# Patient Record
Sex: FEMALE | Race: BLACK OR AFRICAN AMERICAN | Employment: OTHER | URBAN - METROPOLITAN AREA
[De-identification: names, ages, dates, MRNs, and addresses within clinical notes are randomized per-mention and may not be internally consistent; named-entity substitution may affect disease eponyms.]

---

## 2017-01-04 ENCOUNTER — DOCUMENTATION ONLY (OUTPATIENT)
Dept: INTERNAL MEDICINE CLINIC | Age: 37
End: 2017-01-04

## 2017-01-04 NOTE — PROGRESS NOTES
NN chart review reveals pt is no longer being cared for by this practice. All active episodes will be closed.

## 2017-03-21 ENCOUNTER — HOSPITAL ENCOUNTER (EMERGENCY)
Age: 37
Discharge: HOME | End: 2017-03-21
Attending: INTERNAL MEDICINE
Payer: MEDICARE

## 2017-03-21 VITALS
DIASTOLIC BLOOD PRESSURE: 90 MMHG | SYSTOLIC BLOOD PRESSURE: 147 MMHG | OXYGEN SATURATION: 100 % | HEART RATE: 83 BPM | WEIGHT: 244.8 LBS | RESPIRATION RATE: 20 BRPM | HEIGHT: 64 IN | BODY MASS INDEX: 41.79 KG/M2 | TEMPERATURE: 98.4 F

## 2017-03-21 DIAGNOSIS — K21.00 GASTROESOPHAGEAL REFLUX DISEASE WITH ESOPHAGITIS: Primary | ICD-10-CM

## 2017-03-21 LAB — TROPONIN I BLD-MCNC: <0.04 NG/ML (ref 0–0.08)

## 2017-03-21 PROCEDURE — 74011000250 HC RX REV CODE- 250: Performed by: INTERNAL MEDICINE

## 2017-03-21 PROCEDURE — 99284 EMERGENCY DEPT VISIT MOD MDM: CPT

## 2017-03-21 PROCEDURE — 74011250637 HC RX REV CODE- 250/637: Performed by: INTERNAL MEDICINE

## 2017-03-21 PROCEDURE — 93005 ELECTROCARDIOGRAM TRACING: CPT

## 2017-03-21 PROCEDURE — 84484 ASSAY OF TROPONIN QUANT: CPT

## 2017-03-21 RX ORDER — RANITIDINE HCL 75 MG
75 TABLET ORAL
Qty: 10 TAB | Refills: 0 | Status: SHIPPED | OUTPATIENT
Start: 2017-03-21 | End: 2017-03-31

## 2017-03-21 RX ADMIN — PHENOBARBITAL ELIXIR 50 ML: 16.2; .1037; .0065; .0194 ELIXIR ORAL at 02:06

## 2017-03-21 NOTE — ED PROVIDER NOTES
HPI Comments: Claudetta Mathieu is a 42 y.o. female with history significant for HTN, DM, and asthma presenting ambulatory to [facility] ED with c/o sudden onset of abdominal pain. Per pt, she has been experiencing a moderate 5/10 burning sensation to her epigastric abdomen which radiates upwards diffusely into her chest. Pt reports history of GERD and believes it is acting up. Pt states her symptoms have been present for the past three days but were exacerbated with ingestion of a \"hot dog from -\" today. Pt notes she has attempted to take Omeprazole and Mylanta with little relief. Pt specifically denies any nausea, vomiting, fevers, chills, urinary complications, chest pain, or SOB. PCP: Vangie Atkins MD    PMHx: depression, hyperlipidemia   PSHx: tubal ligation  Social Hx: + EtOH; + Smoker; - Illicit Drugs    There are no other changes, complaints or physical findings at this time. The history is provided by the patient.       Past Medical History:   Diagnosis Date    Abnormal Pap smear     \"years ago\"     Asthma     bronchitis    Depression with anxiety 2010    Diabetes ([facility] [address]     Diabetes mellitus     type II diabetes mellitus since 2004    Essential hypertension     chronic hypertension    Gastrointestinal disorder     GERD    GERD (gastroesophageal reflux disease)     HTN (hypertension) 2010    Hypertension     Ill-defined condition     High Cholesterol    Other and unspecified hyperlipidemia 2010    Postpartum depression     hospitalized after last delivery    Psychiatric disorder     Depression, Bipolar, & Anxiety    Psychiatric problem     anxiety    Psychiatric problem     \"mood disorder\"    Rhinitis 2012     Past Surgical History:   Procedure Laterality Date     DELIVERY ONLY      cesearean section 08    HX  SECTION      X 3    HX GYN      , tubal ligation     Family History:   Problem Relation Age of Onset    Diabetes Mother    Akil Hernan Hypertension Mother     Stroke Mother     Heart Disease Mother     Hypertension Father     Stroke Father     Diabetes Father     Kidney Disease Father     Hypertension Maternal Grandmother     Diabetes Maternal Grandmother     Hypertension Maternal Grandfather     Diabetes Maternal Grandfather     Hypertension Paternal Grandmother     Diabetes Paternal [de-identified]     Hypertension Paternal Grandfather     Diabetes Paternal Grandfather     Hypertension Sister     Diabetes Sister     Diabetes Sister     Hypertension Sister     Diabetes Sister     Hypertension Sister      Social History     Social History    Marital status: SINGLE     Spouse name: N/A    Number of children: N/A    Years of education: N/A     Occupational History    Not on file. Social History Main Topics    Smoking status: Current Every Day Smoker     Packs/day: 0.50     Last attempt to quit: 10/16/2015    Smokeless tobacco: Never Used    Alcohol use No      Comment: Occasionally    Drug use: No    Sexual activity: Yes     Partners: Male     Birth control/ protection: Surgical     Other Topics Concern    Not on file     Social History Narrative    ** Merged History Encounter **          ALLERGIES: Latex; Latex; Other food; Flagyl [metronidazole]; Lisinopril-hydrochlorothiazide; Clindamycin; Ciprofloxacin; Citrate; Cymbalta [duloxetine]; Flexeril [cyclobenzaprine]; Pcn [penicillins]; Sulfa (sulfonamide antibiotics); Tomato; and Tomato    Review of Systems   Constitutional: Negative for chills, diaphoresis and fever. HENT: Negative for rhinorrhea and sore throat. Eyes: Negative for visual disturbance. Respiratory: Negative for cough and shortness of breath. Cardiovascular: Negative for chest pain. Gastrointestinal: Positive for abdominal pain. Negative for constipation, diarrhea, nausea and vomiting. Genitourinary: Negative for dysuria, frequency, hematuria and urgency.    Musculoskeletal: Negative for back pain.   Skin: Negative for wound. Neurological: Negative for syncope, numbness and headaches. Vitals:    03/21/17 0144   BP: 147/90   Pulse: 83   Resp: 20   Temp: 98.4 °F (36.9 °C)   SpO2: 100%   Weight: 111 kg (244 lb 12.8 oz)   Height: 5' 4\" (1.626 m)          Physical Exam   Constitutional: She is oriented to person, place, and time. She appears well-developed and well-nourished. No distress. HENT:   Head: Normocephalic and atraumatic. Eyes: EOM are normal. Pupils are equal, round, and reactive to light. Neck: Normal range of motion. Cardiovascular: Normal rate, regular rhythm and normal heart sounds. Exam reveals no gallop and no friction rub. No murmur heard. Pulmonary/Chest: Effort normal and breath sounds normal. No respiratory distress. She has no wheezes. She has no rales. She exhibits no tenderness. Abdominal: Soft. Bowel sounds are normal. She exhibits no distension and no mass. There is no tenderness. There is no rebound and no guarding. Musculoskeletal: Normal range of motion. She exhibits no edema, tenderness or deformity. Neurological: She is alert and oriented to person, place, and time. No cranial nerve deficit. Skin: Skin is warm. No rash noted. She is not diaphoretic. No erythema. Psychiatric: Her behavior is normal.   Nursing note and vitals reviewed. MDM  Number of Diagnoses or Management Options  Diagnosis management comments: DDx: reflux, esophagitis, MI       Amount and/or Complexity of Data Reviewed  Clinical lab tests: ordered and reviewed  Tests in the medicine section of CPT®: reviewed and ordered  Review and summarize past medical records: yes  Independent visualization of images, tracings, or specimens: yes    Patient Progress  Patient progress: stable    ED Course     Procedures     EKG interpretation: (Preliminary) 0200  Rhythm: normal sinus rhythm; and regular . Rate (approx.): 77;  Axis: normal; ND interval: normal; QRS interval: normal ; ST/T wave: non-specific T wave flattening; This note is prepared by Quiana San acting as Scribe for Tyra Damon MD    LABORATORY TESTS:  Recent Results (from the past 12 hour(s))   EKG, 12 LEAD, INITIAL    Collection Time: 03/21/17  2:02 AM   Result Value Ref Range    Ventricular Rate 77 BPM    Atrial Rate 77 BPM    P-R Interval 176 ms    QRS Duration 80 ms    Q-T Interval 386 ms    QTC Calculation (Bezet) 436 ms    Calculated P Axis 38 degrees    Calculated R Axis 24 degrees    Calculated T Axis 25 degrees    Diagnosis       Normal sinus rhythm  Nonspecific T wave abnormality  Abnormal ECG  When compared with ECG of 03-NOV-2016 23:31,  Nonspecific T wave abnormality now evident in Lateral leads     POC TROPONIN-I    Collection Time: 03/21/17  2:12 AM   Result Value Ref Range    Troponin-I (POC) <0.04 0.00 - 0.08 ng/mL     MEDICATIONS GIVEN:  Medications   mylanta/donnatal/viscous lidocaine (GI COCKTAIL) (50 mL Oral Given 3/21/17 0206)     IMPRESSION:  1. Gastroesophageal reflux disease with esophagitis      PLAN:  1. Discharge Medication List as of 3/21/2017  2:37 AM      START taking these medications    Details   raNITIdine (ZANTAC 75) 75 mg tablet Take 1 Tab by mouth nightly for 10 doses. , Print, Disp-10 Tab, R-0         CONTINUE these medications which have NOT CHANGED    Details   ondansetron hcl (ZOFRAN, AS HYDROCHLORIDE,) 4 mg tablet Take 1 Tab by mouth every eight (8) hours as needed for Nausea., Normal, Disp-10 Tab, R-0      metoclopramide HCl (REGLAN) 5 mg tablet Take 5 mg by mouth Before breakfast, lunch, and dinner., Historical Med      omeprazole (PRILOSEC) 20 mg capsule Historical Med      gabapentin (NEURONTIN) 100 mg capsule Take 100 mg by mouth three (3) times daily. , Historical Med           2.    Follow-up Information     Follow up With Details Comments [address], [place] In 2 days  [address]  [phone]          Return to ED if worse     DISCHARGE NOTE:    2:44 AM  The patient is ready for discharge. The patient signs, symptoms, diagnosis, and discharge instructions have been discussed and the patient has conveyed their understanding. The patient is to follow-up as reccommended or returned to the ER should their symptoms worsen. Plan has been discussed and patient is in agreement. This note is prepared by Erin Perea acting as Scribe for Rebekah Dillon MD.    Rebekah Dillon MD: This scribe's documentation has been prepared under my direction and personally reviewed by me in its entirety. I confirm that the note above accurately reflects all work, treatment procedures and medical decision makings performed by me.

## 2017-03-21 NOTE — DISCHARGE INSTRUCTIONS

## 2017-03-21 NOTE — ED NOTES
Patient (s)  given copy of dc instructions and 1 script(s). Patient(s)  verbalized understanding of instructions and script (s). Patient given a current medication reconciliation form and verbalized understanding of their medications. Patient (s) verbalized understanding of the importance of discussing medications with  his or her physician or clinic when they follow up. Patient alert and oriented and in no acute distress. Pt verbalizes pain scale of 0 out of 10. Patient discharged home ambulatory.

## 2017-03-23 LAB
ATRIAL RATE: 77 BPM
CALCULATED P AXIS, ECG09: 38 DEGREES
CALCULATED R AXIS, ECG10: 24 DEGREES
CALCULATED T AXIS, ECG11: 25 DEGREES
DIAGNOSIS, 93000: NORMAL
P-R INTERVAL, ECG05: 176 MS
Q-T INTERVAL, ECG07: 386 MS
QRS DURATION, ECG06: 80 MS
QTC CALCULATION (BEZET), ECG08: 436 MS
VENTRICULAR RATE, ECG03: 77 BPM

## 2017-06-09 ENCOUNTER — HOSPITAL ENCOUNTER (EMERGENCY)
Age: 37
Discharge: HOME | End: 2017-06-09
Attending: EMERGENCY MEDICINE
Payer: MEDICARE

## 2017-06-09 VITALS
WEIGHT: 245 LBS | TEMPERATURE: 98.7 F | RESPIRATION RATE: 18 BRPM | OXYGEN SATURATION: 99 % | BODY MASS INDEX: 41.83 KG/M2 | SYSTOLIC BLOOD PRESSURE: 166 MMHG | DIASTOLIC BLOOD PRESSURE: 85 MMHG | HEIGHT: 64 IN | HEART RATE: 87 BPM

## 2017-06-09 DIAGNOSIS — S40.862A INSECT BITE OF ARM, LEFT, INITIAL ENCOUNTER: Primary | ICD-10-CM
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
## 2017-06-09 PROCEDURE — 74011250637 HC RX REV CODE- 250/637: Performed by: PHYSICIAN ASSISTANT

## 2017-06-09 PROCEDURE — 99283 EMERGENCY DEPT VISIT LOW MDM: CPT

## 2017-06-09 RX ORDER — DIPHENHYDRAMINE HCL 25 MG
25 CAPSULE ORAL
Status: COMPLETED | OUTPATIENT
Start: 2017-06-09 | End: 2017-06-09

## 2017-06-09 RX ORDER — IBUPROFEN 400 MG/1
800 TABLET ORAL
Status: COMPLETED | OUTPATIENT
Start: 2017-06-09 | End: 2017-06-09

## 2017-06-09 RX ORDER — DIPHENHYDRAMINE HCL 50 MG
50 CAPSULE ORAL
Qty: 15 CAP | Refills: 0 | Status: SHIPPED | OUTPATIENT
Start: 2017-06-09 | End: 2017-06-19

## 2017-06-09 RX ORDER — IBUPROFEN 800 MG/1
800 TABLET ORAL
Qty: 20 TAB | Refills: 0 | Status: SHIPPED | OUTPATIENT
Start: 2017-06-09 | End: 2018-01-17

## 2017-06-09 RX ORDER — DOXYCYCLINE 100 MG/1
100 CAPSULE ORAL 2 TIMES DAILY
Qty: 14 CAP | Refills: 0 | Status: SHIPPED | OUTPATIENT
Start: 2017-06-09 | End: 2017-06-16

## 2017-06-09 RX ADMIN — IBUPROFEN 800 MG: 400 TABLET, FILM COATED ORAL at 09:39

## 2017-06-09 RX ADMIN — DIPHENHYDRAMINE HYDROCHLORIDE 25 MG: 25 CAPSULE ORAL at 09:39

## 2017-06-09 NOTE — DISCHARGE INSTRUCTIONS
Insect Stings and Bites: Care Instructions  Your Care Instructions  Stings and bites from bees, wasps, ants, and other insects often cause pain, swelling, redness, and itching. In some people, especially children, the redness and swelling may be worse. It may extend several inches beyond the affected area. But in most cases, stings and bites don't cause reactions all over the body. If you have had a reaction to an insect sting or bite, you are at risk for a reaction if you get stung or bitten again. Follow-up care is a key part of your treatment and safety. Be sure to make and go to all appointments, and call your doctor if you are having problems. It's also a good idea to know your test results and keep a list of the medicines you take. How can you care for yourself at home? · Do not scratch or rub the skin where the sting or bite occurred. · Put a cold pack or ice cube on the area. Put a thin cloth between the ice and your skin. For some people, a paste of baking soda mixed with a little water helps relieve pain and decrease the reaction. · Take an over-the-counter antihistamine, such as diphenhydramine (Benadryl) or loratadine (Claritin), to relieve swelling, redness, and itching. Calamine lotion or hydrocortisone cream may also help. Do not give antihistamines to your child unless you have checked with the doctor first.  · Be safe with medicines. If your doctor prescribed medicine for your allergy, take it exactly as prescribed. Call your doctor if you think you are having a problem with your medicine. You will get more details on the specific medicines your doctor prescribes. · Your doctor may prescribe a shot of epinephrine to carry with you in case you have a severe reaction. Learn how and when to give yourself the shot, and keep it with you at all times. Make sure it has not . · Go to the emergency room anytime you have a severe reaction.  Go even if you have given yourself epinephrine and are feeling better. Symptoms can come back. When should you call for help? Call 911 anytime you think you may need emergency care. For example, call if:  · You have symptoms of a severe allergic reaction. These may include:  ¨ Sudden raised, red areas (hives) all over your body. ¨ Swelling of the throat, mouth, lips, or tongue. ¨ Trouble breathing. ¨ Passing out (losing consciousness). Or you may feel very lightheaded or suddenly feel weak, confused, or restless. Call your doctor now or seek immediate medical care if:  · You have symptoms of an allergic reaction not right at the sting or bite, such as:  ¨ A rash or small area of hives (raised, red areas on the skin). ¨ Itching. ¨ Swelling. ¨ Belly pain, nausea, or vomiting. · You have a lot of swelling around the site (such as your entire arm or leg is swollen). · You have signs of infection, such as:  ¨ Increased pain, swelling, redness, or warmth around the sting. ¨ Red streaks leading from the area. ¨ Pus draining from the sting. ¨ A fever. Watch closely for changes in your health, and be sure to contact your doctor if:  · You do not get better as expected. Where can you learn more? Go to http://rosalino-jordan.info/. Enter P390 in the search box to learn more about \"Insect Stings and Bites: Care Instructions. \"  Current as of: July 28, 2016  Content Version: 11.2  © 1060-1171 [employer]. Care instructions adapted under license by [employer] (which disclaims liability or warranty for this information). If you have questions about a medical condition or this instruction, always ask your healthcare professional. [place] [address] any warranty or liability for your use of this information.

## 2017-06-09 NOTE — ED TRIAGE NOTES
Bite trevor with redness/swelling/pain noted on left upper arm area since yesterday, \"i woke up with it,\" denies recent injury/trauma

## 2017-06-09 NOTE — ED PROVIDER NOTES
Patient is a 42 y.o. female presenting with arm swelling. The history is provided by the patient. Arm swelling    This is a new problem. The current episode started yesterday (pt states she thinks she was bit by something and she started scratching the area but woke up this morning and it was swollen, painful, and \"feels like it has fever in it\"). The problem occurs constantly. The problem has been gradually worsening. The pain is present in the left arm. The quality of the pain is described as aching and constant. The pain is at a severity of 9/10. The pain is severe. Pertinent negatives include full range of motion. The symptoms are aggravated by palpation and movement. She has tried nothing for the symptoms. There has been no history of extremity trauma.         Past Medical History:   Diagnosis Date    Abnormal Pap smear     \"years ago\"     Asthma     bronchitis    Depression with anxiety 2010    Diabetes ([facility] [address]     Diabetes mellitus     type II diabetes mellitus since     Essential hypertension     chronic hypertension    Gastrointestinal disorder     GERD    GERD (gastroesophageal reflux disease)     HTN (hypertension) 2010    Hypertension     Ill-defined condition     High Cholesterol    Other and unspecified hyperlipidemia 2010    Postpartum depression     hospitalized after last delivery    Psychiatric disorder     Depression, Bipolar, & Anxiety    Psychiatric problem     anxiety    Psychiatric problem     \"mood disorder\"    Rhinitis 2012       Past Surgical History:   Procedure Laterality Date     DELIVERY ONLY      cesearean section 08    HX  SECTION      X 3    HX GYN      , tubal ligation         Family History:   Problem Relation Age of Onset    Diabetes Mother     Hypertension Mother     Stroke Mother     Heart Disease Mother     Hypertension Father     Stroke Father     Diabetes Father     Kidney Disease Father    Andrew Jacobmiryam Hypertension Maternal Grandmother     Diabetes Maternal Grandmother     Hypertension Maternal Grandfather     Diabetes Maternal Grandfather     Hypertension Paternal Grandmother     Diabetes Paternal [de-identified]     Hypertension Paternal Grandfather     Diabetes Paternal Grandfather     Hypertension Sister     Diabetes Sister     Diabetes Sister     Hypertension Sister     Diabetes Sister     Hypertension Sister        Social History     Social History    Marital status: SINGLE     Spouse name: N/A    Number of children: N/A    Years of education: N/A     Occupational History    Not on file. Social History Main Topics    Smoking status: Current Every Day Smoker     Packs/day: 0.50     Last attempt to quit: 10/16/2015    Smokeless tobacco: Never Used    Alcohol use No      Comment: Occasionally    Drug use: No    Sexual activity: Yes     Partners: Female     Other Topics Concern    Not on file     Social History Narrative    ** Merged History Encounter **              ALLERGIES: Latex; Latex; Other food; Flagyl [metronidazole]; Lisinopril-hydrochlorothiazide; Clindamycin; Ciprofloxacin; Citrate; Cymbalta [duloxetine]; Flexeril [cyclobenzaprine]; Pcn [penicillins]; Sulfa (sulfonamide antibiotics); Tomato; and Tomato    Review of Systems   Constitutional: Negative for fever. Musculoskeletal: Positive for myalgias. Skin: Positive for color change and rash. Neurological: Negative for speech difficulty and weakness. All other systems reviewed and are negative. Vitals:    06/09/17 0904 06/09/17 0909   BP: (!) 177/114 166/85   Pulse: 87    Resp: 18    Temp: 98.7 °F (37.1 °C)    SpO2: 100% 99%   Weight: 111.1 kg (245 lb)    Height: 5' 4\" (1.626 m)             Physical Exam   Constitutional: She is oriented to person, place, and time. She appears well-developed and well-nourished. No distress. HENT:   Head: Normocephalic and atraumatic.    Eyes: Conjunctivae are normal. Cardiovascular: Normal rate, regular rhythm and normal heart sounds. Pulmonary/Chest: Effort normal and breath sounds normal. No respiratory distress. She has no wheezes. She has no rales. Musculoskeletal:        Left upper arm: She exhibits tenderness and swelling (and erythema to the L. posterior upper arm, with induration, no fluctuance, minimal warmth to touch ). Arms:  Neurological: She is alert and oriented to person, place, and time. Skin: Skin is warm and dry. Psychiatric: She has a normal mood and affect. Her behavior is normal. Judgment and thought content normal.   Nursing note and vitals reviewed. MDM  Number of Diagnoses or Management Options  Insect bite of arm, left, initial encounter:   Diagnosis management comments: DDX: cellulitis, insect bite, allergic reaction    Progress note:  Discussed covering pt for abx.   Pt states she can only take doxy because she is allergic to a lot of stuff and that's the \"only thing that works for [facility]"    ED Course       Procedures

## 2017-06-09 NOTE — ED NOTES
Emergency Department Nursing Plan of Care       The Nursing Plan of Care is developed from the Nursing assessment and Emergency Department Attending provider initial evaluation. The plan of care may be reviewed in the ED Provider note.     The Plan of Care was developed with the following considerations:   Patient / Family readiness to learn indicated by:verbalized understanding  Persons(s) to be included in education: patient  Barriers to Learning/Limitations:No    Signed     Hiram Ramires RN    6/9/2017   9:20 AM

## 2017-07-29 ENCOUNTER — HOSPITAL ENCOUNTER (EMERGENCY)
Age: 37
Discharge: HOME | End: 2017-07-29
Attending: INTERNAL MEDICINE
Payer: MEDICARE

## 2017-07-29 VITALS
WEIGHT: 244 LBS | RESPIRATION RATE: 18 BRPM | SYSTOLIC BLOOD PRESSURE: 174 MMHG | HEART RATE: 85 BPM | BODY MASS INDEX: 43.23 KG/M2 | TEMPERATURE: 97.8 F | HEIGHT: 63 IN | DIASTOLIC BLOOD PRESSURE: 102 MMHG | OXYGEN SATURATION: 97 %

## 2017-07-29 DIAGNOSIS — M79.2 NEUROPATHIC PAIN OF LOWER EXTREMITY, LEFT: Primary | ICD-10-CM

## 2017-07-29 PROCEDURE — 99282 EMERGENCY DEPT VISIT SF MDM: CPT

## 2017-07-29 PROCEDURE — 74011250636 HC RX REV CODE- 250/636: Performed by: INTERNAL MEDICINE

## 2017-07-29 PROCEDURE — 96372 THER/PROPH/DIAG INJ SC/IM: CPT

## 2017-07-29 RX ORDER — KETOROLAC TROMETHAMINE 30 MG/ML
30 INJECTION, SOLUTION INTRAMUSCULAR; INTRAVENOUS
Status: COMPLETED | OUTPATIENT
Start: 2017-07-29 | End: 2017-07-29

## 2017-07-29 RX ORDER — TRAMADOL HYDROCHLORIDE 50 MG/1
50 TABLET ORAL
Qty: 8 TAB | Refills: 0 | Status: SHIPPED | OUTPATIENT
Start: 2017-07-29 | End: 2018-01-17

## 2017-07-29 RX ADMIN — KETOROLAC TROMETHAMINE 30 MG: 30 INJECTION, SOLUTION INTRAMUSCULAR at 03:33

## 2017-07-29 NOTE — ED TRIAGE NOTES
Pt arrived to ED ambulatory with c/o leg pain and hip pain X 2 weeks. Pt denies any injury or trauma to the area. Pt has tried OTC medications w/o relief. Pt is alert and orientated X 4; skin is intact; lungs are clear; pt breaths well on room air; Pt is in no acute distress. Will continue to monitor.

## 2017-07-29 NOTE — ED PROVIDER NOTES
HPI Comments: Margaret Dockery is a 42 y.o. female with significant PMHx of depression, anxiety, hypertension, and diabetes, presents ambulatory to the ED with c/o intermittent episodes of throbbing left hip pain x 2 weeks. She notes the pain radiates down her left leg and left foot numbness. She reports she takes 900 mg Neurontin a day for neuropathy of her feet. Pt denies injury/fall, chest pain, SOB, nausea, vomiting, any exacerbating/modifying factors. PCP: Surya Everett MD  Social Hx: + tobacco use (0.5 ppd), + EtOH use (occasionally), - Illicit drug use    There are no other complaints, changes or physical findings at this time. Written by Beth Mayo ED [employer], as dictated by Mukesh Castellanos MD.      The history is provided by the patient. No  was used.         Past Medical History:   Diagnosis Date    Abnormal Pap smear     \"years ago\"     Asthma     bronchitis    Depression with anxiety 2010    Diabetes ([facility] [address]     Diabetes mellitus     type II diabetes mellitus since     Essential hypertension     chronic hypertension    Gastrointestinal disorder     GERD    GERD (gastroesophageal reflux disease)     HTN (hypertension) 2010    Hypertension     Ill-defined condition     High Cholesterol    Other and unspecified hyperlipidemia 2010    Postpartum depression     hospitalized after last delivery    Psychiatric disorder     Depression, Bipolar, & Anxiety    Psychiatric problem     anxiety    Psychiatric problem     \"mood disorder\"    Rhinitis 2012       Past Surgical History:   Procedure Laterality Date     DELIVERY ONLY      cesearean section 08    HX  SECTION      X 3    HX GYN      , tubal ligation         Family History:   Problem Relation Age of Onset    Diabetes Mother     Hypertension Mother     Stroke Mother     Heart Disease Mother     Hypertension Father     Stroke Father     Diabetes Father    [facility] Kidney Disease Father     Hypertension Maternal Grandmother     Diabetes Maternal Grandmother     Hypertension Maternal Grandfather     Diabetes Maternal Grandfather     Hypertension Paternal Grandmother     Diabetes Paternal [de-identified]     Hypertension Paternal Grandfather     Diabetes Paternal Grandfather     Hypertension Sister     Diabetes Sister     Diabetes Sister     Hypertension Sister     Diabetes Sister     Hypertension Sister        Social History     Social History    Marital status: SINGLE     Spouse name: N/A    Number of children: N/A    Years of education: N/A     Occupational History    Not on file. Social History Main Topics    Smoking status: Current Every Day Smoker     Packs/day: 0.50     Last attempt to quit: 10/16/2015    Smokeless tobacco: Never Used    Alcohol use No      Comment: Occasionally    Drug use: No    Sexual activity: Yes     Partners: Female     Other Topics Concern    Not on file     Social History Narrative    ** Merged History Encounter **              ALLERGIES: Latex; Latex; Other food; Flagyl [metronidazole]; Lisinopril-hydrochlorothiazide; Clindamycin; Ciprofloxacin; Citrate; Cymbalta [duloxetine]; Flexeril [cyclobenzaprine]; Pcn [penicillins]; Sulfa (sulfonamide antibiotics); Tomato; and Tomato    Review of Systems   Constitutional: Negative. Negative for activity change, appetite change, chills, fatigue and fever. HENT: Negative for congestion, ear pain, rhinorrhea, sneezing, sore throat and voice change. Eyes: Negative. Negative for pain. Respiratory: Negative. Negative for cough and shortness of breath. Cardiovascular: Negative. Negative for chest pain. Gastrointestinal: Negative. Negative for abdominal pain, blood in stool, diarrhea, nausea and vomiting. Musculoskeletal: Positive for arthralgias and myalgias (left leg). Negative for back pain (left hip) and neck stiffness. Skin: Negative.   Negative for color change and rash.   Neurological: Negative. Negative for dizziness, syncope, numbness and headaches. Hematological: Negative for adenopathy. Vitals:    07/29/17 0257   BP: (!) 174/102   Pulse: 85   Resp: 18   Temp: 97.8 °F (36.6 °C)   SpO2: 97%   Weight: 110.7 kg (244 lb)   Height: 5' 3\" (1.6 m)            Physical Exam   Constitutional: She is oriented to person, place, and time. She appears well-developed and well-nourished. No distress. hypertension   HENT:   Head: Normocephalic and atraumatic. Mouth/Throat: Oropharynx is clear and moist. No oropharyngeal exudate. Eyes: Conjunctivae and EOM are normal. Pupils are equal, round, and reactive to light. Right eye exhibits no discharge. Left eye exhibits no discharge. Neck: Normal range of motion. Neck supple. Cardiovascular: Normal rate, regular rhythm and intact distal pulses. Exam reveals no gallop and no friction rub. No murmur heard. Pulmonary/Chest: Effort normal and breath sounds normal. No respiratory distress. She has no wheezes. She has no rales. She exhibits no tenderness. Abdominal: Soft. Bowel sounds are normal. She exhibits no distension and no mass. There is no tenderness. There is no rebound and no guarding. Musculoskeletal: Normal range of motion. She exhibits no edema. Lymphadenopathy:     She has no cervical adenopathy. Neurological: She is alert and oriented to person, place, and time. No cranial nerve deficit. Coordination normal.   Skin: Skin is warm and dry. No rash noted. No erythema. Psychiatric: She has a normal mood and affect. Vitals reviewed. MDM  Number of Diagnoses or Management Options  Diagnosis management comments:     DDx: diabetic neuropathy, DJD, fibromyalgia, myositis      Patient Progress  Patient progress: stable    ED Course       Procedures      MEDICATIONS GIVEN:  Medications   ketorolac (TORADOL) injection 30 mg (30 mg IntraMUSCular Given 7/29/17 8967)       IMPRESSION:  1.  Neuropathic pain of lower extremity, left        PLAN:  1. Discharge Medication List as of 7/29/2017  3:02 AM      START taking these medications    Details   traMADol (ULTRAM) 50 mg tablet Take 1 Tab by mouth every eight (8) hours as needed for Pain for up to 8 doses. Max Daily Amount: 150 mg., Print, Disp-8 Tab, R-0         CONTINUE these medications which have NOT CHANGED    Details   metoclopramide HCl (REGLAN) 5 mg tablet Take 5 mg by mouth Before breakfast, lunch, and dinner., Historical Med      gabapentin (NEURONTIN) 100 mg capsule Take 100 mg by mouth three (3) times daily. , Historical Med      ibuprofen (MOTRIN) 800 mg tablet Take 1 Tab by mouth every eight (8) hours as needed for Pain., Normal, Disp-20 Tab, R-0      ondansetron hcl (ZOFRAN, AS HYDROCHLORIDE,) 4 mg tablet Take 1 Tab by mouth every eight (8) hours as needed for Nausea., Normal, Disp-10 Tab, R-0      omeprazole (PRILOSEC) 20 mg capsule Historical Med           2. Follow-up Information     Follow up With Details Comments [address], [place] In 2 days  [address] [facility] [address] [phone]          Return to ED if worse     DISCHARGE NOTE  3:42 AM  The patient has been re-evaluated and is ready for discharge. Reviewed available results with patient. Counseled pt on diagnosis and care plan. Pt has expressed understanding, and all questions have been answered. Pt agrees with plan and agrees to F/U as recommended, or return to the ED if their sxs worsen. Discharge instructions have been provided and explained to the pt, along with reasons to return to the ED. Written by Nicole Wagner, ED Scribe, as dictated by Alexandr Fong MD.      This note is prepared by Nicole Wagner, acting as Scribe for Alexandr Fong MD.    Alexandr Fong MD\ : The scribe's documentation has been prepared under my direction and personally reviewed by me in its entirety.  I confirm that the note above accurately reflects all work, treatment, procedures, and medical decision making performed by me.

## 2017-07-29 NOTE — ED NOTES
Emergency Department Nursing Plan of Care       The Nursing Plan of Care is developed from the Nursing assessment and Emergency Department Attending provider initial evaluation. The plan of care may be reviewed in the ED Provider note.     The Plan of Care was developed with the following considerations:   Patient / Family readiness to learn indicated by:verbalized understanding  Persons(s) to be included in education: patient  Barriers to Learning/Limitations:No    Signed     Marina Combs RN    7/29/2017   3:43 AM

## 2017-11-14 ENCOUNTER — HOSPITAL ENCOUNTER (EMERGENCY)
Age: 37
Discharge: HOME | End: 2017-11-14
Attending: INTERNAL MEDICINE
Payer: MEDICARE

## 2017-11-14 VITALS
TEMPERATURE: 98.8 F | DIASTOLIC BLOOD PRESSURE: 99 MMHG | OXYGEN SATURATION: 95 % | SYSTOLIC BLOOD PRESSURE: 173 MMHG | HEART RATE: 86 BPM | RESPIRATION RATE: 20 BRPM

## 2017-11-14 DIAGNOSIS — J02.9 ACUTE PHARYNGITIS, UNSPECIFIED ETIOLOGY: Primary | ICD-10-CM

## 2017-11-14 LAB — DEPRECATED S PYO AG THROAT QL EIA: NEGATIVE

## 2017-11-14 PROCEDURE — 99283 EMERGENCY DEPT VISIT LOW MDM: CPT

## 2017-11-14 PROCEDURE — 87880 STREP A ASSAY W/OPTIC: CPT | Performed by: PHYSICIAN ASSISTANT

## 2017-11-14 PROCEDURE — 74011250637 HC RX REV CODE- 250/637: Performed by: PHYSICIAN ASSISTANT

## 2017-11-14 PROCEDURE — 87070 CULTURE OTHR SPECIMN AEROBIC: CPT | Performed by: INTERNAL MEDICINE

## 2017-11-14 PROCEDURE — 87147 CULTURE TYPE IMMUNOLOGIC: CPT | Performed by: INTERNAL MEDICINE

## 2017-11-14 RX ORDER — LOSARTAN POTASSIUM 25 MG/1
50 TABLET ORAL DAILY
COMMUNITY
End: 2022-03-15

## 2017-11-14 RX ORDER — GUAIFENESIN 100 MG/5ML
200 SOLUTION ORAL
Qty: 118 ML | Refills: 0 | Status: SHIPPED | OUTPATIENT
Start: 2017-11-14 | End: 2018-01-17

## 2017-11-14 RX ORDER — ACETAMINOPHEN 325 MG/1
650 TABLET ORAL
Qty: 20 TAB | Refills: 0 | Status: SHIPPED | OUTPATIENT
Start: 2017-11-14 | End: 2018-01-17

## 2017-11-14 RX ORDER — FLUTICASONE PROPIONATE 50 MCG
2 SPRAY, SUSPENSION (ML) NASAL DAILY
Qty: 1 BOTTLE | Refills: 0 | Status: SHIPPED | OUTPATIENT
Start: 2017-11-14 | End: 2018-01-17

## 2017-11-14 RX ORDER — ONDANSETRON 4 MG/1
4 TABLET, ORALLY DISINTEGRATING ORAL
Status: COMPLETED | OUTPATIENT
Start: 2017-11-14 | End: 2017-11-14

## 2017-11-14 RX ORDER — AZITHROMYCIN 500 MG/1
500 TABLET, FILM COATED ORAL SEE ADMIN INSTRUCTIONS
Qty: 10 TAB | Refills: 0 | Status: SHIPPED | OUTPATIENT
Start: 2017-11-14 | End: 2017-11-19

## 2017-11-14 RX ORDER — LORATADINE 10 MG/1
10 TABLET ORAL DAILY
Qty: 20 TAB | Refills: 0 | Status: SHIPPED | OUTPATIENT
Start: 2017-11-14 | End: 2018-03-29

## 2017-11-14 RX ADMIN — ONDANSETRON 4 MG: 4 TABLET, ORALLY DISINTEGRATING ORAL at 21:27

## 2017-11-15 NOTE — ED PROVIDER NOTES
Patient is a 42 y.o. female presenting with sore throat. The history is provided by the patient. Sore Throat    This is a new problem. The current episode started more than 2 days ago (x 4 days). The problem has not changed since onset. There has been no fever. Associated symptoms include congestion, headaches and cough. Pertinent negatives include no diarrhea, no vomiting, no drooling, no ear discharge, no ear pain, no plugged ear sensation, no shortness of breath, no stridor, no swollen glands, no trouble swallowing and no stiff neck. She has tried nothing for the symptoms.         Past Medical History:   Diagnosis Date    Abnormal Pap smear     \"years ago\"     Asthma     bronchitis    Depression with anxiety 2010    Diabetes ([facility] [address]     Diabetes mellitus     type II diabetes mellitus since     Essential hypertension     chronic hypertension    Gastrointestinal disorder     GERD    GERD (gastroesophageal reflux disease)     HTN (hypertension) 2010    Hypertension     Ill-defined condition     High Cholesterol    Other and unspecified hyperlipidemia 2010    Postpartum depression     hospitalized after last delivery    Psychiatric disorder     Depression, Bipolar, & Anxiety    Psychiatric problem     anxiety    Psychiatric problem     \"mood disorder\"    Rhinitis 2012       Past Surgical History:   Procedure Laterality Date     DELIVERY ONLY      cesearean section 08    HX  SECTION      X 3    HX GYN      , tubal ligation         Family History:   Problem Relation Age of Onset    Diabetes Mother     Hypertension Mother     Stroke Mother     Heart Disease Mother     Hypertension Father     Stroke Father     Diabetes Father     Kidney Disease Father     Hypertension Maternal Grandmother     Diabetes Maternal Grandmother     Hypertension Maternal Grandfather     Diabetes Maternal Grandfather     Hypertension Paternal Grandmother    [facility] Diabetes Paternal Grandmother     Hypertension Paternal Grandfather     Diabetes Paternal Grandfather     Hypertension Sister     Diabetes Sister     Diabetes Sister     Hypertension Sister     Diabetes Sister     Hypertension Sister        Social History     Social History    Marital status: SINGLE     Spouse name: N/A    Number of children: N/A    Years of education: N/A     Occupational History    Not on file. Social History Main Topics    Smoking status: Current Every Day Smoker     Packs/day: 0.50     Last attempt to quit: 10/16/2015    Smokeless tobacco: Never Used    Alcohol use No      Comment: Occasionally    Drug use: No    Sexual activity: Yes     Partners: Female     Other Topics Concern    Not on file     Social History Narrative    ** Merged History Encounter **              ALLERGIES: Latex; Latex; Other food; Flagyl [metronidazole]; Lisinopril-hydrochlorothiazide; Clindamycin; Ciprofloxacin; Citrate; Cottonseed oil; Cymbalta [duloxetine]; Flexeril [cyclobenzaprine]; Pcn [penicillins]; Sulfa (sulfonamide antibiotics); Tomato; and Tomato    Review of Systems   Constitutional: Negative for activity change, chills, fatigue and fever. HENT: Positive for congestion, rhinorrhea and sore throat. Negative for drooling, ear discharge, ear pain, facial swelling, postnasal drip, sinus pressure, sneezing and trouble swallowing. Eyes: Negative for photophobia, pain, discharge and visual disturbance. Respiratory: Positive for cough. Negative for chest tightness, shortness of breath and stridor. Cardiovascular: Negative for chest pain. Gastrointestinal: Positive for nausea. Negative for abdominal pain, constipation, diarrhea and vomiting. Genitourinary: Negative. Musculoskeletal: Negative. Negative for myalgias and neck pain. Skin: Negative. Negative for rash. Neurological: Positive for headaches.  Negative for dizziness, syncope, facial asymmetry, weakness and light-headedness. Psychiatric/Behavioral: Negative. Vitals:    11/14/17 2108   BP: (!) 173/99   Pulse: 86   Resp: 20   Temp: 98.8 °F (37.1 °C)   SpO2: 95%            Physical Exam   Constitutional: She is oriented to person, place, and time. She appears well-developed and well-nourished. No distress. Obese. HENT:   Head: Normocephalic and atraumatic. Right Ear: Hearing, tympanic membrane, external ear and ear canal normal.   Left Ear: Hearing, tympanic membrane, external ear and ear canal normal.   Nose: Mucosal edema and rhinorrhea present. Right sinus exhibits no maxillary sinus tenderness and no frontal sinus tenderness. Left sinus exhibits no maxillary sinus tenderness and no frontal sinus tenderness. Mouth/Throat: Uvula is midline and mucous membranes are normal. No oral lesions. No trismus in the jaw. No uvula swelling. Posterior oropharyngeal erythema present. No oropharyngeal exudate, posterior oropharyngeal edema or tonsillar abscesses. Eyes: Conjunctivae and EOM are normal. Pupils are equal, round, and reactive to light. Right eye exhibits no discharge. Left eye exhibits no discharge. No scleral icterus. Neck: Normal range of motion. Neck supple. Cardiovascular: Normal rate, regular rhythm, normal heart sounds and intact distal pulses. Exam reveals no gallop and no friction rub. No murmur heard. Pulmonary/Chest: Effort normal and breath sounds normal. No accessory muscle usage. No respiratory distress. She has no decreased breath sounds. She has no wheezes. She has no rhonchi. She has no rales. She exhibits no tenderness. Abdominal: Soft. Bowel sounds are normal. She exhibits no distension and no mass. There is no tenderness. There is no rebound and no guarding. Musculoskeletal: Normal range of motion. Lymphadenopathy:     She has no cervical adenopathy. Neurological: She is alert and oriented to person, place, and time. No cranial nerve deficit.    Skin: Skin is warm and dry. No rash noted. She is not diaphoretic. Psychiatric: She has a normal mood and affect. Her behavior is normal. Judgment and thought content normal.   Nursing note and vitals reviewed. MDM  Number of Diagnoses or Management Options  Acute pharyngitis, unspecified etiology:   Diagnosis management comments: DDx: strep, viral pharyngitis, uri, allergic rhinitis, bronchitis, pneumonia unlikely    LABORATORY TESTS:  Recent Results (from the past 12 hour(s))  -STREP AG SCREEN, GROUP A  Collection Time: 11/14/17  9:19 PM       Result                                            Value                         Ref Range                       Group A Strep Ag ID                               NEGATIVE                      NEG                          IMAGING RESULTS:  No orders to display    MEDICATIONS GIVEN:  Medications  ondansetron (ZOFRAN ODT) tablet 4 mg (4 mg Oral Given 11/14/17 2127)    IMPRESSION:  Acute pharyngitis, unspecified etiology  (primary encounter diagnosis)    PLAN:  1. Discharge Medication List as of 11/14/2017  9:38 PM    START taking these medications    azithromycin (ZITHROMAX) 500 mg tab  Take 1 Tab by mouth See Admin Instructions for 5 days. , Normal, Disp-10 Tab, R-0    acetaminophen (TYLENOL) 325 mg tablet  Take 2 Tabs by mouth every four (4) hours as needed for Pain., Normal, Disp-20 Tab, R-0    guaiFENesin (ROBITUSSIN) 100 mg/5 mL liquid  Take 10 mL by mouth three (3) times daily as needed for Cough., Normal, Disp-118 mL, R-0    fluticasone (FLONASE) 50 mcg/actuation nasal spray  2 Sprays by Both Nostrils route daily. , Normal, Disp-1 Bottle, R-0    loratadine (CLARITIN) 10 mg tablet  Take 1 Tab by mouth daily. , Normal, Disp-20 Tab, R-0      CONTINUE these medications which have NOT CHANGED    losartan (COZAAR) 25 mg tablet  Take 25 mg by mouth daily. , Historical Med    IRON,CARB/VIT C/VIT B12/FOLIC (IRON 628 PLUS PO)  Take  by mouth., Historical Med    metoclopramide HCl (REGLAN) 5 mg tablet  Take 5 mg by mouth Before breakfast, lunch, and dinner., Historical Med    omeprazole (PRILOSEC) 20 mg capsule  Historical Med    gabapentin (NEURONTIN) 100 mg capsule  Take 400 mg by mouth three (3) times daily. , Historical Med    traMADol (ULTRAM) 50 mg tablet  Take 1 Tab by mouth every eight (8) hours as needed for Pain for up to 8 doses. Max Daily Amount: 150 mg., Print, Disp-8 Tab, R-0    ibuprofen (MOTRIN) 800 mg tablet  Take 1 Tab by mouth every eight (8) hours as needed for Pain., Normal, Disp-20 Tab, R-0    ondansetron hcl (ZOFRAN, AS HYDROCHLORIDE,) 4 mg tablet  Take 1 Tab by mouth every eight (8) hours as needed for Nausea., Normal, Disp-10 Tab, R-0        2. Follow-up Information     Follow up With Details Comments [address], [place] Schedule an appointment as soon as possible for a   visit in 1 week As needed, If symptoms worsen [address]        Return to ED if worse                  Amount and/or Complexity of Data Reviewed  Clinical lab tests: ordered and reviewed  Tests in the medicine section of CPT®: ordered and reviewed    Patient Progress  Patient progress: stable    ED Course       Procedures    9:46 PM  I have discussed with patient their diagnosis, treatment, and follow up plan. The patient agrees to follow up as outlined in discharge paperwork and also to return to the ED with any worsening.  Jude Carmona PA-C

## 2017-11-15 NOTE — DISCHARGE INSTRUCTIONS
Sore Throat: Care Instructions  Your Care Instructions    Infection by bacteria or a virus causes most sore throats. Cigarette smoke, dry air, air pollution, allergies, and yelling can also cause a sore throat. Sore throats can be painful and annoying. Fortunately, most sore throats go away on their own. If you have a bacterial infection, your doctor may prescribe antibiotics. Follow-up care is a key part of your treatment and safety. Be sure to make and go to all appointments, and call your doctor if you are having problems. It's also a good idea to know your test results and keep a list of the medicines you take. How can you care for yourself at home? · If your doctor prescribed antibiotics, take them as directed. Do not stop taking them just because you feel better. You need to take the full course of antibiotics. · Gargle with warm salt water once an hour to help reduce swelling and relieve discomfort. Use 1 teaspoon of salt mixed in 1 cup of warm water. · Take an over-the-counter pain medicine, such as acetaminophen (Tylenol), ibuprofen (Advil, Motrin), or naproxen (Aleve). Read and follow all instructions on the label. · Be careful when taking over-the-counter cold or flu medicines and Tylenol at the same time. Many of these medicines have acetaminophen, which is Tylenol. Read the labels to make sure that you are not taking more than the recommended dose. Too much acetaminophen (Tylenol) can be harmful. · Drink plenty of fluids. Fluids may help soothe an irritated throat. Hot fluids, such as tea or soup, may help decrease throat pain. · Use over-the-counter throat lozenges to soothe pain. Regular cough drops or hard candy may also help. These should not be given to young children because of the risk of choking. · Do not smoke or allow others to smoke around you. If you need help quitting, talk to your doctor about stop-smoking programs and medicines.  These can increase your chances of quitting for good. · Use a vaporizer or humidifier to add moisture to your bedroom. Follow the directions for cleaning the machine. When should you call for help? Call your doctor now or seek immediate medical care if:  ? · You have new or worse trouble swallowing. ? · Your sore throat gets much worse on one side. ? Watch closely for changes in your health, and be sure to contact your doctor if you do not get better as expected. Where can you learn more? Go to http://rosalino-jordan.info/. Enter [phone] in the search box to learn more about \"Sore Throat: Care Instructions. \"  Current as of: May 12, 2017  Content Version: 11.4  © 7388-2410 [employer]. Care instructions adapted under license by [employer] (which disclaims liability or warranty for this information). If you have questions about a medical condition or this instruction, always ask your healthcare professional. [address] any warranty or liability for your use of this information.

## 2017-11-15 NOTE — ED NOTES
Patient  given copy of dc instructions and 5 script(s). Patient  verbalized understanding of instructions and script (s). Patient given a current medication reconciliation form and verbalized understanding of their medications. Patient verbalized understanding of the importance of discussing medications with  his or her physician or clinic they will be following up with. Patient alert and oriented and in no acute distress. Patient discharged home ambulatory.

## 2017-11-15 NOTE — ED NOTES
Pt arrived to ED with c/o sore throat X 4 days. Pt states daughter is sick as well. Pt denies any other symptoms. Pt is alert and orientated X 4; skin is intact; lungs are clear; pt breaths well on room air; Pt is in no acute distress. Will continue to monitor. See nursing assessment. Safety precautions in place; call light within reach. Emergency Department Nursing Plan of Care       The Nursing Plan of Care is developed from the Nursing assessment and Emergency Department Attending provider initial evaluation. The plan of care may be reviewed in the ED Provider note.     The Plan of Care was developed with the following considerations:   Patient / Family readiness to learn indicated by:verbalized understanding  Persons(s) to be included in education: patient  Barriers to Learning/Limitations:No    [address], RN    11/14/2017   9:16 PM

## 2017-11-17 LAB
BACTERIA SPEC CULT: ABNORMAL
BACTERIA SPEC CULT: ABNORMAL
SERVICE CMNT-IMP: ABNORMAL

## 2018-01-17 ENCOUNTER — APPOINTMENT (OUTPATIENT)
Dept: GENERAL RADIOLOGY | Age: 38
End: 2018-01-17
Attending: EMERGENCY MEDICINE
Payer: MEDICARE

## 2018-01-17 ENCOUNTER — HOSPITAL ENCOUNTER (EMERGENCY)
Age: 38
Discharge: HOME | End: 2018-01-17
Attending: EMERGENCY MEDICINE
Payer: MEDICARE

## 2018-01-17 VITALS
RESPIRATION RATE: 18 BRPM | HEART RATE: 87 BPM | BODY MASS INDEX: 43.94 KG/M2 | OXYGEN SATURATION: 100 % | SYSTOLIC BLOOD PRESSURE: 156 MMHG | HEIGHT: 63 IN | WEIGHT: 248 LBS | DIASTOLIC BLOOD PRESSURE: 97 MMHG | TEMPERATURE: 98 F

## 2018-01-17 DIAGNOSIS — J06.9 ACUTE UPPER RESPIRATORY INFECTION: Primary | ICD-10-CM

## 2018-01-17 DIAGNOSIS — M94.0 COSTOCHONDRITIS: ICD-10-CM

## 2018-01-17 LAB — HCG UR QL: NEGATIVE

## 2018-01-17 PROCEDURE — 74011000250 HC RX REV CODE- 250: Performed by: EMERGENCY MEDICINE

## 2018-01-17 PROCEDURE — 81025 URINE PREGNANCY TEST: CPT

## 2018-01-17 PROCEDURE — 93005 ELECTROCARDIOGRAM TRACING: CPT

## 2018-01-17 PROCEDURE — 74011250637 HC RX REV CODE- 250/637: Performed by: EMERGENCY MEDICINE

## 2018-01-17 PROCEDURE — 71046 X-RAY EXAM CHEST 2 VIEWS: CPT

## 2018-01-17 PROCEDURE — 99284 EMERGENCY DEPT VISIT MOD MDM: CPT

## 2018-01-17 RX ORDER — FLUTICASONE PROPIONATE 50 MCG
2 SPRAY, SUSPENSION (ML) NASAL DAILY
Qty: 1 BOTTLE | Refills: 0 | Status: SHIPPED | OUTPATIENT
Start: 2018-01-17 | End: 2018-03-29

## 2018-01-17 RX ORDER — CODEINE PHOSPHATE AND GUAIFENESIN 10; 100 MG/5ML; MG/5ML
5 SOLUTION ORAL
Qty: 120 ML | Refills: 0 | Status: SHIPPED | OUTPATIENT
Start: 2018-01-17 | End: 2018-03-29

## 2018-01-17 RX ADMIN — PHENOBARBITAL ELIXIR 50 ML: 16.2; .1037; .0065; .0194 ELIXIR ORAL at 11:42

## 2018-01-17 NOTE — ED PROVIDER NOTES
EMERGENCY DEPARTMENT HISTORY AND PHYSICAL EXAM      Date: 1/17/2018  Patient Name: Manisha Dixon    History of Presenting Illness     Chief Complaint   Patient presents with    Nasal Congestion     headache since yesterday    Chest Pain     intermittent sharp pains in chest since yesterday     History Provided By: Patient    HPI: Manisha Dixon, 42 y.o. female with PMHx significant for HTN, DM, and asthma, presents ambulatory to the ED with cc of gradually worsening diarrhea x 5 days, chest pain x one day, and sudden onset of congestion and cough this morning. Per pt, she has been having intermittent diarrhea for the past several days with no formed BM movements. Pt reports she was evaluated by her PCP for the diarrhea x several days and was advised to stop her Reglan and informed that her Metformin can also cause diarrhea. Aside from that, pt additionally informs of chest pain for the past several days with a moderate 8/10 intensity and an associated sharp, pressuring sensation to the left sided chest since yesterday with gradual worsening. Pt informs increased chest pain with deep inspiration. Pt notes associated chest tightness as well. Pt lastly informs of constant congestion upon waking up this morning alongside a minimal associated cough. Pt reports her cough is predominantly dry with mild, intermittent productivity and yellow-green mucus. Pt reports no other modifying factors. Pt reports recent sick contact. Pt specifically denies any fevers, chills, nausea, vomiting, abdominal pain, palpitations, wheezing, or SOB. PMHx: depression, anxiety   PSHx: tubal ligation   Social Hx: + EtOH; + Smoker; - Illicit Drugs    PCP: Swati Boykin MD    There are no other complaints, changes, or physical findings at this time. Current Outpatient Prescriptions   Medication Sig Dispense Refill    METFORMIN HCL (METFORMIN PO) Take  by mouth.       OTHER Indications: unknown medication for depression that begins with an \"L\"      fluticasone (FLONASE) 50 mcg/actuation nasal spray 2 Sprays by Both Nostrils route daily. 1 Bottle 0    guaiFENesin-codeine (ROBITUSSIN AC) 100-10 mg/5 mL solution Take 5 mL by mouth three (3) times daily as needed for Cough. Max Daily Amount: 15 mL. 120 mL 0    losartan (COZAAR) 25 mg tablet Take 25 mg by mouth daily.  IRON,CARB/VIT C/VIT B12/FOLIC (IRON 034 PLUS PO) Take  by mouth.  metoclopramide HCl (REGLAN) 5 mg tablet Take 5 mg by mouth Before breakfast, lunch, and dinner.  omeprazole (PRILOSEC) 20 mg capsule       gabapentin (NEURONTIN) 100 mg capsule Take 400 mg by mouth three (3) times daily.  loratadine (CLARITIN) 10 mg tablet Take 1 Tab by mouth daily.  21 Tab 0     Past History     Past Medical History:  Past Medical History:   Diagnosis Date    Abnormal Pap smear     \"years ago\"     Asthma     bronchitis    Depression with anxiety 2010    Diabetes ([facility] [address]     Diabetes mellitus     type II diabetes mellitus since     Essential hypertension     chronic hypertension    Gastrointestinal disorder     GERD    GERD (gastroesophageal reflux disease)     HTN (hypertension) 2010    Hypertension     Ill-defined condition     High Cholesterol    Other and unspecified hyperlipidemia 2010    Postpartum depression     hospitalized after last delivery    Psychiatric disorder     Depression, Bipolar, & Anxiety    Psychiatric problem     anxiety    Psychiatric problem     \"mood disorder\"    Rhinitis 2012     Past Surgical History:  Past Surgical History:   Procedure Laterality Date     DELIVERY ONLY      cesearean section 08    HX  SECTION      X 3    HX GYN      , tubal ligation     Family History:  Family History   Problem Relation Age of Onset    Diabetes Mother     Hypertension Mother     Stroke Mother     Heart Disease Mother     Hypertension Father     Stroke Father     Diabetes Father     Kidney Disease Father     Hypertension Maternal Grandmother     Diabetes Maternal Grandmother     Hypertension Maternal Grandfather     Diabetes Maternal Grandfather     Hypertension Paternal Grandmother     Diabetes Paternal Grandmother     Hypertension Paternal Grandfather     Diabetes Paternal Grandfather     Hypertension Sister     Diabetes Sister     Diabetes Sister     Hypertension Sister     Diabetes Sister     Hypertension Sister      Social History:  Social History   Substance Use Topics    Smoking status: Current Every Day Smoker     Packs/day: 0.50     Last attempt to quit: 10/16/2015    Smokeless tobacco: Never Used    Alcohol use No      Comment: Occasionally     Allergies: Allergies   Allergen Reactions    Latex Rash    Latex Hives    Other Food Hives     Citrus Fruits    Flagyl [Metronidazole] Hives and Itching    Lisinopril-Hydrochlorothiazide Angioedema    Clindamycin Rash and Itching    Ciprofloxacin Rash and Itching    Citrate Hives    Cottonseed Oil Rash    Cymbalta [Duloxetine] Hives and Itching    Flexeril [Cyclobenzaprine] Angioedema    Pcn [Penicillins] Hives    Sulfa (Sulfonamide Antibiotics) Shortness of Breath    Tomato Hives    Tomato Hives     Review of Systems   Review of Systems   Constitutional: Negative for chills and fever. HENT: Positive for congestion. Negative for rhinorrhea, sneezing and sore throat. Eyes: Negative for redness and visual disturbance. Respiratory: Positive for cough and chest tightness. Negative for shortness of breath. Cardiovascular: Positive for chest pain. Negative for palpitations and leg swelling. Gastrointestinal: Positive for diarrhea. Negative for abdominal pain, nausea and vomiting. Genitourinary: Negative for difficulty urinating and frequency. Musculoskeletal: Negative for back pain, myalgias and neck stiffness. Skin: Negative for rash. Neurological: Negative for dizziness, syncope, weakness and headaches. Hematological: Negative for adenopathy. All other systems reviewed and are negative. Physical Exam   Physical Exam   Constitutional: She is oriented to person, place, and time. She appears well-developed and well-nourished. Overweight    HENT:   Head: Normocephalic and atraumatic. Mouth/Throat: Oropharynx is clear and moist.   Eyes: Conjunctivae and EOM are normal.   Neck: Normal range of motion and full passive range of motion without pain. Neck supple. Cardiovascular: Normal rate, regular rhythm, S1 normal, S2 normal, normal heart sounds, intact distal pulses and normal pulses. No murmur heard. Pulmonary/Chest: Effort normal and breath sounds normal. No respiratory distress. She has no wheezes. She exhibits no tenderness. Bowel sounds on auscultation of chest    Abdominal: Soft. Normal appearance and bowel sounds are normal. She exhibits no distension. There is no tenderness. There is no rebound. Musculoskeletal: Normal range of motion. Neurological: She is alert and oriented to person, place, and time. She has normal strength. Skin: Skin is warm, dry and intact. No rash noted. Psychiatric: She has a normal mood and affect. Her speech is normal and behavior is normal. Judgment and thought content normal.   Nursing note and vitals reviewed.     Diagnostic Study Results     Labs -     Recent Results (from the past 12 hour(s))   EKG, 12 LEAD, INITIAL    Collection Time: 01/17/18 11:10 AM   Result Value Ref Range    Ventricular Rate 88 BPM    Atrial Rate 88 BPM    P-R Interval 158 ms    QRS Duration 78 ms    Q-T Interval 378 ms    QTC Calculation (Bezet) 457 ms    Calculated P Axis 45 degrees    Calculated R Axis 11 degrees    Calculated T Axis 7 degrees    Diagnosis       Normal sinus rhythm  Possible Left atrial enlargement  Left ventricular hypertrophy  Abnormal ECG  When compared with ECG of 21-MAR-2017 02:02,  No significant change was found     HCG URINE, QL. - POC    Collection Time: 01/17/18 11:31 AM   Result Value Ref Range    Pregnancy test,urine (POC) NEGATIVE  NEG       Radiologic Studies -   XR CHEST PA LAT   Final Result        CT Results  (Last 48 hours)    None        CXR Results  (Last 48 hours)               01/17/18 1201  XR CHEST PA LAT Final result    Impression:  IMPRESSION: No acute cardiopulmonary disease. Narrative: Indication:  Chest Pain, Cough, pneumonia? Exam: PA and lateral views of the chest.       Direct comparison is made to prior CXR dated 11/2016. Findings: Cardiomediastinal silhouette is within normal limits. Lungs are clear   bilaterally. Pleural spaces are normal. Osseous structures are intact. Medical Decision Making   I am the first provider for this patient. I reviewed the vital signs, available nursing notes, past medical history, past surgical history, family history and social history. Vital Signs-Reviewed the patient's vital signs. Patient Vitals for the past 12 hrs:   Temp Pulse Resp BP SpO2   01/17/18 1057 98 °F (36.7 °C) 87 18 (!) 156/97 100 %     Pulse Oximetry Analysis - 100% on RA    Cardiac Monitor:   Rate: 87 bpm  Rhythm: Normal Sinus Rhythm      EKG interpretation: (Preliminary) 1115  Rhythm: normal sinus rhythm; and regular . Rate (approx.): 88; Axis: normal; SD interval: normal; QRS interval: normal ; ST/T wave: normal; Other findings: left ventricular hypertrophy. Written by Merly Marina ED [employer], as dictated by Matthew Preston MD.    Records Reviewed: Nursing Notes and Old Medical Records    Provider Notes (Medical Decision Making):   DDx: GERD, viral illness, PNA, atypical chest pain     ED Course:   Initial assessment performed. The patients presenting problems have been discussed, and they are in agreement with the care plan formulated and outlined with them. I have encouraged them to ask questions as they arise throughout their visit.     TOBACCO COUNSELING:  Upon evaluation, pt expressed that they are a current tobacco user. Pt has been counseled on the dangers of smoking and was encouraged to quit as soon as possible in order to decrease further risks to their health. Pt has conveyed their understanding of the risks involved should they continue to use tobacco products. Disposition:  DISCHARGE NOTE:    12:11 PM  The patient is ready for discharge. The patient signs, symptoms, diagnosis, and discharge instructions have been discussed and the patient has conveyed their understanding. The patient is to follow-up as reccommended or returned to the ER should their symptoms worsen. Plan has been discussed and patient is in agreement. PLAN:  1. Current Discharge Medication List      START taking these medications    Details   fluticasone (FLONASE) 50 mcg/actuation nasal spray 2 Sprays by Both Nostrils route daily. Qty: 1 Bottle, Refills: 0    Associated Diagnoses: Acute upper respiratory infection      guaiFENesin-codeine (ROBITUSSIN AC) 100-10 mg/5 mL solution Take 5 mL by mouth three (3) times daily as needed for Cough. Max Daily Amount: 15 mL. Qty: 120 mL, Refills: 0    Associated Diagnoses: Acute upper respiratory infection           2. Follow-up Information     Follow up With Details Comments [address], [place] Schedule an appointment as soon as possible for a visit  [address]      [facility] EM[facility]  As needed, If symptoms worsen [address] [facility]  [phone]        Return to ED if worse     Diagnosis     Clinical Impression:   1. Acute upper respiratory infection    2. Costochondritis      Attestations: This note is prepared by Merly Marina, acting as Scribe for Sonny Damon MD.    Sonny Damon MD: The scribe's documentation has been prepared under my direction and personally reviewed by me in its entirety.  I confirm that the note above accurately reflects all work, treatment, procedures, and medical decision making performed by me.

## 2018-01-17 NOTE — ED NOTES
Assumed pt care for task only. Patient discharged to home at this time with self. Patient provided with written instructions and 1 prescriptions. All questions answered.

## 2018-01-17 NOTE — DISCHARGE INSTRUCTIONS
Upper Respiratory Infection (Cold): Care Instructions  Your Care Instructions    An upper respiratory infection, or URI, is an infection of the nose, sinuses, or throat. URIs are spread by coughs, sneezes, and direct contact. The common cold is the most frequent kind of URI. The flu and sinus infections are other kinds of URIs. Almost all URIs are caused by viruses. Antibiotics won't cure them. But you can treat most infections with home care. This may include drinking lots of fluids and taking over-the-counter pain medicine. You will probably feel better in 4 to 10 days. The doctor has checked you carefully, but problems can develop later. If you notice any problems or new symptoms, get medical treatment right away. Follow-up care is a key part of your treatment and safety. Be sure to make and go to all appointments, and call your doctor if you are having problems. It's also a good idea to know your test results and keep a list of the medicines you take. How can you care for yourself at home? · To prevent dehydration, drink plenty of fluids, enough so that your urine is light yellow or clear like water. Choose water and other caffeine-free clear liquids until you feel better. If you have kidney, heart, or liver disease and have to limit fluids, talk with your doctor before you increase the amount of fluids you drink. · Take an over-the-counter pain medicine, such as acetaminophen (Tylenol), ibuprofen (Advil, Motrin), or naproxen (Aleve). Read and follow all instructions on the label. · Before you use cough and cold medicines, check the label. These medicines may not be safe for young children or for people with certain health problems. · Be careful when taking over-the-counter cold or flu medicines and Tylenol at the same time. Many of these medicines have acetaminophen, which is Tylenol. Read the labels to make sure that you are not taking more than the recommended dose.  Too much acetaminophen (Tylenol) can be harmful. · Get plenty of rest.  · Do not smoke or allow others to smoke around you. If you need help quitting, talk to your doctor about stop-smoking programs and medicines. These can increase your chances of quitting for good. When should you call for help? Call 911 anytime you think you may need emergency care. For example, call if:  ? · You have severe trouble breathing. ?Call your doctor now or seek immediate medical care if:  ? · You seem to be getting much sicker. ? · You have new or worse trouble breathing. ? · You have a new or higher fever. ? · You have a new rash. ? Watch closely for changes in your health, and be sure to contact your doctor if:  ? · You have a new symptom, such as a sore throat, an earache, or sinus pain. ? · You cough more deeply or more often, especially if you notice more mucus or a change in the color of your mucus. ? · You do not get better as expected. Where can you learn more? Go to http://rosalino-jordan.info/. Enter V576 in the search box to learn more about \"Upper Respiratory Infection (Cold): Care Instructions. \"  Current as of: May 12, 2017  Content Version: 11.4  © 1941-4385 [employer]. Care instructions adapted under license by [employer] (which disclaims liability or warranty for this information). If you have questions about a medical condition or this instruction, always ask your healthcare professional. [place] [address] any warranty or liability for your use of this information. Viral Respiratory Infection: Care Instructions  Your Care Instructions    Viruses are very small organisms. They grow in number after they enter your body. There are many types that cause different illnesses, such as colds and the mumps. The symptoms of a viral respiratory infection often start quickly. They include a fever, sore throat, and runny nose. You may also just not feel well.  Or you may not want to eat much. Most viral respiratory infections are not serious. They usually get better with time and self-care. Antibiotics are not used to treat a viral infection. That's because antibiotics will not help cure a viral illness. In some cases, antiviral medicine can help your body fight a serious viral infection. Follow-up care is a key part of your treatment and safety. Be sure to make and go to all appointments, and call your doctor if you are having problems. It's also a good idea to know your test results and keep a list of the medicines you take. How can you care for yourself at home? · Rest as much as possible until you feel better. · Be safe with medicines. Take your medicine exactly as prescribed. Call your doctor if you think you are having a problem with your medicine. You will get more details on the specific medicine your doctor prescribes. · Take an over-the-counter pain medicine, such as acetaminophen (Tylenol), ibuprofen (Advil, Motrin), or naproxen (Aleve), as needed for pain and fever. Read and follow all instructions on the label. Do not give aspirin to anyone younger than 20. It has been linked to Reye syndrome, a serious illness. · Drink plenty of fluids, enough so that your urine is light yellow or clear like water. Hot fluids, such as tea or soup, may help relieve congestion in your nose and throat. If you have kidney, heart, or liver disease and have to limit fluids, talk with your doctor before you increase the amount of fluids you drink. · Try to clear mucus from your lungs by breathing deeply and coughing. · Gargle with warm salt water once an hour. This can help reduce swelling and throat pain. Use 1 teaspoon of salt mixed in 1 cup of warm water. · Do not smoke or allow others to smoke around you. If you need help quitting, talk to your doctor about stop-smoking programs and medicines. These can increase your chances of quitting for good.   To avoid spreading the virus  · Cough or sneeze into a tissue. Then throw the tissue away. · If you don't have a tissue, use your hand to cover your cough or sneeze. Then clean your hand. You can also cough into your sleeve. · Wash your hands often. Use soap and warm water. Wash for 15 to 20 seconds each time. · If you don't have soap and water near you, you can clean your hands with alcohol wipes or gel. When should you call for help? Call your doctor now or seek immediate medical care if:  ? · You have a new or higher fever. ? · Your fever lasts more than 48 hours. ? · You have trouble breathing. ? · You have a fever with a stiff neck or a severe headache. ? · You are sensitive to light. ? · You feel very sleepy or confused. ? Watch closely for changes in your health, and be sure to contact your doctor if:  ? · You do not get better as expected. Where can you learn more? Go to http://rosalino-jordan.info/. Enter N970 in the search box to learn more about \"Viral Respiratory Infection: Care Instructions. \"  Current as of: May 12, 2017  Content Version: 11.4  © 8695-4439 [employer]. Care instructions adapted under license by [employer] (which disclaims liability or warranty for this information). If you have questions about a medical condition or this instruction, always ask your healthcare professional. [place] [address] any warranty or liability for your use of this information.

## 2018-01-17 NOTE — ED TRIAGE NOTES
Pt reports intermittent sharp pains in right side of chest, onset yesterday, denies any injury, reports occasional cough with yellow/green mucus; also reports nasal congestion and headache since yesterday      Emergency Department Nursing Plan of Care       The Nursing Plan of Care is developed from the Nursing assessment and Emergency Department Attending provider initial evaluation. The plan of care may be reviewed in the ED Provider note.     The Plan of Care was developed with the following considerations:   Patient / Family readiness to learn indicated by:verbalized understanding  Persons(s) to be included in education: patient  Barriers to Learning/Limitations:No    Signed     Debra De Souza RN    1/17/2018   11:01 AM

## 2018-01-19 LAB
ATRIAL RATE: 88 BPM
CALCULATED P AXIS, ECG09: 45 DEGREES
CALCULATED R AXIS, ECG10: 11 DEGREES
CALCULATED T AXIS, ECG11: 7 DEGREES
DIAGNOSIS, 93000: NORMAL
P-R INTERVAL, ECG05: 158 MS
Q-T INTERVAL, ECG07: 378 MS
QRS DURATION, ECG06: 78 MS
QTC CALCULATION (BEZET), ECG08: 457 MS
VENTRICULAR RATE, ECG03: 88 BPM

## 2018-03-21 ENCOUNTER — HOSPITAL ENCOUNTER (EMERGENCY)
Age: 38
Discharge: HOME | End: 2018-03-21
Attending: EMERGENCY MEDICINE
Payer: MEDICARE

## 2018-03-21 VITALS
TEMPERATURE: 97.8 F | SYSTOLIC BLOOD PRESSURE: 151 MMHG | OXYGEN SATURATION: 99 % | HEIGHT: 63 IN | BODY MASS INDEX: 44.3 KG/M2 | WEIGHT: 250 LBS | RESPIRATION RATE: 19 BRPM | HEART RATE: 92 BPM | DIASTOLIC BLOOD PRESSURE: 97 MMHG

## 2018-03-21 DIAGNOSIS — S05.01XA ABRASION OF RIGHT CORNEA, INITIAL ENCOUNTER: Primary | ICD-10-CM

## 2018-03-21 PROCEDURE — 99282 EMERGENCY DEPT VISIT SF MDM: CPT

## 2018-03-21 RX ORDER — ERYTHROMYCIN 5 MG/G
OINTMENT OPHTHALMIC
Qty: 1 TUBE | Refills: 0 | Status: SHIPPED | OUTPATIENT
Start: 2018-03-21 | End: 2018-03-28

## 2018-03-21 NOTE — ED PROVIDER NOTES
EMERGENCY DEPARTMENT HISTORY AND PHYSICAL EXAM      Date: 3/21/2018  Patient Name: Michelle Goemz    History of Presenting Illness     Chief Complaint   Patient presents with    Eye Pain     right eye pain x 2 days, +irritation, denies itching, \"feels like something is in eye,\" denies recent injury. History Provided By: Patient    HPI: Michelle Gomez, 34 y.o. female with PMHx significant for HTN and DM, presents ambulatory to the ED with cc of constant right eye pain with concern for foreign body after walking outdoors while windy 2 days ago. Pt reports attempting to wash out the foreign body without success. She notes some intermittent blurred vision. Pt endorses wearing glasses and denies wearing contacts. She reports rubbing the eye, and notes increased watering of the right eye. Pt denies any other new symptoms at this time. PCP: Omar Davies MD    There are no other complaints, changes, or physical findings at this time. Current Facility-Administered Medications   Medication Dose Route Frequency Provider Last Rate Last Dose    fluorescein (FLU-SURINDER) 0.6 mg ophthalmic strip 1 Strip  1 Strip Both Eyes NOW Meghan Sifuentes MD         Current Outpatient Prescriptions   Medication Sig Dispense Refill    erythromycin (ILOTYCIN) ophthalmic ointment Apply 1 strip four times a day for next 4 days 1 Tube 0    METFORMIN HCL (METFORMIN PO) Take  by mouth.  OTHER Indications: unknown medication for depression that begins with an \"L\"      fluticasone (FLONASE) 50 mcg/actuation nasal spray 2 Sprays by Both Nostrils route daily. 1 Bottle 0    guaiFENesin-codeine (ROBITUSSIN AC) 100-10 mg/5 mL solution Take 5 mL by mouth three (3) times daily as needed for Cough. Max Daily Amount: 15 mL. 120 mL 0    losartan (COZAAR) 25 mg tablet Take 25 mg by mouth daily.  loratadine (CLARITIN) 10 mg tablet Take 1 Tab by mouth daily. 20 Tab 0    IRON,CARB/VIT C/VIT B12/FOLIC (IRON 720 PLUS PO) Take  by mouth.  metoclopramide HCl (REGLAN) 5 mg tablet Take 5 mg by mouth Before breakfast, lunch, and dinner.  omeprazole (PRILOSEC) 20 mg capsule       gabapentin (NEURONTIN) 100 mg capsule Take 400 mg by mouth three (3) times daily.          Past History     Past Medical History:  Past Medical History:   Diagnosis Date    Abnormal Pap smear     \"years ago\"     Asthma     bronchitis    Depression with anxiety 2010    Diabetes ([facility] [address]     Diabetes mellitus     type II diabetes mellitus since     Essential hypertension     chronic hypertension    Gastrointestinal disorder     GERD    GERD (gastroesophageal reflux disease)     HTN (hypertension) 2010    Hypertension     Ill-defined condition     High Cholesterol    Other and unspecified hyperlipidemia 2010    Postpartum depression     hospitalized after last delivery    Psychiatric disorder     Depression, Bipolar, & Anxiety    Psychiatric problem     anxiety    Psychiatric problem     \"mood disorder\"    Rhinitis 2012       Past Surgical History:  Past Surgical History:   Procedure Laterality Date     DELIVERY ONLY      cesearean section 08    HX  SECTION      X 3    HX GYN      , tubal ligation       Family History:  Family History   Problem Relation Age of Onset    Diabetes Mother     Hypertension Mother     Stroke Mother     Heart Disease Mother     Hypertension Father     Stroke Father     Diabetes Father     Kidney Disease Father     Hypertension Maternal Grandmother     Diabetes Maternal Grandmother     Hypertension Maternal Grandfather     Diabetes Maternal Grandfather     Hypertension Paternal Grandmother     Diabetes Paternal Grandmother     Hypertension Paternal Grandfather     Diabetes Paternal Grandfather     Hypertension Sister     Diabetes Sister     Diabetes Sister     Hypertension Sister     Diabetes Sister     Hypertension Sister        Social History:  Social History   Substance Use Topics    Smoking status: Current Every Day Smoker     Packs/day: 0.50     Last attempt to quit: 10/16/2015    Smokeless tobacco: Never Used    Alcohol use No      Comment: Occasionally       Allergies: Allergies   Allergen Reactions    Latex Rash    Latex Hives    Other Food Hives     Citrus Fruits    Flagyl [Metronidazole] Hives and Itching    Lisinopril-Hydrochlorothiazide Angioedema    Clindamycin Rash and Itching    Ciprofloxacin Rash and Itching    Citrate Hives    Cottonseed Oil Rash    Cymbalta [Duloxetine] Hives and Itching    Flexeril [Cyclobenzaprine] Angioedema    Pcn [Penicillins] Hives    Sulfa (Sulfonamide Antibiotics) Shortness of Breath    Tomato Hives    Tomato Hives         Review of Systems   Review of Systems   Constitutional: Negative. Negative for chills, diaphoresis and fever. HENT: Negative for congestion, sore throat and trouble swallowing. Eyes: Positive for pain. Negative for photophobia and redness. Respiratory: Positive for stridor. Negative for cough, chest tightness, shortness of breath and wheezing. Cardiovascular: Negative. Negative for chest pain and palpitations. Gastrointestinal: Negative. Negative for abdominal pain, blood in stool, diarrhea, nausea and vomiting. Genitourinary: Negative for difficulty urinating, dysuria and frequency. Musculoskeletal: Negative. Negative for arthralgias, myalgias, neck pain and neck stiffness. Skin: Negative. Neurological: Negative. Negative for dizziness, tremors, seizures, syncope, speech difficulty, light-headedness and headaches. Psychiatric/Behavioral: Negative. Negative for confusion. The patient is not nervous/anxious. All other systems reviewed and are negative. Physical Exam   Physical Exam   Constitutional: She is oriented to person, place, and time. She appears well-developed and well-nourished. HENT:   Head: Normocephalic and atraumatic.    Eyes: Conjunctivae and EOM are normal.   No obvious foreign body  Inverted right eyelid, no foreign body found  Normal peripheral vision  EOMI without difficulty   Neck: Normal range of motion. Neck supple. Cardiovascular: Normal rate and regular rhythm. Pulmonary/Chest: Effort normal and breath sounds normal. No respiratory distress. Abdominal: Soft. She exhibits no distension. There is no tenderness. Musculoskeletal: Normal range of motion. Neurological: She is alert and oriented to person, place, and time. Skin: Skin is warm and dry. Psychiatric: She has a normal mood and affect. Nursing note and vitals reviewed. Medical Decision Making   I am the first provider for this patient. I reviewed the vital signs, available nursing notes, past medical history, past surgical history, family history and social history. Vital Signs-Reviewed the patient's vital signs. Patient Vitals for the past 12 hrs:   Temp Pulse Resp BP SpO2   03/21/18 1252 97.8 °F (36.6 °C) 92 19 (!) 151/97 99 %       Records Reviewed: Nursing Notes and Old Medical Records    Provider Notes (Medical Decision Making):   Pt presents to ED with eye pain. Differential: corenal vs scleral abrasion, foreign body, conjunctivitis. No red flags for acute glaucoma or globe injury, retinal artery or vein occlusion. Will numb eye first and then do slit lamp to evaluate eye. ED Course:   Initial assessment performed. The patients presenting problems have been discussed, and they are in agreement with the care plan formulated and outlined with them. I have encouraged them to ask questions as they arise throughout their visit. 1:39 PM  Pyxis in the ER is down and currently being worked on. Patient does not want to wait for [facility] exam. Will empirically treat for corneal abrasion given story. Disposition:  DISCHARGE NOTE:  1:40 PM  The patient has been re-evaluated and is ready for discharge.  Reviewed available results with patient. Counseled patient on diagnosis and care plan. Patient has expressed understanding, and all questions have been answered. Patient agrees with plan and agrees to follow up as recommended, or return to the ED if their symptoms worsen. Discharge instructions have been provided and explained to the patient, along with reasons to return to the ED. PLAN:  1. Current Discharge Medication List      START taking these medications    Details   erythromycin (ILOTYCIN) ophthalmic ointment Apply 1 strip four times a day for next 4 days  Qty: 1 Tube, Refills: 0           2. Follow-up Information     Follow up With Details Comments [address], [place]  As needed [address] [facility]  [phone]          Return to ED if worse     Diagnosis     Clinical Impression:   1. Abrasion of right cornea, initial encounter        Attestations:    ATTESTATION:  This note is prepared by Karen De Leon, acting as Scribe for Liv Rome MD.     Liv Rome MD: The scribe's documentation has been prepared under my direction and personally reviewed by me in its entirety. I confirm that the note above accurately reflects all work, treatment, procedures, and medical decision making performed by me.

## 2018-03-21 NOTE — DISCHARGE INSTRUCTIONS
Corneal Scratches: Care Instructions  Your Care Instructions    The cornea is the clear surface that covers the front of the eye. When a speck of dirt, a wood chip, an insect, or another object flies into your eye, it can cause a painful scratch on the cornea. Wearing contact lenses too long or rubbing your eyes can also scratch the cornea. Small scratches usually heal in a day or two. Deeper scratches may take longer. If you have had a foreign object removed from your eye or you have a corneal scratch, you will need to watch for infection and vision problems while your eye heals. Follow-up care is a key part of your treatment and safety. Be sure to make and go to all appointments, and call your doctor if you are having problems. It's also a good idea to know your test results and keep a list of the medicines you take. How can you care for yourself at home? · The doctor probably used a medicine during your exam to numb your eye. When it wears off in 30 to 60 minutes, your eye pain may come back. Take pain medicines exactly as directed. ¨ If the doctor gave you a prescription medicine for pain, take it as prescribed. ¨ If you are not taking a prescription pain medicine, ask your doctor if you can take an over-the-counter medicine. ¨ Do not take two or more pain medicines at the same time unless the doctor told you to. Many pain medicines have acetaminophen, which is Tylenol. Too much acetaminophen (Tylenol) can be harmful. · Do not rub your injured eye. Rubbing can make it worse. · Use the prescribed eyedrops or ointment as directed. Be sure the dropper or bottle tip is clean. To put in eyedrops or ointment:  ¨ Tilt your head back, and pull your lower eyelid down with one finger. ¨ Drop or squirt the medicine inside the lower lid. ¨ Close your eye for 30 to 60 seconds to let the drops or ointment move around. ¨ Do not touch the ointment or dropper tip to your eyelashes or any other surface.   · Do not use your contact lens in your hurt eye until your doctor says you can. Also, do not wear eye makeup until your eye has healed. · Do not drive if you have blurred vision. · Bright light may hurt. Sunglasses can help. · To prevent eye injuries in the future, wear safety glasses or goggles when you work with machines or tools, mow the lawn, or ride a bike or motorcycle. When should you call for help? Call your doctor now or seek immediate medical care if:  ? · You have signs of an eye infection, such as:  ¨ Pus or thick discharge coming from the eye. ¨ Redness or swelling around the eye. ¨ A fever. ? · You have new or worse eye pain. ? · You have vision changes. ? · It feels like there is something in your eye. ? · Light hurts your eye. ? Watch closely for changes in your health, and be sure to contact your doctor if:  ? · You do not get better as expected. Where can you learn more? Go to http://rosalino-jordan.info/. Enter C576 in the search box to learn more about \"Corneal Scratches: Care Instructions. \"  Current as of: March 3, 2017  Content Version: 11.4  © 5089-7452 Healthwise[employer]. Care instructions adapted under license by [employer] (which disclaims liability or warranty for this information). If you have questions about a medical condition or this instruction, always ask your healthcare professional. [place] [address] any warranty or liability for your use of this information.

## 2018-03-21 NOTE — ED NOTES
Patient here with c/o right eye pain x2 days. Patient states \"I took my glasses off when it was windy and it felt like something flew into my eye. I've washed my eye out and used ClearEyes but nothing has helped. \"  Patient reports only clear watery drainage. Patient reports being able to see from right eye but states \"its a little blurry\". Patient denies fevers. Denies redness in eye. Emergency Department Nursing Plan of Care       The Nursing Plan of Care is developed from the Nursing assessment and Emergency Department Attending provider initial evaluation. The plan of care may be reviewed in the ED Provider note.     The Plan of Care was developed with the following considerations:   Patient / Family readiness to learn indicated by:verbalized understanding  Persons(s) to be included in education: patient  Barriers to Learning/Limitations:No    Signed     Jas Armenta RN    3/21/2018   2:03 PM

## 2018-03-29 ENCOUNTER — HOSPITAL ENCOUNTER (EMERGENCY)
Age: 38
Discharge: HOME | End: 2018-03-30
Attending: EMERGENCY MEDICINE
Payer: MEDICARE

## 2018-03-29 VITALS
OXYGEN SATURATION: 100 % | HEART RATE: 83 BPM | SYSTOLIC BLOOD PRESSURE: 152 MMHG | DIASTOLIC BLOOD PRESSURE: 90 MMHG | RESPIRATION RATE: 18 BRPM | WEIGHT: 243.5 LBS | BODY MASS INDEX: 43.14 KG/M2 | HEIGHT: 63 IN | TEMPERATURE: 98.2 F

## 2018-03-29 DIAGNOSIS — H92.01 EAR PAIN, RIGHT: Primary | ICD-10-CM

## 2018-03-29 PROCEDURE — 99282 EMERGENCY DEPT VISIT SF MDM: CPT

## 2018-03-30 RX ORDER — DOXYCYCLINE HYCLATE 100 MG
100 TABLET ORAL 2 TIMES DAILY
Qty: 20 TAB | Refills: 0 | Status: SHIPPED | OUTPATIENT
Start: 2018-03-30 | End: 2018-04-09

## 2018-03-30 NOTE — DISCHARGE INSTRUCTIONS
Earache: Care Instructions  Your Care Instructions    Even though infection is a common cause of ear pain, not all ear pain means an infection. If you have ear pain and don't have an infection, it could be because of a jaw problem, such as temporomandibular joint (TMJ) pain. Or it could be because of a neck problem. When ear discomfort or pain is mild or comes and goes without other symptoms, home treatment may be all you need. Follow-up care is a key part of your treatment and safety. Be sure to make and go to all appointments, and call your doctor if you are having problems. It's also a good idea to know your test results and keep a list of the medicines you take. How can you care for yourself at home? · Apply heat on the ear to ease pain. To apply heat, put a warm water bottle, a heating pad set on low, or a warm cloth on your ear. Do not go to sleep with a heating pad on your skin. · Take an over-the-counter pain medicine, such as acetaminophen (Tylenol), ibuprofen (Advil, Motrin), or naproxen (Aleve). Be safe with medicines. Read and follow all instructions on the label. · Do not take two or more pain medicines at the same time unless the doctor told you to. Many pain medicines have acetaminophen, which is Tylenol. Too much acetaminophen (Tylenol) can be harmful. · Never insert anything, such as a cotton swab or a valeriano pin, into the ear. When should you call for help? Call your doctor now or seek immediate medical care if:  ? · You have new or worse symptoms of infection, such as:  ¨ Increased pain, swelling, warmth, or redness. ¨ Red streaks leading from the area. ¨ Pus draining from the area. ¨ A fever. ? Watch closely for changes in your health, and be sure to contact your doctor if:  ? · You have new or worse discharge coming from the ear. ? · You do not get better as expected. Where can you learn more? Go to http://rosalino-jordan.info/.   Enter A403 in the search box to learn more about \"Earache: Care Instructions. \"  Current as of: May 12, 2017  Content Version: 11.4  © 8844-2263 Healthwise, [employer]. Care instructions adapted under license by [employer] (which disclaims liability or warranty for this information). If you have questions about a medical condition or this instruction, always ask your healthcare professional. [address] any warranty or liability for your use of this information.

## 2018-03-30 NOTE — ED NOTES
Pt presents ambulatory to ED complaining of bilateral ear pain for the past day. Pt has not tried any medication for her pain. Pt offered a hot pack for comfort, she declined. Pt is alert and oriented x 4, RR even and unlabored, skin is warm and dry. Assesment completed and pt updated on plan of care. Emergency Department Nursing Plan of Care       The Nursing Plan of Care is developed from the Nursing assessment and Emergency Department Attending provider initial evaluation. The plan of care may be reviewed in the ED Provider note.     The Plan of Care was developed with the following considerations:   Patient / Family readiness to learn indicated by:verbalized understanding  Persons(s) to be included in education: patient  Barriers to Learning/Limitations:No    Signed     Jodee Sanchez RN    3/29/2018   10:59 PM

## 2018-03-30 NOTE — ED NOTES
Discharge instructions were given to the patient by Yoshi Monzon RN. The patient left the Emergency Department ambulatory, alert and oriented and in no acute distress with 1 prescription. The patient was encouraged to call or return to the ED for worsening issues or problems and was encouraged to schedule a follow up appointment for continuing care. The patient verbalized understanding of discharge instructions and prescriptions, all questions were answered. The patient has no further concerns at this time.

## 2018-03-30 NOTE — ED PROVIDER NOTES
EMERGENCY DEPARTMENT HISTORY AND PHYSICAL EXAM      Date: 3/29/2018  Patient Name: Brandon Medina    History of Presenting Illness     Chief Complaint   Patient presents with    Ear Pain     x 1day bilateral ear pain rt hurting more than left       History Provided By: Patient    HPI: Brandon Medina, 34 y.o. female with PMHx significant for HTN, depression, anxiety, HLD, rhinitis, asthma, bipolar disorder, DM, GERD who presents ambulatory to the ED with cc of gradually worsening bilateral ear pain that onset yesterday. Pt reports associated productive cough with brown to yellow sputum and intermittent nasal congestion. She reports taking tylenol with little relief of her symptoms. Pt reports a hx of ear infections but states that her last ear infection occurred years ago. She denies any sore throat, nausea, vomiting, abdominal pain, facial swelling, ear drainage, HA, fevers, or chills. + tobacco use (0.5 ppd), - EtOH use, - Illicit drug use    PCP: Gabriella Montanez MD    There are no other complaints, changes, or physical findings at this time. Current Outpatient Prescriptions   Medication Sig Dispense Refill    doxycycline (VIBRA-TABS) 100 mg tablet Take 1 Tab by mouth two (2) times a day for 10 days. 20 Tab 0    doxycycline (VIBRA-TABS) 100 mg tablet Take 1 Tab by mouth two (2) times a day for 10 days. 20 Tab 0    METFORMIN HCL (METFORMIN PO) Take  by mouth.  OTHER Indications: unknown medication for depression that begins with an \"L\"      losartan (COZAAR) 25 mg tablet Take 25 mg by mouth daily.  IRON,CARB/VIT C/VIT B12/FOLIC (IRON 688 PLUS PO) Take  by mouth.  metoclopramide HCl (REGLAN) 5 mg tablet Take 5 mg by mouth Before breakfast, lunch, and dinner.  omeprazole (PRILOSEC) 20 mg capsule       gabapentin (NEURONTIN) 100 mg capsule Take 400 mg by mouth three (3) times daily.          Past History     Past Medical History:  Past Medical History:   Diagnosis Date    Abnormal Pap smear     \"years ago\"     Asthma     bronchitis    Depression with anxiety 2010    Diabetes mellitus     type II diabetes mellitus since     GERD (gastroesophageal reflux disease)     HTN (hypertension) 2010    Ill-defined condition     High Cholesterol    Other and unspecified hyperlipidemia 2010    Postpartum depression     hospitalized after last delivery    Psychiatric disorder     Bipolar    Psychiatric problem     \"mood disorder\"    Rhinitis 2012       Past Surgical History:  Past Surgical History:   Procedure Laterality Date     DELIVERY ONLY      cesearean section 08    HX  SECTION      X 3    HX GYN      , tubal ligation       Family History:  Family History   Problem Relation Age of Onset    Diabetes Mother     Hypertension Mother     Stroke Mother     Heart Disease Mother     Hypertension Father     Stroke Father     Diabetes Father     Kidney Disease Father     Hypertension Maternal Grandmother     Diabetes Maternal Grandmother     Hypertension Maternal Grandfather     Diabetes Maternal Grandfather     Hypertension Paternal Grandmother     Diabetes Paternal Grandmother     Hypertension Paternal Grandfather     Diabetes Paternal Grandfather     Hypertension Sister     Diabetes Sister     Diabetes Sister     Hypertension Sister     Diabetes Sister     Hypertension Sister        Social History:  Social History   Substance Use Topics    Smoking status: Current Every Day Smoker     Packs/day: 0.50     Last attempt to quit: 10/16/2015    Smokeless tobacco: Never Used    Alcohol use No      Comment: Occasionally       Allergies:   Allergies   Allergen Reactions    Latex Rash    Latex Hives    Other Food Hives     Citrus Fruits    Flagyl [Metronidazole] Hives and Itching    Lisinopril-Hydrochlorothiazide Angioedema    Clindamycin Rash and Itching    Ciprofloxacin Rash and Itching    Citrate Hives    Cottonseed Oil Rash    Cymbalta [Duloxetine] Hives and Itching    Flexeril [Cyclobenzaprine] Angioedema    Pcn [Penicillins] Hives    Sulfa (Sulfonamide Antibiotics) Shortness of Breath    Tomato Hives    Tomato Hives         Review of Systems   Review of Systems   Constitutional: Negative for appetite change, chills, diaphoresis, fatigue and fever. HENT: Positive for ear pain (bilateral). Negative for ear discharge, facial swelling, sore throat and trouble swallowing. Respiratory: Negative for cough and shortness of breath. Cardiovascular: Negative for chest pain. Gastrointestinal: Negative for abdominal pain, diarrhea, nausea and vomiting. Genitourinary: Negative for dysuria and frequency. Musculoskeletal: Negative for arthralgias, back pain and myalgias. Skin: Negative for color change and rash. Neurological: Negative for weakness, numbness and headaches. Hematological: Does not bruise/bleed easily. All other systems reviewed and are negative. Physical Exam   Physical Exam   Constitutional: She is oriented to person, place, and time. She appears well-developed and well-nourished. No distress. HENT:   Head: Normocephalic and atraumatic. Right Ear: Tympanic membrane is bulging. Tympanic membrane is not erythematous. Left Ear: Tympanic membrane normal.   Mouth/Throat: Oropharynx is clear and moist. No oropharyngeal exudate or posterior oropharyngeal erythema. Neck: Normal range of motion and full passive range of motion without pain. Neck supple. Cardiovascular: Normal rate, regular rhythm, normal heart sounds, intact distal pulses and normal pulses. Exam reveals no gallop and no friction rub. No murmur heard. Pulmonary/Chest: Effort normal and breath sounds normal. No accessory muscle usage. No respiratory distress. She has no decreased breath sounds. She has no wheezes. She has no rhonchi. She has no rales. Abdominal: Soft. Bowel sounds are normal. She exhibits no distension. There is no tenderness. There is no rebound, no guarding and no CVA tenderness. Musculoskeletal: Normal range of motion. She exhibits no edema or tenderness. Thoracic back: She exhibits no tenderness and no bony tenderness. Lumbar back: She exhibits no tenderness and no bony tenderness. Lymphadenopathy:     She has no cervical adenopathy. Neurological: She is alert and oriented to person, place, and time. She has normal strength. She is not disoriented. No cranial nerve deficit or sensory deficit. No focal deficits; 5/5 muscle strength in all extremities   Skin: Skin is warm. No lesion and no rash noted. Rash is not nodular. She is not diaphoretic. Nursing note and vitals reviewed. Medical Decision Making   I am the first provider for this patient. I reviewed the vital signs, available nursing notes, past medical history, past surgical history, family history and social history. Vital Signs-Reviewed the patient's vital signs. Patient Vitals for the past 12 hrs:   Temp Pulse Resp BP SpO2   03/29/18 2238 98.2 °F (36.8 °C) 83 18 152/90 100 %     Records Reviewed: Nursing Notes and Old Medical Records    Provider Notes (Medical Decision Making):     Pissoble AOM, viral illness, pharyngitis. Unlikely PNA. ED Course:   Initial assessment performed. The patients presenting problems have been discussed, and they are in agreement with the care plan formulated and outlined with them. I have encouraged them to ask questions as they arise throughout their visit. Disposition:    DISCHARGE NOTE  12:18 AM  The patient has been re-evaluated and is ready for discharge. Reviewed available results with patient. Counseled patient on diagnosis and care plan. Patient has expressed understanding, and all questions have been answered. Patient agrees with plan and agrees to follow up as recommended, or return to the ED if their symptoms worsen.  Discharge instructions have been provided and explained to the patient, along with reasons to return to the ED. PLAN:  1. Discharge Medication List as of 3/30/2018 12:25 AM        2. Follow-up Information     Follow up With Details Comments [address], [place]   [address] [phone]          Return to ED if worse     Diagnosis     Clinical Impression:   1. Ear pain, right        Attestations: This note is prepared by Tamara Bermudez, acting as Scribe for Riley Choe MD.    Riley Choe MD: The scribe's documentation has been prepared under my direction and personally reviewed by me in its entirety. I confirm that the note above accurately reflects all work, treatment, procedures, and medical decision making performed by me.

## 2018-04-23 ENCOUNTER — HOSPITAL ENCOUNTER (INPATIENT)
Age: 38
LOS: 2 days | Discharge: HOME | DRG: 881 | End: 2018-04-25
Attending: EMERGENCY MEDICINE
Payer: MEDICARE

## 2018-04-23 DIAGNOSIS — F41.8 ANXIETY ASSOCIATED WITH DEPRESSION: ICD-10-CM

## 2018-04-23 DIAGNOSIS — F19.10 SUBSTANCE ABUSE (HCC): ICD-10-CM

## 2018-04-23 DIAGNOSIS — E11.9 TYPE 2 DIABETES MELLITUS WITHOUT COMPLICATION, UNSPECIFIED WHETHER LONG TERM INSULIN USE (HCC): ICD-10-CM

## 2018-04-23 DIAGNOSIS — F32.89 OTHER DEPRESSION: Primary | ICD-10-CM

## 2018-04-23 DIAGNOSIS — I10 HYPERTENSION, UNSPECIFIED TYPE: ICD-10-CM

## 2018-04-23 PROBLEM — F32.A DEPRESSION: Status: ACTIVE | Noted: 2018-04-23

## 2018-04-23 LAB
ALBUMIN SERPL-MCNC: 3.8 G/DL (ref 3.5–5)
ALBUMIN/GLOB SERPL: 1.1 {RATIO} (ref 1.1–2.2)
ALP SERPL-CCNC: 87 U/L (ref 45–117)
ALT SERPL-CCNC: 29 U/L (ref 12–78)
AMPHET UR QL SCN: NEGATIVE
ANION GAP SERPL CALC-SCNC: 9 MMOL/L (ref 5–15)
APPEARANCE UR: ABNORMAL
AST SERPL-CCNC: 20 U/L (ref 15–37)
BACTERIA URNS QL MICRO: ABNORMAL /HPF
BARBITURATES UR QL SCN: NEGATIVE
BASOPHILS # BLD: 0.1 K/UL (ref 0–0.1)
BASOPHILS NFR BLD: 1 % (ref 0–1)
BENZODIAZ UR QL: NEGATIVE
BILIRUB SERPL-MCNC: 0.2 MG/DL (ref 0.2–1)
BILIRUB UR QL: NEGATIVE
BUN SERPL-MCNC: 9 MG/DL (ref 6–20)
BUN/CREAT SERPL: 8 (ref 12–20)
CALCIUM SERPL-MCNC: 8.6 MG/DL (ref 8.5–10.1)
CANNABINOIDS UR QL SCN: POSITIVE
CHLORIDE SERPL-SCNC: 103 MMOL/L (ref 97–108)
CO2 SERPL-SCNC: 26 MMOL/L (ref 21–32)
COCAINE UR QL SCN: NEGATIVE
COLOR UR: ABNORMAL
CREAT SERPL-MCNC: 1.07 MG/DL (ref 0.55–1.02)
DIFFERENTIAL METHOD BLD: ABNORMAL
DRUG SCRN COMMENT,DRGCM: ABNORMAL
EOSINOPHIL # BLD: 0.1 K/UL (ref 0–0.4)
EOSINOPHIL NFR BLD: 1 % (ref 0–7)
EPITH CASTS URNS QL MICRO: ABNORMAL /LPF
ERYTHROCYTE [DISTWIDTH] IN BLOOD BY AUTOMATED COUNT: 14.7 % (ref 11.5–14.5)
ETHANOL SERPL-MCNC: <10 MG/DL
GLOBULIN SER CALC-MCNC: 3.4 G/DL (ref 2–4)
GLUCOSE BLD STRIP.AUTO-MCNC: 152 MG/DL (ref 65–100)
GLUCOSE SERPL-MCNC: 157 MG/DL (ref 65–100)
GLUCOSE UR STRIP.AUTO-MCNC: NEGATIVE MG/DL
HCG UR QL: NEGATIVE
HCT VFR BLD AUTO: 34.4 % (ref 35–47)
HGB BLD-MCNC: 11.3 G/DL (ref 11.5–16)
HGB UR QL STRIP: ABNORMAL
IMM GRANULOCYTES # BLD: 0 K/UL (ref 0–0.04)
IMM GRANULOCYTES NFR BLD AUTO: 0 % (ref 0–0.5)
KETONES UR QL STRIP.AUTO: NEGATIVE MG/DL
LEUKOCYTE ESTERASE UR QL STRIP.AUTO: ABNORMAL
LYMPHOCYTES # BLD: 2.1 K/UL (ref 0.8–3.5)
LYMPHOCYTES NFR BLD: 26 % (ref 12–49)
MCH RBC QN AUTO: 26.4 PG (ref 26–34)
MCHC RBC AUTO-ENTMCNC: 32.8 G/DL (ref 30–36.5)
MCV RBC AUTO: 80.4 FL (ref 80–99)
METHADONE UR QL: NEGATIVE
MONOCYTES # BLD: 0.6 K/UL (ref 0–1)
MONOCYTES NFR BLD: 7 % (ref 5–13)
NEUTS SEG # BLD: 5.1 K/UL (ref 1.8–8)
NEUTS SEG NFR BLD: 65 % (ref 32–75)
NITRITE UR QL STRIP.AUTO: NEGATIVE
NRBC # BLD: 0 K/UL (ref 0–0.01)
NRBC BLD-RTO: 0 PER 100 WBC
OPIATES UR QL: NEGATIVE
PCP UR QL: NEGATIVE
PH UR STRIP: 5.5 [PH] (ref 5–8)
PLATELET # BLD AUTO: 245 K/UL (ref 150–400)
PMV BLD AUTO: 10.9 FL (ref 8.9–12.9)
POTASSIUM SERPL-SCNC: 3.6 MMOL/L (ref 3.5–5.1)
PROT SERPL-MCNC: 7.2 G/DL (ref 6.4–8.2)
PROT UR STRIP-MCNC: ABNORMAL MG/DL
RBC # BLD AUTO: 4.28 M/UL (ref 3.8–5.2)
RBC #/AREA URNS HPF: ABNORMAL /HPF (ref 0–5)
SERVICE CMNT-IMP: ABNORMAL
SODIUM SERPL-SCNC: 138 MMOL/L (ref 136–145)
SP GR UR REFRACTOMETRY: 1.02 (ref 1–1.03)
UROBILINOGEN UR QL STRIP.AUTO: 0.2 EU/DL (ref 0.2–1)
WBC # BLD AUTO: 7.8 K/UL (ref 3.6–11)
WBC URNS QL MICRO: ABNORMAL /HPF (ref 0–4)

## 2018-04-23 PROCEDURE — 81001 URINALYSIS AUTO W/SCOPE: CPT

## 2018-04-23 PROCEDURE — 90791 PSYCH DIAGNOSTIC EVALUATION: CPT

## 2018-04-23 PROCEDURE — 80307 DRUG TEST PRSMV CHEM ANLYZR: CPT

## 2018-04-23 PROCEDURE — 81025 URINE PREGNANCY TEST: CPT

## 2018-04-23 PROCEDURE — 80053 COMPREHEN METABOLIC PANEL: CPT

## 2018-04-23 PROCEDURE — 85025 COMPLETE CBC W/AUTO DIFF WBC: CPT

## 2018-04-23 PROCEDURE — 74011250637 HC RX REV CODE- 250/637

## 2018-04-23 PROCEDURE — 82962 GLUCOSE BLOOD TEST: CPT

## 2018-04-23 PROCEDURE — 65220000003 HC RM SEMIPRIVATE PSYCH

## 2018-04-23 PROCEDURE — 36415 COLL VENOUS BLD VENIPUNCTURE: CPT

## 2018-04-23 PROCEDURE — 99285 EMERGENCY DEPT VISIT HI MDM: CPT

## 2018-04-23 PROCEDURE — 74011250637 HC RX REV CODE- 250/637: Performed by: EMERGENCY MEDICINE

## 2018-04-23 RX ORDER — BENZTROPINE MESYLATE 2 MG/1
2 TABLET ORAL
Status: DISCONTINUED | OUTPATIENT
Start: 2018-04-23 | End: 2018-04-25 | Stop reason: HOSPADM

## 2018-04-23 RX ORDER — LORAZEPAM 1 MG/1
1 TABLET ORAL
Status: DISCONTINUED | OUTPATIENT
Start: 2018-04-23 | End: 2018-04-24

## 2018-04-23 RX ORDER — LORAZEPAM 2 MG/ML
2 INJECTION INTRAMUSCULAR
Status: DISCONTINUED | OUTPATIENT
Start: 2018-04-23 | End: 2018-04-25 | Stop reason: HOSPADM

## 2018-04-23 RX ORDER — HALOPERIDOL 1 MG/1
2 TABLET ORAL
Status: COMPLETED | OUTPATIENT
Start: 2018-04-23 | End: 2018-04-23

## 2018-04-23 RX ORDER — OLANZAPINE 5 MG/1
5 TABLET ORAL
Status: DISCONTINUED | OUTPATIENT
Start: 2018-04-23 | End: 2018-04-25 | Stop reason: HOSPADM

## 2018-04-23 RX ORDER — ACETAMINOPHEN 325 MG/1
650 TABLET ORAL
Status: DISCONTINUED | OUTPATIENT
Start: 2018-04-23 | End: 2018-04-25 | Stop reason: HOSPADM

## 2018-04-23 RX ORDER — ZOLPIDEM TARTRATE 10 MG/1
10 TABLET ORAL
Status: DISCONTINUED | OUTPATIENT
Start: 2018-04-23 | End: 2018-04-25 | Stop reason: HOSPADM

## 2018-04-23 RX ORDER — FAMOTIDINE 20 MG/1
20 TABLET, FILM COATED ORAL 2 TIMES DAILY
Status: DISCONTINUED | OUTPATIENT
Start: 2018-04-23 | End: 2018-04-25 | Stop reason: HOSPADM

## 2018-04-23 RX ORDER — IBUPROFEN 200 MG
1 TABLET ORAL
Status: DISCONTINUED | OUTPATIENT
Start: 2018-04-23 | End: 2018-04-25 | Stop reason: HOSPADM

## 2018-04-23 RX ORDER — ADHESIVE BANDAGE
30 BANDAGE TOPICAL DAILY PRN
Status: DISCONTINUED | OUTPATIENT
Start: 2018-04-23 | End: 2018-04-25 | Stop reason: HOSPADM

## 2018-04-23 RX ORDER — BENZTROPINE MESYLATE 1 MG/ML
2 INJECTION INTRAMUSCULAR; INTRAVENOUS
Status: DISCONTINUED | OUTPATIENT
Start: 2018-04-23 | End: 2018-04-25 | Stop reason: HOSPADM

## 2018-04-23 RX ORDER — IBUPROFEN 400 MG/1
400 TABLET ORAL
Status: DISCONTINUED | OUTPATIENT
Start: 2018-04-23 | End: 2018-04-25 | Stop reason: HOSPADM

## 2018-04-23 RX ORDER — LORAZEPAM 1 MG/1
1 TABLET ORAL
Status: COMPLETED | OUTPATIENT
Start: 2018-04-23 | End: 2018-04-23

## 2018-04-23 RX ADMIN — LORAZEPAM 1 MG: 1 TABLET ORAL at 16:32

## 2018-04-23 RX ADMIN — GABAPENTIN 400 MG: 300 CAPSULE ORAL at 21:36

## 2018-04-23 RX ADMIN — FAMOTIDINE 20 MG: 20 TABLET, FILM COATED ORAL at 19:30

## 2018-04-23 RX ADMIN — ACETAMINOPHEN 650 MG: 325 TABLET, FILM COATED ORAL at 22:00

## 2018-04-23 RX ADMIN — HALOPERIDOL 2 MG: 1 TABLET ORAL at 18:57

## 2018-04-23 NOTE — ED NOTES
Per other RN patient found to have call bell wrapped around neck. When asked what she was doing patient states, \"I was trying to strangle myself. \" Patient will now have a sitter with her at bedside. She is breathing at a regular rate, depth, and pattern. VS GALOL.

## 2018-04-23 NOTE — BSMART NOTE
Comprehensive Assessment Form Part 1      Section I - Disposition    Axis I - Bipolar Disorder    Axis II - Deferred  Axis III -   Past Medical History:   Diagnosis Date    Abnormal Pap smear     \"years ago\"     Asthma     bronchitis    Depression with anxiety 9/22/2010    Diabetes mellitus     type II diabetes mellitus since 2004    GERD (gastroesophageal reflux disease)     HTN (hypertension) 9/22/2010    Ill-defined condition     High Cholesterol    Other and unspecified hyperlipidemia 9/22/2010    Postpartum depression     hospitalized after last delivery    Psychiatric disorder     Bipolar    Psychiatric problem     \"mood disorder\"    Rhinitis 6/8/2012       Axis IV - Medication issues, limited support  [place] V - 28      The Medical Doctor to Psychiatrist conference was not completed. The Medical Doctor is in agreement with Psychiatrist disposition because of (reason) Patient requests admission. The plan is admit to [facility]. The on-call Psychiatrist consulted was Dr. Melly Torres. The admitting Psychiatrist will be Dr. Noah Alberto. The admitting Diagnosis is Bipolar . The Payor source is [facility] Medicare. Section II - Integrated Summary  Summary:  Patient drove herself to ER due to need for mental health evaluation. Patient reported history of Depression, Anxiety, and Bipolar Disorder diagnoses. She reported one prior psychiatric admission at [facility]. Patient indicated she has seen two different psychiatric providers, a doctor and an NP, and she was taken off of her medications because it was interacting with her Reglan. Patient indicated she has been off medications for a couple months and she has been decompensating. Patient reported no specific stressors but stated \"everyday life. \"  Patient is alert and oriented. She is cooperative. Patient reported poor sleep, poor appetite, and SI. She stated she thought about wrecking her car on the way over and in the ER put cords around her neck.   Patient reported nightmares and sometimes seeing stars. Patient denied HI. Patient does not feel safe to discharge at this time. She indicated \"My state of mind isn't right. \"    The patienthas demonstrated mental capacity to provide informed consent. The information is given by the patient. The Chief Complaint is suicidal.  The Precipitant Factors are non specific. Previous Hospitalizations: Yes  Current Psychiatrist and/or  is NA. Lethality Assessment:    The potential for suicide noted by the following: defined plan and ideation . The potential for homicide is not noted. The patient has not been a perpetrator of sexual or physical abuse. There are not pending charges. The patient is felt to be at risk for self harm or harm to others. The attending nurse was advised that security has not been notified. Section III - Psychosocial  The patient's overall mood and attitude is depressed. Feelings of helplessness and hopelessness are observed by verbal statements. Generalized anxiety is not observed. Panic is not observed. Phobias are not observed. Obsessive compulsive tendencies are not observed. Section IV - Mental Status Exam  The patient's appearance shows no evidence of impairment. The patient's behavior shows no evidence of impairment. The patient is oriented to time, place, person and situation. The patient's speech shows no evidence of impairment. The patient's mood is depressed. The range of affect is constricted. The patient's thought content demonstrates no evidence of impairment. The thought process shows no evidence of impairment. The patient's perception shows no evidence of impairment. The patient's memory shows no evidence of impairment. The patient's appetite is decreased and shows signs of weight loss. The patient's sleep has evidence of insomnia. The patient shows no insight. The patient's judgement is psychologically impaired.                   Section V - Substance Abuse  The patient is not using substances. Patient denied substance use but is positive for THC. Section VI - Living Arrangements  The patient is single. The patient lives with a child/children. The patient has one child age 10 yo. The patient does plan to return home upon discharge. The patient does not have legal issues pending. The patient's source of income comes from social security. Sabianism and cultural practices have not been voiced at this time. The patient's greatest support comes from child's father and this person will be involved with the treatment. The patient has not been in an event described as horrible or outside the realm of ordinary life experience either currently or in the past.  The patient has not been a victim of sexual/physical abuse. Section VII - Other Areas of Clinical Concern  The highest grade achieved is NA with the overall quality of school experience being described as NA. The patient is currently unemployed and speaks [place] as a primary language. The patient has no communication impairments affecting communication. The patient's preference for learning can be described as: can read and write adequately.   The patient's hearing is normal.  The patient's vision is normal.      Sebastian Moreno, LPC

## 2018-04-23 NOTE — ED NOTES
Patient presents ambulatory to the ED complaining of depression and suicidal ideations x 2 days. She is anxious and tearful during assessment and relays she has been under a lot of stress lately. She denies having an active plan, denies HI, denies hallucinations or delusions. States she has been off her depression medications x 2 months but is unable to verbalize the names of those medications. Hx of psych admissions in the past at [facility]. Denies alcohol or drug use.

## 2018-04-23 NOTE — IP AVS SNAPSHOT
[facility] 
 
 
 [address] Ivane Lui Reese Patient: Faith Mendosa MRN: [id number] GTX:[id number] About your hospitalization You were admitted on:  April 23, 2018 You last received care in the:  [address] [facility] [address] [address] You were discharged on:  April 25, 2018 Why you were hospitalized Your primary diagnosis was:  Not on File Your diagnoses also included:  Depression Follow-up Information Follow up With Details Comments Contact Info Madelin  Please follow up with Aniya Calhoun LCSW on May 8th at [place] [address], [address], [address] Phone: [phone] Fax: [phone] Kendra Ramos MD   [address]  [address] 
[phone] Madelin  Please follow up with MAYLIN Cain on Tues. 5/1/18 at [facility] [address], [address] ph: [phone] Discharge Orders None A check trevor indicates which time of day the medication should be taken. My Medications START taking these medications Instructions Each Dose to Equal  
 Morning Noon Evening Bedtime FLUoxetine 10 mg capsule Commonly known as:  PROzac Start taking on:  4/26/2018 Your last dose was: Your next dose is: Take 1 Cap by mouth daily for 14 days. Indications: Generalized Anxiety Disorder, Post Traumatic Stress Disorder 10 mg CONTINUE taking these medications Instructions Each Dose to Equal  
 Morning Noon Evening Bedtime  
 ferrous sulfate 325 mg (65 mg iron) tablet Your last dose was: Your next dose is: Take 325 mg by mouth Daily (before lunch). Indications: Iron Deficiency Anemia 325 mg  
    
   
   
   
  
 gabapentin 400 mg capsule Commonly known as:  NEURONTIN Your last dose was: Your next dose is: Take 400 mg by mouth three (3) times daily. Indications: NEUROPATHIC PAIN  
 400 mg  
    
   
   
   
  
 losartan 25 mg tablet Commonly known as:  COZAAR Your last dose was: Your next dose is: Take 25 mg by mouth daily. Indications: hypertension 25 mg  
    
   
   
   
  
 metFORMIN 1,000 mg tablet Commonly known as:  GLUCOPHAGE Your last dose was: Your next dose is: Take 1,000 mg by mouth two (2) times daily (with meals). Indications: type 2 diabetes mellitus 1000 mg  
    
   
   
   
  
 omeprazole 20 mg capsule Commonly known as:  PRILOSEC Your last dose was: Your next dose is: Take 20 mg by mouth daily. Indications: Heartburn  
 20 mg REGLAN 5 mg tablet Generic drug:  metoclopramide HCl Your last dose was: Your next dose is: Take 5 mg by mouth Before breakfast, lunch, dinner and at bedtime. 5 mg Where to Get Your Medications Information on where to get these meds will be given to you by the nurse or doctor. ! Ask your nurse or doctor about these medications FLUoxetine 10 mg capsule Discharge Instructions DISCHARGE SUMMARY from Nurse PATIENT INSTRUCTIONS What to do at Home: 
Recommended activity: Activity as tolerated *  Please give a list of your current medications to your Primary Care Provider. *  Please update this list whenever your medications are discontinued, doses are 
    changed, or new medications (including over-the-counter products) are added. *  Please carry medication information at all times in case of emergency situations. These are general instructions for a healthy lifestyle: No smoking/ No tobacco products/ Avoid exposure to second hand smoke Surgeon General's Warning:  Quitting smoking now greatly reduces serious risk to your health. Obesity, smoking, and sedentary lifestyle greatly increases your risk for illness A healthy diet, regular physical exercise & weight monitoring are important for maintaining a healthy lifestyle The discharge information has been reviewed with the patient. The patient verbalized understanding. Discharge medications reviewed with the patient and appropriate educational materials and side effects teaching were provided. ___________________________________________________________________________________________________________________________________Please return for increased weakness, thoughts Albert Vazquez If I feel I am at risk of hurting myself or others, I will call the crisis office and speak with a crisis worker who will assist me during my crisis. Albert Vazquez [facility]  [phone] [address] [place] [address] [phone] [place] [address]  [phone] [facility] [facility]- [phone] Com[facility]- [phone] 2 [facility]  [phone] [facility]3 [facility]  [phone] Learning About Anxiety Disorders What are anxiety disorders? Anxiety disorders are a type of medical problem. They cause severe anxiety. When you feel anxious, you feel that something bad is about to happen. This feeling interferes with your life. These disorders include: · Generalized anxiety disorder. You feel worried and stressed about many everyday events and activities. This goes on for several months and disrupts your life on most days. · Panic disorder. You have repeated panic attacks. A panic attack is a sudden, intense fear or anxiety. It may make you feel short of breath. Your heart may pound. · Social anxiety disorder. You feel very anxious about what you will say or do in front of people. For example, you may be scared to talk or eat in public. This problem affects your daily life. · Phobias.  You are very scared of a specific object, situation, or activity. For example, you may fear spiders, high places, or small spaces. What are the symptoms? Generalized anxiety disorder Symptoms may include: · Feeling worried and stressed about many things almost every day. · Feeling tired or irritable. You may have a hard time concentrating. · Having headaches or muscle aches. · Having a hard time getting to sleep or staying asleep. Panic disorder You may have repeated panic attacks when there is no reason for feeling afraid. You may change your daily activities because you worry that you will have another attack. Symptoms may include: 
· Intense fear, terror, or anxiety. · Trouble breathing or very fast breathing. · Chest pain or tightness. · A heartbeat that races or is not regular. Social anxiety disorder Symptoms may include: · Fear about a social situation, such as eating in front of others or speaking in public. You may worry a lot. Or you may be afraid that something bad will happen. · Anxiety that can cause you to blush, sweat, and feel shaky. · A heartbeat that is faster than normal. 
· A hard time focusing. Phobias Symptoms may include: · More fear than most people of being around an object, being in a situation, or doing an activity. You might also be stressed about the chance of being around the thing you fear. · Worry about losing control, panicking, fainting, or having physical symptoms like a faster heartbeat when you are around the situation or object. How are these disorders treated? Anxiety disorders can be treated with medicines or counseling. A combination of both may be used. Medicines may include: · Antidepressants. These may help your symptoms by keeping chemicals in your brain in balance. · Benzodiazepines. These may give you short-term relief of your symptoms. Some people use cognitive-behavioral therapy. A therapist helps you learn to change stressful or bad thoughts into helpful thoughts. Lead a healthy lifestyle A healthy lifestyle may help you feel better. · Get at least 30 minutes of exercise on most days of the week. Walking is a good choice. · Eat a healthy diet. Include fruits, vegetables, lean proteins, and whole grains in your diet each day. · Try to go to bed at the same time every night. Try for 8 hours of sleep a night. · Find ways to manage stress. Try relaxation exercises. · Avoid alcohol and illegal drugs. Follow-up care is a key part of your treatment and safety. Be sure to make and go to all appointments, and call your doctor if you are having problems. It's also a good idea to know your test results and keep a list of the medicines you take. Where can you learn more? Go to http://rosalino-jordan.info/. Enter I339 in the search box to learn more about \"Learning About Anxiety Disorders. \" Current as of: May 12, 2017 Content Version: 11.4 © 7640-6114 [employer]. Care instructions adapted under license by [employer] (which disclaims liability or warranty for this information). If you have questions about a medical condition or this instruction, always ask your healthcare professional. [place] [address] any warranty or liability for your use of this information. Learning About Depression Screening What is depression screening? Depression screening is a way to see if you have depression symptoms. It may be done by a doctor or counselor. This screening is often part of a routine checkup. That's because your mental health is just as important as your physical health. Depression is a medical illness that affects how you feel, think, and act. You may: 
· Have less energy. · Lose interest in your daily activities. · Feel sad and grouchy for a long time. Depression is very common. It affects men and women of all ages. Many things can trigger depression.  Some people become depressed after they have a stroke or find out they have a major illness like cancer or heart disease. The death of a loved one, a breakup, or changes in the natural brain chemicals may lead to depression. It can run in families. Most experts believe that a combination of family history (a person's genes) and stressful life events can cause depression. What happens during screening? Your doctor may ask about your feelings, any changes in eating habits, your energy level, and your interest in your daily tasks. He or she may ask other questions, such as how well you are sleeping and if you can focus on the things you do. This may be an informal talk between the two of you. Or your doctor may ask you to fill out a quick form and then talk about your answers. Some diseases or changes in your body can cause symptoms that look like depression. So your doctor may do blood tests to help rule out other problems, such as hormone changes, a low thyroid level, or anemia. What happens after screening? If you have signs of depression, your doctor will talk to you about your options. Doctors usually treat depression with medicines or counseling. Often, combining the two works best. Many people don't get help because they think that they'll get over the depression on their own. But people with depression may not get better unless they get treatment. Many people feel embarrassed or ashamed about having depression. But it isn't a sign of personal weakness. It's not a character flaw. A person who is depressed is not \"crazy. \" Depression is caused by changes in the brain. A serious symptom of depression is thinking about death or suicide. If you or someone you care about talks about this or about feeling hopeless, get help right away. It's important to know that depression can be treated. The first step toward feeling better is often just seeing that the problem exists. Where can you learn more? Go to http://rosalino-jordan.info/. Enter T185 in the search box to learn more about \"Learning About Depression Screening. \" Current as of: May 12, 2017 Content Version: 11.4 © 2428-8330 [employer]. Care instructions adapted under license by [employer] (which disclaims liability or warranty for this information). If you have questions about a medical condition or this instruction, always ask your healthcare professional. [place] [address] any warranty or liability for your use of this information. of death or dying or any other concerns. Please see your doctor in 2-3 days for a recheck PlayRaven Announcement We are excited to announce that we are making your provider's discharge notes available to you in PlayRaven. You will see these notes when they are completed and signed by the physician that discharged you from your recent hospital stay. If you have any questions or concerns about any information you see in PlayRaven, please call the Health Information Department where you were seen or reach out to your Primary Care Provider for more information about your plan of care. Introducing [facility]! Rachelle Carbone introduces PlayRaven patient portal. Now you can access parts of your medical record, email your doctor's office, and request medication refills online. 1. In your internet browser, go to [email or web address] 2. Click on the First Time User? Click Here link in the Sign In box. You will see the New Member Sign Up page. 3. Enter your PlayRaven Access Code exactly as it appears below. You will not need to use this code after youve completed the sign-up process. If you do not sign up before the expiration date, you must request a new code. · PlayRaven Access Code: [email or web address] Expires: 6/19/2018  1:39 PM 
 
4.  Enter the last four digits of your Social Security Number (xxxx) and Date of Birth (mm/dd/yyyy) as indicated and click Submit. You will be taken to the next sign-up page. 5. Create a Appurit ID. This will be your [employer] login ID and cannot be changed, so think of one that is secure and easy to remember. 6. Create a Appurit password. You can change your password at any time. 7. Enter your Password Reset Question and Answer. This can be used at a later time if you forget your password. 8. Enter your e-mail address. You will receive e-mail notification when new information is available in [address] 9. Click Sign Up. You can now view and download portions of your medical record. 10. Click the Download Summary menu link to download a portable copy of your medical information. If you have questions, please visit the Frequently Asked Questions section of the [employer] [place] website. Remember, [employer] [place] is NOT to be used for urgent needs. For medical emergencies, dial 911. Now available from your iPhone and Android! Introducing Lex Solis As a [facility] [place] patient, I wanted to make you aware of our electronic visit tool called Lex Torrefin. [place] 24/7 allows you to connect within minutes with a medical provider 24 hours a day, seven days a week via a mobile device or tablet or logging into a secure website from your computer. You can access Lex Solis from anywhere in the [place]. A virtual visit might be right for you when you have a simple condition and feel like you just dont want to get out of bed, or cant get away from work for an appointment, when your regular Kridris [place] provider is not available (evenings, weekends or holidays), or when youre out of town and need minor care. Electronic visits cost only $49 and if the Santh[facility] [place] 24/7 provider determines a prescription is needed to treat your condition, one can be electronically transmitted to a nearby pharmacy*. Please take a moment to enroll today if you have not already done so. The enrollment process is free and takes just a few minutes. To enroll, please download the [employer] Play-i) 24/7 lelia to your tablet or phone, or visit [email or web address] to enroll on your computer. And, as an [address] [place] [address] patient with a [employer] account, the results of your visits will be scanned into your electronic medical record and your primary care provider will be able to view the scanned results. We urge you to continue to see your regular [facility] (Former[employer] Play-i) provider for your ongoing medical care. And while your primary care provider may not be the one available when you seek a Just around Us virtual visit, the peace of mind you get from getting a real diagnosis real time can be priceless. For more information on Just around Us, view our Frequently Asked Questions (FAQs) at [email or web address]. Sincerely, 
 
Dianah Sicard, MD 
Chief Medical Officer [facility] Teena Chance *:  certain medications cannot be prescribed via Just around Us Providers Seen During Your Hospitalization Provider Specialty Primary office phone Sylvia Sumner MD Emergency Medicine [phone] Paola Noyola MD Emergency Medicine [phone] Dawood Mcdermott MD Emergency Medicine [phone] Clemente Sosa MD Psychiatry [phone] Danielle San MD Psychiatry [phone] Your Primary Care Physician (PCP) Primary Care Physician Office Phone Office Fax Francisco Javier Quezada [phone] You are allergic to the following Allergen Reactions Latex Rash Latex Hives Other Food Hives Citrus Fruits Flagyl (Metronidazole) Hives Itching Lisinopril-Hydrochlorothiazide Angioedema Clindamycin Rash Itching Ciprofloxacin Rash Itching Citrate Hives Cottonseed Oil Rash Cymbalta (Duloxetine) Hives Itching Flexeril (Cyclobenzaprine) Angioedema Lemon Hives Pcn (Penicillins) Hives Sulfa (Sulfonamide Antibiotics) Shortness of Breath Tomato Hives Tomato Hives Recent Documentation Height Weight Breastfeeding? BMI OB Status Smoking Status 1.6 m 110.7 kg No 43.23 kg/m2 Having regular periods Current Every Day Smoker Emergency Contacts Name Discharge Info Relation Home Work Mobile [place] Mir N/A  AT THIS TIME [6] Sister [23] [phone] Patient Belongings The following personal items are in your possession at time of discharge: 
  Dental Appliances: None  Visual Aid: None      Home Medications: None   Jewelry: None  Clothing: Pants, Shirt, Socks, Footwear    Other Valuables: None Please provide this summary of care documentation to your next provider. Signatures-by signing, you are acknowledging that this After Visit Summary has been reviewed with you and you have received a copy. Patient Signature:  ____________________________________________________________ Date:  ____________________________________________________________  
  
Randall Hoffmann Provider Signature:  ____________________________________________________________ Date:  ____________________________________________________________

## 2018-04-23 NOTE — ED NOTES
Verbal shift change report given to MELANIA Guzmán (oncoming nurse) by Tommie Severance (offgoing nurse). Report included the following information SBAR, ED Summary, Procedure Summary, MAR and Recent Results.

## 2018-04-23 NOTE — ED NOTES
Report called to MONY MALLOY. Carola Malcolm RN receiving. SBAR report given. Pt to be transported to [facility] Room 312 Bed 2 by Security.

## 2018-04-23 NOTE — ED PROVIDER NOTES
HPI Comments: Pt with hx of depression, anxiety and possible bipolar d/o- states that she saw a NP/psychiatrist and was taken off her medications for depression about 2 months ago and was not placed on anything to replace her medication. Since then her depression has steadily been getting worse-she has had thoughts of self harm, but no plan      She has no medical complaints, has been taking her HTN and DM meds- no chest pain or SOB- has had some nasal congestion and was tx of ear infection several weeks ago    Patient is a 34 y.o. female presenting with mental health disorder. The history is provided by the patient. Mental Health Problem    This is a recurrent problem. Associated symptoms include self-injury. Pertinent negatives include no confusion, no somnolence, no unresponsiveness, no weakness, no delusions, no hallucinations, no violence and no numbness.         Past Medical History:   Diagnosis Date    Abnormal Pap smear     \"years ago\"     Asthma     bronchitis    Depression with anxiety 2010    Diabetes mellitus     type II diabetes mellitus since     GERD (gastroesophageal reflux disease)     HTN (hypertension) 2010    Ill-defined condition     High Cholesterol    Other and unspecified hyperlipidemia 2010    Postpartum depression     hospitalized after last delivery    Psychiatric disorder     Bipolar    Psychiatric problem     \"mood disorder\"    Rhinitis 2012       Past Surgical History:   Procedure Laterality Date     DELIVERY ONLY      cesearean section 08    HX  SECTION      X 3    HX GYN      , tubal ligation         Family History:   Problem Relation Age of Onset    Diabetes Mother     Hypertension Mother     Stroke Mother     Heart Disease Mother     Hypertension Father     Stroke Father     Diabetes Father     Kidney Disease Father     Hypertension Maternal Grandmother     Diabetes Maternal Grandmother     Hypertension Maternal Grandfather     Diabetes Maternal Grandfather     Hypertension Paternal Grandmother     Diabetes Paternal [de-identified]     Hypertension Paternal Grandfather     Diabetes Paternal Grandfather     Hypertension Sister     Diabetes Sister     Diabetes Sister     Hypertension Sister     Diabetes Sister     Hypertension Sister        Social History     Social History    Marital status: SINGLE     Spouse name: N/A    Number of children: N/A    Years of education: N/A     Occupational History    Not on file. Social History Main Topics    Smoking status: Current Every Day Smoker     Packs/day: 0.50     Last attempt to quit: 10/16/2015    Smokeless tobacco: Never Used    Alcohol use No      Comment: Occasionally    Drug use: No    Sexual activity: Yes     Partners: Female     Other Topics Concern    Not on file     Social History Narrative    ** Merged History Encounter **              ALLERGIES: Latex; Latex; Other food; Flagyl [metronidazole]; Lisinopril-hydrochlorothiazide; Clindamycin; Ciprofloxacin; Citrate; Cottonseed oil; Cymbalta [duloxetine]; Flexeril [cyclobenzaprine]; Pcn [penicillins]; Sulfa (sulfonamide antibiotics); Tomato; and Tomato    Review of Systems   Constitutional:        Pt denies other acute medical complaints or concerns. HENT: Positive for ear pain, postnasal drip and sinus pain. Negative for ear discharge. Respiratory: Negative for cough, chest tightness and shortness of breath. Neurological: Negative for weakness and numbness. Psychiatric/Behavioral: Positive for self-injury. Negative for confusion and hallucinations. Vitals:    04/23/18 1556 04/23/18 1643   BP: (!) 216/123 155/81   Pulse: (!) 102 85   Resp: 22    Temp: 98 °F (36.7 °C)    SpO2: 99%    Weight: 110.7 kg (244 lb 0.8 oz)    Height: 5' 3\" (1.6 m)             Physical Exam   Constitutional: She is oriented to person, place, and time.  She appears well-developed and well-nourished. HENT:   Head: Normocephalic and atraumatic. Right Ear: No tenderness. A middle ear effusion is present. Left Ear: No tenderness. A middle ear effusion is present. Nose: Mucosal edema and rhinorrhea present. Mouth/Throat: Uvula is midline. Eyes: Conjunctivae and EOM are normal.   Neck: Neck supple. No tracheal deviation present. No thyromegaly present. Cardiovascular: Normal rate, regular rhythm and normal heart sounds. Pulmonary/Chest: Effort normal and breath sounds normal. No respiratory distress. Abdominal: She exhibits no distension. Musculoskeletal: She exhibits no edema. Lymphadenopathy:     She has no cervical adenopathy. Neurological: She is alert and oriented to person, place, and time. Skin: Skin is warm and dry. Psychiatric: Judgment normal. Her mood appears anxious. Her affect is labile. Vitals reviewed. [facility]      ED Course   Comment By Time   Pt with asymptomatic HTN- anxiety likely contributing Sofía Hutson MD 04/23 1617   Pt with no acute medical concerns.  Asymptomatic HTN- resolved without intervention-pending further psych eval and tx Sofía Hutson MD 04/23 1716       Procedures

## 2018-04-23 NOTE — ED TRIAGE NOTES
Pt c/o SI x several days, no plan. Pt states she has been off of medication x several months. Pt denies AH/VH/HI.

## 2018-04-23 NOTE — ED NOTES
Emergency Department Nursing Plan of Care       The Nursing Plan of Care is developed from the Nursing assessment and Emergency Department Attending provider initial evaluation. The plan of care may be reviewed in the ED Provider note.     The Plan of Care was developed with the following considerations:   Patient / Family readiness to learn indicated by:verbalized understanding  Persons(s) to be included in education: patient  Barriers to Learning/Limitations:No    Signed     Penelope Zamora    4/23/2018   4:05 PM

## 2018-04-23 NOTE — IP AVS SNAPSHOT
[facility] 
 
 
 [address] Patient: Lucita Benavides MRN: [id number] OPJ:[id number] A check trevor indicates which time of day the medication should be taken. My Medications START taking these medications Instructions Each Dose to Equal  
 Morning Noon Evening Bedtime FLUoxetine 10 mg capsule Commonly known as:  PROzac Start taking on:  4/26/2018 Your last dose was: Your next dose is: Take 1 Cap by mouth daily for 14 days. Indications: Generalized Anxiety Disorder, Post Traumatic Stress Disorder 10 mg CONTINUE taking these medications Instructions Each Dose to Equal  
 Morning Noon Evening Bedtime  
 ferrous sulfate 325 mg (65 mg iron) tablet Your last dose was: Your next dose is: Take 325 mg by mouth Daily (before lunch). Indications: Iron Deficiency Anemia 325 mg  
    
   
   
   
  
 gabapentin 400 mg capsule Commonly known as:  NEURONTIN Your last dose was: Your next dose is: Take 400 mg by mouth three (3) times daily. Indications: NEUROPATHIC PAIN  
 400 mg  
    
   
   
   
  
 losartan 25 mg tablet Commonly known as:  COZAAR Your last dose was: Your next dose is: Take 25 mg by mouth daily. Indications: hypertension 25 mg  
    
   
   
   
  
 metFORMIN 1,000 mg tablet Commonly known as:  GLUCOPHAGE Your last dose was: Your next dose is: Take 1,000 mg by mouth two (2) times daily (with meals). Indications: type 2 diabetes mellitus 1000 mg  
    
   
   
   
  
 omeprazole 20 mg capsule Commonly known as:  PRILOSEC Your last dose was: Your next dose is: Take 20 mg by mouth daily. Indications: Heartburn  
 20 mg REGLAN 5 mg tablet Generic drug:  metoclopramide HCl Your last dose was: Your next dose is: Take 5 mg by mouth Before breakfast, lunch, dinner and at bedtime. 5 mg Where to Get Your Medications Information on where to get these meds will be given to you by the nurse or doctor. ! Ask your nurse or doctor about these medications FLUoxetine 10 mg capsule

## 2018-04-23 NOTE — BSMART NOTE
Received call requesting consult. At another facility with another patient. Will call when on the way or able to telepsych.     Joe Villasenor [facility]

## 2018-04-24 LAB
CHOLEST SERPL-MCNC: 253 MG/DL
GLUCOSE BLD STRIP.AUTO-MCNC: 152 MG/DL (ref 65–100)
GLUCOSE P FAST SERPL-MCNC: 127 MG/DL (ref 65–100)
HDLC SERPL-MCNC: 49 MG/DL
HDLC SERPL: 5.2 {RATIO} (ref 0–5)
LDLC SERPL CALC-MCNC: 174 MG/DL (ref 0–100)
LIPID PROFILE,FLP: ABNORMAL
SERVICE CMNT-IMP: ABNORMAL
TRIGL SERPL-MCNC: 150 MG/DL (ref ?–150)
TSH SERPL DL<=0.05 MIU/L-ACNC: 1.32 UIU/ML (ref 0.36–3.74)
VLDLC SERPL CALC-MCNC: 30 MG/DL

## 2018-04-24 PROCEDURE — 84443 ASSAY THYROID STIM HORMONE: CPT

## 2018-04-24 PROCEDURE — 82947 ASSAY GLUCOSE BLOOD QUANT: CPT

## 2018-04-24 PROCEDURE — 65220000003 HC RM SEMIPRIVATE PSYCH

## 2018-04-24 PROCEDURE — 74011250637 HC RX REV CODE- 250/637: Performed by: EMERGENCY MEDICINE

## 2018-04-24 PROCEDURE — 74011250637 HC RX REV CODE- 250/637

## 2018-04-24 PROCEDURE — 74011250637 HC RX REV CODE- 250/637: Performed by: PSYCHIATRY & NEUROLOGY

## 2018-04-24 PROCEDURE — 82962 GLUCOSE BLOOD TEST: CPT

## 2018-04-24 PROCEDURE — 80061 LIPID PANEL: CPT

## 2018-04-24 PROCEDURE — 36415 COLL VENOUS BLD VENIPUNCTURE: CPT

## 2018-04-24 PROCEDURE — 74011250637 HC RX REV CODE- 250/637: Performed by: FAMILY MEDICINE

## 2018-04-24 RX ORDER — METFORMIN HYDROCHLORIDE 1000 MG/1
1000 TABLET ORAL 2 TIMES DAILY WITH MEALS
COMMUNITY

## 2018-04-24 RX ORDER — LOSARTAN POTASSIUM 25 MG/1
25 TABLET ORAL DAILY
Status: DISCONTINUED | OUTPATIENT
Start: 2018-04-24 | End: 2018-04-25 | Stop reason: HOSPADM

## 2018-04-24 RX ORDER — LOPERAMIDE HYDROCHLORIDE 2 MG/1
2 CAPSULE ORAL
Status: DISCONTINUED | OUTPATIENT
Start: 2018-04-24 | End: 2018-04-25 | Stop reason: HOSPADM

## 2018-04-24 RX ORDER — LANOLIN ALCOHOL/MO/W.PET/CERES
325 CREAM (GRAM) TOPICAL
COMMUNITY
End: 2019-07-25

## 2018-04-24 RX ORDER — GABAPENTIN 100 MG/1
100 CAPSULE ORAL 3 TIMES DAILY
COMMUNITY
End: 2021-09-17

## 2018-04-24 RX ORDER — METOCLOPRAMIDE 10 MG/1
5 TABLET ORAL
Status: DISCONTINUED | OUTPATIENT
Start: 2018-04-24 | End: 2018-04-24 | Stop reason: DRUGHIGH

## 2018-04-24 RX ORDER — FLUOXETINE 10 MG/1
10 CAPSULE ORAL DAILY
Status: DISCONTINUED | OUTPATIENT
Start: 2018-04-24 | End: 2018-04-25 | Stop reason: HOSPADM

## 2018-04-24 RX ORDER — LANOLIN ALCOHOL/MO/W.PET/CERES
325 CREAM (GRAM) TOPICAL
Status: DISCONTINUED | OUTPATIENT
Start: 2018-04-24 | End: 2018-04-25 | Stop reason: HOSPADM

## 2018-04-24 RX ORDER — HYDROXYZINE PAMOATE 25 MG/1
50 CAPSULE ORAL
Status: DISCONTINUED | OUTPATIENT
Start: 2018-04-24 | End: 2018-04-25 | Stop reason: HOSPADM

## 2018-04-24 RX ORDER — METOCLOPRAMIDE 10 MG/1
5 TABLET ORAL
Status: DISCONTINUED | OUTPATIENT
Start: 2018-04-24 | End: 2018-04-25 | Stop reason: HOSPADM

## 2018-04-24 RX ORDER — METFORMIN HYDROCHLORIDE 500 MG/1
1000 TABLET ORAL 2 TIMES DAILY WITH MEALS
Status: DISCONTINUED | OUTPATIENT
Start: 2018-04-24 | End: 2018-04-25 | Stop reason: HOSPADM

## 2018-04-24 RX ADMIN — LOPERAMIDE HYDROCHLORIDE 2 MG: 2 CAPSULE ORAL at 21:15

## 2018-04-24 RX ADMIN — FERROUS SULFATE TAB 325 MG (65 MG ELEMENTAL FE) 325 MG: 325 (65 FE) TAB at 12:02

## 2018-04-24 RX ADMIN — FAMOTIDINE 20 MG: 20 TABLET, FILM COATED ORAL at 08:18

## 2018-04-24 RX ADMIN — FLUOXETINE 10 MG: 10 CAPSULE ORAL at 10:15

## 2018-04-24 RX ADMIN — METOCLOPRAMIDE 5 MG: 10 TABLET ORAL at 12:03

## 2018-04-24 RX ADMIN — ZOLPIDEM TARTRATE 10 MG: 10 TABLET ORAL at 21:16

## 2018-04-24 RX ADMIN — GABAPENTIN 400 MG: 300 CAPSULE ORAL at 08:43

## 2018-04-24 RX ADMIN — METFORMIN HYDROCHLORIDE 1000 MG: 500 TABLET, FILM COATED ORAL at 12:03

## 2018-04-24 RX ADMIN — GABAPENTIN 400 MG: 300 CAPSULE ORAL at 17:02

## 2018-04-24 RX ADMIN — HYDROXYZINE PAMOATE 50 MG: 25 CAPSULE ORAL at 10:14

## 2018-04-24 RX ADMIN — METOCLOPRAMIDE 5 MG: 10 TABLET ORAL at 17:02

## 2018-04-24 RX ADMIN — LOSARTAN POTASSIUM 25 MG: 25 TABLET ORAL at 12:02

## 2018-04-24 RX ADMIN — FAMOTIDINE 20 MG: 20 TABLET, FILM COATED ORAL at 17:02

## 2018-04-24 NOTE — BH NOTES
Pt observed awake in his room on her bed talking to staff. She has been noted at times laughing and talking without distress. Pt interacting well with staff. She continues on 1:1 monitor for safety

## 2018-04-24 NOTE — BH NOTES
Patient states she wrapped cord around neck in ER because she wanted attention. Patient states she was setting in ER for 5 hours. Patient contracts for safety on there unit. One to one discontinued per MD. Staff will continue to monitor patient closely and assess needs.

## 2018-04-24 NOTE — PROGRESS NOTES
Spoke with Leanne Quach from [facility]. I explained the concerns about the pt being voluntary and actively suicidal, to include trying to strangle herself in the ED today with a call bell cord. There is concern from Jag Hurley that the pt needs to be ECO and then TDO, do to active suicidal ideations and attempts while in the ED. Leanne Quach advised me that the pt is \"Capable of making sound decisions,\" and stated that \"Just because the pt tried to wrap a cord around her neck, it isn't a reason for her to be TDO'd.\"  I advised the [facility] about this status of the pt, and they will be accepting the pt at this time, in the current voluntary status.

## 2018-04-24 NOTE — BH NOTES
Pt in bed resting quietly . She has not voiced any suicidal ideas and have not made any attempt to harm herself. Continued on 1:1 monitor

## 2018-04-24 NOTE — BH NOTES
ADMISSION NOTE:    Pt is admitted voluntarily for the professional services of Dr. Charley Granados. Per report, pt has been suicidal x2 days w/ plan. However, during the nurse to nurse report, Carola Feliciano was informed that pt wrapped the call bell cord around her neck in the the ED. Writer called nursing supervisor with concerns. Per report, pt has been off meds for at least 2 months. Pt UDS is positive for THC and BAL <10. Pt U/A is remarkable for bacteria. Pt skin assessment is unremarkable, done by Lyndon Hoffman and Reji Johnson RN. Pt denies being suicidal at this time, but does not contract for safety. Per MD, pt placed on one to one continuously. Orders received from Dr. Trae Cortez. Pt oriented to the unit. Will monitor on a 1:1 basis continuously.

## 2018-04-24 NOTE — BH NOTES
Pt refused metformin stating \"I don't need it\". Pt also will only take gabapentin by opening the capsule. She states its hard to swallow pills but swallows all other medication.

## 2018-04-24 NOTE — H&P
INITIAL PSYCHIATRIC EVALUATION            IDENTIFICATION:    Patient Name  Isis Figueroa   Date of Birth 1980   [facility] [id number]   Medical Record Number  [id number]      Age  34 y.o. PCP Matt Vicente MD   Admit date:  4/23/2018    Room Number  200/65  @ [facility]   Date of Service  4/24/2018            HISTORY         REASON FOR HOSPITALIZATION:  CC: \". Pt admitted under a voluntary basis for depression with suicidal ideations . HISTORY OF PRESENT ILLNESS:    The patient, Isis Figueroa, is a 34 y.o. BLACK OR  female with a past psychiatric history significant for depression and suicidal ideation , who presents at this time with complaints of (and/or evidence of) the following emotional symptoms: depression and suicidal thoughts/threats. Additional symptomatology include anxiety, depression worse and feeling suicidal.  The above symptoms have been present for months . These symptoms are of moderate  severity. These symptoms are constant  in nature. The patient's condition has been precipitated by financial issues and psychosocial stressors (problems with family ). Patient's condition made worse by continued illicit drug use and alcohol use as well as treatment noncompliance. UDS: positive ; BAL=0. ALLERGIES:   Allergies   Allergen Reactions    Latex Rash    Latex Hives    Other Food Hives     Citrus Fruits    Flagyl [Metronidazole] Hives and Itching    Lisinopril-Hydrochlorothiazide Angioedema    Clindamycin Rash and Itching    Ciprofloxacin Rash and Itching    Citrate Hives    Cottonseed Oil Rash    Cymbalta [Duloxetine] Hives and Itching    Flexeril [Cyclobenzaprine] Angioedema    Lemon Hives    Pcn [Penicillins] Hives    Sulfa (Sulfonamide Antibiotics) Shortness of Breath    Tomato Hives    Tomato Hives      MEDICATIONS PRIOR TO ADMISSION:   Prescriptions Prior to Admission   Medication Sig    METFORMIN HCL (METFORMIN PO) Take  by mouth.  losartan (COZAAR) 25 mg tablet Take 25 mg by mouth daily.  IRON,CARB/VIT C/VIT B12/FOLIC (IRON 105 PLUS PO) Take  by mouth.  metoclopramide HCl (REGLAN) 5 mg tablet Take 5 mg by mouth Before breakfast, lunch, and dinner.  omeprazole (PRILOSEC) 20 mg capsule     gabapentin (NEURONTIN) 100 mg capsule Take 400 mg by mouth three (3) times daily.  OTHER Indications: unknown medication for depression that begins with an \"L\"      PAST MEDICAL HISTORY:   Past Medical History:   Diagnosis Date    Abnormal Pap smear     \"years ago\"     Asthma     bronchitis    Depression with anxiety 2010    Diabetes mellitus     type II diabetes mellitus since     GERD (gastroesophageal reflux disease)     HTN (hypertension) 2010    Ill-defined condition     High Cholesterol    Other and unspecified hyperlipidemia 2010    Postpartum depression     hospitalized after last delivery    Psychiatric disorder     Bipolar    Psychiatric problem     \"mood disorder\"    Rhinitis 2012     Past Surgical History:   Procedure Laterality Date     DELIVERY ONLY      cesearean section 08    HX  SECTION      X 3    HX GYN      , tubal ligation      SOCIAL HISTORY: single    Social History     Social History    Marital status: SINGLE     Spouse name: N/A    Number of children: N/A    Years of education: N/A     Occupational History    Not on file. Social History Main Topics    Smoking status: Current Every Day Smoker     Packs/day: 0.50     Last attempt to quit: 10/16/2015    Smokeless tobacco: Never Used    Alcohol use No      Comment: Occasionally    Drug use: No    Sexual activity: Yes     Partners: Female     Other Topics Concern    Not on file     Social History Narrative    ** Merged History Encounter **           FAMILY HISTORY: History reviewed. No pertinent family history.    Family History   Problem Relation Age of Onset    Diabetes Mother    [address] [facility] Hypertension Mother     Stroke Mother     Heart Disease Mother     Hypertension Father     Stroke Father     Diabetes Father     Kidney Disease Father     Hypertension Maternal Grandmother     Diabetes Maternal Grandmother     Hypertension Maternal Grandfather     Diabetes Maternal Grandfather     Hypertension Paternal Grandmother     Diabetes Paternal [de-identified]     Hypertension Paternal Grandfather     Diabetes Paternal Grandfather     Hypertension Sister     Diabetes Sister     Diabetes Sister     Hypertension Sister     Diabetes Sister     Hypertension Sister        REVIEW OF SYSTEMS:   History obtained from the patient  Pertinent items are noted in the History of Present Illness. All other Systems reviewed and are considered negative. MENTAL STATUS EXAM & VITALS     MENTAL STATUS EXAM (MSE):    MSE FINDINGS ARE WITHIN NORMAL LIMITS (WNL) UNLESS OTHERWISE STATED BELOW. ( ALL OF THE BELOW CATEGORIES OF THE MSE HAVE BEEN REVIEWED (reviewed 4/24/2018) AND UPDATED AS DEEMED APPROPRIATE )  General Presentation age appropriate, cooperative   Orientation oriented to time, place and person   Vital Signs  See below (reviewed 4/24/2018); Vital Signs (BP, Pulse, & Temp) are within normal limits if not listed below.    Gait and Station Stable/steady, no ataxia   Musculoskeletal System No extrapyramidal symptoms (EPS); no abnormal muscular movements or Tardive Dyskinesia (TD); muscle strength and tone are within normal limits   Language No aphasia or dysarthria   Speech:  normal pitch and normal volume   Thought Processes logical; normal rate of thoughts; fair abstract reasoning/computation   Thought Associations goal directed   Thought Content free of delusions   Suicidal Ideations no plan    Homicidal Ideations no plan    Mood:  anxious    Affect:  anxious   Memory recent  good   Memory remote:  good   Concentration/Attention:  good   Fund of Knowledge average   Insight:  fair   Reliability fair   Judgment:  fair          VITALS:     Patient Vitals for the past 24 hrs:   Temp Pulse Resp BP SpO2   04/23/18 1937 - 88 18 163/79 100 %   04/23/18 1821 98.7 °F (37.1 °C) - 18 (!) 176/106 100 %   04/23/18 1643 - 85 - 155/81 -   04/23/18 1556 98 °F (36.7 °C) (!) 102 22 (!) 216/123 99 %     Wt Readings from Last 3 Encounters:   04/23/18 110.7 kg (244 lb 0.8 oz)   03/29/18 110.5 kg (243 lb 8 oz)   03/21/18 113.4 kg (250 lb)     Temp Readings from Last 3 Encounters:   04/23/18 98.7 °F (37.1 °C)   03/29/18 98.2 °F (36.8 °C)   03/21/18 97.8 °F (36.6 °C)     BP Readings from Last 3 Encounters:   04/23/18 163/79   03/29/18 152/90   03/21/18 (!) 151/97     Pulse Readings from Last 3 Encounters:   04/23/18 88   03/29/18 83   03/21/18 92            DATA     LABORATORY DATA:  Labs Reviewed   CBC WITH AUTOMATED DIFF - Abnormal; Notable for the following:        Result Value    HGB 11.3 (*)     HCT 34.4 (*)     RDW 14.7 (*)     All other components within normal limits   METABOLIC PANEL, COMPREHENSIVE - Abnormal; Notable for the following:     Glucose 157 (*)     Creatinine 1.07 (*)     BUN/Creatinine ratio 8 (*)     GFR est non-AA 57 (*)     All other components within normal limits   URINALYSIS W/ RFLX MICROSCOPIC - Abnormal; Notable for the following:     Appearance CLOUDY (*)     Protein TRACE (*)     Blood LARGE (*)     Leukocyte Esterase TRACE (*)     All other components within normal limits   DRUG SCREEN, URINE - Abnormal; Notable for the following:     THC (TH-CANNABINOL) POSITIVE (*)     All other components within normal limits   URINE MICROSCOPIC ONLY - Abnormal; Notable for the following:     Epithelial cells MODERATE (*)     Bacteria 1+ (*)     All other components within normal limits   LIPID PANEL - Abnormal; Notable for the following:     Cholesterol, total 253 (*)     Triglyceride 150 (*)     LDL, calculated 174 (*)     CHOL/HDL Ratio 5.2 (*)     All other components within normal limits   GLUCOSE, FASTING - Abnormal; Notable for the following:     Glucose 127 (*)     All other components within normal limits   GLUCOSE, POC - Abnormal; Notable for the following:     Glucose (POC) 152 (*)     All other components within normal limits   GLUCOSE, POC - Abnormal; Notable for the following:     Glucose (POC) 152 (*)     All other components within normal limits   ETHYL ALCOHOL   HCG URINE, QL   TSH 3RD GENERATION     Admission on 04/23/2018   Component Date Value Ref Range Status    WBC 04/23/2018 7.8  3.6 - 11.0 K/uL Final    RBC 04/23/2018 4.28  3.80 - 5.20 M/uL Final    HGB 04/23/2018 11.3* 11.5 - 16.0 g/dL Final    HCT 04/23/2018 34.4* 35.0 - 47.0 % Final    MCV 04/23/2018 80.4  80.0 - 99.0 FL Final    MCH 04/23/2018 26.4  26.0 - 34.0 PG Final    MCHC 04/23/2018 32.8  30.0 - 36.5 g/dL Final    RDW 04/23/2018 14.7* 11.5 - 14.5 % Final    PLATELET 85/32/1415 959  150 - 400 K/uL Final    MPV 04/23/2018 10.9  8.9 - 12.9 FL Final    NRBC 04/23/2018 0.0  0  WBC Final    ABSOLUTE NRBC 04/23/2018 0.00  0.00 - 0.01 K/uL Final    NEUTROPHILS 04/23/2018 65  32 - 75 % Final    LYMPHOCYTES 04/23/2018 26  12 - 49 % Final    MONOCYTES 04/23/2018 7  5 - 13 % Final    EOSINOPHILS 04/23/2018 1  0 - 7 % Final    BASOPHILS 04/23/2018 1  0 - 1 % Final    IMMATURE GRANULOCYTES 04/23/2018 0  0.0 - 0.5 % Final    ABS. NEUTROPHILS 04/23/2018 5.1  1.8 - 8.0 K/UL Final    ABS. LYMPHOCYTES 04/23/2018 2.1  0.8 - 3.5 K/UL Final    ABS. MONOCYTES 04/23/2018 0.6  0.0 - 1.0 K/UL Final    ABS. EOSINOPHILS 04/23/2018 0.1  0.0 - 0.4 K/UL Final    ABS. BASOPHILS 04/23/2018 0.1  0.0 - 0.1 K/UL Final    ABS. IMM.  GRANS. 04/23/2018 0.0  0.00 - 0.04 K/UL Final    DF 04/23/2018 AUTOMATED    Final    Sodium 04/23/2018 138  136 - 145 mmol/L Final    Potassium 04/23/2018 3.6  3.5 - 5.1 mmol/L Final    Chloride 04/23/2018 103  97 - 108 mmol/L Final    CO2 04/23/2018 26  21 - 32 mmol/L Final    Anion gap 04/23/2018 9  5 - 15 mmol/L Final    Glucose 04/23/2018 157* 65 - 100 mg/dL Final    BUN 04/23/2018 9  6 - 20 MG/DL Final    Creatinine 04/23/2018 1.07* 0.55 - 1.02 MG/DL Final    BUN/Creatinine ratio 04/23/2018 8* 12 - 20   Final    GFR est AA 04/23/2018 >60  >60 ml/min/1.73m2 Final    GFR est non-AA 04/23/2018 57* >60 ml/min/1.73m2 Final    Calcium 04/23/2018 8.6  8.5 - 10.1 MG/DL Final    Bilirubin, total 04/23/2018 0.2  0.2 - 1.0 MG/DL Final    ALT (SGPT) 04/23/2018 29  12 - 78 U/L Final    AST (SGOT) 04/23/2018 20  15 - 37 U/L Final    Alk.  phosphatase 04/23/2018 87  45 - 117 U/L Final    Protein, total 04/23/2018 7.2  6.4 - 8.2 g/dL Final    Albumin 04/23/2018 3.8  3.5 - 5.0 g/dL Final    Globulin 04/23/2018 3.4  2.0 - 4.0 g/dL Final    A-G Ratio 04/23/2018 1.1  1.1 - 2.2   Final    Color 04/23/2018 YELLOW/STRAW    Final    Appearance 04/23/2018 CLOUDY* CLEAR   Final    Specific gravity 04/23/2018 1.025  1.003 - 1.030   Final    pH (UA) 04/23/2018 5.5  5.0 - 8.0   Final    Protein 04/23/2018 TRACE* NEG mg/dL Final    Glucose 04/23/2018 NEGATIVE   NEG mg/dL Final    Ketone 04/23/2018 NEGATIVE   NEG mg/dL Final    Bilirubin 04/23/2018 NEGATIVE   NEG   Final    Blood 04/23/2018 LARGE* NEG   Final    Urobilinogen 04/23/2018 0.2  0.2 - 1.0 EU/dL Final    Nitrites 04/23/2018 NEGATIVE   NEG   Final    Leukocyte Esterase 04/23/2018 TRACE* NEG   Final    ALCOHOL(ETHYL),SERUM 04/23/2018 <10  <10 MG/DL Final    AMPHETAMINES 04/23/2018 NEGATIVE   NEG   Final    BARBITURATES 04/23/2018 NEGATIVE   NEG   Final    BENZODIAZEPINES 04/23/2018 NEGATIVE   NEG   Final    COCAINE 04/23/2018 NEGATIVE   NEG   Final    METHADONE 04/23/2018 NEGATIVE   NEG   Final    OPIATES 04/23/2018 NEGATIVE   NEG   Final    PCP(PHENCYCLIDINE) 04/23/2018 NEGATIVE   NEG   Final    THC (TH-CANNABINOL) 04/23/2018 POSITIVE* NEG   Final    Drug screen comment 04/23/2018 (NOTE)   Final    HCG urine, QL 04/23/2018 NEGATIVE   NEG   Final    Glucose (POC) 04/23/2018 152* 65 - 100 mg/dL Final    Performed by 04/23/2018 Shirin Lizzeth Sandy Burtonmin) Cris Flores   Final    WBC 04/23/2018 0-4  0 - 4 /hpf Final    RBC 04/23/2018 0-5  0 - 5 /hpf Final    Epithelial cells 04/23/2018 MODERATE* FEW /lpf Final    Bacteria 04/23/2018 1+* NEG /hpf Final    TSH 04/24/2018 1.32  0.36 - 3.74 uIU/mL Final    LIPID PROFILE 04/24/2018        Final    Cholesterol, total 04/24/2018 253* <200 MG/DL Final    Triglyceride 04/24/2018 150* <150 MG/DL Final    HDL Cholesterol 04/24/2018 49  MG/DL Final    LDL, calculated 04/24/2018 174* 0 - 100 MG/DL Final    VLDL, calculated 04/24/2018 30  MG/DL Final    CHOL/HDL Ratio 04/24/2018 5.2* 0 - 5.0   Final    Glucose 04/24/2018 127* 65 - 100 MG/DL Final    Glucose (POC) 04/24/2018 152* 65 - 100 mg/dL Final    Performed by 04/24/2018 Sandrita Granados   Final        RADIOLOGY REPORTS:    Results from Hospital Encounter encounter on 01/17/18   XR CHEST PA LAT   Narrative Indication:  Chest Pain, Cough, pneumonia? Exam: PA and lateral views of the chest.    Direct comparison is made to prior CXR dated 11/2016. Findings: Cardiomediastinal silhouette is within normal limits. Lungs are clear  bilaterally. Pleural spaces are normal. Osseous structures are intact. Impression IMPRESSION: No acute cardiopulmonary disease. No results found.            MEDICATIONS       ALL MEDICATIONS  Current Facility-Administered Medications   Medication Dose Route Frequency    gabapentin (NEURONTIN) capsule 400 mg  400 mg Oral TID    famotidine (PEPCID) tablet 20 mg  20 mg Oral BID    LORazepam (ATIVAN) tablet 1 mg  1 mg Oral Q6H PRN    ziprasidone (GEODON) 20 mg in sterile water (preservative free) 1 mL injection  20 mg IntraMUSCular BID PRN    OLANZapine (ZyPREXA) tablet 5 mg  5 mg Oral Q6H PRN    benztropine (COGENTIN) tablet 2 mg  2 mg Oral BID PRN    benztropine (COGENTIN) injection 2 mg  2 mg IntraMUSCular BID PRN    LORazepam (ATIVAN) injection 2 mg  2 mg IntraMUSCular Q4H PRN    LORazepam (ATIVAN) tablet 1 mg  1 mg Oral Q4H PRN    zolpidem (AMBIEN) tablet 10 mg  10 mg Oral QHS PRN    acetaminophen (TYLENOL) tablet 650 mg  650 mg Oral Q4H PRN    ibuprofen (MOTRIN) tablet 400 mg  400 mg Oral Q8H PRN    magnesium hydroxide (MILK OF MAGNESIA) 400 mg/5 mL oral suspension 30 mL  30 mL Oral DAILY PRN    nicotine (NICODERM CQ) 21 mg/24 hr patch 1 Patch  1 Patch TransDERmal DAILY PRN      SCHEDULED MEDICATIONS  Current Facility-Administered Medications   Medication Dose Route Frequency    gabapentin (NEURONTIN) capsule 400 mg  400 mg Oral TID    famotidine (PEPCID) tablet 20 mg  20 mg Oral BID                ASSESSMENT & PLAN        The patient, Natalio Armando, is a 34 y.o.  female who presents at this time for treatment of the following diagnoses:  Patient Active Hospital Problem List:   Depression (4/23/2018)    Assessment: depression and sadness     Plan: restart her medications and will need therapy           I will continue to monitor blood levels (Depakote, Tegretol, lithium, clozapine---a drug with a narrow therapeutic index= NTI) and associated labs for drug therapy implemented that require intense monitoring for toxicity as deemed appropriate based on current medication side effects and pharmacodynamically determined drug 1/2 lives. A coordinated, multidisplinary treatment team (includes the nurse, unit pharmcist,  and writer) round was conducted for this initial evaluation with the patient present. The following regarding medications was addressed during rounds with patient:   the risks and benefits of the proposed medication. The patient was given the opportunity to ask questions. Informed consent given to the use of the above medications.      I will continue to adjust psychiatric and non-psychiatric medications (see above \"medication\" section and orders section for details) as deemed appropriate & based upon diagnoses and response to treatment. I have reviewed admission (and previous/old) labs and medical tests in the EHR and or transferring hospital documents. I will continue to order blood tests/labs and diagnostic tests as deemed appropriate and review results as they become available (see orders for details). I have reviewed old psychiatric and medical records available in the EHR. I Will order additional psychiatric records from other institutions to further elucidate the nature of patient's psychopathology and review once available. I will gather additional collateral information from friends, family and o/p treatment team to further elucidate the nature of patient's psychopathology and baselline level of psychiatric functioning.       ESTIMATED LENGTH OF STAY:    3       STRENGTHS:  Exercising self-direction/Resourceful, Access to housing/residential stability and Interpersonal/supportive relationships (family, friends, peers)                                        SIGNED:    Gino Muro MD  4/24/2018
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
## 2018-04-24 NOTE — ED NOTES
Call placed to Araceli Infante. [facility] [place], [place]) in regards to patient trying to hang herself. There is some concern from floor staff about patient being admitted voluntary. Informed by [facility] that she will reach out to bed board. Awaiting call back from [facility] for update on patient.

## 2018-04-24 NOTE — CONSULTS
Medical Consult for [facility] Patient    Consult H&P   dictated, see patient chart    Impression:    Leana Barnard a 34 y.o. female with past medical history of mental health disease, hypertension, dm,  multiple drug allergies, and anemia presents with behavioral health problems of severe depression admitted for further psychiatric evaluation and treatment. Plan:   1. Psychiatry to manage mental health issues  2. Continuing home medications. 3. Medically stable at this time, will follow up as needed. 4. No VTE prophylaxis indicated or necessary at this time.    5. For diarrhea will start immodium    Thank you  Natalia Billings MD  4/24/2018, 7:50 PM

## 2018-04-24 NOTE — BH NOTES
PT observed on initial rounds. I asked how she was doing she said \"not good\" I asked her what was going on? She stated\" I don't want to be here I want to be at home. ... I thought I could watch the news and they said I had to go to bed at 10 o'clock. It's stuffy here and I can't wait to get out of here in the morning\" I asked her if she felt safe here and whether she would do anything to harm herself here on the unit. She stated \" no I don't feel safe here but I will not do anything to harm myself. Philip Orlando \" Pt remain !:1 with staff for safety

## 2018-04-24 NOTE — PROGRESS NOTES
[facility] Medication Reconciliation     Recommendations/Findings:   1) Added: ferrous sulfate   2) Changed: gabapentin, metoclopramide  3) Removed: multivitamin     Total Time Spent: 20 minutes     Information obtained from: RxQuery, patient interview     Patient allergies: Allergies as of 04/23/2018 - Review Complete 04/23/2018   Allergen Reaction Noted    Latex Rash 03/10/2010    Latex Hives 04/03/2011    Other food Hives 10/02/2014    Flagyl [metronidazole] Hives and Itching 10/03/2014    Lisinopril-hydrochlorothiazide Angioedema 03/21/2015    Clindamycin Rash and Itching 08/20/2015    Ciprofloxacin Rash and Itching 08/20/2015    Citrate Hives 04/03/2011    Cottonseed oil Rash 11/14/2017    Cymbalta [duloxetine] Hives and Itching 02/17/2011    Flexeril [cyclobenzaprine] Angioedema 03/09/2012    Lemon Hives 04/23/2018    Pcn [penicillins] Hives 10/02/2014    Sulfa (sulfonamide antibiotics) Shortness of Breath 04/17/2014    Tomato Hives 03/10/2010    Tomato Hives 10/17/2011         Prior to Admission Medications   Prescriptions Last Dose Informant Patient Reported? Taking?   ferrous sulfate 325 mg (65 mg iron) tablet   Yes Yes   Sig: Take 325 mg by mouth Daily (before lunch). Indications: Iron Deficiency Anemia   gabapentin (NEURONTIN) 400 mg capsule   Yes Yes   Sig: Take 400 mg by mouth three (3) times daily. Indications: NEUROPATHIC PAIN   losartan (COZAAR) 25 mg tablet 4/23/2018 at Unknown time  Yes Yes   Sig: Take 25 mg by mouth daily. Indications: hypertension   metFORMIN (GLUCOPHAGE) 1,000 mg tablet   Yes Yes   Sig: Take 1,000 mg by mouth two (2) times daily (with meals). Indications: type 2 diabetes mellitus   metoclopramide HCl (REGLAN) 5 mg tablet 4/23/2018 at Unknown time  Yes Yes   Sig: Take 5 mg by mouth Before breakfast, lunch, dinner and at bedtime. omeprazole (PRILOSEC) 20 mg capsule 4/23/2018 at Unknown time  Yes Yes   Sig: Take 20 mg by mouth daily.  Indications: Heartburn      Facility-Administered Medications: None       Thank you,  Samantha James, PHARMD, BCPS

## 2018-04-24 NOTE — BH NOTES
Patient complained of anxiety. m patient states ativan 1 mg was not helping. Order for vistaril 50mg q6h prn started. Patient was also order scheduled Prozac. Nurse will given Prozac and vistaril together. Will continue to monitor patient and assess .

## 2018-04-25 VITALS
RESPIRATION RATE: 20 BRPM | WEIGHT: 244.05 LBS | DIASTOLIC BLOOD PRESSURE: 79 MMHG | SYSTOLIC BLOOD PRESSURE: 143 MMHG | OXYGEN SATURATION: 100 % | TEMPERATURE: 98.1 F | HEIGHT: 63 IN | HEART RATE: 89 BPM | BODY MASS INDEX: 43.24 KG/M2

## 2018-04-25 PROCEDURE — 74011250637 HC RX REV CODE- 250/637: Performed by: PSYCHIATRY & NEUROLOGY

## 2018-04-25 PROCEDURE — 74011250637 HC RX REV CODE- 250/637: Performed by: EMERGENCY MEDICINE

## 2018-04-25 RX ORDER — FLUOXETINE 10 MG/1
10 CAPSULE ORAL DAILY
Qty: 14 CAP | Refills: 0 | Status: SHIPPED | OUTPATIENT
Start: 2018-04-26 | End: 2018-05-10

## 2018-04-25 RX ADMIN — LOSARTAN POTASSIUM 25 MG: 25 TABLET ORAL at 08:59

## 2018-04-25 RX ADMIN — FLUOXETINE 10 MG: 10 CAPSULE ORAL at 08:58

## 2018-04-25 RX ADMIN — METFORMIN HYDROCHLORIDE 1000 MG: 500 TABLET, FILM COATED ORAL at 08:54

## 2018-04-25 RX ADMIN — METOCLOPRAMIDE 5 MG: 10 TABLET ORAL at 08:56

## 2018-04-25 RX ADMIN — GABAPENTIN 400 MG: 300 CAPSULE ORAL at 08:58

## 2018-04-25 RX ADMIN — FAMOTIDINE 20 MG: 20 TABLET, FILM COATED ORAL at 08:58

## 2018-04-25 NOTE — BH NOTES
GROUP THERAPY PROGRESS NOTE    Kayley Hou is participating in [place].      Group time: 30 minutes    Personal goal for participation: daily orientation    Goal orientation: personal    Group therapy participation: active    Therapeutic interventions reviewed and discussed: yes    Impression of participation: Attend    Kristi Hogue  4/25/2018

## 2018-04-25 NOTE — BH NOTES
GROUP THERAPY PROGRESS NOTE    Car Pickard is participating in [place].      Group time: 30 minutes    Personal goal for participation: daily orientation    Goal orientation: personal    Group therapy participation: active    Therapeutic interventions reviewed and discussed: yes    Impression of participation: Attend    Brittney Ann  4/25/2018

## 2018-04-25 NOTE — DISCHARGE SUMMARY
PSYCHIATRIC DISCHARGE SUMMARY         IDENTIFICATION:    Patient Name  Whitney Hook   Date of Birth 1980   [facility] [id number]   Medical Record Number  [id number]      Age  34 y.o. PCP Jewels Mcgraw MD   Admit date:  4/23/2018    Discharge date: 4/25/2018   Room Number  46/80  @ [facility]   Date of Service  4/25/2018            TYPE OF DISCHARGE: REGULAR               CONDITION AT DISCHARGE: improved       PROVISIONAL & DISCHARGE DIAGNOSES:    Problem List  Date Reviewed: 12/29/2014          Codes Class    Depression ICD-10-CM: F32.9  ICD-9-CM: 122         Preexisting diabetes complicating pregnancy, antepartum ICD-10-CM: O24.319  ICD-9-CM: 648.03, 250.00         Back pain with radiation ICD-10-CM: M54.9  ICD-9-CM: 724.5         Allergic rhinitis ICD-10-CM: J30.9  ICD-9-CM: 477.9         Hypercholesteremia ICD-10-CM: E78.00  ICD-9-CM: 272.0         Anxiety attack ICD-10-CM: F41.0  ICD-9-CM: 300.01         GERD (gastroesophageal reflux disease) ICD-10-CM: K21.9  ICD-9-CM: 530.81         Depression with anxiety ICD-10-CM: F41.8  ICD-9-CM: 300.4         Diabetes ([address] ICD-10-CM: E11.9  ICD-9-CM: 250.00         HTN (hypertension) ICD-10-CM: I10  ICD-9-CM: 401.9               Active Hospital Problems    Depression        DISCHARGE DIAGNOSIS:   Axis I:  SEE ABOVE  Axis II: SEE ABOVE  Axis III: SEE ABOVE  Axis IV:  lack of structure  Axis V:  45 on admission, 65 on discharge 65 (baseline)       CC & HISTORY OF PRESENT ILLNESS:  CC: \". Pt admitted under a voluntary basis for depression with suicidal ideations . HISTORY OF PRESENT ILLNESS:    The patient, Whitney Hook, is a 34 y.o. BLACK [place]  female with a past psychiatric history significant for depression and suicidal ideation , who presents at this time with complaints of (and/or evidence of) the following emotional symptoms: depression and suicidal thoughts/threats.   Additional symptomatology include anxiety, depression worse and feeling suicidal.  The above symptoms have been present for months . These symptoms are of moderate  severity. These symptoms are constant  in nature. The patient's condition has been precipitated by financial issues and psychosocial stressors (problems with family ). Patient's condition made worse by continued illicit drug use and alcohol use as well as treatment noncompliance. UDS: positive ; BAL=0.   4/25/18 she is doing better and mood is better and no SI or HI and no psychotic features and no psychotic features and no anger issues        SOCIAL HISTORY:    Social History     Social History    Marital status: SINGLE     Spouse name: N/A    Number of children: N/A    Years of education: N/A     Occupational History    Not on file.      Social History Main Topics    Smoking status: Current Every Day Smoker     Packs/day: 0.50     Last attempt to quit: 10/16/2015    Smokeless tobacco: Never Used    Alcohol use No      Comment: Occasionally    Drug use: No    Sexual activity: Yes     Partners: Female     Other Topics Concern    Not on file     Social History Narrative    ** Merged History Encounter **           FAMILY HISTORY:   Family History   Problem Relation Age of Onset    Diabetes Mother     Hypertension Mother     Stroke Mother     Heart Disease Mother     Hypertension Father     Stroke Father     Diabetes Father     Kidney Disease Father     Hypertension Maternal Grandmother     Diabetes Maternal Grandmother     Hypertension Maternal Grandfather     Diabetes Maternal Grandfather     Hypertension Paternal Grandmother     Diabetes Paternal Grandmother     Hypertension Paternal Grandfather     Diabetes Paternal Grandfather     Hypertension Sister     Diabetes Sister     Diabetes Sister     Hypertension Sister     Diabetes Sister     Hypertension Sister              HOSPITALIZATION COURSE:    Bridget Kellogg was admitted to the inpatient psychiatric unit [facility] for acute psychiatric stabilization in regards to symptomatology as described in the HPI above. The differential diagnosis at time of admission included: depression and anxiety   While on the unit Louis Corral was involved in individual, group, occupational and milieu therapy. Psychiatric medications were adjusted during this hospitalization including Prozac . Louis Corral demonstrated a slow, but progressive improvement in overall condition. Much of patient's depression appeared to be related to situational stressors, effects of drugs of abuse, and psychological factors. Please see individual progress notes for more specific details regarding patient's hospitalization course. At time of discharge, Louis Corral is without significant problems of depression psychosis  luz maria. Patient free of suicidal and homicidal ideations (appears to be at very low risk of suicide or homicide) and reports many positive predictive factors in terms of not attempting suicide or homicide. Overall presentation at time of discharge is most consistent with the diagnosis of adjustment disorder with depressed mood. Patient with request for discharge today. There are no grounds to seek a TDO. Patient has maximized benefit to be derived from acute inpatient psychiatric treatment. All members of the treatment team concur with each other in regards to plans for discharge today per patient's request.  Patient and family are aware and in agreement with discharge and discharge plan.     Per my last note:          LABS AND IMAGAING:    Labs Reviewed   CBC WITH AUTOMATED DIFF - Abnormal; Notable for the following:        Result Value    HGB 11.3 (*)     HCT 34.4 (*)     RDW 14.7 (*)     All other components within normal limits   METABOLIC PANEL, COMPREHENSIVE - Abnormal; Notable for the following:     Glucose 157 (*)     Creatinine 1.07 (*)     BUN/Creatinine ratio 8 (*)     GFR est non-AA 57 (*)     All other components within normal limits   URINALYSIS W/ RFLX MICROSCOPIC - Abnormal; Notable for the following:     Appearance CLOUDY (*)     Protein TRACE (*)     Blood LARGE (*)     Leukocyte Esterase TRACE (*)     All other components within normal limits   DRUG SCREEN, URINE - Abnormal; Notable for the following:     THC (TH-CANNABINOL) POSITIVE (*)     All other components within normal limits   URINE MICROSCOPIC ONLY - Abnormal; Notable for the following:     Epithelial cells MODERATE (*)     Bacteria 1+ (*)     All other components within normal limits   LIPID PANEL - Abnormal; Notable for the following:     Cholesterol, total 253 (*)     Triglyceride 150 (*)     LDL, calculated 174 (*)     CHOL/HDL Ratio 5.2 (*)     All other components within normal limits   GLUCOSE, FASTING - Abnormal; Notable for the following:     Glucose 127 (*)     All other components within normal limits   GLUCOSE, POC - Abnormal; Notable for the following:     Glucose (POC) 152 (*)     All other components within normal limits   GLUCOSE, POC - Abnormal; Notable for the following:     Glucose (POC) 152 (*)     All other components within normal limits   ETHYL ALCOHOL   HCG URINE, QL   TSH 3RD GENERATION     No results found for: DS35, PHEN, PHENO, PHENT, DILF, DS39, PHENY, PTN, VALF2, VALAC, VALP, VALPR, DS6, CRBAM, CRBAMP, CARB2, XCRBAM  Admission on 04/23/2018   Component Date Value Ref Range Status    WBC 04/23/2018 7.8  3.6 - 11.0 K/uL Final    RBC 04/23/2018 4.28  3.80 - 5.20 M/uL Final    HGB 04/23/2018 11.3* 11.5 - 16.0 g/dL Final    HCT 04/23/2018 34.4* 35.0 - 47.0 % Final    MCV 04/23/2018 80.4  80.0 - 99.0 FL Final    MCH 04/23/2018 26.4  26.0 - 34.0 PG Final    MCHC 04/23/2018 32.8  30.0 - 36.5 g/dL Final    RDW 04/23/2018 14.7* 11.5 - 14.5 % Final    PLATELET 17/20/5662 603  150 - 400 K/uL Final    MPV 04/23/2018 10.9  8.9 - 12.9 FL Final    NRBC 04/23/2018 0.0  0  WBC Final    ABSOLUTE NRBC 04/23/2018 0.00  0.00 - 0.01 K/uL Final    NEUTROPHILS 04/23/2018 65  32 - 75 % Final    LYMPHOCYTES 04/23/2018 26  12 - 49 % Final    MONOCYTES 04/23/2018 7  5 - 13 % Final    EOSINOPHILS 04/23/2018 1  0 - 7 % Final    BASOPHILS 04/23/2018 1  0 - 1 % Final    IMMATURE GRANULOCYTES 04/23/2018 0  0.0 - 0.5 % Final    ABS. NEUTROPHILS 04/23/2018 5.1  1.8 - 8.0 K/UL Final    ABS. LYMPHOCYTES 04/23/2018 2.1  0.8 - 3.5 K/UL Final    ABS. MONOCYTES 04/23/2018 0.6  0.0 - 1.0 K/UL Final    ABS. EOSINOPHILS 04/23/2018 0.1  0.0 - 0.4 K/UL Final    ABS. BASOPHILS 04/23/2018 0.1  0.0 - 0.1 K/UL Final    ABS. IMM. GRANS. 04/23/2018 0.0  0.00 - 0.04 K/UL Final    DF 04/23/2018 AUTOMATED    Final    Sodium 04/23/2018 138  136 - 145 mmol/L Final    Potassium 04/23/2018 3.6  3.5 - 5.1 mmol/L Final    Chloride 04/23/2018 103  97 - 108 mmol/L Final    CO2 04/23/2018 26  21 - 32 mmol/L Final    Anion gap 04/23/2018 9  5 - 15 mmol/L Final    Glucose 04/23/2018 157* 65 - 100 mg/dL Final    BUN 04/23/2018 9  6 - 20 MG/DL Final    Creatinine 04/23/2018 1.07* 0.55 - 1.02 MG/DL Final    BUN/Creatinine ratio 04/23/2018 8* 12 - 20   Final    GFR est AA 04/23/2018 >60  >60 ml/min/1.73m2 Final    GFR est non-AA 04/23/2018 57* >60 ml/min/1.73m2 Final    Calcium 04/23/2018 8.6  8.5 - 10.1 MG/DL Final    Bilirubin, total 04/23/2018 0.2  0.2 - 1.0 MG/DL Final    ALT (SGPT) 04/23/2018 29  12 - 78 U/L Final    AST (SGOT) 04/23/2018 20  15 - 37 U/L Final    Alk.  phosphatase 04/23/2018 87  45 - 117 U/L Final    Protein, total 04/23/2018 7.2  6.4 - 8.2 g/dL Final    Albumin 04/23/2018 3.8  3.5 - 5.0 g/dL Final    Globulin 04/23/2018 3.4  2.0 - 4.0 g/dL Final    A-G Ratio 04/23/2018 1.1  1.1 - 2.2   Final    Color 04/23/2018 YELLOW/STRAW    Final    Appearance 04/23/2018 CLOUDY* CLEAR   Final    Specific gravity 04/23/2018 1.025  1.003 - 1.030   Final    pH (UA) 04/23/2018 5.5  5.0 - 8.0   Final    Protein 04/23/2018 TRACE* NEG mg/dL Final    Glucose 04/23/2018 NEGATIVE   NEG mg/dL Final    Ketone 04/23/2018 NEGATIVE   NEG mg/dL Final    Bilirubin 04/23/2018 NEGATIVE   NEG   Final    Blood 04/23/2018 LARGE* NEG   Final    Urobilinogen 04/23/2018 0.2  0.2 - 1.0 EU/dL Final    Nitrites 04/23/2018 NEGATIVE   NEG   Final    Leukocyte Esterase 04/23/2018 TRACE* NEG   Final    ALCOHOL(ETHYL),SERUM 04/23/2018 <10  <10 MG/DL Final    AMPHETAMINES 04/23/2018 NEGATIVE   NEG   Final    BARBITURATES 04/23/2018 NEGATIVE   NEG   Final    BENZODIAZEPINES 04/23/2018 NEGATIVE   NEG   Final    COCAINE 04/23/2018 NEGATIVE   NEG   Final    METHADONE 04/23/2018 NEGATIVE   NEG   Final    OPIATES 04/23/2018 NEGATIVE   NEG   Final    PCP(PHENCYCLIDINE) 04/23/2018 NEGATIVE   NEG   Final    THC (TH-CANNABINOL) 04/23/2018 POSITIVE* NEG   Final    Drug screen comment 04/23/2018 (NOTE)   Final    HCG urine, QL 04/23/2018 NEGATIVE   NEG   Final    Glucose (POC) 04/23/2018 152* 65 - 100 mg/dL Final    Performed by 04/23/2018 Lamar Moore   Final    WBC 04/23/2018 0-4  0 - 4 /hpf Final    RBC 04/23/2018 0-5  0 - 5 /hpf Final    Epithelial cells 04/23/2018 MODERATE* FEW /lpf Final    Bacteria 04/23/2018 1+* NEG /hpf Final    TSH 04/24/2018 1.32  0.36 - 3.74 uIU/mL Final    LIPID PROFILE 04/24/2018        Final    Cholesterol, total 04/24/2018 253* <200 MG/DL Final    Triglyceride 04/24/2018 150* <150 MG/DL Final    HDL Cholesterol 04/24/2018 49  MG/DL Final    LDL, calculated 04/24/2018 174* 0 - 100 MG/DL Final    VLDL, calculated 04/24/2018 30  MG/DL Final    CHOL/HDL Ratio 04/24/2018 5.2* 0 - 5.0   Final    Glucose 04/24/2018 127* 65 - 100 MG/DL Final    Glucose (POC) 04/24/2018 152* 65 - 100 mg/dL Final    Performed by 04/24/2018 Chucky Rodas   Final     No results found. DISPOSITION:    Home. Patient to f/u with psychiatric, and psychotherapy appointments. Patient is to f/u with internist as directed. FOLLOW-UP CARE:    Activity as tolerated  Regular Diet  Wound Care: none needed. Follow-up Information     Follow up With Details Comments Contact Info    [facility]  Please follow up with Je Cerda LCSW on May 8th at 1pm. Please folllow up with Ama Lr on   [facility] [address],   [place], [address] [place] [address]  Phone: [phone]  Fax: [phone]    Paulette Archibald MD   [address] [phone]                   PROGNOSIS:   Good ---- based on nature of patient's pathology/ies and treatment compliance issues. Prognosis is greatly dependent upon patient's ability to remain to follow up with psychiatric/psychotherapy appointments as well as to comply with psychiatric medications as prescribed. DISCHARGE MEDICATIONS:     Informed consent given for the use of following psychotropic medications:  Current Discharge Medication List      START taking these medications    Details   FLUoxetine (PROZAC) 10 mg capsule Take 1 Cap by mouth daily for 14 days. Indications: Generalized Anxiety Disorder, Post Traumatic Stress Disorder  Qty: 14 Cap, Refills: 0         CONTINUE these medications which have NOT CHANGED    Details   metFORMIN (GLUCOPHAGE) 1,000 mg tablet Take 1,000 mg by mouth two (2) times daily (with meals). Indications: type 2 diabetes mellitus      gabapentin (NEURONTIN) 400 mg capsule Take 400 mg by mouth three (3) times daily. Indications: NEUROPATHIC PAIN      ferrous sulfate 325 mg (65 mg iron) tablet Take 325 mg by mouth Daily (before lunch). Indications: Iron Deficiency Anemia      losartan (COZAAR) 25 mg tablet Take 25 mg by mouth daily. Indications: hypertension      metoclopramide HCl (REGLAN) 5 mg tablet Take 5 mg by mouth Before breakfast, lunch, dinner and at bedtime. omeprazole (PRILOSEC) 20 mg capsule Take 20 mg by mouth daily.  Indications: Heartburn                    A coordinated, multidisplinary treatment team round was conducted with Chidi Sung Clarke---this is done daily here at [facility]. This team consists of the nurse, psychiatric unit pharmcist,  and writer. I have spent greater than 35 minutes on discharge work.     Signed:  Lizette Mann MD  4/25/2018

## 2018-04-25 NOTE — CONSULTS
[address]  MR#: [id number]  : 1980  ACCOUNT #: [de-identified]   DATE OF SERVICE: 2018    REFERRING PHYSICIAN:  Fan Meza M.D. REASON FOR CONSULTATION:  Medical evaluation and psychiatric admission. CHIEF COMPLAINT:  Severe depression. HISTORY OF PRESENT ILLNESS:  This is a 47-year-old female who presents complaining of severe depression, admitted for further psychiatric evaluation and treatment. Denies any chest pain, shortness of breath, nausea, vomiting. Does state she has been having some diarrhea since her medicines have been a little off kilter since admission. She says usually if she does not take her Reglan on a regular basis, it will flare up her diarrhea. Her primary care physician is Dr. Zoraida Diane. PAST MEDICAL HISTORY:  Bipolar, anxiety, hypertension, diabetes, anemia and irritable bowel syndrome. PAST SURGICAL HISTORY:   x3. ALLERGIES:  Multiple include FLEXERIL, SULFA, CYMBALTA, CLINDAMYCIN, FLAGYL, LISINOPRIL. MEDICATIONS:  Metformin 1000 mg b.i.d., gabapentin 400 mg t.i.d., ferrous sulfate 325, losartan 25 mg, Reglan 5 q.i.d. SOCIAL HISTORY:  Does smoke half pack of cigarettes a day. Denies any alcohol use. Does use marijuana. Denies any cocaine or heroin use. Single has 1 child, currently not working. PHYSICAL EXAMINATION:  VITAL SIGNS:  Temperature was 96.7, blood pressure 146/94, pulse 83, weight 244 pounds. GENERAL:  Pleasant overweight, no acute distress. HEENT:  Oropharynx is clear. NECK:  Supple. LUNGS:  Clear to auscultation, no wheezing, rales, rhonchi. HEART:  Regular rate. No murmurs, gallops or rubs. ABDOMEN:  Soft, nontender, nondistended with active bowel sounds. No hepatosplenomegaly. EXTREMITIES:  No cyanosis, clubbing or edema. LABORATORY DATA:  TSH 1.32, hemoglobin 11.3, hematocrit is 34.4, WBC 7.8, platelets 858. UA shows a large amount of blood.   Sodium 138, potassium 3.6, chloride 103, bicarbonate 26, BUN was 9, creatinine 1.07, glucose 157. HCG was negative. Tox screen positive for marijuana. Alcohol level less than 10. ASSESSMENT:  A 42-year-old female with a past medical history of bipolar, anxiety, hypertension, diabetes, obesity, anemia, irritable bowel syndrome, presents with severe depression, admitted for further psychiatric evaluation and treatment. PLAN:  1. Psychiatric management of the issues. 2.  Appears medically stable. We will follow up on p.r.n. basis. 3.  Continue home meds once confirmed. 4.  Medically stable. Follow up on p.r.n. basis. No VTE prophylaxis indicated or warranted at this time. I will start Imodium p.r.n. for diarrhea, which she says helps her.       Anay Walsh MD DC / Connie Mancera  D: 04/25/2018 06:45     T: 04/25/2018 07:52  JOB #: [id number]

## 2018-04-25 NOTE — PROGRESS NOTES
Problem: Suicide/Homicide (Adult/Pediatric)  Goal: *STG:  Verbalizes alternative ways of dealing with maladaptive feelings/behaviors  Outcome: Progressing Towards Goal  Pt primarily withdrawn to room. Able to make needs known to staff. She is easily irritable with complaints regarding laundry (initially wasn't dry and refused offer to place back in dryer) and reglan (scheduled times are not in accordance with how she takes at home). Reglan times adjusted and Imodium ordered by Dr Barbara mann for reports of diarrhea. None observed at this time. She is focused on discharge tomorrow. Pt makes eye contact. A&O x 4. Affect is angry, mood is labile. Hygiene is adequate, independent in ADLs. Gait is steady. Sleep and appetite patterns WNL. Denies SI/HI. Denies hallucinations at present. Attempting to meet patient's needs and address concerns. Will continue to monitor pt with q 15 min checks.

## 2018-04-25 NOTE — BH NOTES
GROUP THERAPY PROGRESS NOTE    The patient Radha terrell 34 y.o. female is participating in Reflections Group    Group time: 30 minutes    Personal goal for participation: To discuss the daily goals    Goal orientation: personal    Group therapy participation: passive    Therapeutic interventions reviewed and discussed:  Yes    Impression of participation: poor    Loreto Bartlett  4/24/2018  9:47 PM

## 2018-04-25 NOTE — BH NOTES
Behavioral Health Transition Record to Provider    Patient Name: Genaro Ma  YOB: 1980  Medical Record Number: [id number]  Date of Admission: 4/23/2018  Date of Discharge: 4/25/18    Attending Provider: Jose Armando Colon MD  Discharging Provider: Dr. Jose Armando Colon  To contact this individual call [phone] and ask the  to page. If unavailable, ask to be transferred to [facility] Provider on call. [facility] Provider will be available on call 24/7 and during holidays. Primary Care Provider: Ortega Patterson MD    Allergies   Allergen Reactions    Latex Rash    Latex Hives    Other Food Hives     Citrus Fruits    Flagyl [Metronidazole] Hives and Itching    Lisinopril-Hydrochlorothiazide Angioedema    Clindamycin Rash and Itching    Ciprofloxacin Rash and Itching    Citrate Hives    Cottonseed Oil Rash    Cymbalta [Duloxetine] Hives and Itching    Flexeril [Cyclobenzaprine] Angioedema    Lemon Hives    Pcn [Penicillins] Hives    Sulfa (Sulfonamide Antibiotics) Shortness of Breath    Tomato Hives    Tomato Hives       Reason for Admission: Depression     Admission Diagnosis: Depression    * No surgery found *    Results for orders placed or performed during the hospital encounter of 04/23/18   CBC WITH AUTOMATED DIFF   Result Value Ref Range    WBC 7.8 3.6 - 11.0 K/uL    RBC 4.28 3.80 - 5.20 M/uL    HGB 11.3 (L) 11.5 - 16.0 g/dL    HCT 34.4 (L) 35.0 - 47.0 %    MCV 80.4 80.0 - 99.0 FL    MCH 26.4 26.0 - 34.0 PG    MCHC 32.8 30.0 - 36.5 g/dL    RDW 14.7 (H) 11.5 - 14.5 %    PLATELET 116 348 - 282 K/uL    MPV 10.9 8.9 - 12.9 FL    NRBC 0.0 0  WBC    ABSOLUTE NRBC 0.00 0.00 - 0.01 K/uL    NEUTROPHILS 65 32 - 75 %    LYMPHOCYTES 26 12 - 49 %    MONOCYTES 7 5 - 13 %    EOSINOPHILS 1 0 - 7 %    BASOPHILS 1 0 - 1 %    IMMATURE GRANULOCYTES 0 0.0 - 0.5 %    ABS. NEUTROPHILS 5.1 1.8 - 8.0 K/UL    ABS. LYMPHOCYTES 2.1 0.8 - 3.5 K/UL    ABS.  MONOCYTES 0.6 0.0 - 1.0 K/UL    ABS. EOSINOPHILS 0.1 0.0 - 0.4 K/UL    ABS. BASOPHILS 0.1 0.0 - 0.1 K/UL    ABS. IMM. GRANS. 0.0 0.00 - 0.04 K/UL    DF AUTOMATED     METABOLIC PANEL, COMPREHENSIVE   Result Value Ref Range    Sodium 138 136 - 145 mmol/L    Potassium 3.6 3.5 - 5.1 mmol/L    Chloride 103 97 - 108 mmol/L    CO2 26 21 - 32 mmol/L    Anion gap 9 5 - 15 mmol/L    Glucose 157 (H) 65 - 100 mg/dL    BUN 9 6 - 20 MG/DL    Creatinine 1.07 (H) 0.55 - 1.02 MG/DL    BUN/Creatinine ratio 8 (L) 12 - 20      GFR est AA >60 >60 ml/min/1.73m2    GFR est non-AA 57 (L) >60 ml/min/1.73m2    Calcium 8.6 8.5 - 10.1 MG/DL    Bilirubin, total 0.2 0.2 - 1.0 MG/DL    ALT (SGPT) 29 12 - 78 U/L    AST (SGOT) 20 15 - 37 U/L    Alk.  phosphatase 87 45 - 117 U/L    Protein, total 7.2 6.4 - 8.2 g/dL    Albumin 3.8 3.5 - 5.0 g/dL    Globulin 3.4 2.0 - 4.0 g/dL    A-G Ratio 1.1 1.1 - 2.2     URINALYSIS W/ RFLX MICROSCOPIC   Result Value Ref Range    Color YELLOW/STRAW      Appearance CLOUDY (A) CLEAR      Specific gravity 1.025 1.003 - 1.030      pH (UA) 5.5 5.0 - 8.0      Protein TRACE (A) NEG mg/dL    Glucose NEGATIVE  NEG mg/dL    Ketone NEGATIVE  NEG mg/dL    Bilirubin NEGATIVE  NEG      Blood LARGE (A) NEG      Urobilinogen 0.2 0.2 - 1.0 EU/dL    Nitrites NEGATIVE  NEG      Leukocyte Esterase TRACE (A) NEG     ETHYL ALCOHOL   Result Value Ref Range    ALCOHOL(ETHYL),SERUM <10 <10 MG/DL   DRUG SCREEN, URINE   Result Value Ref Range    AMPHETAMINES NEGATIVE  NEG      BARBITURATES NEGATIVE  NEG      BENZODIAZEPINES NEGATIVE  NEG      COCAINE NEGATIVE  NEG      METHADONE NEGATIVE  NEG      OPIATES NEGATIVE  NEG      PCP(PHENCYCLIDINE) NEGATIVE  NEG      THC (TH-CANNABINOL) POSITIVE (A) NEG      Drug screen comment (NOTE)    HCG URINE, QL   Result Value Ref Range    HCG urine, QL NEGATIVE  NEG     URINE MICROSCOPIC ONLY   Result Value Ref Range    WBC 0-4 0 - 4 /hpf    RBC 0-5 0 - 5 /hpf    Epithelial cells MODERATE (A) FEW /lpf    Bacteria 1+ (A) NEG /hpf TSH 3RD GENERATION   Result Value Ref Range    TSH 1.32 0.36 - 3.74 uIU/mL   LIPID PANEL   Result Value Ref Range    LIPID PROFILE          Cholesterol, total 253 (H) <200 MG/DL    Triglyceride 150 (H) <150 MG/DL    HDL Cholesterol 49 MG/DL    LDL, calculated 174 (H) 0 - 100 MG/DL    VLDL, calculated 30 MG/DL    CHOL/HDL Ratio 5.2 (H) 0 - 5.0     GLUCOSE, FASTING   Result Value Ref Range    Glucose 127 (H) 65 - 100 MG/DL   GLUCOSE, POC   Result Value Ref Range    Glucose (POC) 152 (H) 65 - 100 mg/dL    Performed by Eduar Valles) Dhruv Nigel    GLUCOSE, POC   Result Value Ref Range    Glucose (POC) 152 (H) 65 - 100 mg/dL    Performed by [facility]        Immunizations administered during this encounter: There is no immunization history on file for this patient. Screening for Metabolic Disorders for Patients on Antipsychotic Medications  (Data obtained from the EMR)    Estimated Body Mass Index  Estimated body mass index is 43.23 kg/(m^2) as calculated from the following:    Height as of this encounter: 5' 3\" (1.6 m). Weight as of this encounter: 110.7 kg (244 lb 0.8 oz).      Vital Signs/Blood Pressure  Visit Vitals    BP (!) 161/113    Pulse 90    Temp 98.1 °F (36.7 °C)    Resp 20    Ht 5' 3\" (1.6 m)    Wt 110.7 kg (244 lb 0.8 oz)    LMP 04/17/2018 (Exact Date)    SpO2 100%    Breastfeeding No    BMI 43.23 kg/m2       Blood Glucose/Hemoglobin A1c  Lab Results   Component Value Date/Time    Glucose 127 (H) 04/24/2018 04:53 AM    Glucose (POC) 152 (H) 04/24/2018 08:16 AM       No results found for: HBA1C, HGBE8, YBK9ILON     Lipid Panel  Lab Results   Component Value Date/Time    Cholesterol, total 253 (H) 04/24/2018 04:53 AM    HDL Cholesterol 49 04/24/2018 04:53 AM    LDL, calculated 174 (H) 04/24/2018 04:53 AM    Triglyceride 150 (H) 04/24/2018 04:53 AM    CHOL/HDL Ratio 5.2 (H) 04/24/2018 04:53 AM        Discharge Diagnosis: Depression    Discharge Plan: The patient reported feeling better, denied SI, exhibits no signs/symptoms of being a harm to herself or others and was discharged. She will drive herself home since her car is in the parking lot from two days ago. She will follow up with [facility] and continuing care paperwork was faxed. Discharge Medication List and Instructions:   Current Discharge Medication List      START taking these medications    Details   FLUoxetine (PROZAC) 10 mg capsule Take 1 Cap by mouth daily for 14 days. Indications: Generalized Anxiety Disorder, Post Traumatic Stress Disorder  Qty: 14 Cap, Refills: 0         CONTINUE these medications which have NOT CHANGED    Details   metFORMIN (GLUCOPHAGE) 1,000 mg tablet Take 1,000 mg by mouth two (2) times daily (with meals). Indications: type 2 diabetes mellitus      gabapentin (NEURONTIN) 400 mg capsule Take 400 mg by mouth three (3) times daily. Indications: NEUROPATHIC PAIN      ferrous sulfate 325 mg (65 mg iron) tablet Take 325 mg by mouth Daily (before lunch). Indications: Iron Deficiency Anemia      losartan (COZAAR) 25 mg tablet Take 25 mg by mouth daily. Indications: hypertension      metoclopramide HCl (REGLAN) 5 mg tablet Take 5 mg by mouth Before breakfast, lunch, dinner and at bedtime. omeprazole (PRILOSEC) 20 mg capsule Take 20 mg by mouth daily. Indications: Heartburn             Unresulted Labs     None        To obtain results of studies pending at discharge, please contact N/A     Follow-up Information     Follow up With Details Comments Contact Asif Yusuf  Please follow up with Cal León LCSW on May 8th at [address]  Phone: [phone]  Fax: [phone]    [address]  [phone]      Madelin  Please follow up with MAYLIN Hanley on Tues. 5/1/18 at [address]    [place], [facility] [address]  ph: [phone]          Advanced Directive:   Does the patient have an appointed surrogate decision maker? No  Does the patient have a Medical Advance Directive? No  Does the patient have a Psychiatric Advance Directive? No  If the patient does not have a surrogate or Medical Advance Directive AND Psychiatric Advance Directive, the patient was offered information on these advance directives Other No    Patient Instructions: Please continue all medications until otherwise directed by physician. Tobacco Cessation Discharge Plan:   Is the patient a smoker and needs referral for smoking cessation? No  Patient referred to the following for smoking cessation with an appointment? No    Patient was offered medication to assist with smoking cessation at discharge? No  Was education for smoking cessation added to the discharge instructions? No    Alcohol/Substance Abuse Discharge Plan:   Does the patient have a history of substance/alcohol abuse and requires a referral for treatment? No  Patient referred to the following for substance/alcohol abuse treatment with an appointment? No  Patient was offered medication to assist with alcohol cessation at discharge? No  Was education for substance/alcohol abuse added to discharge instructions?  No    Patient discharged to Home; discussed with patient/caregiver

## 2018-04-25 NOTE — DISCHARGE INSTRUCTIONS
DISCHARGE SUMMARY from Nurse    PATIENT INSTRUCTIONS  What to do at Home:  Recommended activity: Activity as tolerated  *  Please give a list of your current medications to your Primary Care Provider. *  Please update this list whenever your medications are discontinued, doses are      changed, or new medications (including over-the-counter products) are added. *  Please carry medication information at all times in case of emergency situations. These are general instructions for a healthy lifestyle:    No smoking/ No tobacco products/ Avoid exposure to second hand smoke  Surgeon General's Warning:  Quitting smoking now greatly reduces serious risk to your health. Obesity, smoking, and sedentary lifestyle greatly increases your risk for illness    A healthy diet, regular physical exercise & weight monitoring are important for maintaining a healthy lifestyle    The discharge information has been reviewed with the patient. The patient verbalized understanding. Discharge medications reviewed with the patient and appropriate educational materials and side effects teaching were provided. ___________________________________________________________________________________________________________________________________Please return for increased weakness, thoughts  . If I feel I am at risk of hurting myself or others, I will call the crisis office and speak with a crisis worker who will assist me during my crisis. Desimagen Lute [address]  [phone]  [address] [place] [address] [phone]  [facility] [address]  [phone]           Learning About Anxiety Disorders  What are anxiety disorders? Anxiety disorders are a type of medical problem. They cause severe anxiety. When you feel anxious, you feel that something bad is about to happen. This feeling interferes with your life.   These disorders include:  · Generalized anxiety disorder. You feel worried and stressed about many everyday events and activities. This goes on for several months and disrupts your life on most days. · Panic disorder. You have repeated panic attacks. A panic attack is a sudden, intense fear or anxiety. It may make you feel short of breath. Your heart may pound. · Social anxiety disorder. You feel very anxious about what you will say or do in front of people. For example, you may be scared to talk or eat in public. This problem affects your daily life. · Phobias. You are very scared of a specific object, situation, or activity. For example, you may fear spiders, high places, or small spaces. What are the symptoms? Generalized anxiety disorder  Symptoms may include:  · Feeling worried and stressed about many things almost every day. · Feeling tired or irritable. You may have a hard time concentrating. · Having headaches or muscle aches. · Having a hard time getting to sleep or staying asleep. Panic disorder  You may have repeated panic attacks when there is no reason for feeling afraid. You may change your daily activities because you worry that you will have another attack. Symptoms may include:  · Intense fear, terror, or anxiety. · Trouble breathing or very fast breathing. · Chest pain or tightness. · A heartbeat that races or is not regular. Social anxiety disorder  Symptoms may include:  · Fear about a social situation, such as eating in front of others or speaking in public. You may worry a lot. Or you may be afraid that something bad will happen. · Anxiety that can cause you to blush, sweat, and feel shaky. · A heartbeat that is faster than normal.  · A hard time focusing. Phobias  Symptoms may include:  · More fear than most people of being around an object, being in a situation, or doing an activity. You might also be stressed about the chance of being around the thing you fear.   · Worry about losing control, panicking, fainting, or having physical symptoms like a faster heartbeat when you are around the situation or object. How are these disorders treated? Anxiety disorders can be treated with medicines or counseling. A combination of both may be used. Medicines may include:  · Antidepressants. These may help your symptoms by keeping chemicals in your brain in balance. · Benzodiazepines. These may give you short-term relief of your symptoms. Some people use cognitive-behavioral therapy. A therapist helps you learn to change stressful or bad thoughts into helpful thoughts. Lead a healthy lifestyle  A healthy lifestyle may help you feel better. · Get at least 30 minutes of exercise on most days of the week. Walking is a good choice. · Eat a healthy diet. Include fruits, vegetables, lean proteins, and whole grains in your diet each day. · Try to go to bed at the same time every night. Try for 8 hours of sleep a night. · Find ways to manage stress. Try relaxation exercises. · Avoid alcohol and illegal drugs. Follow-up care is a key part of your treatment and safety. Be sure to make and go to all appointments, and call your doctor if you are having problems. It's also a good idea to know your test results and keep a list of the medicines you take. Where can you learn more? Go to http://rosalino-jordan.info/. Enter N350 in the search box to learn more about \"Learning About Anxiety Disorders. \"  Current as of: May 12, 2017  Content Version: 11.4  © 7206-5094 [employer]. Care instructions adapted under license by [employer] (which disclaims liability or warranty for this information). If you have questions about a medical condition or this instruction, always ask your healthcare professional. [place] [address] any warranty or liability for your use of this information.          Learning About Depression Screening  What is depression screening? Depression screening is a way to see if you have depression symptoms. It may be done by a doctor or counselor. This screening is often part of a routine checkup. That's because your mental health is just as important as your physical health. Depression is a medical illness that affects how you feel, think, and act. You may:  · Have less energy. · Lose interest in your daily activities. · Feel sad and grouchy for a long time. Depression is very common. It affects men and women of all ages. Many things can trigger depression. Some people become depressed after they have a stroke or find out they have a major illness like cancer or heart disease. The death of a loved one, a breakup, or changes in the natural brain chemicals may lead to depression. It can run in families. Most experts believe that a combination of family history (a person's genes) and stressful life events can cause depression. What happens during screening? Your doctor may ask about your feelings, any changes in eating habits, your energy level, and your interest in your daily tasks. He or she may ask other questions, such as how well you are sleeping and if you can focus on the things you do. This may be an informal talk between the two of you. Or your doctor may ask you to fill out a quick form and then talk about your answers. Some diseases or changes in your body can cause symptoms that look like depression. So your doctor may do blood tests to help rule out other problems, such as hormone changes, a low thyroid level, or anemia. What happens after screening? If you have signs of depression, your doctor will talk to you about your options. Doctors usually treat depression with medicines or counseling. Often, combining the two works best. Many people don't get help because they think that they'll get over the depression on their own. But people with depression may not get better unless they get treatment.   Many people feel embarrassed or ashamed about having depression. But it isn't a sign of personal weakness. It's not a character flaw. A person who is depressed is not \"crazy. \" Depression is caused by changes in the brain. A serious symptom of depression is thinking about death or suicide. If you or someone you care about talks about this or about feeling hopeless, get help right away. It's important to know that depression can be treated. The first step toward feeling better is often just seeing that the problem exists. Where can you learn more? Go to http://rosalinoThe Noun Projectjordan.info/. Enter T185 in the search box to learn more about \"Learning About Depression Screening. \"  Current as of: May 12, 2017  Content Version: 11.4  © 1629-4592 [employer]. Care instructions adapted under license by [employer] (which disclaims liability or warranty for this information). If you have questions about a medical condition or this instruction, always ask your healthcare professional. [address] any warranty or liability for your use of this information. of death or dying or any other concerns.   Please see your doctor in 2-3 days for a recheck

## 2018-04-25 NOTE — BH NOTES
Pt is alert and oriented x person, place and time. Pt denies any SI/HI or AV hallucinations. Pt denies any depression. Pt plans to return home . Discharge information reviewed with patient. Pt verbalizes understanding. Pt's belongings/valuables returned.  Pt to be transported home by self

## 2018-05-31 ENCOUNTER — APPOINTMENT (OUTPATIENT)
Dept: GENERAL RADIOLOGY | Age: 38
End: 2018-05-31
Attending: PHYSICIAN ASSISTANT
Payer: MEDICARE

## 2018-05-31 ENCOUNTER — HOSPITAL ENCOUNTER (EMERGENCY)
Age: 38
Discharge: HOME | End: 2018-05-31
Attending: EMERGENCY MEDICINE
Payer: MEDICARE

## 2018-05-31 VITALS
BODY MASS INDEX: 43.41 KG/M2 | OXYGEN SATURATION: 98 % | DIASTOLIC BLOOD PRESSURE: 98 MMHG | WEIGHT: 245 LBS | RESPIRATION RATE: 18 BRPM | HEIGHT: 63 IN | HEART RATE: 96 BPM | TEMPERATURE: 99.3 F | SYSTOLIC BLOOD PRESSURE: 157 MMHG
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
## 2018-05-31 DIAGNOSIS — S16.1XXA STRAIN OF NECK MUSCLE, INITIAL ENCOUNTER: ICD-10-CM

## 2018-05-31 DIAGNOSIS — S39.012A BACK STRAIN, INITIAL ENCOUNTER: ICD-10-CM

## 2018-05-31 PROCEDURE — 99282 EMERGENCY DEPT VISIT SF MDM: CPT

## 2018-05-31 PROCEDURE — 72100 X-RAY EXAM L-S SPINE 2/3 VWS: CPT

## 2018-05-31 PROCEDURE — 74011250636 HC RX REV CODE- 250/636: Performed by: PHYSICIAN ASSISTANT

## 2018-05-31 PROCEDURE — 72050 X-RAY EXAM NECK SPINE 4/5VWS: CPT

## 2018-05-31 PROCEDURE — 96372 THER/PROPH/DIAG INJ SC/IM: CPT

## 2018-05-31 PROCEDURE — 72052 X-RAY EXAM NECK SPINE 6/>VWS: CPT

## 2018-05-31 PROCEDURE — 72072 X-RAY EXAM THORAC SPINE 3VWS: CPT

## 2018-05-31 RX ORDER — METHOCARBAMOL 750 MG/1
750 TABLET, FILM COATED ORAL
Qty: 15 TAB | Refills: 0 | Status: SHIPPED | OUTPATIENT
Start: 2018-05-31 | End: 2018-07-09

## 2018-05-31 RX ORDER — KETOROLAC TROMETHAMINE 30 MG/ML
60 INJECTION, SOLUTION INTRAMUSCULAR; INTRAVENOUS
Status: COMPLETED | OUTPATIENT
Start: 2018-05-31 | End: 2018-05-31

## 2018-05-31 RX ORDER — NAPROXEN 500 MG/1
500 TABLET ORAL
Qty: 20 TAB | Refills: 0 | Status: SHIPPED | OUTPATIENT
Start: 2018-05-31 | End: 2018-06-10

## 2018-05-31 RX ADMIN — KETOROLAC TROMETHAMINE 60 MG: 30 INJECTION, SOLUTION INTRAMUSCULAR; INTRAVENOUS at 14:23

## 2018-05-31 NOTE — ED NOTES
Emergency Department Nursing Plan of Care       The Nursing Plan of Care is developed from the Nursing assessment and Emergency Department Attending provider initial evaluation. The plan of care may be reviewed in the ED Provider note.     The Plan of Care was developed with the following considerations:   Patient / Family readiness to learn indicated by:verbalized understanding  Persons(s) to be included in education: patient  Barriers to Learning/Limitations:No    Signed     Matias Smith RN    5/31/2018   1:14 PM
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
## 2018-07-09 ENCOUNTER — HOSPITAL ENCOUNTER (EMERGENCY)
Age: 38
Discharge: HOME | End: 2018-07-09
Attending: EMERGENCY MEDICINE
Payer: MEDICARE

## 2018-07-09 VITALS
WEIGHT: 249 LBS | SYSTOLIC BLOOD PRESSURE: 182 MMHG | OXYGEN SATURATION: 100 % | DIASTOLIC BLOOD PRESSURE: 104 MMHG | HEIGHT: 63 IN | RESPIRATION RATE: 18 BRPM | BODY MASS INDEX: 44.12 KG/M2 | HEART RATE: 83 BPM | TEMPERATURE: 98.3 F

## 2018-07-09 DIAGNOSIS — R19.7 DIARRHEA, UNSPECIFIED TYPE: Primary | ICD-10-CM

## 2018-07-09 DIAGNOSIS — R52 BODY ACHES: ICD-10-CM

## 2018-07-09 LAB
ANION GAP SERPL CALC-SCNC: 10 MMOL/L (ref 5–15)
APPEARANCE UR: ABNORMAL
BACTERIA URNS QL MICRO: ABNORMAL /HPF
BASOPHILS # BLD: 0.1 K/UL (ref 0–0.1)
BASOPHILS NFR BLD: 1 % (ref 0–1)
BILIRUB UR QL: NEGATIVE
BUN SERPL-MCNC: 7 MG/DL (ref 6–20)
BUN/CREAT SERPL: 8 (ref 12–20)
CALCIUM SERPL-MCNC: 8.7 MG/DL (ref 8.5–10.1)
CHLORIDE SERPL-SCNC: 101 MMOL/L (ref 97–108)
CO2 SERPL-SCNC: 27 MMOL/L (ref 21–32)
COLOR UR: ABNORMAL
CREAT SERPL-MCNC: 0.9 MG/DL (ref 0.55–1.02)
DIFFERENTIAL METHOD BLD: ABNORMAL
EOSINOPHIL # BLD: 0.1 K/UL (ref 0–0.4)
EOSINOPHIL NFR BLD: 1 % (ref 0–7)
EPITH CASTS URNS QL MICRO: ABNORMAL /LPF
ERYTHROCYTE [DISTWIDTH] IN BLOOD BY AUTOMATED COUNT: 14.4 % (ref 11.5–14.5)
GLUCOSE SERPL-MCNC: 143 MG/DL (ref 65–100)
GLUCOSE UR STRIP.AUTO-MCNC: NEGATIVE MG/DL
HCT VFR BLD AUTO: 36.1 % (ref 35–47)
HGB BLD-MCNC: 12 G/DL (ref 11.5–16)
HGB UR QL STRIP: ABNORMAL
IMM GRANULOCYTES # BLD: 0 K/UL (ref 0–0.04)
IMM GRANULOCYTES NFR BLD AUTO: 0 % (ref 0–0.5)
KETONES UR QL STRIP.AUTO: NEGATIVE MG/DL
LEUKOCYTE ESTERASE UR QL STRIP.AUTO: ABNORMAL
LYMPHOCYTES # BLD: 2.4 K/UL (ref 0.8–3.5)
LYMPHOCYTES NFR BLD: 26 % (ref 12–49)
MCH RBC QN AUTO: 25.9 PG (ref 26–34)
MCHC RBC AUTO-ENTMCNC: 33.2 G/DL (ref 30–36.5)
MCV RBC AUTO: 78 FL (ref 80–99)
MONOCYTES # BLD: 0.6 K/UL (ref 0–1)
MONOCYTES NFR BLD: 7 % (ref 5–13)
NEUTS SEG # BLD: 6.1 K/UL (ref 1.8–8)
NEUTS SEG NFR BLD: 65 % (ref 32–75)
NITRITE UR QL STRIP.AUTO: NEGATIVE
NRBC # BLD: 0 K/UL (ref 0–0.01)
NRBC BLD-RTO: 0 PER 100 WBC
PH UR STRIP: 5.5 [PH] (ref 5–8)
PLATELET # BLD AUTO: 291 K/UL (ref 150–400)
PMV BLD AUTO: 11.2 FL (ref 8.9–12.9)
POTASSIUM SERPL-SCNC: 3.7 MMOL/L (ref 3.5–5.1)
PROT UR STRIP-MCNC: NEGATIVE MG/DL
RBC # BLD AUTO: 4.63 M/UL (ref 3.8–5.2)
RBC #/AREA URNS HPF: ABNORMAL /HPF (ref 0–5)
SODIUM SERPL-SCNC: 138 MMOL/L (ref 136–145)
SP GR UR REFRACTOMETRY: 1.01 (ref 1–1.03)
UA: UC IF INDICATED,UAUC: ABNORMAL
UROBILINOGEN UR QL STRIP.AUTO: 0.2 EU/DL (ref 0.2–1)
WBC # BLD AUTO: 9.4 K/UL (ref 3.6–11)
WBC URNS QL MICRO: ABNORMAL /HPF (ref 0–4)

## 2018-07-09 PROCEDURE — 85025 COMPLETE CBC W/AUTO DIFF WBC: CPT | Performed by: EMERGENCY MEDICINE

## 2018-07-09 PROCEDURE — 96374 THER/PROPH/DIAG INJ IV PUSH: CPT

## 2018-07-09 PROCEDURE — 80048 BASIC METABOLIC PNL TOTAL CA: CPT | Performed by: EMERGENCY MEDICINE

## 2018-07-09 PROCEDURE — 36415 COLL VENOUS BLD VENIPUNCTURE: CPT | Performed by: EMERGENCY MEDICINE

## 2018-07-09 PROCEDURE — 81001 URINALYSIS AUTO W/SCOPE: CPT | Performed by: EMERGENCY MEDICINE

## 2018-07-09 PROCEDURE — 99283 EMERGENCY DEPT VISIT LOW MDM: CPT

## 2018-07-09 PROCEDURE — 74011250636 HC RX REV CODE- 250/636: Performed by: EMERGENCY MEDICINE

## 2018-07-09 PROCEDURE — 96375 TX/PRO/DX INJ NEW DRUG ADDON: CPT

## 2018-07-09 PROCEDURE — 74011250637 HC RX REV CODE- 250/637: Performed by: EMERGENCY MEDICINE

## 2018-07-09 PROCEDURE — 96361 HYDRATE IV INFUSION ADD-ON: CPT

## 2018-07-09 PROCEDURE — 87086 URINE CULTURE/COLONY COUNT: CPT | Performed by: EMERGENCY MEDICINE

## 2018-07-09 RX ORDER — SODIUM CHLORIDE 0.9 % (FLUSH) 0.9 %
5-10 SYRINGE (ML) INJECTION EVERY 8 HOURS
Status: DISCONTINUED | OUTPATIENT
Start: 2018-07-09 | End: 2018-07-09 | Stop reason: HOSPADM

## 2018-07-09 RX ORDER — SODIUM CHLORIDE 0.9 % (FLUSH) 0.9 %
5-10 SYRINGE (ML) INJECTION AS NEEDED
Status: DISCONTINUED | OUTPATIENT
Start: 2018-07-09 | End: 2018-07-09 | Stop reason: HOSPADM

## 2018-07-09 RX ORDER — KETOROLAC TROMETHAMINE 30 MG/ML
15 INJECTION, SOLUTION INTRAMUSCULAR; INTRAVENOUS
Status: COMPLETED | OUTPATIENT
Start: 2018-07-09 | End: 2018-07-09

## 2018-07-09 RX ORDER — BISMUTH SUBSALICYLATE 262 MG/15ML
30 LIQUID ORAL ONCE
Status: DISCONTINUED | OUTPATIENT
Start: 2018-07-09 | End: 2018-07-09

## 2018-07-09 RX ORDER — ONDANSETRON 4 MG/1
4 TABLET, ORALLY DISINTEGRATING ORAL
Qty: 10 TAB | Refills: 0 | Status: SHIPPED | OUTPATIENT
Start: 2018-07-09 | End: 2018-09-11

## 2018-07-09 RX ORDER — DOXYCYCLINE 100 MG/1
100 CAPSULE ORAL 2 TIMES DAILY
COMMUNITY
End: 2018-09-11

## 2018-07-09 RX ORDER — BISMUTH SUBSALICYLATE 262 MG/1
2 TABLET, CHEWABLE ORAL
Qty: 21 TAB | Refills: 0 | Status: SHIPPED | OUTPATIENT
Start: 2018-07-09 | End: 2018-07-12

## 2018-07-09 RX ORDER — ONDANSETRON 2 MG/ML
4 INJECTION INTRAMUSCULAR; INTRAVENOUS
Status: COMPLETED | OUTPATIENT
Start: 2018-07-09 | End: 2018-07-09

## 2018-07-09 RX ADMIN — KETOROLAC TROMETHAMINE 15 MG: 30 INJECTION INTRAMUSCULAR; INTRAVENOUS at 18:28

## 2018-07-09 RX ADMIN — BISMUTH SUBSALICYLATE 525 MG: 525 LIQUID ORAL at 18:18

## 2018-07-09 RX ADMIN — SODIUM CHLORIDE 1000 ML: 900 INJECTION, SOLUTION INTRAVENOUS at 18:28

## 2018-07-09 RX ADMIN — ONDANSETRON 4 MG: 2 INJECTION, SOLUTION INTRAMUSCULAR; INTRAVENOUS at 18:28

## 2018-07-09 NOTE — ED NOTES
Bedside and Verbal shift change report given to MELANIA Westbrook (oncoming nurse) by Annette Omalley (offgoing nurse). Report included the following information SBAR, ED Summary, Procedure Summary, MAR and Recent Results.
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
## 2018-07-09 NOTE — DISCHARGE INSTRUCTIONS
Diarrhea: Care Instructions  Your Care Instructions    Diarrhea is loose, watery stools (bowel movements). The exact cause is often hard to find. Sometimes diarrhea is your body's way of getting rid of what caused an upset stomach. Viruses, food poisoning, and many medicines can cause diarrhea. Some people get diarrhea in response to emotional stress, anxiety, or certain foods. Almost everyone has diarrhea now and then. It usually isn't serious, and your stools will return to normal soon. The important thing to do is replace the fluids you have lost, so you can prevent dehydration. The doctor has checked you carefully, but problems can develop later. If you notice any problems or new symptoms, get medical treatment right away. Follow-up care is a key part of your treatment and safety. Be sure to make and go to all appointments, and call your doctor if you are having problems. It's also a good idea to know your test results and keep a list of the medicines you take. How can you care for yourself at home? · Watch for signs of dehydration, which means your body has lost too much water. Dehydration is a serious condition and should be treated right away. Signs of dehydration are:  ¨ Increasing thirst and dry eyes and mouth. ¨ Feeling faint or lightheaded. ¨ Darker urine, and a smaller amount of urine than normal.  · To prevent dehydration, drink plenty of fluids, enough so that your urine is light yellow or clear like water. Choose water and other caffeine-free clear liquids until you feel better. If you have kidney, heart, or liver disease and have to limit fluids, talk with your doctor before you increase the amount of fluids you drink. · Begin eating small amounts of mild foods the next day, if you feel like it. ¨ Try yogurt that has live cultures of Lactobacillus. (Check the label.)  ¨ Avoid spicy foods, fruits, alcohol, and caffeine until 48 hours after all symptoms are gone.   ¨ Avoid chewing gum that contains sorbitol. ¨ Avoid dairy products (except for yogurt with Lactobacillus) while you have diarrhea and for 3 days after symptoms are gone. · The doctor may recommend that you take over-the-counter medicine, such as loperamide (Imodium), if you still have diarrhea after 6 hours. Read and follow all instructions on the label. Do not use this medicine if you have bloody diarrhea, a high fever, or other signs of serious illness. Call your doctor if you think you are having a problem with your medicine. When should you call for help? Call 911 anytime you think you may need emergency care. For example, call if:  ? · You passed out (lost consciousness). ? · Your stools are maroon or very bloody. ?Call your doctor now or seek immediate medical care if:  ? · You are dizzy or lightheaded, or you feel like you may faint. ? · Your stools are black and look like tar, or they have streaks of blood. ? · You have new or worse belly pain. ? · You have symptoms of dehydration, such as:  ¨ Dry eyes and a dry mouth. ¨ Passing only a little dark urine. ¨ Feeling thirstier than usual.   ? · You have a new or higher fever. ? Watch closely for changes in your health, and be sure to contact your doctor if:  ? · Your diarrhea is getting worse. ? · You see pus in the diarrhea. ? · You are not getting better after 2 days (48 hours). Where can you learn more? Go to http://rosalino-jordan.info/. Enter F928 in the search box to learn more about \"Diarrhea: Care Instructions. \"  Current as of: March 20, 2017  Content Version: 11.4  © 5212-7497 [employer]. Care instructions adapted under license by [employer] (which disclaims liability or warranty for this information). If you have questions about a medical condition or this instruction, always ask your healthcare professional. [address] any warranty or liability for your use of this information.

## 2018-07-09 NOTE — ED PROVIDER NOTES
EMERGENCY DEPARTMENT HISTORY AND PHYSICAL EXAM 
 
 
Date: 7/9/2018 Patient Name: Dacia Mackey History of Presenting Illness Chief Complaint Patient presents with  Generalized Body Aches History Provided By: Patient HPI: Dacia Mackey, 34 y.o. female with PMHx significant for depression, HLD, asthma, hypercholesterolemia, DM, GERD, and HTN, presents ambulatory to the ED with cc of new onset generalized body aches and diarrhea persisting overt the last 2 days. Pt reports associated sx of nausea and a brown mucous productive cough as well. She expresses she had recent sick contact with her daughter and believes she caught the cold from her relative. Pt endorses being evaluated by her PCP and was placed on Doxycycline for her sx noting she is currently still compliant with the medication. She discloses over the last 2 days she has had intermittent diarrhea stating her LBM was this morning leading her to the ED for evaluation. Pt denies any exacerbating or relieving factors to her sx. She denies any fevers, chills, chest pain, SOB, abdominal pain, vomiting, dysuria, hematochezia, or melena. There are no other complaints, changes, or physical findings at this time. PCP: Kevon Lesch, MD  
SHx: (+) Tobacco: 0.5ppd; (+) ETOH: occasional; (-) Illicit drug use Current Outpatient Prescriptions Medication Sig Dispense Refill  methocarbamol (ROBAXIN) 750 mg tablet Take 1 Tab by mouth every six (6) hours as needed (mm spasm). 15 Tab 0  
 metFORMIN (GLUCOPHAGE) 1,000 mg tablet Take 1,000 mg by mouth two (2) times daily (with meals). Indications: type 2 diabetes mellitus  gabapentin (NEURONTIN) 400 mg capsule Take 400 mg by mouth three (3) times daily. Indications: NEUROPATHIC PAIN    
 ferrous sulfate 325 mg (65 mg iron) tablet Take 325 mg by mouth Daily (before lunch). Indications: Iron Deficiency Anemia  losartan (COZAAR) 25 mg tablet Take 25 mg by mouth daily.  Indications: hypertension  metoclopramide HCl (REGLAN) 5 mg tablet Take 5 mg by mouth Before breakfast, lunch, dinner and at bedtime.  omeprazole (PRILOSEC) 20 mg capsule Take 20 mg by mouth daily. Indications: Heartburn Past History Past Medical History: 
Past Medical History:  
Diagnosis Date  Abnormal Pap smear \"years ago\"  Asthma   
 bronchitis  Depression with anxiety 2010  Diabetes mellitus   
 type II diabetes mellitus since   GERD (gastroesophageal reflux disease)  HTN (hypertension) 2010  Ill-defined condition High Cholesterol  Other and unspecified hyperlipidemia 2010  Postpartum depression   
 hospitalized after last delivery  Psychiatric disorder Bipolar  Psychiatric problem \"mood disorder\"  Rhinitis 2012 Past Surgical History: 
Past Surgical History:  
Procedure Laterality Date   DELIVERY ONLY    
 cesearean section 08  HX  SECTION    
 X 3  
 HX GYN    
 , tubal ligation Family History: 
Family History Problem Relation Age of Onset  Diabetes Mother  Hypertension Mother  Stroke Mother  Heart Disease Mother  Hypertension Father  Stroke Father  Diabetes Father  Kidney Disease Father  Hypertension Maternal Grandmother  Diabetes Maternal Grandmother  Hypertension Maternal Grandfather  Diabetes Maternal Grandfather  Hypertension Paternal Grandmother  Diabetes Paternal Grandmother  Hypertension Paternal Grandfather  Diabetes Paternal Grandfather  Hypertension Sister  Diabetes Sister  Diabetes Sister  Hypertension Sister  Diabetes Sister  Hypertension Sister Social History: 
Social History Substance Use Topics  Smoking status: Current Every Day Smoker Packs/day: 0.50 Last attempt to quit: 10/16/2015  Smokeless tobacco: Never Used  Alcohol use No  
   Comment: Occasionally Allergies: Allergies Allergen Reactions  Latex Rash  Latex Hives  Other Food Hives Citrus Fruits  Flagyl [Metronidazole] Hives and Itching  Lisinopril-Hydrochlorothiazide Angioedema  Clindamycin Rash and Itching  Ciprofloxacin Rash and Itching  Citrate Hives  Cottonseed Oil Rash  Cymbalta [Duloxetine] Hives and Itching  Flexeril [Cyclobenzaprine] Angioedema  Lemon Hives  Pcn [Penicillins] Hives  Sulfa (Sulfonamide Antibiotics) Shortness of Breath  Tomato Hives  Tomato Hives Review of Systems Review of Systems Constitutional: Negative for chills and fever. HENT: Negative for sore throat. Eyes: Negative for photophobia and redness. Respiratory: Positive for cough (brown mucous productive ). Negative for shortness of breath and wheezing. Cardiovascular: Negative for chest pain and leg swelling. Gastrointestinal: Positive for diarrhea and nausea. Negative for abdominal pain, blood in stool (hematochezia or melena) and vomiting. Genitourinary: Negative for difficulty urinating, dysuria, hematuria, menstrual problem and vaginal bleeding. Musculoskeletal: Positive for myalgias (generalized body aches ). Negative for back pain and joint swelling. Neurological: Negative for dizziness, seizures, syncope, speech difficulty, weakness, numbness and headaches. Hematological: Negative for adenopathy. Psychiatric/Behavioral: Negative for agitation, confusion and suicidal ideas. The patient is not nervous/anxious. Physical Exam  
Physical Exam  
Constitutional: She is oriented to person, place, and time. She appears well-developed and well-nourished. No distress. HENT:  
Head: Normocephalic and atraumatic. Mouth/Throat: Oropharynx is clear and moist. No oropharyngeal exudate. Eyes: Conjunctivae and EOM are normal. Pupils are equal, round, and reactive to light. Left eye exhibits no discharge.   
Neck: Normal range of motion. Neck supple. No JVD present. Cardiovascular: Normal rate, regular rhythm, normal heart sounds and intact distal pulses. Pulmonary/Chest: Effort normal and breath sounds normal. No respiratory distress. She has no wheezes. Abdominal: Soft. Bowel sounds are normal. She exhibits no distension. There is no tenderness. There is no rebound and no guarding. Musculoskeletal: Normal range of motion. She exhibits no edema or tenderness. Lymphadenopathy:  
  She has no cervical adenopathy. Neurological: She is alert and oriented to person, place, and time. She has normal reflexes. No cranial nerve deficit. Skin: Skin is warm and dry. No rash noted. Psychiatric: She has a normal mood and affect. Her behavior is normal.  
Nursing note and vitals reviewed. Diagnostic Study Results Labs - Recent Results (from the past 12 hour(s)) CBC WITH AUTOMATED DIFF Collection Time: 07/09/18  6:10 PM  
Result Value Ref Range WBC 9.4 3.6 - 11.0 K/uL  
 RBC 4.63 3.80 - 5.20 M/uL  
 HGB 12.0 11.5 - 16.0 g/dL HCT 36.1 35.0 - 47.0 % MCV 78.0 (L) 80.0 - 99.0 FL  
 MCH 25.9 (L) 26.0 - 34.0 PG  
 MCHC 33.2 30.0 - 36.5 g/dL  
 RDW 14.4 11.5 - 14.5 % PLATELET 692 439 - 116 K/uL MPV 11.2 8.9 - 12.9 FL  
 NRBC 0.0 0  WBC ABSOLUTE NRBC 0.00 0.00 - 0.01 K/uL NEUTROPHILS 65 32 - 75 % LYMPHOCYTES 26 12 - 49 % MONOCYTES 7 5 - 13 % EOSINOPHILS 1 0 - 7 % BASOPHILS 1 0 - 1 % IMMATURE GRANULOCYTES 0 0.0 - 0.5 % ABS. NEUTROPHILS 6.1 1.8 - 8.0 K/UL  
 ABS. LYMPHOCYTES 2.4 0.8 - 3.5 K/UL  
 ABS. MONOCYTES 0.6 0.0 - 1.0 K/UL  
 ABS. EOSINOPHILS 0.1 0.0 - 0.4 K/UL  
 ABS. BASOPHILS 0.1 0.0 - 0.1 K/UL  
 ABS. IMM. GRANS. 0.0 0.00 - 0.04 K/UL  
 DF AUTOMATED METABOLIC PANEL, BASIC Collection Time: 07/09/18  6:10 PM  
Result Value Ref Range Sodium 138 136 - 145 mmol/L Potassium 3.7 3.5 - 5.1 mmol/L Chloride 101 97 - 108 mmol/L  
 CO2 27 21 - 32 mmol/L  Anion gap 10 5 - 15 mmol/L Glucose 143 (H) 65 - 100 mg/dL BUN 7 6 - 20 MG/DL Creatinine 0.90 0.55 - 1.02 MG/DL  
 BUN/Creatinine ratio 8 (L) 12 - 20 GFR est AA >60 >60 ml/min/1.73m2 GFR est non-AA >60 >60 ml/min/1.73m2 Calcium 8.7 8.5 - 10.1 MG/DL URINALYSIS W/ REFLEX CULTURE Collection Time: 07/09/18  6:10 PM  
Result Value Ref Range Color YELLOW/STRAW Appearance CLOUDY (A) CLEAR Specific gravity 1.015 1.003 - 1.030    
 pH (UA) 5.5 5.0 - 8.0 Protein NEGATIVE  NEG mg/dL Glucose NEGATIVE  NEG mg/dL Ketone NEGATIVE  NEG mg/dL Bilirubin NEGATIVE  NEG Blood SMALL (A) NEG Urobilinogen 0.2 0.2 - 1.0 EU/dL Nitrites NEGATIVE  NEG Leukocyte Esterase TRACE (A) NEG    
 WBC 0-4 0 - 4 /hpf  
 RBC 0-5 0 - 5 /hpf Epithelial cells MODERATE (A) FEW /lpf Bacteria 1+ (A) NEG /hpf  
 UA:UC IF INDICATED URINE CULTURE ORDERED (A) CNI Medical Decision Making I am the first provider for this patient. I reviewed the vital signs, available nursing notes, past medical history, past surgical history, family history and social history. Vital Signs-Reviewed the patient's vital signs. Patient Vitals for the past 12 hrs: 
 Temp Pulse Resp BP SpO2  
07/09/18 1743 98.3 °F (36.8 °C) 83 18 (!) 182/104 100 % Pulse Oximetry Analysis - 100% on room air Cardiac Monitor:  
Rate: 83 bpm 
Rhythm: Normal Sinus Rhythm Records Reviewed: Nursing Notes, Old Medical Records and Previous Laboratory Studies Provider Notes (Medical Decision Making): DDx: Gastroenteritis, Colitis, URI, Viral syndrome. ED Course:  
Initial assessment performed. The patients presenting problems have been discussed, and they are in agreement with the care plan formulated and outlined with them. I have encouraged them to ask questions as they arise throughout their visit. Progress Notes: 
 
6:59 PM  
The pt has been re-evaluated.  She was updated on reassuring lab findings and informed of the plan for discharge with symptomatic management and follow up as needed. Critical Care Time: 0 minutes Disposition: 
Discharge Note: 
6:59 PM 
The patient is ready for discharge. The patient's signs, symptoms, diagnosis, and discharge instruction have been discussed and the patient has conveyed their understanding. The patient is to follow up as recommended or return to the ER should their symptoms worsen. Plan has been discussed and the patient is in agreement. Written by Martina Perales ED Scribe, as dictated by Maximilian Lopez MD 
 
PLAN: 
1. Current Discharge Medication List  
  
START taking these medications Details  
bismuth subsalicylate (PEPTO-BISMOL) 262 mg chew Take 2 Tabs by mouth every three (3) hours as needed for up to 3 days. Qty: 21 Tab, Refills: 0  
  
ondansetron (ZOFRAN ODT) 4 mg disintegrating tablet Take 1 Tab by mouth every eight (8) hours as needed for Nausea. Qty: 10 Tab, Refills: 0  
  
  
 
2. Follow-up Information Follow up With Details Comments Contact Info Amandeep Carvalho MD In 1 week  [address] [place] [phone] Return to ED if worse Diagnosis Clinical Impression: 1. Diarrhea, unspecified type 2. Body aches Attestations: 
 
Attestation: This note is prepared by Shereen Bey. Diaz, acting as Scribe for Maximilian Lopez MD. Maximilian Lopez MD: The scribe's documentation has been prepared under my direction and personally reviewed by me in its entirety. I confirm that the note above accurately reflects all work, treatment, procedures, and medical decision making performed by me.

## 2018-07-11 LAB
BACTERIA SPEC CULT: NORMAL
CC UR VC: NORMAL
SERVICE CMNT-IMP: NORMAL

## 2018-08-15 ENCOUNTER — HOSPITAL ENCOUNTER (EMERGENCY)
Age: 38
Discharge: HOME | End: 2018-08-15
Attending: EMERGENCY MEDICINE | Admitting: EMERGENCY MEDICINE
Payer: MEDICARE

## 2018-08-15 VITALS
WEIGHT: 250 LBS | HEIGHT: 63 IN | RESPIRATION RATE: 18 BRPM | OXYGEN SATURATION: 98 % | DIASTOLIC BLOOD PRESSURE: 102 MMHG | HEART RATE: 99 BPM | TEMPERATURE: 98.1 F | BODY MASS INDEX: 44.3 KG/M2 | SYSTOLIC BLOOD PRESSURE: 155 MMHG

## 2018-08-15 DIAGNOSIS — K29.90 GASTRITIS AND DUODENITIS: ICD-10-CM

## 2018-08-15 DIAGNOSIS — S90.122A CONTUSION OF LESSER TOE OF LEFT FOOT WITHOUT DAMAGE TO NAIL, INITIAL ENCOUNTER: Primary | ICD-10-CM

## 2018-08-15 PROCEDURE — 74011000250 HC RX REV CODE- 250: Performed by: EMERGENCY MEDICINE

## 2018-08-15 PROCEDURE — 99283 EMERGENCY DEPT VISIT LOW MDM: CPT

## 2018-08-15 PROCEDURE — 74011250637 HC RX REV CODE- 250/637: Performed by: EMERGENCY MEDICINE

## 2018-08-15 RX ORDER — ONDANSETRON 4 MG/1
4 TABLET, ORALLY DISINTEGRATING ORAL
Status: COMPLETED | OUTPATIENT
Start: 2018-08-15 | End: 2018-08-15

## 2018-08-15 RX ORDER — ESOMEPRAZOLE MAGNESIUM 40 MG/1
40 CAPSULE, DELAYED RELEASE ORAL DAILY
Qty: 20 CAP | Refills: 0 | Status: SHIPPED | OUTPATIENT
Start: 2018-08-15 | End: 2022-03-15

## 2018-08-15 RX ADMIN — PHENOBARBITAL ELIXIR 50 ML: 16.2; .1037; .0065; .0194 ELIXIR ORAL at 01:43

## 2018-08-15 RX ADMIN — ONDANSETRON 4 MG: 4 TABLET, ORALLY DISINTEGRATING ORAL at 01:43

## 2018-08-15 NOTE — DISCHARGE INSTRUCTIONS
Gaby calling stating she will be re faxing a form regarding the omega 3, lidocaine, and omeprazole.           Gastritis: Care Instructions  Your Care Instructions    Gastritis is a sore and upset stomach. It happens when something irritates the stomach lining. Many things can cause it. These include an infection such as the flu or something you ate or drank. Medicines or a sore on the lining of the stomach (ulcer) also can cause it. Your belly may bloat and ache. You may belch, vomit, and feel sick to your stomach. You should be able to relieve the problem by taking medicine. And it may help to change your diet. If gastritis lasts, your doctor may prescribe medicine. Follow-up care is a key part of your treatment and safety. Be sure to make and go to all appointments, and call your doctor if you are having problems. It's also a good idea to know your test results and keep a list of the medicines you take. How can you care for yourself at home? · If your doctor prescribed antibiotics, take them as directed. Do not stop taking them just because you feel better. You need to take the full course of antibiotics. · Be safe with medicines. If your doctor prescribed medicine to decrease stomach acid, take it as directed. Call your doctor if you think you are having a problem with your medicine. · Do not take any other medicine, including over-the-counter pain relievers, without talking to your doctor first.  · If your doctor recommends over-the-counter medicine to reduce stomach acid, such as Pepcid AC, Prilosec, Tagamet HB, or Zantac 75, follow the directions on the label. · Drink plenty of fluids (enough so that your urine is light yellow or clear like water) to prevent dehydration. Choose water and other caffeine-free clear liquids. If you have kidney, heart, or liver disease and have to limit fluids, talk with your doctor before you increase the amount of fluids you drink. · Limit how much alcohol you drink. · Avoid coffee, tea, cola drinks, chocolate, and other foods with caffeine.  They increase stomach acid.  When should you call for help? Call 911 anytime you think you may need emergency care. For example, call if:    · You vomit blood or what looks like coffee grounds.     · You pass maroon or very bloody stools.    Call your doctor now or seek immediate medical care if:    · You start breathing fast and have not produced urine in the last 8 hours.     · You cannot keep fluids down.    Watch closely for changes in your health, and be sure to contact your doctor if:    · You do not get better as expected. Where can you learn more? Go to http://rosalino-jordan.info/. Enter [id number] in the search box to learn more about \"Gastritis: Care Instructions. \"  Current as of: May 12, 2017  Content Version: 11.7  © 0975-6196 [employer]. Care instructions adapted under license by [employer] (which disclaims liability or warranty for this information). If you have questions about a medical condition or this instruction, always ask your healthcare professional. [address] any warranty or liability for your use of this information.

## 2018-08-15 NOTE — ED PROVIDER NOTES
EMERGENCY DEPARTMENT HISTORY AND PHYSICAL EXAM      Date: 8/15/2018  Patient Name: Jere Crowell    History of Presenting Illness     Chief Complaint   Patient presents with    Abdominal Pain     Acid reflux       History Provided By: Patient    HPI: Jere Crowell, 34 y.o. female with PMHx significant for depression, DM, GERD, HTN, presents ambulatory to the ED with cc of a L pinky toe pain x 2 weeks. She denies any associated symptoms. Pt states she had stubbed her toe and has been experiencing this pain since. She further reports an abdominal pain s/p eating chicken at 9 PM tonight. Pt states she had eaten [employer] at 9 PM tonight and about 10 minutes later, she had experienced the abdominal pain. She had originally thought she was experiencing an allergic reaction and had taken Benadryl. She then states she has a h/o similar episodes with a h/o acid reflux and had taken OTC medications with no relief. Pt is currently requesting for a GI cocktail as this has helped her in the past.                  There are no other complaints, changes, or physical findings at this time. PCP: Romulo Albert MD    Current Facility-Administered Medications   Medication Dose Route Frequency Provider Last Rate Last Dose    ondansetron (ZOFRAN ODT) tablet 4 mg  4 mg Oral NOW Loretta Zaidi MD        MAALOX/DONNATAL/viscous lidocaine (GI COCKTAIL)  50 mL Oral ONCE Loretta Zaidi MD         Current Outpatient Prescriptions   Medication Sig Dispense Refill    esomeprazole (NEXIUM) 40 mg capsule Take 1 Cap by mouth daily. 20 Cap 0    metFORMIN (GLUCOPHAGE) 1,000 mg tablet Take 1,000 mg by mouth two (2) times daily (with meals). Indications: type 2 diabetes mellitus      gabapentin (NEURONTIN) 400 mg capsule Take 400 mg by mouth three (3) times daily. Indications: NEUROPATHIC PAIN      losartan (COZAAR) 25 mg tablet Take 25 mg by mouth daily.  Indications: hypertension      metoclopramide HCl (REGLAN) 5 mg tablet Take 5 mg by mouth Before breakfast, lunch, dinner and at bedtime.  omeprazole (PRILOSEC) 20 mg capsule Take 20 mg by mouth daily. Indications: Heartburn      doxycycline (MONODOX) 100 mg capsule Take 100 mg by mouth two (2) times a day.  ondansetron (ZOFRAN ODT) 4 mg disintegrating tablet Take 1 Tab by mouth every eight (8) hours as needed for Nausea. 10 Tab 0    ferrous sulfate 325 mg (65 mg iron) tablet Take 325 mg by mouth Daily (before lunch).  Indications: Iron Deficiency Anemia         Past History     Past Medical History:  Past Medical History:   Diagnosis Date    Abnormal Pap smear     \"years ago\"     Asthma     bronchitis    Depression with anxiety 2010    Diabetes mellitus     type II diabetes mellitus since     GERD (gastroesophageal reflux disease)     HTN (hypertension) 2010    Ill-defined condition     High Cholesterol    Other and unspecified hyperlipidemia 2010    Postpartum depression     hospitalized after last delivery    Psychiatric disorder     Bipolar    Psychiatric problem     \"mood disorder\"    Rhinitis 2012       Past Surgical History:  Past Surgical History:   Procedure Laterality Date     DELIVERY ONLY      cesearean section 08    HX  SECTION      X 3    HX GYN      , tubal ligation       Family History:  Family History   Problem Relation Age of Onset    Diabetes Mother     Hypertension Mother     Stroke Mother     Heart Disease Mother     Hypertension Father     Stroke Father     Diabetes Father     Kidney Disease Father     Hypertension Maternal Grandmother     Diabetes Maternal Grandmother     Hypertension Maternal Grandfather     Diabetes Maternal Grandfather     Hypertension Paternal Grandmother     Diabetes Paternal Grandmother     Hypertension Paternal Grandfather     Diabetes Paternal Grandfather     Hypertension Sister     Diabetes Sister     Diabetes Sister     Hypertension Sister  Diabetes Sister     Hypertension Sister        Social History:  Social History   Substance Use Topics    Smoking status: Current Every Day Smoker     Packs/day: 0.50     Last attempt to quit: 10/16/2015    Smokeless tobacco: Never Used    Alcohol use No      Comment: Occasionally       Allergies: Allergies   Allergen Reactions    Latex Rash    Latex Hives    Other Food Hives     Citrus Fruits    Flagyl [Metronidazole] Hives and Itching    Lisinopril-Hydrochlorothiazide Angioedema    Clindamycin Rash and Itching    Ciprofloxacin Rash and Itching    Citrate Hives    Cottonseed Oil Rash    Cymbalta [Duloxetine] Hives and Itching    Flexeril [Cyclobenzaprine] Angioedema    Lemon Hives    Pcn [Penicillins] Hives    Sulfa (Sulfonamide Antibiotics) Shortness of Breath    Tomato Hives    Tomato Hives     Review of Systems   Review of Systems   Constitutional: Negative. Negative for chills, fever and unexpected weight change. HENT: Negative. Negative for congestion and trouble swallowing. Eyes: Negative for discharge. Respiratory: Negative. Negative for cough, chest tightness and shortness of breath. Cardiovascular: Negative. Negative for chest pain. Gastrointestinal: Positive for abdominal pain. Negative for abdominal distention, constipation, diarrhea and nausea. Endocrine: Negative. Genitourinary: Negative. Negative for difficulty urinating, dysuria, frequency and urgency. Musculoskeletal: Positive for arthralgias. Negative for myalgias. Left pinky toe pain   Skin: Negative. Negative for color change. Allergic/Immunologic: Negative. Neurological: Negative. Negative for dizziness, speech difficulty and headaches. Hematological: Negative. Psychiatric/Behavioral: Negative. Negative for agitation and confusion. All other systems reviewed and are negative. Physical Exam   Physical Exam   Constitutional: She is oriented to person, place, and time.  She appears well-developed and well-nourished. HENT:   Head: Normocephalic and atraumatic. Eyes: Conjunctivae and EOM are normal.   Neck: Neck supple. Cardiovascular: Normal rate, regular rhythm and intact distal pulses. Pulmonary/Chest: Effort normal. No respiratory distress. Abdominal: Soft. She exhibits no distension and no mass. There is tenderness. There is no rebound and no guarding. Mild epigastric tenderness   Musculoskeletal: Normal range of motion. She exhibits no deformity. Left pinky toe normal to inspection and minimally tender   Neurological: She is alert and oriented to person, place, and time. Skin: Skin is warm and dry. Psychiatric: She has a normal mood and affect. Her behavior is normal. Thought content normal.   Vitals reviewed. Diagnostic Study Results     Labs - No results found for this or any previous visit (from the past 12 hour(s)). Radiologic Studies -   No orders to display     CT Results  (Last 48 hours)    None        CXR Results  (Last 48 hours)    None        Medical Decision Making   I am the first provider for this patient. I reviewed the vital signs, available nursing notes, past medical history, past surgical history, family history and social history. Vital Signs-Reviewed the patient's vital signs. Patient Vitals for the past 12 hrs:   Temp Pulse Resp BP SpO2   08/15/18 0013 98.1 °F (36.7 °C) 99 18 (!) 155/102 98 %           Provider Notes (Medical Decision Making):   H/o same symptoms with her GERD    ED Course:   Initial assessment performed. The patients presenting problems have been discussed, and they are in agreement with the care plan formulated and outlined with them. I have encouraged them to ask questions as they arise throughout their visit. Disposition:  DISCHARGE NOTE  12:59 AM  The patient has been re-evaluated and is ready for discharge. Reviewed available results with patient. Counseled patient on diagnosis and care plan. Patient has expressed understanding, and all questions have been answered. Patient agrees with plan and agrees to follow up as recommended, or return to the ED if their symptoms worsen. Discharge instructions have been provided and explained to the patient, along with reasons to return to the ED. PLAN:  1. Discharge  Current Discharge Medication List      START taking these medications    Details   esomeprazole (NEXIUM) 40 mg capsule Take 1 Cap by mouth daily. Qty: 20 Cap, Refills: 0           2. Follow-up Information     Follow up With Details Comments One Tegan Llanes MD Schedule an appointment as soon as possible for a visit  [address] [place] [address]  [phone]          Return to ED if worse     Diagnosis     Clinical Impression:   1. Contusion of lesser toe of left foot without damage to nail, initial encounter    2. Gastritis and duodenitis        Attestations: This note is prepared by Iqra Romero, acting as Scribe for BERTIN Jaimes MD.    Lizbet Jaimes MD: The scribe's documentation has been prepared under my direction and personally reviewed by me in its entirety. I confirm that the note above accurately reflects all work, treatment, procedures, and medical decision making performed by me.

## 2018-08-15 NOTE — ED NOTES
Pt arrived to ED with c/o acid reflux and L pinky toe pain. Pt report she ate [employer] chicken tonight and is having \"bad\" acid reflux. Pt is requesting a \"GI cocktail. \" Pt reports she hit her pinky toe on a dresser 2 weeks ago and noticed her nail is coming \"up. \"    Pt is alert and orientated X 4; skin is intact; lungs are clear; pt breathes well on room air; Pt is in no acute distress. Will continue to monitor. See nursing assessment. Safety precautions in place; call light within reach. Emergency Department Nursing Plan of Care       The Nursing Plan of Care is developed from the Nursing assessment and Emergency Department Attending provider initial evaluation. The plan of care may be reviewed in the ED Provider note.     The Plan of Care was developed with the following considerations:   Patient / Family readiness to learn indicated by:verbalized understanding  Persons(s) to be included in education: patient  Barriers to Learning/Limitations:No    Signed     Rocco Miles RN    8/15/2018   12:29 AM

## 2018-08-15 NOTE — ED TRIAGE NOTES
Pt C/O gastric reflux after dinner last PM. Took Pepto-bismal without relief. Also C/O left 5th toe pain. Injury one week ago.

## 2018-08-15 NOTE — ED NOTES
The documentation for this period is being entered following the guidelines as defined in the [facility] policy by Joselyn Dunn RN.

## 2018-09-11 ENCOUNTER — HOSPITAL ENCOUNTER (EMERGENCY)
Age: 38
Discharge: HOME | End: 2018-09-11
Attending: EMERGENCY MEDICINE
Payer: MEDICARE

## 2018-09-11 VITALS
SYSTOLIC BLOOD PRESSURE: 162 MMHG | WEIGHT: 253 LBS | RESPIRATION RATE: 18 BRPM | OXYGEN SATURATION: 100 % | DIASTOLIC BLOOD PRESSURE: 88 MMHG | TEMPERATURE: 98.9 F | BODY MASS INDEX: 44.83 KG/M2 | HEIGHT: 63 IN | HEART RATE: 115 BPM

## 2018-09-11 DIAGNOSIS — L02.11 ABSCESS, NECK: Primary | ICD-10-CM

## 2018-09-11 PROCEDURE — 74011250637 HC RX REV CODE- 250/637: Performed by: PHYSICIAN ASSISTANT

## 2018-09-11 PROCEDURE — 74011000250 HC RX REV CODE- 250: Performed by: PHYSICIAN ASSISTANT

## 2018-09-11 PROCEDURE — 99283 EMERGENCY DEPT VISIT LOW MDM: CPT

## 2018-09-11 RX ORDER — FLUCONAZOLE 150 MG/1
150 TABLET ORAL
Qty: 1 TAB | Refills: 0 | Status: SHIPPED | OUTPATIENT
Start: 2018-09-11 | End: 2018-09-11

## 2018-09-11 RX ORDER — NAPROXEN 500 MG/1
500 TABLET ORAL 2 TIMES DAILY WITH MEALS
Qty: 20 TAB | Refills: 0 | Status: SHIPPED | OUTPATIENT
Start: 2018-09-11 | End: 2019-07-05

## 2018-09-11 RX ORDER — DOXYCYCLINE 100 MG/1
100 CAPSULE ORAL 2 TIMES DAILY
Qty: 14 CAP | Refills: 0 | Status: SHIPPED | OUTPATIENT
Start: 2018-09-11 | End: 2018-09-18

## 2018-09-11 RX ADMIN — PHENOBARBITAL ELIXIR 50 ML: 16.2; .1037; .0065; .0194 ELIXIR ORAL at 19:19

## 2018-09-11 NOTE — DISCHARGE INSTRUCTIONS

## 2018-09-12 NOTE — ED PROVIDER NOTES
EMERGENCY DEPARTMENT HISTORY AND PHYSICAL EXAM 
 
 
Date: 9/11/2018 Patient Name: Ken Coreas History of Presenting Illness Chief Complaint Patient presents with  
 Skin Problem  
  bump on back of neck History Provided By: Patient HPI: Ken Coreas, 34 y.o. female with PMHx significant for depression, anxiety, hyperlipidemia, asthma, post partum depression, DM, GERD, htn presents ambulatory to the ED with cc of boil to left neck x 3 days. Reports pain and swelling. Denies drainage. Rates pain 9/10. Describes pain as throbbing. Pain worse with movement. Denies numbness/tingling, radiating pain. Has not taken anything for sx. There are no other complaints, changes, or physical findings at this time. PCP: Anita Gonzalez MD 
 
Current Outpatient Prescriptions Medication Sig Dispense Refill  doxycycline (MONODOX) 100 mg capsule Take 1 Cap by mouth two (2) times a day for 7 days. 14 Cap 0  
 naproxen (NAPROSYN) 500 mg tablet Take 1 Tab by mouth two (2) times daily (with meals). 20 Tab 0  
 esomeprazole (NEXIUM) 40 mg capsule Take 1 Cap by mouth daily. 20 Cap 0  
 metFORMIN (GLUCOPHAGE) 1,000 mg tablet Take 1,000 mg by mouth two (2) times daily (with meals). Indications: type 2 diabetes mellitus  gabapentin (NEURONTIN) 400 mg capsule Take 400 mg by mouth three (3) times daily. Indications: NEUROPATHIC PAIN    
 losartan (COZAAR) 25 mg tablet Take 25 mg by mouth daily. Indications: hypertension  metoclopramide HCl (REGLAN) 5 mg tablet Take 5 mg by mouth Before breakfast, lunch, dinner and at bedtime.  ferrous sulfate 325 mg (65 mg iron) tablet Take 325 mg by mouth Daily (before lunch). Indications: Iron Deficiency Anemia Past History Past Medical History: 
Past Medical History:  
Diagnosis Date  Abnormal Pap smear \"years ago\"  Asthma   
 bronchitis  Depression with anxiety 9/22/2010  Diabetes mellitus type II diabetes mellitus since   GERD (gastroesophageal reflux disease)  HTN (hypertension) 2010  Ill-defined condition High Cholesterol  Other and unspecified hyperlipidemia 2010  Postpartum depression   
 hospitalized after last delivery  Psychiatric disorder Bipolar  Psychiatric problem \"mood disorder\"  Rhinitis 2012 Past Surgical History: 
Past Surgical History:  
Procedure Laterality Date   DELIVERY ONLY    
 cesearean section 08  HX  SECTION    
 X 3  
 HX GYN    
 , tubal ligation Family History: 
Family History Problem Relation Age of Onset  Diabetes Mother  Hypertension Mother  Stroke Mother  Heart Disease Mother  Hypertension Father  Stroke Father  Diabetes Father  Kidney Disease Father  Hypertension Maternal Grandmother  Diabetes Maternal Grandmother  Hypertension Maternal Grandfather  Diabetes Maternal Grandfather  Hypertension Paternal Grandmother  Diabetes Paternal Grandmother  Hypertension Paternal Grandfather  Diabetes Paternal Grandfather  Hypertension Sister  Diabetes Sister  Diabetes Sister  Hypertension Sister  Diabetes Sister  Hypertension Sister Social History: 
Social History Substance Use Topics  Smoking status: Current Every Day Smoker Packs/day: 0.50 Last attempt to quit: 10/16/2015  Smokeless tobacco: Never Used  Alcohol use No  
   Comment: Occasionally Allergies: Allergies Allergen Reactions  Latex Rash  Latex Hives  Other Food Hives Citrus Fruits  Flagyl [Metronidazole] Hives and Itching  Lisinopril-Hydrochlorothiazide Angioedema  Clindamycin Rash and Itching  Ciprofloxacin Rash and Itching  Citrate Hives  Cottonseed Oil Rash  Cymbalta [Duloxetine] Hives and Itching  Flexeril [Cyclobenzaprine] Angioedema  Lemon Hives  Pcn [Penicillins] Hives  Sulfa (Sulfonamide Antibiotics) Shortness of Breath  Tomato Hives  Tomato Hives Review of Systems Review of Systems Constitutional: Negative for chills and fever. Respiratory: Negative for shortness of breath. Cardiovascular: Negative for chest pain. Gastrointestinal: Negative for abdominal pain, nausea and vomiting. Genitourinary: Negative for flank pain. Musculoskeletal: Negative for back pain and myalgias. Skin: Negative for color change, pallor, rash and wound. Boil to left lateral neck Neurological: Negative for dizziness, weakness and light-headedness. All other systems reviewed and are negative. Physical Exam  
Physical Exam  
Constitutional: She is oriented to person, place, and time. She appears well-developed and well-nourished. No distress. HENT:  
Head: Normocephalic and atraumatic. Eyes: Conjunctivae are normal.  
Cardiovascular: Normal rate, regular rhythm and normal heart sounds. Pulmonary/Chest: Effort normal and breath sounds normal. No respiratory distress. Abdominal: Soft. Bowel sounds are normal. She exhibits no distension. Musculoskeletal: Normal range of motion. Neurological: She is alert and oriented to person, place, and time. Skin: Skin is warm. No rash noted. Left lateral neck: 3 cm indurated area with central fluctuance. TTP. No drainage. Psychiatric: She has a normal mood and affect. Her behavior is normal.  
Nursing note and vitals reviewed. Diagnostic Study Results Labs - No results found for this or any previous visit (from the past 12 hour(s)). Radiologic Studies - No orders to display CT Results  (Last 48 hours) None CXR Results  (Last 48 hours) None Medical Decision Making I am the first provider for this patient.  
 
I reviewed the vital signs, available nursing notes, past medical history, past surgical history, family history and social history. Vital Signs-Reviewed the patient's vital signs. Patient Vitals for the past 12 hrs: 
 Temp Pulse Resp BP SpO2  
09/11/18 1756 98.9 °F (37.2 °C) (!) 115 18 162/88 100 % Records Reviewed: Nursing Notes, Old Medical Records, Previous Radiology Studies and Previous Laboratory Studies Provider Notes (Medical Decision Making): DDx: Abscess, Cyst, cellulitis ED Course:  
Initial assessment performed. The patients presenting problems have been discussed, and they are in agreement with the care plan formulated and outlined with them. I have encouraged them to ask questions as they arise throughout their visit. Pt adamant that she does not want abscess drained. Discussed need for possible drainage in the future and continued pain due to not draining. Pt states she understands and she still declines abscess drainage. Return if sx worsen. Pt requesting diflucan due to abx rx. Disposition: 
Discussed dx and treatment plan. Discussed importance of  PCP follow up. All questions answered. Pt voiced they understood. Return if sx worsen. PLAN: 
1. Discharge Medication List as of 9/11/2018  7:05 PM  
  
START taking these medications Details  
doxycycline (MONODOX) 100 mg capsule Take 1 Cap by mouth two (2) times a day for 7 days. , Normal, Disp-14 Cap, R-0  
  
naproxen (NAPROSYN) 500 mg tablet Take 1 Tab by mouth two (2) times daily (with meals). , Normal, Disp-20 Tab, R-0  
  
fluconazole (DIFLUCAN) 150 mg tablet Take 1 Tab by mouth now for 1 dose. FDA advises cautious prescribing of oral fluconazole in pregnancy. , Normal, Disp-1 Tab, R-0  
  
  
CONTINUE these medications which have NOT CHANGED Details  
esomeprazole (NEXIUM) 40 mg capsule Take 1 Cap by mouth daily. , Print, Disp-20 Cap, R-0  
  
metFORMIN (GLUCOPHAGE) 1,000 mg tablet Take 1,000 mg by mouth two (2) times daily (with meals).  Indications: type 2 diabetes mellitus, Historical Med  
  
gabapentin (NEURONTIN) 400 mg capsule Take 400 mg by mouth three (3) times daily. Indications: NEUROPATHIC PAIN, Historical Med  
  
losartan (COZAAR) 25 mg tablet Take 25 mg by mouth daily. Indications: hypertension, Historical Med  
  
metoclopramide HCl (REGLAN) 5 mg tablet Take 5 mg by mouth Before breakfast, lunch, dinner and at bedtime. , Historical Med  
  
ferrous sulfate 325 mg (65 mg iron) tablet Take 325 mg by mouth Daily (before lunch). Indications: Iron Deficiency Anemia, Historical Med 2. Follow-up Information Follow up With Details Comments Contact Info Alyson Andre MD Schedule an appointment as soon as possible for a visit As needed [facility] [address] [id number] [phone] [facility] EMERGENCY DEPT  If symptoms worsen [address] [facility] Return to ED if worse Diagnosis Clinical Impression: 1. Abscess, neck

## 2018-11-10 ENCOUNTER — APPOINTMENT (OUTPATIENT)
Dept: GENERAL RADIOLOGY | Age: 38
End: 2018-11-10
Attending: PHYSICIAN ASSISTANT
Payer: MEDICARE

## 2018-11-10 ENCOUNTER — HOSPITAL ENCOUNTER (EMERGENCY)
Age: 38
Discharge: HOME | End: 2018-11-10
Attending: EMERGENCY MEDICINE
Payer: MEDICARE

## 2018-11-10 VITALS
OXYGEN SATURATION: 98 % | WEIGHT: 220 LBS | RESPIRATION RATE: 16 BRPM | BODY MASS INDEX: 38.98 KG/M2 | HEART RATE: 98 BPM | TEMPERATURE: 98.8 F | HEIGHT: 63 IN | SYSTOLIC BLOOD PRESSURE: 150 MMHG | DIASTOLIC BLOOD PRESSURE: 89 MMHG

## 2018-11-10 DIAGNOSIS — J20.9 ACUTE BRONCHITIS, UNSPECIFIED ORGANISM: Primary | ICD-10-CM

## 2018-11-10 PROCEDURE — 99283 EMERGENCY DEPT VISIT LOW MDM: CPT

## 2018-11-10 PROCEDURE — 74011000250 HC RX REV CODE- 250: Performed by: PHYSICIAN ASSISTANT

## 2018-11-10 PROCEDURE — 74011250637 HC RX REV CODE- 250/637: Performed by: PHYSICIAN ASSISTANT

## 2018-11-10 PROCEDURE — 94640 AIRWAY INHALATION TREATMENT: CPT

## 2018-11-10 PROCEDURE — 77030029684 HC NEB SM VOL KT MONA -A

## 2018-11-10 PROCEDURE — 71046 X-RAY EXAM CHEST 2 VIEWS: CPT

## 2018-11-10 RX ORDER — IPRATROPIUM BROMIDE AND ALBUTEROL SULFATE 2.5; .5 MG/3ML; MG/3ML
3 SOLUTION RESPIRATORY (INHALATION)
Status: COMPLETED | OUTPATIENT
Start: 2018-11-10 | End: 2018-11-10

## 2018-11-10 RX ORDER — BENZONATATE 100 MG/1
100 CAPSULE ORAL
Status: COMPLETED | OUTPATIENT
Start: 2018-11-10 | End: 2018-11-10

## 2018-11-10 RX ORDER — BENZONATATE 100 MG/1
100 CAPSULE ORAL
Qty: 30 CAP | Refills: 0 | Status: SHIPPED | OUTPATIENT
Start: 2018-11-10 | End: 2018-11-17

## 2018-11-10 RX ORDER — BENZONATATE 100 MG/1
100 CAPSULE ORAL
Status: DISCONTINUED | OUTPATIENT
Start: 2018-11-10 | End: 2018-11-10

## 2018-11-10 RX ORDER — ALBUTEROL SULFATE 90 UG/1
1-2 AEROSOL, METERED RESPIRATORY (INHALATION)
Qty: 1 INHALER | Refills: 1 | Status: SHIPPED | OUTPATIENT
Start: 2018-11-10 | End: 2020-10-14

## 2018-11-10 RX ADMIN — IPRATROPIUM BROMIDE AND ALBUTEROL SULFATE 3 ML: .5; 3 SOLUTION RESPIRATORY (INHALATION) at 15:13

## 2018-11-10 RX ADMIN — BENZONATATE 100 MG: 100 CAPSULE ORAL at 15:12

## 2018-11-10 NOTE — LETTER
[address] 
[facility] EMERGENCY DEPT 
[address] [facility] [id number] [phone] Work/School Note Date: 11/10/2018 To Whom It May concern: Arlette Christian was seen and treated today in the emergency room by the following provider(s): 
Attending Provider: Kina Verma MD 
Physician Assistant: Herlinda Webster PA-C. Arlette Christian may return to work on 11/12/2018. Sincerely, Richard Ramachandran PA-C

## 2018-11-10 NOTE — ED NOTES
3 day history of hacking cough, productive with greenish mucus. Daughter recently sick with similar symptoms. Generalized aches. Using both daytime and nighttime cough and cold medications. Also reports has been using daughters neb machine.

## 2018-11-10 NOTE — ED PROVIDER NOTES
EMERGENCY DEPARTMENT HISTORY AND PHYSICAL EXAM 
 
Date: 11/10/2018 Patient Name: Billie Cerda History of Presenting Illness Chief Complaint Patient presents with  Cough History Provided By: Patient HPI: Billie Cerda is a 34 y.o. female with a PMH of depression, obesity, sathma/bronchitis, Diabetes, HTN, GERD who presents with acute moderate productive cough, nasal congestion, wheezing, sob, fatigue, chills, sweats x 4 days. OTC cough/cold medications including tylenol mild relief. Pt also endorses using child's neb tx at home w/ mild relief. No fever, n/v, abd pain, cp, sore throat, lightheadedness, dizziness. PCP: Wilmer Talavera MD 
 
Current Outpatient Medications Medication Sig Dispense Refill  albuterol (PROVENTIL HFA, VENTOLIN HFA, PROAIR HFA) 90 mcg/actuation inhaler Take 1-2 Puffs by inhalation every four (4) hours as needed for Wheezing. 1 Inhaler 1  
 benzonatate (TESSALON PERLES) 100 mg capsule Take 1 Cap by mouth three (3) times daily as needed for Cough for up to 7 days. 30 Cap 0  
 naproxen (NAPROSYN) 500 mg tablet Take 1 Tab by mouth two (2) times daily (with meals). 20 Tab 0  
 esomeprazole (NEXIUM) 40 mg capsule Take 1 Cap by mouth daily. 20 Cap 0  
 metFORMIN (GLUCOPHAGE) 1,000 mg tablet Take 1,000 mg by mouth two (2) times daily (with meals). Indications: type 2 diabetes mellitus  gabapentin (NEURONTIN) 400 mg capsule Take 400 mg by mouth three (3) times daily. Indications: NEUROPATHIC PAIN    
 ferrous sulfate 325 mg (65 mg iron) tablet Take 325 mg by mouth Daily (before lunch). Indications: Iron Deficiency Anemia  losartan (COZAAR) 25 mg tablet Take 25 mg by mouth daily. Indications: hypertension  metoclopramide HCl (REGLAN) 5 mg tablet Take 5 mg by mouth Before breakfast, lunch, dinner and at bedtime. Past History Past Medical History: 
Past Medical History:  
Diagnosis Date  Abnormal Pap smear \"years ago\"  Asthma   
 bronchitis  Depression with anxiety 2010  Diabetes mellitus   
 type II diabetes mellitus since   GERD (gastroesophageal reflux disease)  HTN (hypertension) 2010  Ill-defined condition High Cholesterol  Other and unspecified hyperlipidemia 2010  Postpartum depression   
 hospitalized after last delivery  Psychiatric disorder Bipolar  Psychiatric problem \"mood disorder\"  Rhinitis 2012 Past Surgical History: 
Past Surgical History:  
Procedure Laterality Date   DELIVERY ONLY    
 cesearean section 08  HX  SECTION    
 X 3  
 HX GYN    
 , tubal ligation Family History: 
Family History Problem Relation Age of Onset  Diabetes Mother  Hypertension Mother  Stroke Mother  Heart Disease Mother  Hypertension Father  Stroke Father  Diabetes Father  Kidney Disease Father  Hypertension Maternal Grandmother  Diabetes Maternal Grandmother  Hypertension Maternal Grandfather  Diabetes Maternal Grandfather  Hypertension Paternal Grandmother  Diabetes Paternal Grandmother  Hypertension Paternal Grandfather  Diabetes Paternal Grandfather  Hypertension Sister  Diabetes Sister  Diabetes Sister  Hypertension Sister  Diabetes Sister  Hypertension Sister Social History: 
Social History Tobacco Use  Smoking status: Current Every Day Smoker Packs/day: 0.50 Last attempt to quit: 10/16/2015 Years since quitting: 3.0  Smokeless tobacco: Never Used Substance Use Topics  Alcohol use: No  
  Comment: Occasionally  Drug use: No  
 
 
Allergies: Allergies Allergen Reactions  Latex Rash  Latex Hives  Other Food Hives Citrus Fruits  Flagyl [Metronidazole] Hives and Itching  Lisinopril-Hydrochlorothiazide Angioedema  Clindamycin Rash and Itching  Ciprofloxacin Rash and Itching  Citrate Hives  Cottonseed Oil Rash  Cymbalta [Duloxetine] Hives and Itching  Flexeril [Cyclobenzaprine] Angioedema  Lemon Hives  Pcn [Penicillins] Hives  Sulfa (Sulfonamide Antibiotics) Shortness of Breath  Tomato Hives  Tomato Hives Review of Systems Review of Systems Constitutional: Positive for chills, diaphoresis and fatigue. Negative for activity change and fever. HENT: Positive for congestion. Negative for ear discharge, ear pain, postnasal drip, rhinorrhea, sneezing, sore throat and trouble swallowing. Eyes: Negative for pain and redness. Respiratory: Positive for cough, chest tightness, shortness of breath and wheezing. Negative for stridor. Cardiovascular: Negative for chest pain. Gastrointestinal: Negative for abdominal pain, diarrhea, nausea and vomiting. Genitourinary: Negative. Musculoskeletal: Negative for arthralgias and myalgias. Skin: Negative. Negative for rash. Allergic/Immunologic: Negative for environmental allergies. Neurological: Negative for headaches. Psychiatric/Behavioral: Negative. Physical Exam  
 
Vitals:  
 11/10/18 1425 11/10/18 1455 11/10/18 1510 BP: (!) 158/122 150/89 Pulse: 98 Resp: 16 Temp: 98.8 °F (37.1 °C) SpO2: 98%  98% Weight: 99.8 kg (220 lb) Height: 5' 3\" (1.6 m) Physical Exam  
Constitutional: She is oriented to person, place, and time. She appears well-developed and well-nourished. No distress. HENT:  
Head: Normocephalic and atraumatic. Right Ear: External ear normal.  
Left Ear: External ear normal.  
Nose: Nose normal.  
Mouth/Throat: Oropharynx is clear and moist. No oropharyngeal exudate. Eyes: Conjunctivae and EOM are normal. Pupils are equal, round, and reactive to light. Neck: Normal range of motion. Neck supple. Cardiovascular: Normal rate, regular rhythm, normal heart sounds and intact distal pulses. Pulmonary/Chest: Effort normal. No accessory muscle usage. No respiratory distress. She has no decreased breath sounds. She has wheezes. She has no rhonchi. She has no rales. She exhibits no tenderness. Persistent spasmodic cough in room. Abdominal: Soft. Bowel sounds are normal. There is no tenderness. Musculoskeletal: Normal range of motion. Neurological: She is alert and oriented to person, place, and time. Skin: Skin is warm and dry. No rash noted. She is not diaphoretic. Psychiatric: She has a normal mood and affect. Her behavior is normal. Judgment and thought content normal.  
Nursing note and vitals reviewed. Diagnostic Study Results Labs - No results found for this or any previous visit (from the past 12 hour(s)). Radiologic Studies -  
XR CHEST PA LAT Final Result CT Results  (Last 48 hours) None CXR Results  (Last 48 hours)  
          
 11/10/18 1459  XR CHEST PA LAT Final result Impression:  Impression: No acute process. Narrative:  Study: XR CHEST PA LAT Indication: PNEUMONIA Additional clinical history:  
   
Comparison:  1/17/2018 Findings:  
Two views of the chest were performed. The lungs are clear. The heart is normal  
in size. There is no edema. No acute osseous abnormality is seen. Medical Decision Making I am the first provider for this patient. I reviewed the vital signs, available nursing notes, past medical history, past surgical history, family history and social history. Vital Signs-Reviewed the patient's vital signs. Records Reviewed: Nursing Notes, Old Medical Records and Previous Radiology Studies Disposition: 
 
DISCHARGE NOTE:  
4:12 PM 
 
  Care plan outlined and precautions discussed. Patient has no new complaints, changes, or physical findings. Results of exam and chest xr were reviewed with the patient.  All medications were reviewed with the patient; will d/c home with albuterol and cough medicine. All of pt's questions and concerns were addressed. Patient was instructed and agrees to follow up with PCP, as well as to return to the ED upon further deterioration. Patient is ready to go home. Follow-up Information Follow up With Specialties Details Why Contact Info Valente Monsalve MD Internal Medicine Schedule an appointment as soon as possible for a visit in 3 days As needed, If symptoms worsen [address] 
[phone] Current Discharge Medication List  
  
START taking these medications Details  
albuterol (PROVENTIL HFA, VENTOLIN HFA, PROAIR HFA) 90 mcg/actuation inhaler Take 1-2 Puffs by inhalation every four (4) hours as needed for Wheezing. Qty: 1 Inhaler, Refills: 1  
  
benzonatate (TESSALON PERLES) 100 mg capsule Take 1 Cap by mouth three (3) times daily as needed for Cough for up to 7 days. Qty: 30 Cap, Refills: 0 CONTINUE these medications which have NOT CHANGED Details  
naproxen (NAPROSYN) 500 mg tablet Take 1 Tab by mouth two (2) times daily (with meals). Qty: 20 Tab, Refills: 0  
  
esomeprazole (NEXIUM) 40 mg capsule Take 1 Cap by mouth daily. Qty: 20 Cap, Refills: 0  
  
metFORMIN (GLUCOPHAGE) 1,000 mg tablet Take 1,000 mg by mouth two (2) times daily (with meals). Indications: type 2 diabetes mellitus  
  
gabapentin (NEURONTIN) 400 mg capsule Take 400 mg by mouth three (3) times daily. Indications: NEUROPATHIC PAIN  
  
ferrous sulfate 325 mg (65 mg iron) tablet Take 325 mg by mouth Daily (before lunch). Indications: Iron Deficiency Anemia  
  
losartan (COZAAR) 25 mg tablet Take 25 mg by mouth daily. Indications: hypertension  
  
metoclopramide HCl (REGLAN) 5 mg tablet Take 5 mg by mouth Before breakfast, lunch, dinner and at bedtime. Provider Notes (Medical Decision Making): DDx: pna, bronchitis, uri, flu 
 
3:39 PM 
 Pt walking around room, talking in complete sentences, in NAD. Procedures: 
Procedures Diagnosis Clinical Impression: 1. Acute bronchitis, unspecified organism

## 2018-11-10 NOTE — DISCHARGE INSTRUCTIONS
Bronchitis: Care Instructions  Your Care Instructions    Bronchitis is inflammation of the bronchial tubes, which carry air to the lungs. The tubes swell and produce mucus, or phlegm. The mucus and inflamed bronchial tubes make you cough. You may have trouble breathing. Most cases of bronchitis are caused by viruses like those that cause colds. Antibiotics usually do not help and they may be harmful. Bronchitis usually develops rapidly and lasts about 2 to 3 weeks in otherwise healthy people. Follow-up care is a key part of your treatment and safety. Be sure to make and go to all appointments, and call your doctor if you are having problems. It's also a good idea to know your test results and keep a list of the medicines you take. How can you care for yourself at home? · Take all medicines exactly as prescribed. Call your doctor if you think you are having a problem with your medicine. · Get some extra rest.  · Take an over-the-counter pain medicine, such as acetaminophen (Tylenol), ibuprofen (Advil, Motrin), or naproxen (Aleve) to reduce fever and relieve body aches. Read and follow all instructions on the label. · Do not take two or more pain medicines at the same time unless the doctor told you to. Many pain medicines have acetaminophen, which is Tylenol. Too much acetaminophen (Tylenol) can be harmful. · Take an over-the-counter cough medicine that contains dextromethorphan to help quiet a dry, hacking cough so that you can sleep. Avoid cough medicines that have more than one active ingredient. Read and follow all instructions on the label. · Breathe moist air from a humidifier, hot shower, or sink filled with hot water. The heat and moisture will thin mucus so you can cough it out. · Do not smoke. Smoking can make bronchitis worse. If you need help quitting, talk to your doctor about stop-smoking programs and medicines. These can increase your chances of quitting for good.   When should you call for help? Call 911 anytime you think you may need emergency care. For example, call if:    · You have severe trouble breathing.    Call your doctor now or seek immediate medical care if:    · You have new or worse trouble breathing.     · You cough up dark brown or bloody mucus (sputum).     · You have a new or higher fever.     · You have a new rash.    Watch closely for changes in your health, and be sure to contact your doctor if:    · You cough more deeply or more often, especially if you notice more mucus or a change in the color of your mucus.     · You are not getting better as expected. Where can you learn more? Go to http://rosalino-jordan.info/. Enter H333 in the search box to learn more about \"Bronchitis: Care Instructions. \"  Current as of: December 6, 2017  Content Version: 11.8  © 6832-5935 [employer]. Care instructions adapted under license by [employer] (which disclaims liability or warranty for this information). If you have questions about a medical condition or this instruction, always ask your healthcare professional. [address] any warranty or liability for your use of this information.

## 2018-12-27 ENCOUNTER — HOSPITAL ENCOUNTER (EMERGENCY)
Age: 38
Discharge: HOME | End: 2018-12-28
Attending: EMERGENCY MEDICINE
Payer: MEDICARE

## 2018-12-27 VITALS
RESPIRATION RATE: 16 BRPM | BODY MASS INDEX: 44.3 KG/M2 | OXYGEN SATURATION: 100 % | WEIGHT: 250 LBS | TEMPERATURE: 98.5 F | SYSTOLIC BLOOD PRESSURE: 147 MMHG | HEIGHT: 63 IN | DIASTOLIC BLOOD PRESSURE: 80 MMHG | HEART RATE: 89 BPM

## 2018-12-27 DIAGNOSIS — S39.012A LUMBAR STRAIN, INITIAL ENCOUNTER: Primary | ICD-10-CM

## 2018-12-27 DIAGNOSIS — M62.830 LUMBAR PARASPINAL MUSCLE SPASM: ICD-10-CM

## 2018-12-27 PROCEDURE — 99283 EMERGENCY DEPT VISIT LOW MDM: CPT

## 2018-12-27 PROCEDURE — 74011250637 HC RX REV CODE- 250/637: Performed by: EMERGENCY MEDICINE

## 2018-12-27 RX ORDER — DIAZEPAM 5 MG/1
5 TABLET ORAL
Qty: 5 TAB | Refills: 0 | Status: SHIPPED | OUTPATIENT
Start: 2018-12-27 | End: 2019-02-23

## 2018-12-27 RX ORDER — TRAMADOL HYDROCHLORIDE 50 MG/1
50 TABLET ORAL
Qty: 10 TAB | Refills: 0 | Status: SHIPPED | OUTPATIENT
Start: 2018-12-27 | End: 2019-02-23

## 2018-12-27 RX ORDER — HYDROCODONE BITARTRATE AND ACETAMINOPHEN 5; 325 MG/1; MG/1
2 TABLET ORAL
Status: COMPLETED | OUTPATIENT
Start: 2018-12-27 | End: 2018-12-27

## 2018-12-27 RX ORDER — DIAZEPAM 5 MG/1
5 TABLET ORAL
Status: COMPLETED | OUTPATIENT
Start: 2018-12-27 | End: 2018-12-27

## 2018-12-27 RX ADMIN — HYDROCODONE BITARTRATE AND ACETAMINOPHEN 2 TABLET: 5; 325 TABLET ORAL at 23:48

## 2018-12-27 RX ADMIN — DIAZEPAM 5 MG: 5 TABLET ORAL at 23:48

## 2018-12-28 NOTE — ED NOTES
Pt in ED w/ complaint of neck pain, lower back pain, and bilateral leg pain X 3 weeks. Pt states she was lifting boxes when the pain occurred. Pt is A&O X 4 and appears in no distress. Emergency Department Nursing Plan of Care       The Nursing Plan of Care is developed from the Nursing assessment and Emergency Department Attending provider initial evaluation. The plan of care may be reviewed in the ED Provider note.     The Plan of Care was developed with the following considerations:   Patient / Family readiness to learn indicated by:verbalized understanding  Persons(s) to be included in education: patient and family  Barriers to Learning/Limitations:No    Signed     Bayron Perez RN    12/28/2018   12:00 AM

## 2018-12-28 NOTE — ED PROVIDER NOTES
EMERGENCY DEPARTMENT HISTORY AND PHYSICAL EXAM      Date: 12/27/2018  Patient Name: Eben De Oliveira    History of Presenting Illness     Chief Complaint   Patient presents with    Back Pain    Neck Pain       History Provided By: Patient    HPI: Eben De Oliveira, 34 y.o. female with PMHx significant for DM, GERD, HTN and neuropathy, presents ambulatory to the ED with cc of mild, constant, gradually worsening, generalized back pain beginning 3 weeks ago along with associated neck and lower extremity pain. Pt reports lifting heavy boxes three weeks ago, noting the back pain began the day after. She states it is now radiating up to her neck and down the back of her legs. She endorses Tylenol and Advil with no relief. She denies any other alleviating or exacerbating factors. She denies any recent hx of trauma or falls. She denies any hx of herniated discs or sciatica. Pt specifically denies fever, chills, CP, SOB, weakness, gait problem, numbness, tingling, abd pain, nausea, vomiting, bladder incontinence, urinary incontinence, dysuria, urinary frequency, or diarrhea. There are no other complaints, changes, or physical findings at this time. PCP: Judd Duverney, MD    No current facility-administered medications on file prior to encounter. Current Outpatient Medications on File Prior to Encounter   Medication Sig Dispense Refill    albuterol (PROVENTIL HFA, VENTOLIN HFA, PROAIR HFA) 90 mcg/actuation inhaler Take 1-2 Puffs by inhalation every four (4) hours as needed for Wheezing. 1 Inhaler 1    naproxen (NAPROSYN) 500 mg tablet Take 1 Tab by mouth two (2) times daily (with meals). 20 Tab 0    esomeprazole (NEXIUM) 40 mg capsule Take 1 Cap by mouth daily. 20 Cap 0    metFORMIN (GLUCOPHAGE) 1,000 mg tablet Take 1,000 mg by mouth two (2) times daily (with meals). Indications: type 2 diabetes mellitus      gabapentin (NEURONTIN) 400 mg capsule Take 400 mg by mouth three (3) times daily.  Indications: NEUROPATHIC PAIN      ferrous sulfate 325 mg (65 mg iron) tablet Take 325 mg by mouth Daily (before lunch). Indications: Iron Deficiency Anemia      losartan (COZAAR) 25 mg tablet Take 25 mg by mouth daily. Indications: hypertension      metoclopramide HCl (REGLAN) 5 mg tablet Take 5 mg by mouth Before breakfast, lunch, dinner and at bedtime.          Past History     Past Medical History:  Past Medical History:   Diagnosis Date    Abnormal Pap smear     \"years ago\"     Asthma     bronchitis    Depression with anxiety 2010    Diabetes mellitus     type II diabetes mellitus since     GERD (gastroesophageal reflux disease)     HTN (hypertension) 2010    Ill-defined condition     High Cholesterol    Other and unspecified hyperlipidemia 2010    Postpartum depression     hospitalized after last delivery    Psychiatric disorder     Bipolar    Psychiatric problem     \"mood disorder\"    Rhinitis 2012       Past Surgical History:  Past Surgical History:   Procedure Laterality Date     DELIVERY ONLY      cesearean section 08    HX  SECTION      X 3    HX GYN      , tubal ligation       Family History:  Family History   Problem Relation Age of Onset    Diabetes Mother     Hypertension Mother     Stroke Mother     Heart Disease Mother     Hypertension Father     Stroke Father     Diabetes Father     Kidney Disease Father     Hypertension Maternal Grandmother     Diabetes Maternal Grandmother     Hypertension Maternal Grandfather     Diabetes Maternal Grandfather     Hypertension Paternal Grandmother     Diabetes Paternal Grandmother     Hypertension Paternal Grandfather     Diabetes Paternal Grandfather     Hypertension Sister     Diabetes Sister     Diabetes Sister     Hypertension Sister     Diabetes Sister     Hypertension Sister        Social History:  Social History     Tobacco Use    Smoking status: Current Every Day Smoker Packs/day: 0.50     Last attempt to quit: 10/16/2015     Years since quitting: 3.2    Smokeless tobacco: Never Used   Substance Use Topics    Alcohol use: No     Comment: Occasionally    Drug use: No       Allergies: Allergies   Allergen Reactions    Latex Rash    Latex Hives    Other Food Hives     Citrus Fruits    Flagyl [Metronidazole] Hives and Itching    Lisinopril-Hydrochlorothiazide Angioedema    Clindamycin Rash and Itching    Ciprofloxacin Rash and Itching    Citrate Hives    Cottonseed Oil Rash    Cymbalta [Duloxetine] Hives and Itching    Flexeril [Cyclobenzaprine] Angioedema    Lemon Hives    Pcn [Penicillins] Hives    Sulfa (Sulfonamide Antibiotics) Shortness of Breath    Tomato Hives    Tomato Hives         Review of Systems   Review of Systems   Constitutional: Negative for chills and fever. HENT: Negative for congestion, rhinorrhea, sneezing and sore throat. Respiratory: Negative for shortness of breath. Cardiovascular: Negative for chest pain. Gastrointestinal: Negative for abdominal pain, nausea and vomiting. Genitourinary: Negative for dysuria and frequency. Musculoskeletal: Positive for arthralgias (+bilateral lower extermity), back pain and neck pain. Negative for gait problem, myalgias and neck stiffness. Skin: Negative for rash. Neurological: Negative for dizziness, weakness, numbness and headaches. All other systems reviewed and are negative. Physical Exam   Physical Exam   Constitutional: She is oriented to person, place, and time. She appears well-developed and well-nourished. HENT:   Head: Normocephalic and atraumatic. Eyes: Conjunctivae and EOM are normal.   Neck: Neck supple. Cardiovascular: Normal rate, regular rhythm and intact distal pulses. Pulmonary/Chest: Effort normal. No respiratory distress. Abdominal: Soft. There is no tenderness. Musculoskeletal: Normal range of motion. She exhibits tenderness. She exhibits no deformity. Diffused paraspinal lumbar tenderness to palpation. Negative leg raise. Minimal tenderness to palpation of the cervical muscle. No localized vertebral tenderness. No step off or deformities. Neurological: She is alert and oriented to person, place, and time. Skin: Skin is warm and dry. Psychiatric: She has a normal mood and affect. Her behavior is normal. Thought content normal.   Vitals reviewed. Medical Decision Making   I am the first provider for this patient. I reviewed the vital signs, available nursing notes, past medical history, past surgical history, family history and social history. Vital Signs-Reviewed the patient's vital signs. Patient Vitals for the past 12 hrs:   Temp Pulse Resp BP SpO2   12/27/18 2317 98.5 °F (36.9 °C) 89 16 147/80 100 %       Records Reviewed: Nursing Notes and Old Medical Records    Provider Notes (Medical Decision Making):   DDx: sprain, strain, spasm, herniated disc, lumbar radiculopathy, diabetic neuropathy    ED Course:   Initial assessment performed. The patients presenting problems have been discussed, and they are in agreement with the care plan formulated and outlined with them. I have encouraged them to ask questions as they arise throughout their visit. Progress note:  11:36 PM  Updated pt on all returned results and findings. Discussed the importance of proper follow up as referred below along with return precautions. Pt in agreement with the care plan and expresses agreement with and understanding of all items discussed. Reviewed  and pt has a consistent prescription of Gabapentin from the same provider. Tramadol was prescribed over a year ago. Disposition:  DISCHARGE NOTE  11:36 PM  The patient has been re-evaluated and is ready for discharge. Reviewed available results with patient and family. Counseled pt and family on diagnosis and care plan. Pt and family have expressed understanding, and all questions have been answered.  Pt and family agree with plan and agree to F/U as recommended, or return to the ED if their sxs worsen. Discharge instructions have been provided and explained to the pt and their family, along with reasons to return to the ED. Written by Carola Mercado, ED Scribe, as dictated by Flor Zelaya MD.    PLAN:  1. Current Discharge Medication List      START taking these medications    Details   diazePAM (VALIUM) 5 mg tablet Take 1 Tab by mouth nightly as needed (spasm). Max Daily Amount: 5 mg. Take one nightly for sleep. Qty: 5 Tab, Refills: 0    Associated Diagnoses: Lumbar strain, initial encounter; Lumbar paraspinal muscle spasm      traMADol (ULTRAM) 50 mg tablet Take 1 Tab by mouth every six (6) hours as needed for Pain. Max Daily Amount: 200 mg. Qty: 10 Tab, Refills: 0    Associated Diagnoses: Lumbar strain, initial encounter; Lumbar paraspinal muscle spasm           2. Follow-up Information     Follow up With Specialties Details Why Contact Info    Queen Jody MD Internal Medicine Schedule an appointment as soon as possible for a visit  [address] [place] [address]  [phone]      [facility] EMERGENCY DEPT Emergency Medicine  As needed, If symptoms worsen [facility]  [phone]        Return to ED if worse     Diagnosis     Clinical Impression:   1. Lumbar strain, initial encounter    2. Lumbar paraspinal muscle spasm        Attestations: This note is prepared by Carola Mercado, acting as Scribe for Flor Zelaya MD.    Flor Zelaya MD: The scribe's documentation has been prepared under my direction and personally reviewed by me in its entirety. I confirm that the note above accurately reflects all work, treatment, procedures, and medical decision making performed by me. This note will not be viewable in [address]

## 2018-12-28 NOTE — ED TRIAGE NOTES
Pt states she was lifting heavy items 3 weeks ago, has had pain in her back and neck ever since then. Otc meds not helping pt states.

## 2018-12-28 NOTE — DISCHARGE INSTRUCTIONS
Back Pain: Care Instructions  Your Care Instructions    Back pain has many possible causes. It is often related to problems with muscles and ligaments of the back. It may also be related to problems with the nerves, discs, or bones of the back. Moving, lifting, standing, sitting, or sleeping in an awkward way can strain the back. Sometimes you don't notice the injury until later. Arthritis is another common cause of back pain. Although it may hurt a lot, back pain usually improves on its own within several weeks. Most people recover in 12 weeks or less. Using good home treatment and being careful not to stress your back can help you feel better sooner. Follow-up care is a key part of your treatment and safety. Be sure to make and go to all appointments, and call your doctor if you are having problems. It's also a good idea to know your test results and keep a list of the medicines you take. How can you care for yourself at home? · Sit or lie in positions that are most comfortable and reduce your pain. Try one of these positions when you lie down:  ? Lie on your back with your knees bent and supported by large pillows. ? Lie on the floor with your legs on the seat of a sofa or chair. ? Lie on your side with your knees and hips bent and a pillow between your legs. ? Lie on your stomach if it does not make pain worse. · Do not sit up in bed, and avoid soft couches and twisted positions. Bed rest can help relieve pain at first, but it delays healing. Avoid bed rest after the first day of back pain. · Change positions every 30 minutes. If you must sit for long periods of time, take breaks from sitting. Get up and walk around, or lie in a comfortable position. · Try using a heating pad on a low or medium setting for 15 to 20 minutes every 2 or 3 hours. Try a warm shower in place of one session with the heating pad. · You can also try an ice pack for 10 to 15 minutes every 2 to 3 hours.  Put a thin cloth between the ice pack and your skin. · Take pain medicines exactly as directed. ? If the doctor gave you a prescription medicine for pain, take it as prescribed. ? If you are not taking a prescription pain medicine, ask your doctor if you can take an over-the-counter medicine. · Take short walks several times a day. You can start with 5 to 10 minutes, 3 or 4 times a day, and work up to longer walks. Walk on level surfaces and avoid hills and stairs until your back is better. · Return to work and other activities as soon as you can. Continued rest without activity is usually not good for your back. · To prevent future back pain, do exercises to stretch and strengthen your back and stomach. Learn how to use good posture, safe lifting techniques, and proper body mechanics. When should you call for help? Call your doctor now or seek immediate medical care if:    · You have new or worsening numbness in your legs.     · You have new or worsening weakness in your legs. (This could make it hard to stand up.)     · You lose control of your bladder or bowels.    Watch closely for changes in your health, and be sure to contact your doctor if:    · You have a fever, lose weight, or don't feel well.     · You do not get better as expected. Where can you learn more? Go to http://rosalino-jordan.info/. Enter I967 in the search box to learn more about \"Back Pain: Care Instructions. \"  Current as of: November 29, 2017  Content Version: 11.8  © 5497-7328 [employer]. Care instructions adapted under license by [employer] (which disclaims liability or warranty for this information). If you have questions about a medical condition or this instruction, always ask your healthcare professional. [place] [address] any warranty or liability for your use of this information.            Back Strain: Care Instructions  Overview    A back strain happens when you overstretch, or pull, a muscle in your back. You may hurt your back in an accident or when you exercise or lift something. Sometimes you may not know how you hurt your back. Most back pain will get better with rest and time. You can take care of yourself at home to help your back heal.  Follow-up care is a key part of your treatment and safety. Be sure to make and go to all appointments, and call your doctor if you are having problems. It's also a good idea to know your test results and keep a list of the medicines you take. How can you care for yourself at home? · Try to stay as active as you can, but stop or reduce any activity that causes pain. · Put ice or a cold pack on the sore muscle for 10 to 20 minutes at a time to stop swelling. Try this every 1 to 2 hours for 3 days (when you are awake) or until the swelling goes down. Put a thin cloth between the ice pack and your skin. · After 2 or 3 days, apply a heating pad on low or a warm cloth to your back. Some doctors suggest that you go back and forth between hot and cold treatments. · Take pain medicines exactly as directed. ? If the doctor gave you a prescription medicine for pain, take it as prescribed. ? If you are not taking a prescription pain medicine, ask your doctor if you can take an over-the-counter medicine. · Try sleeping on your side with a pillow between your legs. Or put a pillow under your knees when you lie on your back. These measures can ease pain in your lower back. · Return to your usual level of activity slowly. When should you call for help? Call 911 anytime you think you may need emergency care. For example, call if:    · You are unable to move a leg at all.   [facility] your doctor now or seek immediate medical care if:    · You have new or worse symptoms in your legs, belly, or buttocks. Symptoms may include:  ? Numbness or tingling. ? Weakness.   ? Pain.     · You lose bladder or bowel control.    Watch closely for changes in your health, and be sure to contact your doctor if:    · You have a fever, lose weight, or don't feel well.     · You are not getting better as expected. Where can you learn more? Go to http://rosalino-jordan.info/. Enter B342 in the search box to learn more about \"Back Strain: Care Instructions. \"  Current as of: November 29, 2017  Content Version: 11.8  © 9192-7758 [employer]. Care instructions adapted under license by [employer] (which disclaims liability or warranty for this information). If you have questions about a medical condition or this instruction, always ask your healthcare professional. [place] [address] any warranty or liability for your use of this information. Back Care and Preventing Injuries: Care Instructions  Your Care Instructions    You can hurt your back doing many everyday activities: lifting a heavy box, bending down to garden, exercising at the gym, and even getting out of bed. But you can keep your back strong and healthy by doing some exercises. You also can follow a few tips for sitting, sleeping, and lifting to avoid hurting your back again. Talk to your doctor before you start an exercise program. Ask for help if you want to learn more about keeping your back healthy. Follow-up care is a key part of your treatment and safety. Be sure to make and go to all appointments, and call your doctor if you are having problems. It's also a good idea to know your test results and keep a list of the medicines you take. How can you care for yourself at home? · Stay at a healthy weight to avoid strain on your lower back. · Do not smoke. Smoking increases the risk of osteoporosis, which weakens the spine. If you need help quitting, talk to your doctor about stop-smoking programs and medicines. These can increase your chances of quitting for good.   · Make sure you sleep in a position that maintains your back's normal curves and on a mattress that feels comfortable. Sleep on your side with a pillow between your knees, or sleep on your back with a pillow under your knees. These positions can reduce strain on your back. · When you get out of bed, lie on your side and bend both knees. Drop your feet over the edge of the bed as you push up with both arms. Scoot to the edge of the bed. Make sure your feet are in line with your rear end (buttocks), and then stand up. · If you must stand for a long time, put one foot on a stool, ledge, or box. Exercise to strengthen your back and other muscles  · Get at least 30 minutes of exercise on most days of the week. Walking is a good choice. You also may want to do other activities, such as running, swimming, cycling, or playing tennis or team sports. · Stretch your back muscles. Here are few exercises to try:  ? Lie on your back with your knees bent and your feet flat on the floor. Gently pull one bent knee to your chest. Put that foot back on the floor, and then pull the other knee to your chest. Hold for 15 to 30 seconds. Repeat 2 to 4 times. ? Do pelvic tilts. Lie on your back with your knees bent. Tighten your stomach muscles. Pull your belly button (navel) in and up toward your ribs. You should feel like your back is pressing to the floor and your hips and pelvis are slightly lifting off the floor. Hold for 6 seconds while breathing smoothly. · Keep your core muscles strong. The muscles of your back, belly (abdomen), and buttocks support your spine. ? Pull in your belly, and imagine pulling your navel toward your spine. Hold this for 6 seconds, then relax. Remember to keep breathing normally as you tense your muscles. ? Do curl-ups. Always do them with your knees bent. Keep your low back on the floor, and curl your shoulders toward your knees using a smooth, slow motion.  Keep your arms folded across your chest. If this bothers your neck, try putting your hands behind your neck (not your head), with your elbows spread apart. ? Lie on your back with your knees bent and your feet flat on the floor. Tighten your belly muscles, and then push with your feet and raise your buttocks up a few inches. Hold this position 6 seconds as you continue to breathe normally, then lower yourself slowly to the floor. Repeat 8 to 12 times. ? If you like group exercise, try Pilates or yoga. These classes have poses that strengthen the core muscles. Protect your back when you sit  · Place a small pillow, a rolled-up towel, or a lumbar roll in the curve of your back if you need extra support. · Sit in a chair that is low enough to let you place both feet flat on the floor with both knees nearly level with your hips. If your chair or desk is too high, use a foot rest to raise your knees. · When driving, keep your knees nearly level with your hips. Sit straight, and drive with both hands on the steering wheel. Your arms should be in a slightly bent position. · Try a kneeling chair, which helps tilt your hips forward. This takes pressure off your lower back. · Try sitting on an exercise ball. It can rock from side to side, which helps keep your back loose. Lift properly  · Squat down, bending at the hips and knees only. If you need to, put one knee to the floor and extend your other knee in front of you, bent at a right angle (half kneeling). · Press your chest straight forward. This helps keep your upper back straight while keeping a slight arch in your low back. · Hold the load as close to your body as possible, at the level of your navel. · Use your feet to change direction, taking small steps. · Lead with your hips as you change direction. Keep your shoulders in line with your hips as you move. Do not twist your body. · Set down your load carefully, squatting with your knees and hips only. When should you call for help?   Watch closely for changes in your health, and be sure to contact your doctor if you have any problems. Where can you learn more? Go to http://rosalino-jordan.info/. Enter S810 in the search box to learn more about \"Back Care and Preventing Injuries: Care Instructions. \"  Current as of: November 29, 2017  Content Version: 11.8  © 4195-0415 [employer]. Care instructions adapted under license by [employer] (which disclaims liability or warranty for this information). If you have questions about a medical condition or this instruction, always ask your healthcare professional. [address] any warranty or liability for your use of this information.

## 2019-02-22 ENCOUNTER — HOSPITAL ENCOUNTER (EMERGENCY)
Age: 39
Discharge: HOME | End: 2019-02-23
Attending: EMERGENCY MEDICINE | Admitting: EMERGENCY MEDICINE
Payer: MEDICARE

## 2019-02-22 VITALS
OXYGEN SATURATION: 100 % | HEIGHT: 63 IN | TEMPERATURE: 97.6 F | RESPIRATION RATE: 18 BRPM | BODY MASS INDEX: 44.3 KG/M2 | HEART RATE: 92 BPM | WEIGHT: 250 LBS | DIASTOLIC BLOOD PRESSURE: 65 MMHG | SYSTOLIC BLOOD PRESSURE: 133 MMHG

## 2019-02-22 DIAGNOSIS — S39.012A STRAIN OF LUMBAR REGION, INITIAL ENCOUNTER: Primary | ICD-10-CM

## 2019-02-22 DIAGNOSIS — M62.830 LUMBAR PARASPINAL MUSCLE SPASM: ICD-10-CM

## 2019-02-22 DIAGNOSIS — S39.012A LUMBAR STRAIN, INITIAL ENCOUNTER: ICD-10-CM

## 2019-02-22 PROCEDURE — 96372 THER/PROPH/DIAG INJ SC/IM: CPT

## 2019-02-22 PROCEDURE — 99283 EMERGENCY DEPT VISIT LOW MDM: CPT

## 2019-02-22 PROCEDURE — 74011000250 HC RX REV CODE- 250: Performed by: EMERGENCY MEDICINE

## 2019-02-22 PROCEDURE — 74011250636 HC RX REV CODE- 250/636: Performed by: EMERGENCY MEDICINE

## 2019-02-22 RX ORDER — KETOROLAC TROMETHAMINE 30 MG/ML
30 INJECTION, SOLUTION INTRAMUSCULAR; INTRAVENOUS
Status: COMPLETED | OUTPATIENT
Start: 2019-02-22 | End: 2019-02-22

## 2019-02-22 RX ORDER — LIDOCAINE 4 G/100G
2 PATCH TOPICAL ONCE
Status: DISCONTINUED | OUTPATIENT
Start: 2019-02-22 | End: 2019-02-23 | Stop reason: HOSPADM

## 2019-02-22 RX ORDER — LABETALOL 200 MG/1
200 TABLET, FILM COATED ORAL 2 TIMES DAILY
COMMUNITY
End: 2020-10-30

## 2019-02-22 RX ORDER — GUAIFENESIN 100 MG/5ML
81 LIQUID (ML) ORAL DAILY
COMMUNITY

## 2019-02-22 RX ADMIN — KETOROLAC TROMETHAMINE 30 MG: 30 INJECTION, SOLUTION INTRAMUSCULAR; INTRAVENOUS at 23:35

## 2019-02-23 RX ORDER — DIAZEPAM 5 MG/1
5 TABLET ORAL
Qty: 5 TAB | Refills: 0 | Status: SHIPPED | OUTPATIENT
Start: 2019-02-23 | End: 2019-07-05

## 2019-02-23 RX ORDER — TRAMADOL HYDROCHLORIDE 50 MG/1
50 TABLET ORAL
Qty: 10 TAB | Refills: 0 | Status: SHIPPED | OUTPATIENT
Start: 2019-02-23 | End: 2019-07-05

## 2019-02-23 NOTE — ED NOTES
Pt presents to ED ambulatory complaining of bilateral lower back pain x today that radiates down bilateral buttocks and posterior legs. Pt reports she has chronic back pain and was cleaning her bathroom when back pain worsened. No gross deformity noted. Pt reporting LROM. Pt is alert and oriented x 4, RR even and unlabored, skin is warm and dry. Assessment completed and pt updated on plan of care. Emergency Department Nursing Plan of Care The Nursing Plan of Care is developed from the Nursing assessment and Emergency Department Attending provider initial evaluation. The plan of care may be reviewed in the ED Provider note. The Plan of Care was developed with the following considerations:  
Patient / Family readiness to learn indicated by:verbalized understanding Persons(s) to be included in education: patient Barriers to Learning/Limitations:No 
 
Signed Sloan Gross V   
2/22/2019   11:30 PM

## 2019-02-23 NOTE — ED NOTES
Pt reporting no relief from pain despite interventions. Provider aware and in to speak with pt. Pt resting in room, in no acute distress, and updated on plan of care. Call bell within reach.

## 2019-02-23 NOTE — ED PROVIDER NOTES
EMERGENCY DEPARTMENT HISTORY AND PHYSICAL EXAM 
 
 
Date: 2/22/2019 Patient Name: Debo Staton History of Presenting Illness Chief Complaint Patient presents with  Back Pain History Provided By: Patient HPI: Debo Staton, 37 y.o. female with PMHx significant for chronic back pain, HLD, asthma, DM, HTN, GERD presents ambulatory to the ED with cc of lower back pain starting today. Patient reports pain started after bending over to clean her bathroom floor. She describes pain as a stiff feeling and also reports pain to the back of her legs. She states she is still ambulatory and denies weakness, numbness, tingling, bowel/bladder incontinence, saddle anesthesia, fever, chills, or hx of IV drug abuse. She reports taking tylenol and using a heating pad with no relief. She states she has had episodes of back pain for the past 2 months after she was preparing to move to a new house. There are no other complaints, changes, or physical findings at this time. PCP: Phyllis Cerda MD 
 
No current facility-administered medications on file prior to encounter. Current Outpatient Medications on File Prior to Encounter Medication Sig Dispense Refill  labetalol (NORMODYNE) 200 mg tablet Take 200 mg by mouth two (2) times a day.  aspirin 81 mg chewable tablet Take 81 mg by mouth daily.  albuterol (PROVENTIL HFA, VENTOLIN HFA, PROAIR HFA) 90 mcg/actuation inhaler Take 1-2 Puffs by inhalation every four (4) hours as needed for Wheezing. 1 Inhaler 1  
 naproxen (NAPROSYN) 500 mg tablet Take 1 Tab by mouth two (2) times daily (with meals). 20 Tab 0  
 esomeprazole (NEXIUM) 40 mg capsule Take 1 Cap by mouth daily. 20 Cap 0  
 metFORMIN (GLUCOPHAGE) 1,000 mg tablet Take 1,000 mg by mouth two (2) times daily (with meals). Indications: type 2 diabetes mellitus  gabapentin (NEURONTIN) 400 mg capsule Take 400 mg by mouth three (3) times daily.  Indications: NEUROPATHIC PAIN    
  losartan (COZAAR) 25 mg tablet Take 100 mg by mouth daily.  metoclopramide HCl (REGLAN) 5 mg tablet Take 5 mg by mouth Before breakfast, lunch, dinner and at bedtime.  ferrous sulfate 325 mg (65 mg iron) tablet Take 325 mg by mouth Daily (before lunch). Indications: Iron Deficiency Anemia Past History Past Medical History: 
Past Medical History:  
Diagnosis Date  Abnormal Pap smear \"years ago\"  Asthma   
 bronchitis  Depression with anxiety 2010  Diabetes mellitus   
 type II diabetes mellitus since   GERD (gastroesophageal reflux disease)  HTN (hypertension) 2010  Ill-defined condition High Cholesterol  Other and unspecified hyperlipidemia 2010  Postpartum depression   
 hospitalized after last delivery  Psychiatric disorder Bipolar  Psychiatric problem \"mood disorder\"  Rhinitis 2012 Past Surgical History: 
Past Surgical History:  
Procedure Laterality Date   DELIVERY ONLY    
 cesearean section 08  HX  SECTION    
 X 3  
 HX GYN    
 , tubal ligation Family History: 
Family History Problem Relation Age of Onset  Diabetes Mother  Hypertension Mother  Stroke Mother  Heart Disease Mother  Hypertension Father  Stroke Father  Diabetes Father  Kidney Disease Father  Hypertension Maternal Grandmother  Diabetes Maternal Grandmother  Hypertension Maternal Grandfather  Diabetes Maternal Grandfather  Hypertension Paternal Grandmother  Diabetes Paternal Grandmother  Hypertension Paternal Grandfather  Diabetes Paternal Grandfather  Hypertension Sister  Diabetes Sister  Diabetes Sister  Hypertension Sister  Diabetes Sister  Hypertension Sister Social History: 
Social History Tobacco Use  Smoking status: Current Every Day Smoker Packs/day: 0.50 Last attempt to quit: 10/16/2015 Years since quitting: 3.3  Smokeless tobacco: Never Used Substance Use Topics  Alcohol use: No  
  Comment: Occasionally  Drug use: No  
 
 
Allergies: Allergies Allergen Reactions  Latex Rash  Latex Hives  Other Food Hives Citrus Fruits  Flagyl [Metronidazole] Hives and Itching  Lisinopril-Hydrochlorothiazide Angioedema  Clindamycin Rash and Itching  Ciprofloxacin Rash and Itching  Citrate Hives  Cottonseed Oil Rash  Cymbalta [Duloxetine] Hives and Itching  Flexeril [Cyclobenzaprine] Angioedema  Lemon Hives  Pcn [Penicillins] Hives  Sulfa (Sulfonamide Antibiotics) Shortness of Breath  Tomato Hives  Tomato Hives Review of Systems Review of Systems Constitutional: Negative for chills and fever. HENT: Negative for congestion, rhinorrhea and sore throat. Respiratory: Negative for cough and shortness of breath. Cardiovascular: Negative for chest pain. Gastrointestinal: Negative for abdominal pain, nausea and vomiting. Genitourinary: Negative for dysuria and urgency. Musculoskeletal: Positive for back pain. Skin: Negative for rash. Neurological: Negative for dizziness, light-headedness and headaches. All other systems reviewed and are negative. Physical Exam  
Physical Exam  
Constitutional: She is oriented to person, place, and time. She appears well-developed and well-nourished. No distress. HENT:  
Head: Normocephalic and atraumatic. Eyes: Conjunctivae and EOM are normal. Pupils are equal, round, and reactive to light. Neck: Normal range of motion. Cardiovascular: Normal rate, regular rhythm and intact distal pulses. Pulmonary/Chest: Effort normal and breath sounds normal. No stridor. No respiratory distress. Abdominal: Soft. She exhibits no distension. There is no tenderness. Musculoskeletal: Normal range of motion. Back: Diffuse lumbar muscular tenderness, pain with ROM of the hips Neurological: She is alert and oriented to person, place, and time. She has normal strength. No sensory deficit. Skin: Skin is warm and dry. Psychiatric: She has a normal mood and affect. Nursing note and vitals reviewed. Diagnostic Study Results Labs - No results found for this or any previous visit (from the past 12 hour(s)). Radiologic Studies - No orders to display CT Results  (Last 48 hours) None CXR Results  (Last 48 hours) None Medical Decision Making I am the first provider for this patient. I reviewed the vital signs, available nursing notes, past medical history, past surgical history, family history and social history. Vital Signs-Reviewed the patient's vital signs. Patient Vitals for the past 12 hrs: 
 Temp Pulse Resp BP SpO2  
02/22/19 2314 97.6 °F (36.4 °C) 92 18 133/65 100 % Pulse Oximetry Analysis - 100% on RA Cardiac Monitor:  
Rate: 92 bpm 
Rhythm: Normal Sinus Rhythm Records Reviewed: Nursing Notes and Old Medical Records Provider Notes (Medical Decision Making): DDx: musculoskeletal back pain, sciatica. No red flag back pain symptoms -doubt cauda equina or epidural abscess; pt with normal neurologic exam with normal strength/sensation in LE. Will tx pain and re-eval  
 
ED Course:  
Initial assessment performed. The patients presenting problems have been discussed, and they are in agreement with the care plan formulated and outlined with them. I have encouraged them to ask questions as they arise throughout their visit. 12:13 AM 
Patient reports no improvement after medication however is moving both legs without difficulty and without pain. Patient states she would like a prescription she can get filled and take the medication at home. Critical Care Time:  
0 minutes Disposition: 
DISCHARGE NOTE: 
12:13 AM 
 The patient is ready for discharge. The patients signs, symptoms, diagnosis, and instructions for discharge have been discussed and the pt has conveyed their understanding. The patient is to follow up as recommended or return to the ER should their symptoms worsen. Plan has been discussed and patient has conveyed their agreement. PLAN: 
1. Current Discharge Medication List  
  
CONTINUE these medications which have CHANGED Details  
diazePAM (VALIUM) 5 mg tablet Take 1 Tab by mouth nightly as needed (spasm). Max Daily Amount: 5 mg. Take one nightly for sleep. Qty: 5 Tab, Refills: 0 Associated Diagnoses: Lumbar strain, initial encounter; Lumbar paraspinal muscle spasm  
  
traMADol (ULTRAM) 50 mg tablet Take 1 Tab by mouth every six (6) hours as needed for Pain. Max Daily Amount: 200 mg. Qty: 10 Tab, Refills: 0 Associated Diagnoses: Lumbar strain, initial encounter; Lumbar paraspinal muscle spasm 2. Follow-up Information Follow up With Specialties Details Why Contact Info Digna Shepherd MD Internal Medicine Schedule an appointment as soon as possible for a visit  [address] [address] 7 [facility] [phone] 
[phone] [facility] [place] EMERGENCY DEPT Emergency Medicine  As needed, If symptoms worsen [address] Return to ED if worse Diagnosis Clinical Impression: 1. Strain of lumbar region, initial encounter 2. Lumbar strain, initial encounter 3. Lumbar paraspinal muscle spasm Attestations: This note is prepared by Arpan Dooley, acting as Scribe for MD Gregoria Paul MD: The scribe's documentation has been prepared under my direction and personally reviewed by me in its entirety. I confirm that the note above accurately reflects all work, treatment, procedures, and medical decision making performed by me.

## 2019-02-23 NOTE — ED NOTES
Discharge instructions were given to the patient by Ivon Levin RN. The patient left the Emergency Department ambulatory, alert and oriented and in no acute distress with 2 prescriptions. The patient was encouraged to call or return to the ED for worsening issues or problems and was encouraged to schedule a follow up appointment for continuing care. The patient verbalized understanding of discharge instructions and prescriptions, all questions were answered. The patient has no further concerns at this time.

## 2019-02-23 NOTE — DISCHARGE INSTRUCTIONS
Patient Education        Back Spasm: Care Instructions  Your Care Instructions  A back spasm is sudden tightness and pain in your back muscles. It may happen from overuse or an injury. Things like sleeping in an awkward way, bending, lifting, standing, or sitting can sometimes cause a spasm. But the cause isn't always clear. Home treatment includes using heat or ice, taking over-the-counter (OTC) pain medicines, and avoiding activities that may cause back pain. For a back spasm that doesn't get better with home care, your doctor may prescribe medicine. Treatments such as massage or manipulation may also help ease a back spasm. Your doctor may also suggest exercise or physical therapy to help improve strength and flexibility in your back muscles. In most cases, getting back to your normal activities is good for your back. Just make sure to avoid doing things that make your pain worse. Follow-up care is a key part of your treatment and safety. Be sure to make and go to all appointments, and call your doctor if you are having problems. It's also a good idea to know your test results and keep a list of the medicines you take. How can you care for yourself at home?   Heat, ice, and medicines    · To relieve pain, use heat or ice (whichever feels better) on the affected area. ? Put a warm water bottle, a heating pad set on low, or a warm cloth on your back. Put a thin cloth between the heating pad and your skin. Do not go to sleep with a heating pad on your skin. ? Try ice or a cold pack on the area for 10 to 20 minutes at a time. Put a thin cloth between the ice and your skin.     · For most back pain you can take over-the-counter pain medicine. Nonsteroidal anti-inflammatory drugs (NSAIDs) such as ibuprofen or naproxen seem to work best. But if you can't take NSAIDs you can try acetaminophen. Your doctor can prescribe stronger medicines if needed. Be safe with medicines.  Read and follow all instructions on the label.    Body positions and posture    · Sit or lie in positions that are most comfortable for you and that reduce pain. Try one of these positions when you lie down:  ? Lie on your back with your knees bent and supported by large pillows. ? Lie on the floor with your legs on the seat of a sofa or chair. ? Lie on your side with your knees and hips bent and a pillow between your legs. ? Lie on your stomach if it does not make pain worse.     · Do not sit up in bed. Avoid soft couches and twisted positions.     · Avoid bed rest after the first day of back pain. Bed rest can help relieve pain at first, but it delays healing. Continued rest without activity is usually not good for your back.     · If you must sit for long periods of time, take breaks from sitting. Change positions every 30 minutes. Get up and walk around, or lie in a comfortable position. Activity    · Take short walks several times a day. You can start with 5 to 10 minutes, 3 or 4 times a day, and work up to longer walks. Walk on level surfaces and avoid hills and stairs until your back starts to feel better.     · After your back spasm starts to feel better, try to stretch your muscles every day, especially before and after exercise and at bedtime. Regular stretching can help relax your muscles.     · To prevent future back pain, do exercises to stretch and strengthen your back and stomach. Learn to use good posture, safe lifting techniques, and other ways to move to help you avoid back pain. When should you call for help? Call 911 anytime you think you may need emergency care. For example, call if:    · You are unable to move an arm or a leg at all.   [facility] your doctor now or seek immediate medical care if:    · You have new or worse symptoms in your legs, belly, or buttocks. Symptoms may include:  ? Numbness or tingling. ? Weakness.   ? Pain.     · You lose bladder or bowel control.    Watch closely for changes in your health, and be sure to contact your doctor if:    · You have a fever, lose weight, or don't feel well.     · You do not get better as expected. Where can you learn more? Go to http://rosalino-jordan.info/. Enter E232 in the search box to learn more about \"Back Spasm: Care Instructions. \"  Current as of: September 20, 2018  Content Version: 11.9  © 6368-7864 [employer]. Care instructions adapted under license by [employer] (which disclaims liability or warranty for this information). If you have questions about a medical condition or this instruction, always ask your healthcare professional. [address] any warranty or liability for your use of this information.

## 2019-03-14 ENCOUNTER — TELEPHONE (OUTPATIENT)
Dept: SLEEP MEDICINE | Age: 39
End: 2019-03-14

## 2019-03-14 DIAGNOSIS — G47.33 OBSTRUCTIVE SLEEP APNEA (ADULT) (PEDIATRIC): Primary | ICD-10-CM

## 2019-03-14 NOTE — TELEPHONE ENCOUNTER
STUDY ORDER CONFIRMATION  Yue Rodgers 1980      Type of Study Requested: Direct Referral for Study - Please arrange a sleep study consult only if sleep study is positive. Referring Physician: Marizol Coker    Date of Study: 4/26/19  Spoke with: pt         Height: 5'3  Weight: unknown  (over 499 lb can only be done at [facility])  BMI:       Snoring      yes    Excessive Daytime Sleepiness   yes    Witnessed Apnea     yes    Hypertension      no    Cardiovascular disease (CHF-Heart Failure)  no    COPD or Emphysema?    no  On Oxygen? lpm Day/Night   no    Restless Leg Symptoms-   Do you experience an uncomfortable sensation in your legs   especially at night?    yes   Kicking?     yes   Twitching?     yes    Do you experience insomnia? yes  Sleep talking/walking?     no  Do you ever wake with a rapid heart rate?  yes  Unusual movements in sleep (violent, flailing limbs? )no  Night Terrors?      no  Sleep Paralysis or Sleep Hallucinations:  (while waking from sleep or dream, you cannot move) no  Do you/have you had seizures?   no  Have you had a Stroke, CVA or TIA?   no       When? Do you take any medications for the following? Pain       no  Anxiety       no  Sleep       no               Have you been in hospital?    no   Has it been at least 72 hrs. since discharge? no   Discharge Date   (If not at least 72 hrs, cannot see pt. for study)    Are you currently on an antibiotic?   no  If yes, for what reason? Do you need anything from us for your employer? no    Are you a CDL, DOT or other Certified ? no     Have you had a sleep study before?   no  If yes, when and where? Are you currently using CPAP?   no  If yes, what is the setting? Do you have any special needs?   Wheelchair      no  Help to and from the bathroom   no  Other?      no    (If yes to any special needs a care giver may need to come with the patient and stay the night.)    Will someone need to accompany you on the night of your study?  (Primarily for parents of minors, patients with special needs, elderly, long distance travel). Other family,  may stay until study begins. \"We want to be sure to take the best care of you. Are there Cultural or Spiritual needs that we should be aware of or are there any concerns that I can address for you?        no    If yes, describe:      Attach Medication List    If yes to any \"*\" or special needs, discuss with the Lead Tech    Notes:       Study date:  Time: Location:      Compiled by:  Andrew Garcia    Date:

## 2019-03-18 DIAGNOSIS — G47.33 OBSTRUCTIVE SLEEP APNEA (ADULT) (PEDIATRIC): ICD-10-CM

## 2019-03-18 NOTE — TELEPHONE ENCOUNTER
STUDY ORDER CONFIRMATION    Study Indication:   G47.33  Study: Diagnostic polysomnogram - CPT 01377: Split Study if patient meets the criteria.   Study Instructions / additional orders:

## 2019-04-17 ENCOUNTER — HOSPITAL ENCOUNTER (EMERGENCY)
Age: 39
Discharge: HOME | End: 2019-04-17
Attending: EMERGENCY MEDICINE
Payer: MEDICARE

## 2019-04-17 VITALS
HEIGHT: 63 IN | OXYGEN SATURATION: 100 % | BODY MASS INDEX: 44.52 KG/M2 | DIASTOLIC BLOOD PRESSURE: 84 MMHG | SYSTOLIC BLOOD PRESSURE: 173 MMHG | HEART RATE: 96 BPM | RESPIRATION RATE: 18 BRPM | TEMPERATURE: 98.2 F | WEIGHT: 251.25 LBS

## 2019-04-17 DIAGNOSIS — J30.9 ALLERGIC SINUSITIS: Primary | ICD-10-CM

## 2019-04-17 PROCEDURE — 74011250637 HC RX REV CODE- 250/637: Performed by: PHYSICIAN ASSISTANT

## 2019-04-17 PROCEDURE — 99283 EMERGENCY DEPT VISIT LOW MDM: CPT

## 2019-04-17 RX ORDER — LORATADINE 10 MG/1
10 TABLET ORAL DAILY
Qty: 20 TAB | Refills: 0 | Status: SHIPPED | OUTPATIENT
Start: 2019-04-17 | End: 2019-07-05

## 2019-04-17 RX ORDER — ACETAMINOPHEN 500 MG
1000 TABLET ORAL
Status: COMPLETED | OUTPATIENT
Start: 2019-04-17 | End: 2019-04-17

## 2019-04-17 RX ORDER — FLUTICASONE PROPIONATE 50 MCG
2 SPRAY, SUSPENSION (ML) NASAL DAILY
Qty: 1 BOTTLE | Refills: 0 | OUTPATIENT
Start: 2019-04-17 | End: 2019-10-28

## 2019-04-17 RX ADMIN — ACETAMINOPHEN 1000 MG: 500 TABLET, FILM COATED ORAL at 08:46

## 2019-04-17 NOTE — DISCHARGE INSTRUCTIONS
Patient Education        Allergies: Care Instructions  Your Care Instructions    Allergies occur when your body's defense system (immune system) overreacts to certain substances. The immune system treats a harmless substance as if it were a harmful germ or virus. Many things can cause this overreaction, including pollens, medicine, food, dust, animal dander, and mold. Allergies can be mild or severe. Mild allergies can be managed with home treatment. But medicine may be needed to prevent problems. Managing your allergies is an important part of staying healthy. Your doctor may suggest that you have allergy testing to help find out what is causing your allergies. When you know what things trigger your symptoms, you can avoid them. This can prevent allergy symptoms and other health problems. For severe allergies that cause reactions that affect your whole body (anaphylactic reactions), your doctor may prescribe a shot of epinephrine to carry with you in case you have a severe reaction. Learn how to give yourself the shot and keep it with you at all times. Make sure it is not . Follow-up care is a key part of your treatment and safety. Be sure to make and go to all appointments, and call your doctor if you are having problems. It's also a good idea to know your test results and keep a list of the medicines you take. How can you care for yourself at home? · If you have been told by your doctor that dust or dust mites are causing your allergy, decrease the dust around your bed:  ? Wash sheets, pillowcases, and other bedding in hot water every week. ? Use dust-proof covers for pillows, duvets, and mattresses. Avoid plastic covers because they tear easily and do not \"breathe. \" Wash as instructed on the label. ? Do not use any blankets and pillows that you do not need. ? Use blankets that you can wash in your washing machine. ?  Consider removing drapes and carpets, which attract and hold dust, from your bedroom. · If you are allergic to house dust and mites, do not use home humidifiers. Your doctor can suggest ways you can control dust and mites. · Look for signs of cockroaches. Cockroaches cause allergic reactions. Use cockroach baits to get rid of them. Then, clean your home well. Cockroaches like areas where grocery bags, newspapers, empty bottles, or cardboard boxes are stored. Do not keep these inside your home, and keep trash and food containers sealed. Seal off any spots where cockroaches might enter your home. · If you are allergic to mold, get rid of furniture, rugs, and drapes that smell musty. Check for mold in the bathroom. · If you are allergic to outdoor pollen or mold spores, use air-conditioning. Change or clean all filters every month. Keep windows closed. · If you are allergic to pollen, stay inside when pollen counts are high. Use a vacuum  with a HEPA filter or a double-thickness filter at least two times each week. · Stay inside when air pollution is bad. Avoid paint fumes, perfumes, and other strong odors. · Avoid conditions that make your allergies worse. Stay away from smoke. Do not smoke or let anyone else smoke in your house. Do not use fireplaces or wood-burning stoves. · If you are allergic to your pets, change the air filter in your furnace every month. Use high-efficiency filters. · If you are allergic to pet dander, keep pets outside or out of your bedroom. Old carpet and cloth furniture can hold a lot of animal dander. You may need to replace them. When should you call for help? Give an epinephrine shot if:    · You think you are having a severe allergic reaction.     · You have symptoms in more than one body area, such as mild nausea and an itchy mouth.    After giving an epinephrine shot call 911, even if you feel better.   Call 911 if:    · You have symptoms of a severe allergic reaction.  These may include:  ? Sudden raised, red areas (hives) all over your body.  ? Swelling of the throat, mouth, lips, or tongue. ? Trouble breathing. ? Passing out (losing consciousness). Or you may feel very lightheaded or suddenly feel weak, confused, or restless.     · You have been given an epinephrine shot, even if you feel better.    Call your doctor now or seek immediate medical care if:    · You have symptoms of an allergic reaction, such as:  ? A rash or hives (raised, red areas on the skin). ? Itching. ? Swelling. ? Belly pain, nausea, or vomiting.    Watch closely for changes in your health, and be sure to contact your doctor if:    · You do not get better as expected. Where can you learn more? Go to http://rosalino-jordan.info/. Enter O722 in the search box to learn more about \"Allergies: Care Instructions. \"  Current as of: June 27, 2018  Content Version: 11.9  © 8243-6924 [employer]. Care instructions adapted under license by [employer] (which disclaims liability or warranty for this information). If you have questions about a medical condition or this instruction, always ask your healthcare professional. [place] [address] any warranty or liability for your use of this information. Patient Education        Sinusitis: Care Instructions  Your Care Instructions    Sinusitis is an infection of the lining of the sinus cavities in your head. Sinusitis often follows a cold. It causes pain and pressure in your head and face. In most cases, sinusitis gets better on its own in 1 to 2 weeks. But some mild symptoms may last for several weeks. Sometimes antibiotics are needed. Follow-up care is a key part of your treatment and safety. Be sure to make and go to all appointments, and call your doctor if you are having problems. It's also a good idea to know your test results and keep a list of the medicines you take. How can you care for yourself at home?   · Take an over-the-counter pain medicine, such as acetaminophen (Tylenol), ibuprofen (Advil, Motrin), or naproxen (Aleve). Read and follow all instructions on the label. · If the doctor prescribed antibiotics, take them as directed. Do not stop taking them just because you feel better. You need to take the full course of antibiotics. · Be careful when taking over-the-counter cold or flu medicines and Tylenol at the same time. Many of these medicines have acetaminophen, which is Tylenol. Read the labels to make sure that you are not taking more than the recommended dose. Too much acetaminophen (Tylenol) can be harmful. · Breathe warm, moist air from a steamy shower, a hot bath, or a sink filled with hot water. Avoid cold, dry air. Using a humidifier in your home may help. Follow the directions for cleaning the machine. · Use saline (saltwater) nasal washes to help keep your nasal passages open and wash out mucus and bacteria. You can buy saline nose drops at a grocery store or drugstore. Or you can make your own at home by adding 1 teaspoon of salt and 1 teaspoon of baking soda to 2 cups of distilled water. If you make your own, fill a bulb syringe with the solution, insert the tip into your nostril, and squeeze gently. Dax Maizes your nose. · Put a hot, wet towel or a warm gel pack on your face 3 or 4 times a day for 5 to 10 minutes each time. · Try a decongestant nasal spray like oxymetazoline (Afrin). Do not use it for more than 3 days in a row. Using it for more than 3 days can make your congestion worse. When should you call for help? Call your doctor now or seek immediate medical care if:    · You have new or worse swelling or redness in your face or around your eyes.     · You have a new or higher fever.    Watch closely for changes in your health, and be sure to contact your doctor if:    · You have new or worse facial pain.     · The mucus from your nose becomes thicker (like pus) or has new blood in it.     · You are not getting better as expected. Where can you learn more? Go to http://rosalino-jordan.info/. Enter Q402 in the search box to learn more about \"Sinusitis: Care Instructions. \"  Current as of: March 27, 2018  Content Version: 11.9  © 8372-8680 [employer]. Care instructions adapted under license by [employer] (which disclaims liability or warranty for this information). If you have questions about a medical condition or this instruction, always ask your healthcare professional. [place] [address] any warranty or liability for your use of this information.

## 2019-04-17 NOTE — ED TRIAGE NOTES
Pt reports nasal congestion and a headache x2 days. Pt reports using Flonase. Pt denies any photophobia or vision changes.

## 2019-04-17 NOTE — ED PROVIDER NOTES
EMERGENCY DEPARTMENT HISTORY AND PHYSICAL EXAM 
 
 
Date: 4/17/2019 Patient Name: Steve Zee History of Presenting Illness Chief Complaint Patient presents with  
 Headache  Nasal Congestion History Provided By: Patient HPI: Steve Zee, 37 y.o. female with PMHx significant for depression with anxiety, obesity, hyperlipidemia, bipolar disorder, hypertension, diabetes, GERD, presents ambulatory to the ED with cc of acute moderate aching frontal headache, sinus pressure, sinus congestion, rhinorrhea, bilateral ear pressure X 2 days. Flonase without relief. Denies fever, chills, nausea, vomiting, lightheadedness, dizziness, numbness, tingling, thunderclap headache, weakness, vision changes, syncope, chest pain, shortness of breath, wheezing. Patient endorses symptoms started after the rain and states her \"allergies are acting up\". There are no other complaints, changes, or physical findings at this time. PCP: Sumanth Licona MD 
 
No current facility-administered medications on file prior to encounter. Current Outpatient Medications on File Prior to Encounter Medication Sig Dispense Refill  labetalol (NORMODYNE) 200 mg tablet Take 200 mg by mouth two (2) times a day.  aspirin 81 mg chewable tablet Take 81 mg by mouth daily.  esomeprazole (NEXIUM) 40 mg capsule Take 1 Cap by mouth daily. 20 Cap 0  
 metFORMIN (GLUCOPHAGE) 1,000 mg tablet Take 1,000 mg by mouth two (2) times daily (with meals). Indications: type 2 diabetes mellitus  losartan (COZAAR) 25 mg tablet Take 100 mg by mouth daily.  metoclopramide HCl (REGLAN) 5 mg tablet Take 5 mg by mouth Before breakfast, lunch, dinner and at bedtime.  diazePAM (VALIUM) 5 mg tablet Take 1 Tab by mouth nightly as needed (spasm). Max Daily Amount: 5 mg. Take one nightly for sleep.  5 Tab 0  
 traMADol (ULTRAM) 50 mg tablet Take 1 Tab by mouth every six (6) hours as needed for Pain. Max Daily Amount: 200 mg. 10 Tab 0  
 albuterol (PROVENTIL HFA, VENTOLIN HFA, PROAIR HFA) 90 mcg/actuation inhaler Take 1-2 Puffs by inhalation every four (4) hours as needed for Wheezing. 1 Inhaler 1  
 naproxen (NAPROSYN) 500 mg tablet Take 1 Tab by mouth two (2) times daily (with meals). 20 Tab 0  
 gabapentin (NEURONTIN) 400 mg capsule Take 400 mg by mouth three (3) times daily. Indications: NEUROPATHIC PAIN    
 ferrous sulfate 325 mg (65 mg iron) tablet Take 325 mg by mouth Daily (before lunch). Indications: Iron Deficiency Anemia Past History Past Medical History: 
Past Medical History:  
Diagnosis Date  Abnormal Pap smear \"years ago\"  Asthma   
 bronchitis  Depression with anxiety 2010  Diabetes mellitus   
 type II diabetes mellitus since   GERD (gastroesophageal reflux disease)  HTN (hypertension) 2010  Ill-defined condition High Cholesterol  Other and unspecified hyperlipidemia 2010  Postpartum depression   
 hospitalized after last delivery  Psychiatric disorder Bipolar  Psychiatric problem \"mood disorder\"  Rhinitis 2012 Past Surgical History: 
Past Surgical History:  
Procedure Laterality Date   DELIVERY ONLY    
 cesearean section 08  HX  SECTION    
 X 3  
 HX GYN    
 , tubal ligation Family History: 
Family History Problem Relation Age of Onset  Diabetes Mother  Hypertension Mother  Stroke Mother  Heart Disease Mother  Hypertension Father  Stroke Father  Diabetes Father  Kidney Disease Father  Hypertension Maternal Grandmother  Diabetes Maternal Grandmother  Hypertension Maternal Grandfather  Diabetes Maternal Grandfather  Hypertension Paternal Grandmother  Diabetes Paternal Grandmother  Hypertension Paternal Grandfather  Diabetes Paternal Grandfather  Hypertension Sister  Diabetes Sister  Diabetes Sister  Hypertension Sister  Diabetes Sister  Hypertension Sister Social History: 
Social History Tobacco Use  Smoking status: Current Every Day Smoker Packs/day: 0.50 Last attempt to quit: 10/16/2015 Years since quitting: 3.5  Smokeless tobacco: Never Used Substance Use Topics  Alcohol use: No  
  Comment: Occasionally  Drug use: No  
 
 
Allergies: Allergies Allergen Reactions  Latex Rash  Latex Hives  Other Food Hives Citrus Fruits  Flagyl [Metronidazole] Hives and Itching  Lisinopril-Hydrochlorothiazide Angioedema  Clindamycin Rash and Itching  Ciprofloxacin Rash and Itching  Citrate Hives  Cottonseed Oil Rash  Cymbalta [Duloxetine] Hives and Itching  Flexeril [Cyclobenzaprine] Angioedema  Lemon Hives  Pcn [Penicillins] Hives  Sulfa (Sulfonamide Antibiotics) Shortness of Breath  Tomato Hives  Tomato Hives Review of Systems Review of Systems Constitutional: Negative for activity change, chills, fatigue and fever. HENT: Positive for congestion, rhinorrhea, sinus pressure and sinus pain. Negative for drooling, ear discharge, ear pain, facial swelling, postnasal drip, sneezing, sore throat, trouble swallowing and voice change. Eyes: Negative for photophobia, pain, discharge and visual disturbance. Respiratory: Negative for cough, chest tightness, shortness of breath, wheezing and stridor. Cardiovascular: Negative for chest pain. Gastrointestinal: Negative for abdominal pain, constipation, diarrhea, nausea and vomiting. Genitourinary: Negative. Musculoskeletal: Negative. Negative for myalgias and neck pain. Skin: Negative. Negative for rash. Allergic/Immunologic: Positive for environmental allergies. Neurological: Positive for headaches.  Negative for dizziness, tremors, seizures, syncope, facial asymmetry, speech difficulty, weakness, light-headedness and numbness. Psychiatric/Behavioral: Negative. Physical Exam  
Physical Exam  
Constitutional: She is oriented to person, place, and time. She appears well-developed and well-nourished. No distress. HENT:  
Head: Normocephalic and atraumatic. Right Ear: Hearing, tympanic membrane, external ear and ear canal normal.  
Left Ear: Hearing, tympanic membrane, external ear and ear canal normal.  
Nose: Mucosal edema and rhinorrhea present. Right sinus exhibits maxillary sinus tenderness and frontal sinus tenderness. Left sinus exhibits maxillary sinus tenderness and frontal sinus tenderness. Mouth/Throat: Uvula is midline, oropharynx is clear and moist and mucous membranes are normal. No oral lesions. No trismus in the jaw. No uvula swelling. No oropharyngeal exudate, posterior oropharyngeal edema, posterior oropharyngeal erythema or tonsillar abscesses. Eyes: Pupils are equal, round, and reactive to light. Conjunctivae and EOM are normal. Right eye exhibits no discharge. Left eye exhibits no discharge. No scleral icterus. Neck: Normal range of motion. Neck supple. Cardiovascular: Normal rate, regular rhythm, normal heart sounds and intact distal pulses. Exam reveals no gallop and no friction rub. No murmur heard. Pulmonary/Chest: Effort normal and breath sounds normal. No accessory muscle usage. No respiratory distress. She has no decreased breath sounds. She has no wheezes. She has no rhonchi. She has no rales. She exhibits no tenderness. Abdominal: Soft. Bowel sounds are normal. She exhibits no distension and no mass. There is no tenderness. There is no rebound and no guarding. Musculoskeletal: Normal range of motion. Lymphadenopathy:  
  She has no cervical adenopathy. Neurological: She is alert and oriented to person, place, and time. No cranial nerve deficit. Skin: Skin is warm and dry. No rash noted. She is not diaphoretic. Psychiatric: She has a normal mood and affect. Her behavior is normal. Judgment and thought content normal.  
Nursing note and vitals reviewed. Diagnostic Study Results Labs - No results found for this or any previous visit (from the past 12 hour(s)). Radiologic Studies - No orders to display CT Results  (Last 48 hours) None CXR Results  (Last 48 hours) None Medical Decision Making I am the first provider for this patient. I reviewed the vital signs, available nursing notes, past medical history, past surgical history, family history and social history. Vital Signs-Reviewed the patient's vital signs. Patient Vitals for the past 12 hrs: 
 Temp Pulse Resp BP SpO2  
04/17/19 0833 98.2 °F (36.8 °C) 96 18 173/84 100 % Pulse Oximetry Analysis - 100% on RA Records Reviewed: Nursing Notes, Old Medical Records, Previous Radiology Studies and Previous Laboratory Studies Provider Notes (Medical Decision Making):  
Patient presents with rhinnorhea, nasal congestion, sinus pressure/sinus pain, frontal headache. Ddx: seasonal allergies, URI, viral vs. Bacterial sinusitis, migraine vs tension vs cluster headache unlikely. No suggestion for intracranial process. The causes and treatment of seasonal allergic rhinitis are discussed in detail. Allergen avoidance, use and side effects of OTC and prescription antihistamine decongestant products, and the use and side effects of inhaled nasal corticosteroids is reviewed. Allergy desensitization shots are reserved for severe and refractory cases. ED Course:  
Initial assessment performed. The patients presenting problems have been discussed, and they are in agreement with the care plan formulated and outlined with them. I have encouraged them to ask questions as they arise throughout their visit. Critical Care Time:  
0 Disposition: 8:47 AM 
I have discussed with patient their diagnosis, treatment, and follow up plan. The patient agrees to follow up as outlined in discharge paperwork and also to return to the ED with any worsening. Jenni Walsh PA-C 
 
PLAN: 
1. Current Discharge Medication List  
  
START taking these medications Details  
fluticasone propionate (FLONASE) 50 mcg/actuation nasal spray 2 Sprays by Both Nostrils route daily. Qty: 1 Bottle, Refills: 0  
  
loratadine (CLARITIN) 10 mg tablet Take 1 Tab by mouth daily. Qty: 20 Tab, Refills: 0  
  
dm-acetaminophen-chorpheniram (CORICIDIN HBP) 2- mg tab Take 2 Tabs by mouth every six to eight (6-8) hours as needed for Pain or Cough. Qty: 20 Tab, Refills: 0 CONTINUE these medications which have NOT CHANGED Details  
labetalol (NORMODYNE) 200 mg tablet Take 200 mg by mouth two (2) times a day. aspirin 81 mg chewable tablet Take 81 mg by mouth daily. esomeprazole (NEXIUM) 40 mg capsule Take 1 Cap by mouth daily. Qty: 20 Cap, Refills: 0  
  
metFORMIN (GLUCOPHAGE) 1,000 mg tablet Take 1,000 mg by mouth two (2) times daily (with meals). Indications: type 2 diabetes mellitus  
  
losartan (COZAAR) 25 mg tablet Take 100 mg by mouth daily. metoclopramide HCl (REGLAN) 5 mg tablet Take 5 mg by mouth Before breakfast, lunch, dinner and at bedtime. diazePAM (VALIUM) 5 mg tablet Take 1 Tab by mouth nightly as needed (spasm). Max Daily Amount: 5 mg. Take one nightly for sleep. Qty: 5 Tab, Refills: 0 Associated Diagnoses: Lumbar strain, initial encounter; Lumbar paraspinal muscle spasm  
  
traMADol (ULTRAM) 50 mg tablet Take 1 Tab by mouth every six (6) hours as needed for Pain. Max Daily Amount: 200 mg. Qty: 10 Tab, Refills: 0  Associated Diagnoses: Lumbar strain, initial encounter; Lumbar paraspinal muscle spasm  
  
albuterol (PROVENTIL HFA, VENTOLIN HFA, PROAIR HFA) 90 mcg/actuation inhaler Take 1-2 Puffs by inhalation every four (4) hours as needed for Wheezing. Qty: 1 Inhaler, Refills: 1  
  
naproxen (NAPROSYN) 500 mg tablet Take 1 Tab by mouth two (2) times daily (with meals). Qty: 20 Tab, Refills: 0  
  
gabapentin (NEURONTIN) 400 mg capsule Take 400 mg by mouth three (3) times daily. Indications: NEUROPATHIC PAIN  
  
ferrous sulfate 325 mg (65 mg iron) tablet Take 325 mg by mouth Daily (before lunch). Indications: Iron Deficiency Anemia 2. Follow-up Information Follow up With Specialties Details Why Contact Info Jen Bolton MD Internal Medicine Schedule an appointment as soon as possible for a visit in 2 days As needed [address] [place] [address] 7 [facility] 7 [id number] 
[phone] Return to ED if worse Diagnosis Clinical Impression: 1. Allergic sinusitis Attestations:

## 2019-04-17 NOTE — ED NOTES

## 2019-04-26 ENCOUNTER — HOSPITAL ENCOUNTER (OUTPATIENT)
Dept: SLEEP MEDICINE | Age: 39
Discharge: HOME | End: 2019-04-26
Payer: MEDICARE

## 2019-04-26 VITALS
HEIGHT: 64 IN | TEMPERATURE: 99.1 F | HEART RATE: 94 BPM | WEIGHT: 254.4 LBS | BODY MASS INDEX: 43.43 KG/M2 | SYSTOLIC BLOOD PRESSURE: 132 MMHG | OXYGEN SATURATION: 100 % | DIASTOLIC BLOOD PRESSURE: 79 MMHG

## 2019-04-26 PROCEDURE — 95810 POLYSOM 6/> YRS 4/> PARAM: CPT | Performed by: INTERNAL MEDICINE

## 2019-05-03 ENCOUNTER — TELEPHONE (OUTPATIENT)
Dept: DIABETES SERVICES | Age: 39
End: 2019-05-03

## 2019-06-19 ENCOUNTER — APPOINTMENT (OUTPATIENT)
Dept: GENERAL RADIOLOGY | Age: 39
End: 2019-06-19
Attending: EMERGENCY MEDICINE
Payer: MEDICARE

## 2019-06-19 ENCOUNTER — HOSPITAL ENCOUNTER (EMERGENCY)
Age: 39
Discharge: HOME | End: 2019-06-19
Attending: EMERGENCY MEDICINE
Payer: MEDICARE

## 2019-06-19 VITALS
SYSTOLIC BLOOD PRESSURE: 132 MMHG | DIASTOLIC BLOOD PRESSURE: 82 MMHG | HEART RATE: 84 BPM | HEIGHT: 64 IN | OXYGEN SATURATION: 100 % | TEMPERATURE: 98 F | BODY MASS INDEX: 43.28 KG/M2 | WEIGHT: 253.5 LBS | RESPIRATION RATE: 20 BRPM

## 2019-06-19 DIAGNOSIS — J06.9 ACUTE UPPER RESPIRATORY INFECTION: ICD-10-CM

## 2019-06-19 DIAGNOSIS — R52 GENERALIZED BODY ACHES: ICD-10-CM

## 2019-06-19 DIAGNOSIS — G44.209 ACUTE NON INTRACTABLE TENSION-TYPE HEADACHE: ICD-10-CM

## 2019-06-19 DIAGNOSIS — J01.90 ACUTE NON-RECURRENT SINUSITIS, UNSPECIFIED LOCATION: Primary | ICD-10-CM

## 2019-06-19 PROCEDURE — 71046 X-RAY EXAM CHEST 2 VIEWS: CPT

## 2019-06-19 PROCEDURE — 74011000250 HC RX REV CODE- 250: Performed by: EMERGENCY MEDICINE

## 2019-06-19 PROCEDURE — 99284 EMERGENCY DEPT VISIT MOD MDM: CPT

## 2019-06-19 PROCEDURE — 74011250637 HC RX REV CODE- 250/637: Performed by: EMERGENCY MEDICINE

## 2019-06-19 RX ORDER — BUTALBITAL, ACETAMINOPHEN AND CAFFEINE 300; 40; 50 MG/1; MG/1; MG/1
1 CAPSULE ORAL
Qty: 20 CAP | Refills: 0 | Status: SHIPPED | OUTPATIENT
Start: 2019-06-19 | End: 2019-07-05

## 2019-06-19 RX ORDER — FAMOTIDINE 20 MG/1
20 TABLET, FILM COATED ORAL 2 TIMES DAILY
Qty: 20 TAB | Refills: 0 | Status: SHIPPED | OUTPATIENT
Start: 2019-06-19 | End: 2019-06-29

## 2019-06-19 RX ORDER — DOXYCYCLINE HYCLATE 100 MG
100 TABLET ORAL 2 TIMES DAILY
Qty: 14 TAB | Refills: 0 | Status: SHIPPED | OUTPATIENT
Start: 2019-06-19 | End: 2019-06-26

## 2019-06-19 RX ORDER — BUTALBITAL, ACETAMINOPHEN AND CAFFEINE 50; 325; 40 MG/1; MG/1; MG/1
1 TABLET ORAL
Status: COMPLETED | OUTPATIENT
Start: 2019-06-19 | End: 2019-06-19

## 2019-06-19 RX ORDER — DOXYCYCLINE HYCLATE 100 MG
100 TABLET ORAL
Status: COMPLETED | OUTPATIENT
Start: 2019-06-19 | End: 2019-06-19

## 2019-06-19 RX ORDER — OXYMETAZOLINE HCL 0.05 %
2 SPRAY, NON-AEROSOL (ML) NASAL ONCE
Status: COMPLETED | OUTPATIENT
Start: 2019-06-19 | End: 2019-06-19

## 2019-06-19 RX ADMIN — DOXYCYCLINE HYCLATE 100 MG: 100 TABLET ORAL at 06:33

## 2019-06-19 RX ADMIN — LIDOCAINE HYDROCHLORIDE 40 ML: 20 SOLUTION ORAL; TOPICAL at 06:57

## 2019-06-19 RX ADMIN — Medication 2 SPRAY: at 06:33

## 2019-06-19 RX ADMIN — BUTALBITAL, ACETAMINOPHEN AND CAFFEINE 1 TABLET: 50; 325; 40 TABLET ORAL at 06:33

## 2019-06-19 NOTE — ED NOTES
Pt arrived to ED via ambulatory with c/o generalized body aches, headache and anxiety/panic. Pt. Reports CPAP has not been working appropriately for the last two days and she has been unable to sleep for two days. Pt. Also reports shortness of breath,but is able to speak in complete sentences without pausing for breath. No use of accessory muscles observed. Respirations even. Lungs clear. Pt is in no acute distress. Will continue to monitor. See nursing assessment. Safety precautions in place; call light within reach. Emergency Department Nursing Plan of Care       The Nursing Plan of Care is developed from the Nursing assessment and Emergency Department Attending provider initial evaluation. The plan of care may be reviewed in the ED Provider note.     The Plan of Care was developed with the following considerations:   Patient / Family readiness to learn indicated by:verbalized understanding  Persons(s) to be included in education: patient  Barriers to Learning/Limitations:No    Signed     Lizzie Garrett RN    6/19/2019   6:22 AM

## 2019-06-19 NOTE — DISCHARGE INSTRUCTIONS
Thank you for allowing us to take care of you today! We hope we addressed all of your concerns and needs. We strive to provide excellent quality care in the Emergency Department. You will receive a survey after your visit to evaluate the care you were provided. Should you receive a survey from us, we invite you to share your experience and tell us what made it excellent. It was a pleasure serving you, we invite you to share your experience with us, in our pursuit for excellence, should you be selected to receive a survey. The exam and treatment you received in the Emergency Department were for an urgent problem and are not intended as complete care. It is important that you follow up with a doctor, nurse practitioner, or physician assistant for ongoing care. If your symptoms become worse or you do not improve as expected and you are unable to reach your usual health care provider, you should return to the Emergency Department. We are available 24 hours a day. Please take your discharge instructions with you when you go to your follow-up appointment. If you have any problem arranging a follow-up appointment, contact the Emergency Department immediately. If a prescription has been provided, please have it filled as soon as possible to prevent a delay in treatment. Read the entire medication instruction sheet provided to you by the pharmacy. If you have any questions or reservations about taking the medication due to side effects or interactions with other medications, please call your primary care physician or contact the ER to speak with the charge nurse. Make an appointment with your family doctor or the physician you were referred to for follow-up of this visit as instructed on your discharge paperwork, as this is mandatory follow-up. Return to the ER if you are unable to be seen or if you are unable to be seen in a timely manner.     If you have any problem arranging the follow-up visit, contact the Emergency Department immediately. I hope you feel better and thank you again for allow us to provide you with excellent care today at [address] [place] [address]! Warmest regards,    Narinder Lewis MD  Emergency Medicine Physician  [facility]          ]    Patient Education        Sinusitis: Care Instructions  Your Care Instructions    Sinusitis is an infection of the lining of the sinus cavities in your head. Sinusitis often follows a cold. It causes pain and pressure in your head and face. In most cases, sinusitis gets better on its own in 1 to 2 weeks. But some mild symptoms may last for several weeks. Sometimes antibiotics are needed. Follow-up care is a key part of your treatment and safety. Be sure to make and go to all appointments, and call your doctor if you are having problems. It's also a good idea to know your test results and keep a list of the medicines you take. How can you care for yourself at home? · Take an over-the-counter pain medicine, such as acetaminophen (Tylenol), ibuprofen (Advil, Motrin), or naproxen (Aleve). Read and follow all instructions on the label. · If the doctor prescribed antibiotics, take them as directed. Do not stop taking them just because you feel better. You need to take the full course of antibiotics. · Be careful when taking over-the-counter cold or flu medicines and Tylenol at the same time. Many of these medicines have acetaminophen, which is Tylenol. Read the labels to make sure that you are not taking more than the recommended dose. Too much acetaminophen (Tylenol) can be harmful. · Breathe warm, moist air from a steamy shower, a hot bath, or a sink filled with hot water. Avoid cold, dry air. Using a humidifier in your home may help. Follow the directions for cleaning the machine.   · Use saline (saltwater) nasal washes to help keep your nasal passages open and wash out mucus and bacteria. You can buy saline nose drops at a grocery store or drugstore. Or you can make your own at home by adding 1 teaspoon of salt and 1 teaspoon of baking soda to 2 cups of distilled water. If you make your own, fill a bulb syringe with the solution, insert the tip into your nostril, and squeeze gently. Helon Jose your nose. · Put a hot, wet towel or a warm gel pack on your face 3 or 4 times a day for 5 to 10 minutes each time. · Try a decongestant nasal spray like oxymetazoline (Afrin). Do not use it for more than 3 days in a row. Using it for more than 3 days can make your congestion worse. When should you call for help? Call your doctor now or seek immediate medical care if:    · You have new or worse swelling or redness in your face or around your eyes.     · You have a new or higher fever.    Watch closely for changes in your health, and be sure to contact your doctor if:    · You have new or worse facial pain.     · The mucus from your nose becomes thicker (like pus) or has new blood in it.     · You are not getting better as expected. Where can you learn more? Go to http://rosalino-jordan.info/. Enter W625 in the search box to learn more about \"Sinusitis: Care Instructions. \"  Current as of: March 27, 2018  Content Version: 11.9  © 8753-6366 [employer], [employer]. Care instructions adapted under license by [employer] (which disclaims liability or warranty for this information). If you have questions about a medical condition or this instruction, always ask your healthcare professional. [place] [address] any warranty or liability for your use of this information.

## 2019-06-19 NOTE — ED TRIAGE NOTES
Pt. Reports anxiety/panic with shortness of breath, headache and generalized body aches x2 days. Pt. States CPAP is not working for the last 2 days and patient has been unable to sleep. Pt. Denies nausea, vomiting, diarrhea, fever and chills.

## 2019-06-19 NOTE — LETTER
[facility] EMERGENCY DEPT 
[address] [facility] [place] [id number] [phone] Work/School Note Date: 6/19/2019 To Whom It May concern: Kar Morgan was seen and treated today in the emergency room by the following provider(s): 
Attending Provider: Vivien Salvador MD. Kar Morgan may return to work on 6/20/19. Sincerely, Junior Britton MD

## 2019-06-19 NOTE — ED NOTES
Discharge instructions were given to the patient by Nica Swift RN  . The patient left the Emergency Department ambulatory, alert and oriented and in no acute distress with 4 prescriptions. The patient was encouraged to call or return to the ED for worsening issues or problems and was encouraged to schedule a follow up appointment for continuing care. The patient verbalized understanding of discharge instructions and prescriptions, all questions were answered. The patient has no further concerns at this time.

## 2019-06-19 NOTE — ED PROVIDER NOTES
EMERGENCY DEPARTMENT HISTORY AND PHYSICAL EXAM      Date: 6/19/2019  Patient Name: Marli Cardoso  Patient Age and Sex: 37 y.o. female    History of Presenting Illness     Chief Complaint   Patient presents with    Anxiety    Generalized Body Aches       History Provided By: Patient    HPI: Marli Cardoso, 37 y.o. female with PMHx significant for many medical problems per below presents with multiple complaints but is specifically complaining of sinus pressure and feeling that she cannot breathe her nose. Patient states that she was recently diagnosed with sleep apnea. She says the CPAP machine is not working as she cannot breathe through her nose and therefore unable to sleep for the past several days. She reports a mild sinus headache gets about 4 out of 10 intensity. She is taking no medications for her symptoms. She specifically she denies any nausea, vomiting, diarrhea, fever, chills. Patient also reports that she is having some reflux and is requesting a \"GI cocktail\". Pt denies any other alleviating or exacerbating factors. Additionally, pt specifically denies any recent fever, chills, nausea, vomiting, abdominal pain, CP, SOB, lightheadedness, dizziness, numbness, weakness, BLE swelling, heart palpitations, urinary sxs, diarrhea, constipation, melena, hematochezia, cough, or congestion. PCP: Jagdeep Vera MD    There are no other complaints, changes or physical findings at this time.      Past History   Past Medical History:  Past Medical History:   Diagnosis Date    Abnormal Pap smear     \"years ago\"     Asthma     bronchitis    Depression with anxiety 9/22/2010    Diabetes mellitus     type II diabetes mellitus since 2004    GERD (gastroesophageal reflux disease)     HTN (hypertension) 9/22/2010    Ill-defined condition     High Cholesterol    Other and unspecified hyperlipidemia 9/22/2010    Postpartum depression     hospitalized after last delivery    Psychiatric disorder     Bipolar  Psychiatric problem     \"mood disorder\"    Rhinitis 2012       Past Surgical History:  Past Surgical History:   Procedure Laterality Date     DELIVERY ONLY      cesearean section 08    HX  SECTION      X 3    HX GYN      , tubal ligation       Family History:  Family History   Problem Relation Age of Onset    Diabetes Mother     Hypertension Mother     Stroke Mother     Heart Disease Mother     Hypertension Father     Stroke Father     Diabetes Father     Kidney Disease Father     Hypertension Maternal Grandmother     Diabetes Maternal Grandmother     Hypertension Maternal Grandfather     Diabetes Maternal Grandfather     Hypertension Paternal Grandmother     Diabetes Paternal Grandmother     Hypertension Paternal Grandfather     Diabetes Paternal Grandfather     Hypertension Sister     Diabetes Sister     Diabetes Sister     Hypertension Sister     Diabetes Sister     Hypertension Sister        Social History:  Social History     Tobacco Use    Smoking status: Former Smoker     Packs/day: 0.50     Last attempt to quit: 10/16/2015     Years since quitting: 3.6    Smokeless tobacco: Never Used    Tobacco comment: Quit smoking 6 days ago   Substance Use Topics    Alcohol use: No     Frequency: Never     Comment: Occasionally    Drug use: No       Allergies:   Allergies   Allergen Reactions    Latex Rash    Latex Hives    Other Food Hives     Citrus Fruits    Flagyl [Metronidazole] Hives and Itching    Lisinopril-Hydrochlorothiazide Angioedema    Clindamycin Rash and Itching    Ciprofloxacin Rash and Itching    Citrate Hives    Cottonseed Oil Rash    Cymbalta [Duloxetine] Hives and Itching    Flexeril [Cyclobenzaprine] Angioedema    Lemon Hives    Pcn [Penicillins] Hives    Sulfa (Sulfonamide Antibiotics) Shortness of Breath    Tomato Hives    Tomato Hives       Current Medications:  No current facility-administered medications on file prior to encounter. Current Outpatient Medications on File Prior to Encounter   Medication Sig Dispense Refill    fluticasone propionate (FLONASE) 50 mcg/actuation nasal spray 2 Sprays by Both Nostrils route daily. 1 Bottle 0    labetalol (NORMODYNE) 200 mg tablet Take 200 mg by mouth two (2) times a day.  aspirin 81 mg chewable tablet Take 81 mg by mouth daily.  naproxen (NAPROSYN) 500 mg tablet Take 1 Tab by mouth two (2) times daily (with meals). 20 Tab 0    esomeprazole (NEXIUM) 40 mg capsule Take 1 Cap by mouth daily. 20 Cap 0    metFORMIN (GLUCOPHAGE) 1,000 mg tablet Take 1,000 mg by mouth two (2) times daily (with meals). Indications: type 2 diabetes mellitus      gabapentin (NEURONTIN) 400 mg capsule Take 400 mg by mouth three (3) times daily. Indications: NEUROPATHIC PAIN      losartan (COZAAR) 25 mg tablet Take 100 mg by mouth daily.  metoclopramide HCl (REGLAN) 5 mg tablet Take 5 mg by mouth Before breakfast, lunch, dinner and at bedtime.  loratadine (CLARITIN) 10 mg tablet Take 1 Tab by mouth daily. 20 Tab 0    dm-acetaminophen-chorpheniram (CORICIDIN HBP) 2- mg tab Take 2 Tabs by mouth every six to eight (6-8) hours as needed for Pain or Cough. 20 Tab 0    diazePAM (VALIUM) 5 mg tablet Take 1 Tab by mouth nightly as needed (spasm). Max Daily Amount: 5 mg. Take one nightly for sleep. 5 Tab 0    traMADol (ULTRAM) 50 mg tablet Take 1 Tab by mouth every six (6) hours as needed for Pain. Max Daily Amount: 200 mg. 10 Tab 0    albuterol (PROVENTIL HFA, VENTOLIN HFA, PROAIR HFA) 90 mcg/actuation inhaler Take 1-2 Puffs by inhalation every four (4) hours as needed for Wheezing. 1 Inhaler 1    ferrous sulfate 325 mg (65 mg iron) tablet Take 325 mg by mouth Daily (before lunch). Indications: Iron Deficiency Anemia         Review of Systems   Review of Systems   Constitutional: Negative. Negative for chills and fever. HENT: Positive for sinus pressure.  Negative for congestion, facial swelling, rhinorrhea, sinus pain, sore throat, trouble swallowing and voice change. Eyes: Negative. Respiratory: Negative. Negative for apnea, cough, chest tightness, shortness of breath and wheezing. Cardiovascular: Negative. Negative for chest pain, palpitations and leg swelling. Gastrointestinal: Negative. Negative for abdominal distention, abdominal pain, blood in stool, constipation, diarrhea, nausea and vomiting. Endocrine: Negative. Negative for cold intolerance, heat intolerance and polyuria. Genitourinary: Negative. Negative for difficulty urinating, dysuria, flank pain, frequency, hematuria and urgency. Musculoskeletal: Negative. Negative for arthralgias, back pain, myalgias, neck pain and neck stiffness. Skin: Negative. Negative for color change and rash. Neurological: Positive for headaches. Negative for dizziness, syncope, facial asymmetry, speech difficulty, weakness, light-headedness and numbness. Hematological: Negative. Does not bruise/bleed easily. Psychiatric/Behavioral: Negative. Negative for confusion and self-injury. The patient is not nervous/anxious. Physical Exam   Physical Exam   Constitutional: She is oriented to person, place, and time. She appears well-developed and well-nourished. No distress. HENT:   Head: Normocephalic and atraumatic. Mouth/Throat: Oropharynx is clear and moist. No oropharyngeal exudate. Eyes: Pupils are equal, round, and reactive to light. Conjunctivae and EOM are normal.   Neck: Normal range of motion. Cardiovascular: Normal rate, regular rhythm and normal heart sounds. Exam reveals no gallop and no friction rub. No murmur heard. Pulmonary/Chest: Effort normal and breath sounds normal. No respiratory distress. She has no wheezes. She has no rales. She exhibits no tenderness. Abdominal: Soft. Bowel sounds are normal. She exhibits no distension and no mass. There is no tenderness.  There is no rebound and no guarding. Musculoskeletal: Normal range of motion. She exhibits no edema, tenderness or deformity. Neurological: She is alert and oriented to person, place, and time. She displays normal reflexes. No cranial nerve deficit. She exhibits normal muscle tone. Coordination normal.   Skin: Skin is warm. No rash noted. She is not diaphoretic. Psychiatric: She has a normal mood and affect. Nursing note and vitals reviewed. Diagnostic Study Results     Labs -  No results found for this or any previous visit (from the past 24 hour(s)). Radiologic Studies -   XR CHEST PA LAT   Final Result   IMPRESSION: Normal.        CT Results  (Last 48 hours)    None        CXR Results  (Last 48 hours)               06/19/19 0653  XR CHEST PA LAT Final result    Impression:  IMPRESSION: Normal.       Narrative:  EXAM: XR CHEST PA LAT       INDICATION: cough, sob       COMPARISON: Chest x-ray 11/10/2018. FINDINGS: PA and lateral radiographs of the chest demonstrate clear lungs. The   cardiac and mediastinal contours and pulmonary vascularity are normal. The bones   and soft tissues are within normal limits. Medical Decision Making   I am the first provider for this patient. I reviewed the vital signs, available nursing notes, past medical history, past surgical history, family history and social history. Vital Signs-Reviewed the patient's vital signs.   Patient Vitals for the past 24 hrs:   Temp Pulse Resp BP SpO2   06/19/19 0630     100 %   06/19/19 0625     98 %   06/19/19 0618     100 %   06/19/19 0615     100 %   06/19/19 0600     100 %   06/19/19 0556     100 %   06/19/19 0553     100 %   06/19/19 0548     100 %   06/19/19 0538 98 °F (36.7 °C) 84 20 132/82 98 %       Pulse Oximetry Analysis - 98% on RA    Cardiac Monitor:   Rate: 84 bpm  Rhythm: Normal Sinus Rhythm      Records Reviewed: Nursing Notes, Old Medical Records, Previous electrocardiograms, Previous Radiology Studies and Previous Laboratory Studies    Provider Notes (Medical Decision Making):   Pt presents with flu like symptoms including nasal congestion, rhinorrhea and sore throat. Pt also has non-productive cough without dyspnea or wheezing. Symptoms are consistent with an uncomplicated URI. DDx: bacterial sinusitis vs. pharyngitis, migraine, flu. ED Course:   Initial assessment performed. The patients presenting problems have been discussed, and they are in agreement with the care plan formulated and outlined with them. I have encouraged them to ask questions as they arise throughout their visit. HYPERTENSION COUNSELING   Education was provided to the patient today regarding their hypertension. Patient is made aware of their elevated blood pressure and is instructed to follow up this week with their Primary Care for a recheck. Patient is counseled regarding consequences of chronic, uncontrolled hypertension including kidney disease, heart disease, stroke or even death. Patient states their understanding and agrees to follow up this week. Additionally, during their visit, I discussed sodium restriction, maintaining ideal body weight and regular exercise program as physiologic means to achieve blood pressure control. The patient will strive towards this. I reviewed our electronic medical record system for any past medical records that were available that may contribute to the patient's current condition, the nursing notes and vital signs from today's visit.   Katy Kraus MD    Medications Administered During ED Course:  Medications   butalbital-acetaminophen-caffeine (FIORICET, ESGIC) -40 mg per tablet 1 Tab (1 Tab Oral Given 6/19/19 0633)   doxycycline (VIBRA-TABS) tablet 100 mg (100 mg Oral Given 6/19/19 0633)   oxymetazoline (AFRIN) 0.05 % nasal spray 2 Spray (2 Sprays Both Nostrils Given 6/19/19 0197)   mylanta/viscous lidocaine (GI COCKTAIL) (40 mL Oral Given 6/19/19 9988)     Progress Note:  Patient has been reassessed and reports feeling better and symptoms have improved after ED treatment. Milton Gates is able to tolerate PO and ambulate per baseline. Jeaneth Mir's final labs and imaging have been reviewed with her. She has been counseled regarding her diagnosis. She verbally conveys understanding and agreement of the signs, symptoms, diagnosis, treatment and prognosis and additionally agrees to follow up as recommended with Dr. Rashawn Acosta MD in 24 - 48 hours. She also agrees with the care-plan and conveys that all of her questions have been answered. I have also put together some discharge instructions for her that include: 1) educational information regarding their diagnosis, 2) how to care for their diagnosis at home, as well a 3) list of reasons why they would want to return to the ED prior to their follow-up appointment, should their condition change. I have answered all questions to the patient's satisfaction. Strict return precautions given. She both understood and agreed with plan as discussed above. Vital signs stable for discharge. Disposition: DISCHARGE     The pt is ready for discharge. The pt's signs, symptoms, diagnosis, and discharge instructions have been discussed and pt has conveyed their understanding. The pt is to follow up as recommended or return to ER should their symptoms worsen. Plan has been discussed and pt is in agreement. PLAN:  1. Discharge Medication List as of 6/19/2019  6:51 AM      CONTINUE these medications which have NOT CHANGED    Details   fluticasone propionate (FLONASE) 50 mcg/actuation nasal spray 2 Sprays by Both Nostrils route daily. , Normal, Disp-1 Bottle, R-0      labetalol (NORMODYNE) 200 mg tablet Take 200 mg by mouth two (2) times a day., Historical Med      aspirin 81 mg chewable tablet Take 81 mg by mouth daily. , Historical Med      naproxen (NAPROSYN) 500 mg tablet Take 1 Tab by mouth two (2) times daily (with meals). , Normal, Disp-20 Tab, R-0      esomeprazole (NEXIUM) 40 mg capsule Take 1 Cap by mouth daily. , Print, Disp-20 Cap, R-0      metFORMIN (GLUCOPHAGE) 1,000 mg tablet Take 1,000 mg by mouth two (2) times daily (with meals). Indications: type 2 diabetes mellitus, Historical Med      gabapentin (NEURONTIN) 400 mg capsule Take 400 mg by mouth three (3) times daily. Indications: NEUROPATHIC PAIN, Historical Med      losartan (COZAAR) 25 mg tablet Take 100 mg by mouth daily. , Historical Med      metoclopramide HCl (REGLAN) 5 mg tablet Take 5 mg by mouth Before breakfast, lunch, dinner and at bedtime. , Historical Med      loratadine (CLARITIN) 10 mg tablet Take 1 Tab by mouth daily. , Normal, Disp-20 Tab, R-0      dm-acetaminophen-chorpheniram (CORICIDIN HBP) 2- mg tab Take 2 Tabs by mouth every six to eight (6-8) hours as needed for Pain or Cough., Normal, Disp-20 Tab, R-0      diazePAM (VALIUM) 5 mg tablet Take 1 Tab by mouth nightly as needed (spasm). Max Daily Amount: 5 mg. Take one nightly for sleep., Print, Disp-5 Tab, R-0      traMADol (ULTRAM) 50 mg tablet Take 1 Tab by mouth every six (6) hours as needed for Pain. Max Daily Amount: 200 mg., Print, Disp-10 Tab, R-0      albuterol (PROVENTIL HFA, VENTOLIN HFA, PROAIR HFA) 90 mcg/actuation inhaler Take 1-2 Puffs by inhalation every four (4) hours as needed for Wheezing., Normal, Disp-1 Inhaler, R-1      ferrous sulfate 325 mg (65 mg iron) tablet Take 325 mg by mouth Daily (before lunch). Indications: Iron Deficiency Anemia, Historical Med           2. Follow-up Information     Follow up With Specialties Details Why Contact Info    Jen Bolton MD Internal Medicine   [address] [facility] [address] [phone]      [facility] EMERGENCY DEPT Emergency Medicine  As needed, If symptoms worsen [address] [place]  [phone]          Return to ED if worse  Diagnosis     Clinical Impression:   1.  Acute non-recurrent sinusitis, unspecified location    2. Acute non intractable tension-type headache    3. Acute upper respiratory infection    4. Generalized body aches        Attestation:  I personally performed the services described in this documentation on this date 6/19/2019 for patient Kar Morgan. Junior Britton MD    Please note that this dictation was completed with [employer], the computer voice recognition software. Quite often unanticipated grammatical, syntax, homophones, and other interpretive errors are inadvertently transcribed by the computer software. Please disregard these errors. Please excuse any errors that have escaped final proofreading. This note will not be viewable in [address]

## 2019-07-05 ENCOUNTER — HOSPITAL ENCOUNTER (EMERGENCY)
Age: 39
Discharge: HOME | End: 2019-07-05
Attending: EMERGENCY MEDICINE
Payer: MEDICARE

## 2019-07-05 VITALS
OXYGEN SATURATION: 99 % | TEMPERATURE: 98.1 F | WEIGHT: 250 LBS | HEIGHT: 64 IN | RESPIRATION RATE: 18 BRPM | BODY MASS INDEX: 42.68 KG/M2 | DIASTOLIC BLOOD PRESSURE: 79 MMHG | HEART RATE: 83 BPM | SYSTOLIC BLOOD PRESSURE: 144 MMHG

## 2019-07-05 DIAGNOSIS — R11.2 NON-INTRACTABLE VOMITING WITH NAUSEA, UNSPECIFIED VOMITING TYPE: Primary | ICD-10-CM

## 2019-07-05 DIAGNOSIS — R73.9 HYPERGLYCEMIA: ICD-10-CM

## 2019-07-05 LAB
ALBUMIN SERPL-MCNC: 3.4 G/DL (ref 3.5–5)
ALBUMIN/GLOB SERPL: 0.9 {RATIO} (ref 1.1–2.2)
ALP SERPL-CCNC: 107 U/L (ref 45–117)
ALT SERPL-CCNC: 30 U/L (ref 12–78)
ANION GAP SERPL CALC-SCNC: 9 MMOL/L (ref 5–15)
APPEARANCE UR: CLEAR
AST SERPL-CCNC: 24 U/L (ref 15–37)
BACTERIA URNS QL MICRO: NEGATIVE /HPF
BASOPHILS # BLD: 0.1 K/UL (ref 0–0.1)
BASOPHILS NFR BLD: 1 % (ref 0–1)
BILIRUB SERPL-MCNC: 0.2 MG/DL (ref 0.2–1)
BILIRUB UR QL: NEGATIVE
BUN SERPL-MCNC: 9 MG/DL (ref 6–20)
BUN/CREAT SERPL: 10 (ref 12–20)
CALCIUM SERPL-MCNC: 8.8 MG/DL (ref 8.5–10.1)
CHLORIDE SERPL-SCNC: 100 MMOL/L (ref 97–108)
CO2 SERPL-SCNC: 26 MMOL/L (ref 21–32)
COLOR UR: ABNORMAL
CREAT SERPL-MCNC: 0.92 MG/DL (ref 0.55–1.02)
DIFFERENTIAL METHOD BLD: ABNORMAL
EOSINOPHIL # BLD: 0.2 K/UL (ref 0–0.4)
EOSINOPHIL NFR BLD: 2 % (ref 0–7)
EPITH CASTS URNS QL MICRO: NORMAL /LPF
ERYTHROCYTE [DISTWIDTH] IN BLOOD BY AUTOMATED COUNT: 18.8 % (ref 11.5–14.5)
GLOBULIN SER CALC-MCNC: 3.8 G/DL (ref 2–4)
GLUCOSE SERPL-MCNC: 221 MG/DL (ref 65–100)
GLUCOSE UR STRIP.AUTO-MCNC: >1000 MG/DL
HCT VFR BLD AUTO: 29 % (ref 35–47)
HGB BLD-MCNC: 8.7 G/DL (ref 11.5–16)
HGB UR QL STRIP: ABNORMAL
IMM GRANULOCYTES # BLD AUTO: 0 K/UL (ref 0–0.04)
IMM GRANULOCYTES NFR BLD AUTO: 0 % (ref 0–0.5)
KETONES UR QL STRIP.AUTO: NEGATIVE MG/DL
LEUKOCYTE ESTERASE UR QL STRIP.AUTO: NEGATIVE
LIPASE SERPL-CCNC: 199 U/L (ref 73–393)
LYMPHOCYTES # BLD: 2.4 K/UL (ref 0.8–3.5)
LYMPHOCYTES NFR BLD: 32 % (ref 12–49)
MCH RBC QN AUTO: 19.8 PG (ref 26–34)
MCHC RBC AUTO-ENTMCNC: 30 G/DL (ref 30–36.5)
MCV RBC AUTO: 65.9 FL (ref 80–99)
MONOCYTES # BLD: 0.5 K/UL (ref 0–1)
MONOCYTES NFR BLD: 7 % (ref 5–13)
NEUTS SEG # BLD: 4.4 K/UL (ref 1.8–8)
NEUTS SEG NFR BLD: 58 % (ref 32–75)
NITRITE UR QL STRIP.AUTO: NEGATIVE
NRBC # BLD: 0 K/UL (ref 0–0.01)
NRBC BLD-RTO: 0 PER 100 WBC
PH UR STRIP: 6 [PH] (ref 5–8)
PLATELET # BLD AUTO: 234 K/UL (ref 150–400)
POTASSIUM SERPL-SCNC: 4 MMOL/L (ref 3.5–5.1)
PROT SERPL-MCNC: 7.2 G/DL (ref 6.4–8.2)
PROT UR STRIP-MCNC: NEGATIVE MG/DL
RBC # BLD AUTO: 4.4 M/UL (ref 3.8–5.2)
RBC #/AREA URNS HPF: NORMAL /HPF (ref 0–5)
RBC MORPH BLD: ABNORMAL
RBC MORPH BLD: ABNORMAL
SODIUM SERPL-SCNC: 135 MMOL/L (ref 136–145)
SP GR UR REFRACTOMETRY: >1.03 (ref 1–1.03)
UROBILINOGEN UR QL STRIP.AUTO: 1 EU/DL (ref 0.2–1)
WBC # BLD AUTO: 7.6 K/UL (ref 3.6–11)
WBC URNS QL MICRO: NORMAL /HPF (ref 0–4)

## 2019-07-05 PROCEDURE — 99283 EMERGENCY DEPT VISIT LOW MDM: CPT

## 2019-07-05 PROCEDURE — 80053 COMPREHEN METABOLIC PANEL: CPT

## 2019-07-05 PROCEDURE — 81001 URINALYSIS AUTO W/SCOPE: CPT

## 2019-07-05 PROCEDURE — 85025 COMPLETE CBC W/AUTO DIFF WBC: CPT

## 2019-07-05 PROCEDURE — 74011000250 HC RX REV CODE- 250: Performed by: EMERGENCY MEDICINE

## 2019-07-05 PROCEDURE — 83690 ASSAY OF LIPASE: CPT

## 2019-07-05 PROCEDURE — 36415 COLL VENOUS BLD VENIPUNCTURE: CPT

## 2019-07-05 PROCEDURE — 74011250637 HC RX REV CODE- 250/637: Performed by: EMERGENCY MEDICINE

## 2019-07-05 RX ORDER — BUSPIRONE HYDROCHLORIDE 7.5 MG/1
7.5 TABLET ORAL DAILY
COMMUNITY
End: 2020-10-05

## 2019-07-05 RX ORDER — ONDANSETRON 4 MG/1
4 TABLET, ORALLY DISINTEGRATING ORAL
Status: COMPLETED | OUTPATIENT
Start: 2019-07-05 | End: 2019-07-05

## 2019-07-05 RX ORDER — ONDANSETRON 4 MG/1
4 TABLET, ORALLY DISINTEGRATING ORAL
Qty: 10 TAB | Refills: 0 | Status: SHIPPED | OUTPATIENT
Start: 2019-07-05 | End: 2019-07-25

## 2019-07-05 RX ORDER — LIDOCAINE HYDROCHLORIDE 20 MG/ML
10 SOLUTION OROPHARYNGEAL
Status: COMPLETED | OUTPATIENT
Start: 2019-07-05 | End: 2019-07-05

## 2019-07-05 RX ADMIN — ALUMINUM HYDROXIDE AND MAGNESIUM HYDROXIDE 30 ML: 200; 200 SUSPENSION ORAL at 21:38

## 2019-07-05 RX ADMIN — LIDOCAINE HYDROCHLORIDE 10 ML: 20 SOLUTION ORAL; TOPICAL at 21:38

## 2019-07-05 RX ADMIN — ONDANSETRON 4 MG: 4 TABLET, ORALLY DISINTEGRATING ORAL at 21:37

## 2019-07-06 NOTE — ED NOTES
Discharge instructions were given to the patient by Sue Pedersen RN. The patient left the Emergency Department ambulatory, alert and oriented and in no acute distress with 1 prescriptions. The patient was encouraged to call or return to the ED for worsening issues or problems and was encouraged to schedule a follow up appointment for continuing care. The patient verbalized understanding of discharge instructions and prescriptions, all questions were answered. The patient has no further concerns at this time.

## 2019-07-06 NOTE — DISCHARGE INSTRUCTIONS
Patient Education        Nausea and Vomiting: Care Instructions  Your Care Instructions    When you are nauseated, you may feel weak and sweaty and notice a lot of saliva in your mouth. Nausea often leads to vomiting. Most of the time you do not need to worry about nausea and vomiting, but they can be signs of other illnesses. Two common causes of nausea and vomiting are stomach flu and food poisoning. Nausea and vomiting from viral stomach flu will usually start to improve within 24 hours. Nausea and vomiting from food poisoning may last from 12 to 48 hours. The doctor has checked you carefully, but problems can develop later. If you notice any problems or new symptoms, get medical treatment right away. Follow-up care is a key part of your treatment and safety. Be sure to make and go to all appointments, and call your doctor if you are having problems. It's also a good idea to know your test results and keep a list of the medicines you take. How can you care for yourself at home? · To prevent dehydration, drink plenty of fluids, enough so that your urine is light yellow or clear like water. Choose water and other caffeine-free clear liquids until you feel better. If you have kidney, heart, or liver disease and have to limit fluids, talk with your doctor before you increase the amount of fluids you drink. · Rest in bed until you feel better. · When you are able to eat, try clear soups, mild foods, and liquids until all symptoms are gone for 12 to 48 hours. Other good choices include dry toast, crackers, cooked cereal, and gelatin dessert, such as Jell-O. When should you call for help? Call 911 anytime you think you may need emergency care. For example, call if:    · You passed out (lost consciousness).    Call your doctor now or seek immediate medical care if:    · You have symptoms of dehydration, such as:  ? Dry eyes and a dry mouth. ? Passing only a little dark urine. ?  Feeling thirstier than usual.   · You have new or worsening belly pain.     · You have a new or higher fever.     · You vomit blood or what looks like coffee grounds.    Watch closely for changes in your health, and be sure to contact your doctor if:    · You have ongoing nausea and vomiting.     · Your vomiting is getting worse.     · Your vomiting lasts longer than 2 days.     · You are not getting better as expected. Where can you learn more? Go to http://rosalino-jordan.info/. Enter 25 610578 in the search box to learn more about \"Nausea and Vomiting: Care Instructions. \"  Current as of: September 23, 2018  Content Version: 11.9  © 9879-1514 [employer]. Care instructions adapted under license by [employer] (which disclaims liability or warranty for this information). If you have questions about a medical condition or this instruction, always ask your healthcare professional. [address] any warranty or liability for your use of this information.

## 2019-07-06 NOTE — ED PROVIDER NOTES
EMERGENCY DEPARTMENT HISTORY AND PHYSICAL EXAM      Date: 7/5/2019  Patient Name: Jaylon Perez    History of Presenting Illness     Chief Complaint   Patient presents with    Headache     x1 week    Nausea     x1 week, denies vomiting       History Provided By: Patient    HPI: Jaylon Perez, 37 y.o. female with PMHx significant for GE reflux, presents by private vehicle to the ED with cc of nausea and right flank pain. This is a 53-year-old female with nausea right flank pain for almost a week. She has been able to eat and drink some though not as she normally does. She also has 9 out of 10 sharp right flank pain with no urinary symptoms. There are no other complaints, changes, or physical findings at this time. PCP: Alfonso Dalal MD    Current Outpatient Medications   Medication Sig Dispense Refill    busPIRone (BUSPAR) 7.5 mg tablet Take 7.5 mg by mouth daily.  ondansetron (ZOFRAN ODT) 4 mg disintegrating tablet Take 1 Tab by mouth every eight (8) hours as needed for Nausea. 10 Tab 0    fluticasone propionate (FLONASE) 50 mcg/actuation nasal spray 2 Sprays by Both Nostrils route daily. 1 Bottle 0    labetalol (NORMODYNE) 200 mg tablet Take 200 mg by mouth two (2) times a day.  esomeprazole (NEXIUM) 40 mg capsule Take 1 Cap by mouth daily. 20 Cap 0    metFORMIN (GLUCOPHAGE) 1,000 mg tablet Take 1,000 mg by mouth two (2) times daily (with meals). Indications: type 2 diabetes mellitus      gabapentin (NEURONTIN) 400 mg capsule Take 400 mg by mouth three (3) times daily. Indications: NEUROPATHIC PAIN      losartan (COZAAR) 25 mg tablet Take 100 mg by mouth daily.  metoclopramide HCl (REGLAN) 5 mg tablet Take 5 mg by mouth Before breakfast, lunch, dinner and at bedtime.  aspirin 81 mg chewable tablet Take 81 mg by mouth daily.       albuterol (PROVENTIL HFA, VENTOLIN HFA, PROAIR HFA) 90 mcg/actuation inhaler Take 1-2 Puffs by inhalation every four (4) hours as needed for Wheezing. 1 Inhaler 1    ferrous sulfate 325 mg (65 mg iron) tablet Take 325 mg by mouth Daily (before lunch).  Indications: Iron Deficiency Anemia         Past History     Past Medical History:  Past Medical History:   Diagnosis Date    Abnormal Pap smear     \"years ago\"     Asthma     bronchitis    Depression with anxiety 2010    Diabetes mellitus     type II diabetes mellitus since     GERD (gastroesophageal reflux disease)     HTN (hypertension) 2010    Ill-defined condition     High Cholesterol    Other and unspecified hyperlipidemia 2010    Postpartum depression     hospitalized after last delivery    Psychiatric disorder     Bipolar    Psychiatric problem     \"mood disorder\"    Rhinitis 2012       Past Surgical History:  Past Surgical History:   Procedure Laterality Date     DELIVERY ONLY      cesearean section 08    HX  SECTION      X 3    HX GYN      , tubal ligation       Family History:  Family History   Problem Relation Age of Onset    Diabetes Mother     Hypertension Mother     Stroke Mother     Heart Disease Mother     Hypertension Father     Stroke Father     Diabetes Father     Kidney Disease Father     Hypertension Maternal Grandmother     Diabetes Maternal Grandmother     Hypertension Maternal Grandfather     Diabetes Maternal Grandfather     Hypertension Paternal Grandmother     Diabetes Paternal Grandmother     Hypertension Paternal Grandfather     Diabetes Paternal Grandfather     Hypertension Sister     Diabetes Sister     Diabetes Sister     Hypertension Sister    Marylou Raines Diabetes Sister     Hypertension Sister        Social History:  Social History     Tobacco Use    Smoking status: Former Smoker     Last attempt to quit: 10/16/2015     Years since quitting: 3.7    Smokeless tobacco: Never Used    Tobacco comment: quit 17 days ago   Substance Use Topics    Alcohol use: No     Frequency: Never     Comment: Occasionally    Drug use: No       Allergies: Allergies   Allergen Reactions    Latex Rash    Latex Hives    Other Food Hives     Citrus Fruits    Flagyl [Metronidazole] Hives and Itching    Lisinopril-Hydrochlorothiazide Angioedema    Clindamycin Rash and Itching    Ciprofloxacin Rash and Itching    Citrate Hives    Cottonseed Oil Rash    Cymbalta [Duloxetine] Hives and Itching    Flexeril [Cyclobenzaprine] Angioedema    Lemon Hives    Pcn [Penicillins] Hives    Sulfa (Sulfonamide Antibiotics) Shortness of Breath    Tomato Hives    Tomato Hives         Review of Systems   Review of Systems   Constitutional: Negative for chills and fever. HENT: Negative for congestion, rhinorrhea, sneezing and sore throat. Respiratory: Negative for shortness of breath. Cardiovascular: Negative for chest pain. Gastrointestinal: Negative for abdominal pain, nausea and vomiting. Musculoskeletal: Negative for back pain, myalgias and neck stiffness. Skin: Negative for rash. Neurological: Negative for dizziness, weakness and headaches. All other systems reviewed and are negative. Physical Exam   Physical Exam   Constitutional: She is oriented to person, place, and time. She appears well-developed and well-nourished. HENT:   Head: Normocephalic and atraumatic. Mouth/Throat: Oropharynx is clear and moist.   Eyes: Conjunctivae and EOM are normal.   Neck: Normal range of motion and full passive range of motion without pain. Neck supple. Cardiovascular: Normal rate, regular rhythm, S1 normal, S2 normal, normal heart sounds, intact distal pulses and normal pulses. No murmur heard. Pulmonary/Chest: Effort normal and breath sounds normal. No respiratory distress. She has no wheezes. Abdominal: Soft. Normal appearance and bowel sounds are normal. She exhibits no distension. There is no tenderness. There is no rebound. Musculoskeletal: Normal range of motion.    Neurological: She is alert and oriented to person, place, and time. She has normal strength. Skin: Skin is warm, dry and intact. No rash noted. Psychiatric: She has a normal mood and affect. Her speech is normal and behavior is normal. Judgment and thought content normal.   Nursing note and vitals reviewed. Diagnostic Study Results     Labs -     Recent Results (from the past 12 hour(s))   CBC WITH AUTOMATED DIFF    Collection Time: 07/05/19  9:42 PM   Result Value Ref Range    WBC 7.6 3.6 - 11.0 K/uL    RBC 4.40 3.80 - 5.20 M/uL    HGB 8.7 (L) 11.5 - 16.0 g/dL    HCT 29.0 (L) 35.0 - 47.0 %    MCV 65.9 (L) 80.0 - 99.0 FL    MCH 19.8 (L) 26.0 - 34.0 PG    MCHC 30.0 30.0 - 36.5 g/dL    RDW 18.8 (H) 11.5 - 14.5 %    PLATELET 765 707 - 677 K/uL    NRBC 0.0 0  WBC    ABSOLUTE NRBC 0.00 0.00 - 0.01 K/uL    NEUTROPHILS 58 32 - 75 %    LYMPHOCYTES 32 12 - 49 %    MONOCYTES 7 5 - 13 %    EOSINOPHILS 2 0 - 7 %    BASOPHILS 1 0 - 1 %    IMMATURE GRANULOCYTES 0 0.0 - 0.5 %    ABS. NEUTROPHILS 4.4 1.8 - 8.0 K/UL    ABS. LYMPHOCYTES 2.4 0.8 - 3.5 K/UL    ABS. MONOCYTES 0.5 0.0 - 1.0 K/UL    ABS. EOSINOPHILS 0.2 0.0 - 0.4 K/UL    ABS. BASOPHILS 0.1 0.0 - 0.1 K/UL    ABS. IMM.  GRANS. 0.0 0.00 - 0.04 K/UL    DF SMEAR SCANNED      RBC COMMENTS ANISOCYTOSIS  1+        RBC COMMENTS HYPOCHROMIA  2+       URINALYSIS W/ RFLX MICROSCOPIC    Collection Time: 07/05/19  9:42 PM   Result Value Ref Range    Color YELLOW/STRAW      Appearance CLEAR CLEAR      Specific gravity >1.030 (H) 1.003 - 1.030    pH (UA) 6.0 5.0 - 8.0      Protein NEGATIVE  NEG mg/dL    Glucose >1,000 (A) NEG mg/dL    Ketone NEGATIVE  NEG mg/dL    Bilirubin NEGATIVE  NEG      Blood MODERATE (A) NEG      Urobilinogen 1.0 0.2 - 1.0 EU/dL    Nitrites NEGATIVE  NEG      Leukocyte Esterase NEGATIVE  NEG     URINE MICROSCOPIC ONLY    Collection Time: 07/05/19  9:42 PM   Result Value Ref Range    WBC 0-4 0 - 4 /hpf    RBC 0-5 0 - 5 /hpf    Epithelial cells FEW FEW /lpf    Bacteria NEGATIVE NEG /hpf   METABOLIC PANEL, COMPREHENSIVE    Collection Time: 07/05/19  9:50 PM   Result Value Ref Range    Sodium 135 (L) 136 - 145 mmol/L    Potassium 4.0 3.5 - 5.1 mmol/L    Chloride 100 97 - 108 mmol/L    CO2 26 21 - 32 mmol/L    Anion gap 9 5 - 15 mmol/L    Glucose 221 (H) 65 - 100 mg/dL    BUN 9 6 - 20 MG/DL    Creatinine 0.92 0.55 - 1.02 MG/DL    BUN/Creatinine ratio 10 (L) 12 - 20      GFR est AA >60 >60 ml/min/1.73m2    GFR est non-AA >60 >60 ml/min/1.73m2    Calcium 8.8 8.5 - 10.1 MG/DL    Bilirubin, total 0.2 0.2 - 1.0 MG/DL    ALT (SGPT) 30 12 - 78 U/L    AST (SGOT) 24 15 - 37 U/L    Alk. phosphatase 107 45 - 117 U/L    Protein, total 7.2 6.4 - 8.2 g/dL    Albumin 3.4 (L) 3.5 - 5.0 g/dL    Globulin 3.8 2.0 - 4.0 g/dL    A-G Ratio 0.9 (L) 1.1 - 2.2     LIPASE    Collection Time: 07/05/19  9:50 PM   Result Value Ref Range    Lipase 199 73 - 393 U/L       Radiologic Studies -   No orders to display     CT Results  (Last 48 hours)    None        CXR Results  (Last 48 hours)    None            Medical Decision Making   I am the first provider for this patient. I reviewed the vital signs, available nursing notes, past medical history, past surgical history, family history and social history. Vital Signs-Reviewed the patient's vital signs. Patient Vitals for the past 12 hrs:   Temp Pulse Resp BP SpO2   07/05/19 2101 98.1 °F (36.7 °C) 83 18 144/79 99 %         Records Reviewed: Nursing Notes    Provider Notes (Medical Decision Making): UTI versus kidney stone    ED Course:   Initial assessment performed. The patients presenting problems have been discussed, and they are in agreement with the care plan formulated and outlined with them. I have encouraged them to ask questions as they arise throughout their visit. Disposition:  Patient informed of results of workup and is comfortable with discharge to home to follow up with PCP.   They are instructed to return as needed for worsening condition. PLAN:  1. Discharge Medication List as of 7/5/2019 10:41 PM      START taking these medications    Details   ondansetron (ZOFRAN ODT) 4 mg disintegrating tablet Take 1 Tab by mouth every eight (8) hours as needed for Nausea., Normal, Disp-10 Tab, R-0         CONTINUE these medications which have NOT CHANGED    Details   busPIRone (BUSPAR) 7.5 mg tablet Take 7.5 mg by mouth daily. , Historical Med      fluticasone propionate (FLONASE) 50 mcg/actuation nasal spray 2 Sprays by Both Nostrils route daily. , Normal, Disp-1 Bottle, R-0      labetalol (NORMODYNE) 200 mg tablet Take 200 mg by mouth two (2) times a day., Historical Med      esomeprazole (NEXIUM) 40 mg capsule Take 1 Cap by mouth daily. , Print, Disp-20 Cap, R-0      metFORMIN (GLUCOPHAGE) 1,000 mg tablet Take 1,000 mg by mouth two (2) times daily (with meals). Indications: type 2 diabetes mellitus, Historical Med      gabapentin (NEURONTIN) 400 mg capsule Take 400 mg by mouth three (3) times daily. Indications: NEUROPATHIC PAIN, Historical Med      losartan (COZAAR) 25 mg tablet Take 100 mg by mouth daily. , Historical Med      metoclopramide HCl (REGLAN) 5 mg tablet Take 5 mg by mouth Before breakfast, lunch, dinner and at bedtime. , Historical Med      aspirin 81 mg chewable tablet Take 81 mg by mouth daily. , Historical Med      albuterol (PROVENTIL HFA, VENTOLIN HFA, PROAIR HFA) 90 mcg/actuation inhaler Take 1-2 Puffs by inhalation every four (4) hours as needed for Wheezing., Normal, Disp-1 Inhaler, R-1      ferrous sulfate 325 mg (65 mg iron) tablet Take 325 mg by mouth Daily (before lunch). Indications: Iron Deficiency Anemia, Historical Med           2. Follow-up Information     Follow up With Specialties Details Why Contact Info    Rajesh Howell MD Internal Medicine Call  [address]  [address]  [address]      [facility] [place] EMERGENCY DEPT Emergency Medicine  As needed, If symptoms worsen [address]  [id number]  [phone]        Return to ED if worse     Diagnosis     Clinical Impression:   1. Non-intractable vomiting with nausea, unspecified vomiting type    2.  Hyperglycemia

## 2019-07-07 ENCOUNTER — HOSPITAL ENCOUNTER (EMERGENCY)
Age: 39
Discharge: HOME | End: 2019-07-07
Attending: EMERGENCY MEDICINE
Payer: MEDICARE

## 2019-07-07 VITALS
DIASTOLIC BLOOD PRESSURE: 82 MMHG | RESPIRATION RATE: 20 BRPM | TEMPERATURE: 98 F | BODY MASS INDEX: 42.68 KG/M2 | WEIGHT: 250 LBS | HEIGHT: 64 IN | OXYGEN SATURATION: 98 % | SYSTOLIC BLOOD PRESSURE: 138 MMHG | HEART RATE: 119 BPM

## 2019-07-07 DIAGNOSIS — R73.9 HYPERGLYCEMIA: Primary | ICD-10-CM

## 2019-07-07 LAB
GLUCOSE BLD STRIP.AUTO-MCNC: 185 MG/DL (ref 65–100)
SERVICE CMNT-IMP: ABNORMAL

## 2019-07-07 PROCEDURE — 99283 EMERGENCY DEPT VISIT LOW MDM: CPT

## 2019-07-07 PROCEDURE — 82962 GLUCOSE BLOOD TEST: CPT

## 2019-07-07 NOTE — DISCHARGE INSTRUCTIONS
Patient Education        Learning About High Blood Sugar  What is high blood sugar? Your body turns the food you eat into glucose (sugar), which it uses for energy. But if your body isn't able to use the sugar right away, it can build up in your blood and lead to high blood sugar. When the amount of sugar in your blood stays too high for too much of the time, you may have diabetes. Diabetes is a disease that can cause serious health problems. The good news is that lifestyle changes may help you get your blood sugar back to normal and avoid or delay diabetes. What causes high blood sugar? Sugar (glucose) can build up in your blood if you:  · Are overweight. · Have a family history of diabetes. · Take certain medicines, such as steroids. What are the symptoms? Having high blood sugar may not cause any symptoms at all. Or it may make you feel very thirsty or very hungry. You may also urinate more often than usual, have blurry vision, or lose weight without trying. How is high blood sugar treated? You can take steps to lower your blood sugar level if you understand what makes it get higher. Your doctor may want you to learn how to test your blood sugar level at home. Then you can see how illness, stress, or different kinds of food or medicine raise or lower your blood sugar level. Other tests may be needed to see if you have diabetes. How can you prevent high blood sugar? · Watch your weight. If you're overweight, losing just a small amount of weight may help. Reducing fat around your waist is most important. · Limit the amount of calories, sweets, and unhealthy fat you eat. Ask your doctor if a dietitian can help you. A registered dietitian can help you create meal plans that fit your lifestyle. · Get at least 30 minutes of exercise on most days of the week. Exercise helps control your blood sugar. It also helps you maintain a healthy weight. Walking is a good choice.  You also may want to do other activities, such as running, swimming, cycling, or playing tennis or team sports. · If your doctor prescribed medicines, take them exactly as prescribed. Call your doctor if you think you are having a problem with your medicine. You will get more details on the specific medicines your doctor prescribes. Follow-up care is a key part of your treatment and safety. Be sure to make and go to all appointments, and call your doctor if you are having problems. It's also a good idea to know your test results and keep a list of the medicines you take. Where can you learn more? Go to http://rosalino-jordan.info/. Enter O108 in the search box to learn more about \"Learning About High Blood Sugar. \"  Current as of: July 25, 2018  Content Version: 11.9  © 8933-5579 [employer]. Care instructions adapted under license by [employer] (which disclaims liability or warranty for this information). If you have questions about a medical condition or this instruction, always ask your healthcare professional. [address] any warranty or liability for your use of this information.

## 2019-07-07 NOTE — LETTER
[facility] EMERGENCY DEPT 
[address] [address] 
[phone] Work/School Note Date: 7/7/2019 To Whom It May concern: Cory Krishnan was seen and treated today in the emergency room by the following provider(s): 
Attending Provider: Cathy Romo MD 
Physician Assistant: NATHANIEL Polk. Cory Krishnan may return to work on 7/8/2019. Sincerely, Jonathan Orr PA

## 2019-07-07 NOTE — ED PROVIDER NOTES
EMERGENCY DEPARTMENT HISTORY AND PHYSICAL EXAM      Date: 7/7/2019  Patient Name: Reinaldo Burt    History of Presenting Illness     Chief Complaint   Patient presents with    High Blood Sugar     she states her blood sugar is 234 and she brought her machine \"becuase her medication must not be working\"       History Provided By: Patient    HPI: Reinaldo Burt, 37 y.o. female with PMHx significant for depression, anxiety, hypercholesterolemia, bipolar disorder, DM, GERD, hypertension, presents ambulatory to the ED with cc of \"high blood sugar reading\". Patient reports checking blood sugar earlier today with a reading of 235. Pt then reports eating two cheeseburgers and fries from [employer] afterward  Patient reports she stopped taking metformin and recently started taking it again 3 days ago. Patient reports she has been \"eating what ever I want\". Denies CP, SOB, dizziness. There are no other complaints, changes, or physical findings at this time. PCP: Hardik Owen MD    No current facility-administered medications on file prior to encounter. Current Outpatient Medications on File Prior to Encounter   Medication Sig Dispense Refill    busPIRone (BUSPAR) 7.5 mg tablet Take 7.5 mg by mouth daily.  ondansetron (ZOFRAN ODT) 4 mg disintegrating tablet Take 1 Tab by mouth every eight (8) hours as needed for Nausea. 10 Tab 0    fluticasone propionate (FLONASE) 50 mcg/actuation nasal spray 2 Sprays by Both Nostrils route daily. 1 Bottle 0    labetalol (NORMODYNE) 200 mg tablet Take 200 mg by mouth two (2) times a day.  albuterol (PROVENTIL HFA, VENTOLIN HFA, PROAIR HFA) 90 mcg/actuation inhaler Take 1-2 Puffs by inhalation every four (4) hours as needed for Wheezing. 1 Inhaler 1    esomeprazole (NEXIUM) 40 mg capsule Take 1 Cap by mouth daily. 20 Cap 0    metFORMIN (GLUCOPHAGE) 1,000 mg tablet Take 1,000 mg by mouth two (2) times daily (with meals).  Indications: type 2 diabetes mellitus      gabapentin (NEURONTIN) 400 mg capsule Take 400 mg by mouth three (3) times daily. Indications: NEUROPATHIC PAIN      losartan (COZAAR) 25 mg tablet Take 100 mg by mouth daily.  metoclopramide HCl (REGLAN) 5 mg tablet Take 5 mg by mouth Before breakfast, lunch, dinner and at bedtime.  aspirin 81 mg chewable tablet Take 81 mg by mouth daily.  ferrous sulfate 325 mg (65 mg iron) tablet Take 325 mg by mouth Daily (before lunch).  Indications: Iron Deficiency Anemia         Past History     Past Medical History:  Past Medical History:   Diagnosis Date    Abnormal Pap smear     \"years ago\"     Asthma     bronchitis    Depression with anxiety 2010    Diabetes mellitus     type II diabetes mellitus since     GERD (gastroesophageal reflux disease)     HTN (hypertension) 2010    Ill-defined condition     High Cholesterol    Other and unspecified hyperlipidemia 2010    Postpartum depression     hospitalized after last delivery    Psychiatric disorder     Bipolar    Psychiatric problem     \"mood disorder\"    Rhinitis 2012       Past Surgical History:  Past Surgical History:   Procedure Laterality Date     DELIVERY ONLY      cesearean section 08    HX  SECTION      X 3    HX GYN      , tubal ligation       Family History:  Family History   Problem Relation Age of Onset    Diabetes Mother     Hypertension Mother     Stroke Mother     Heart Disease Mother     Hypertension Father     Stroke Father     Diabetes Father     Kidney Disease Father     Hypertension Maternal Grandmother     Diabetes Maternal Grandmother     Hypertension Maternal Grandfather     Diabetes Maternal Grandfather     Hypertension Paternal Grandmother     Diabetes Paternal Grandmother     Hypertension Paternal Grandfather     Diabetes Paternal Grandfather     Hypertension Sister     Diabetes Sister     Diabetes Sister     Hypertension Sister     Diabetes Sister     Hypertension Sister        Social History:  Social History     Tobacco Use    Smoking status: Former Smoker     Last attempt to quit: 10/16/2015     Years since quitting: 3.7    Smokeless tobacco: Never Used    Tobacco comment: quit 17 days ago   Substance Use Topics    Alcohol use: No     Frequency: Never     Comment: Occasionally    Drug use: No       Allergies: Allergies   Allergen Reactions    Latex Rash    Latex Hives    Other Food Hives     Citrus Fruits    Flagyl [Metronidazole] Hives and Itching    Lisinopril-Hydrochlorothiazide Angioedema    Clindamycin Rash and Itching    Ciprofloxacin Rash and Itching    Citrate Hives    Cottonseed Oil Rash    Cymbalta [Duloxetine] Hives and Itching    Flexeril [Cyclobenzaprine] Angioedema    Lemon Hives    Pcn [Penicillins] Hives    Sulfa (Sulfonamide Antibiotics) Shortness of Breath    Tomato Hives    Tomato Hives         Review of Systems   Review of Systems   Constitutional: Negative for chills and fever. Respiratory: Negative for shortness of breath. Cardiovascular: Negative for chest pain. Gastrointestinal: Negative for abdominal pain, nausea and vomiting. Genitourinary: Negative for flank pain, pelvic pain, vaginal bleeding, vaginal discharge and vaginal pain. Musculoskeletal: Negative for back pain and myalgias. Skin: Negative for color change, pallor, rash and wound. Neurological: Negative for dizziness, weakness, light-headedness and numbness. All other systems reviewed and are negative. Physical Exam   Physical Exam   Constitutional: She is oriented to person, place, and time. She appears well-developed and well-nourished. No distress. HENT:   Head: Normocephalic and atraumatic. Eyes: Conjunctivae are normal.   Cardiovascular: Normal rate, regular rhythm and normal heart sounds. No murmur heard. Pulmonary/Chest: Effort normal and breath sounds normal. No respiratory distress. Abdominal: Soft. Bowel sounds are normal. She exhibits no distension. Musculoskeletal: Normal range of motion. Neurological: She is alert and oriented to person, place, and time. Skin: Skin is warm. No rash noted. Psychiatric: She has a normal mood and affect. Her behavior is normal.   Nursing note and vitals reviewed. Diagnostic Study Results     Labs -     Recent Results (from the past 12 hour(s))   GLUCOSE, POC    Collection Time: 07/07/19  6:06 PM   Result Value Ref Range    Glucose (POC) 185 (H) 65 - 100 mg/dL    Performed by Haile Hidden        Radiologic Studies -   No orders to display     CT Results  (Last 48 hours)    None        CXR Results  (Last 48 hours)    None            Medical Decision Making   I am the first provider for this patient. I reviewed the vital signs, available nursing notes, past medical history, past surgical history, family history and social history. Vital Signs-Reviewed the patient's vital signs. Patient Vitals for the past 12 hrs:   Temp Pulse Resp BP SpO2   07/07/19 1732 98 °F (36.7 °C) (!) 119 20 138/82 98 %         Records Reviewed: Nursing Notes and Old Medical Records    Provider Notes (Medical Decision Making):   DDx: Hyperglycemia, Medication noncompliance    ED Course:   Initial assessment performed. The patients presenting problems have been discussed, and they are in agreement with the care plan formulated and outlined with them. I have encouraged them to ask questions as they arise throughout their visit. Lengthy discussion with pt regarding proper diabetic diet and importance of taking medication as prescribed. Discussed prompt f/u with PCP for continued management. Discussed with pt that glucose level does not warrant medication at this time. Continue taking medication and monitor food intake. All questions answered. Disposition:  6:18 PM  Discussed lab results with pt along with dx and treatment plan. Discussed importance of PCP follow up.  All questions answered. Pt voiced they understood. Return if sx worsen. PLAN:  1. Current Discharge Medication List      CONTINUE these medications which have NOT CHANGED    Details   busPIRone (BUSPAR) 7.5 mg tablet Take 7.5 mg by mouth daily. ondansetron (ZOFRAN ODT) 4 mg disintegrating tablet Take 1 Tab by mouth every eight (8) hours as needed for Nausea. Qty: 10 Tab, Refills: 0      fluticasone propionate (FLONASE) 50 mcg/actuation nasal spray 2 Sprays by Both Nostrils route daily. Qty: 1 Bottle, Refills: 0      labetalol (NORMODYNE) 200 mg tablet Take 200 mg by mouth two (2) times a day. albuterol (PROVENTIL HFA, VENTOLIN HFA, PROAIR HFA) 90 mcg/actuation inhaler Take 1-2 Puffs by inhalation every four (4) hours as needed for Wheezing. Qty: 1 Inhaler, Refills: 1      esomeprazole (NEXIUM) 40 mg capsule Take 1 Cap by mouth daily. Qty: 20 Cap, Refills: 0      metFORMIN (GLUCOPHAGE) 1,000 mg tablet Take 1,000 mg by mouth two (2) times daily (with meals). Indications: type 2 diabetes mellitus      gabapentin (NEURONTIN) 400 mg capsule Take 400 mg by mouth three (3) times daily. Indications: NEUROPATHIC PAIN      losartan (COZAAR) 25 mg tablet Take 100 mg by mouth daily. metoclopramide HCl (REGLAN) 5 mg tablet Take 5 mg by mouth Before breakfast, lunch, dinner and at bedtime. aspirin 81 mg chewable tablet Take 81 mg by mouth daily. ferrous sulfate 325 mg (65 mg iron) tablet Take 325 mg by mouth Daily (before lunch). Indications: Iron Deficiency Anemia           2. Follow-up Information     Follow up With Specialties Details Why Contact Info    Terri Diaz MD Internal Medicine Schedule an appointment as soon as possible for a visit in 1 day As needed [address]  [facility] [address]  [address] [place] [address]  [phone]          Return to ED if worse     Diagnosis     Clinical Impression:   1.  Hyperglycemia

## 2019-07-07 NOTE — ED NOTES
Patient presents to the ED with c/o high blood sugar today. Pt reports that she last checked her sugar at 1400 and it was 235. Pt reports after that she ate two cheeseburgers and fries from [employer]. Pt reports that she was not taking her metformin and just started taking it again Friday. Pt reports felling lightheaded and dizzy when her sugar is high. Pt is alert and oriented. Pt skin is warm and dry. Pt is ambulatory independently. Emergency Department Nursing Plan of Care       The Nursing Plan of Care is developed from the Nursing assessment and Emergency Department Attending provider initial evaluation. The plan of care may be reviewed in the ED Provider note.     The Plan of Care was developed with the following considerations:   Patient / Family readiness to learn indicated by:verbalized understanding  Persons(s) to be included in education: patient  Barriers to Learning/Limitations:No    Signed     Mindi Guerin    7/7/2019   5:59 PM

## 2019-07-19 ENCOUNTER — HOSPITAL ENCOUNTER (EMERGENCY)
Age: 39
Discharge: HOME | End: 2019-07-19
Attending: EMERGENCY MEDICINE
Payer: MEDICARE

## 2019-07-19 VITALS
HEART RATE: 85 BPM | OXYGEN SATURATION: 99 % | SYSTOLIC BLOOD PRESSURE: 146 MMHG | RESPIRATION RATE: 19 BRPM | WEIGHT: 251 LBS | HEIGHT: 64 IN | DIASTOLIC BLOOD PRESSURE: 84 MMHG | TEMPERATURE: 97.9 F | BODY MASS INDEX: 42.85 KG/M2

## 2019-07-19 DIAGNOSIS — H92.02 OTALGIA OF LEFT EAR: ICD-10-CM

## 2019-07-19 DIAGNOSIS — D64.9 ANEMIA, UNSPECIFIED TYPE: Primary | ICD-10-CM

## 2019-07-19 DIAGNOSIS — R42 DIZZINESS: ICD-10-CM

## 2019-07-19 LAB
ALBUMIN SERPL-MCNC: 3.3 G/DL (ref 3.5–5)
ALBUMIN/GLOB SERPL: 0.9 {RATIO} (ref 1.1–2.2)
ALP SERPL-CCNC: 99 U/L (ref 45–117)
ALT SERPL-CCNC: 27 U/L (ref 12–78)
ANION GAP SERPL CALC-SCNC: 9 MMOL/L (ref 5–15)
AST SERPL-CCNC: 18 U/L (ref 15–37)
BASOPHILS # BLD: 0.1 K/UL (ref 0–0.1)
BASOPHILS NFR BLD: 1 % (ref 0–1)
BILIRUB SERPL-MCNC: 0.2 MG/DL (ref 0.2–1)
BUN SERPL-MCNC: 6 MG/DL (ref 6–20)
BUN/CREAT SERPL: 6 (ref 12–20)
CALCIUM SERPL-MCNC: 8.7 MG/DL (ref 8.5–10.1)
CHLORIDE SERPL-SCNC: 100 MMOL/L (ref 97–108)
CO2 SERPL-SCNC: 26 MMOL/L (ref 21–32)
CREAT SERPL-MCNC: 0.93 MG/DL (ref 0.55–1.02)
DIFFERENTIAL METHOD BLD: ABNORMAL
EOSINOPHIL # BLD: 0.1 K/UL (ref 0–0.4)
EOSINOPHIL NFR BLD: 1 % (ref 0–7)
ERYTHROCYTE [DISTWIDTH] IN BLOOD BY AUTOMATED COUNT: 18.9 % (ref 11.5–14.5)
GLOBULIN SER CALC-MCNC: 3.7 G/DL (ref 2–4)
GLUCOSE SERPL-MCNC: 249 MG/DL (ref 65–100)
HCT VFR BLD AUTO: 29.7 % (ref 35–47)
HGB BLD-MCNC: 8.5 G/DL (ref 11.5–16)
IMM GRANULOCYTES # BLD AUTO: 0.1 K/UL (ref 0–0.04)
IMM GRANULOCYTES NFR BLD AUTO: 1 % (ref 0–0.5)
LYMPHOCYTES # BLD: 1.7 K/UL (ref 0.8–3.5)
LYMPHOCYTES NFR BLD: 23 % (ref 12–49)
MCH RBC QN AUTO: 19.3 PG (ref 26–34)
MCHC RBC AUTO-ENTMCNC: 28.6 G/DL (ref 30–36.5)
MCV RBC AUTO: 67.3 FL (ref 80–99)
MONOCYTES # BLD: 0.4 K/UL (ref 0–1)
MONOCYTES NFR BLD: 6 % (ref 5–13)
NEUTS SEG # BLD: 5 K/UL (ref 1.8–8)
NEUTS SEG NFR BLD: 68 % (ref 32–75)
NRBC # BLD: 0 K/UL (ref 0–0.01)
NRBC BLD-RTO: 0 PER 100 WBC
PLATELET # BLD AUTO: 255 K/UL (ref 150–400)
PMV BLD AUTO: 9.7 FL (ref 8.9–12.9)
POTASSIUM SERPL-SCNC: 4 MMOL/L (ref 3.5–5.1)
PROT SERPL-MCNC: 7 G/DL (ref 6.4–8.2)
RBC # BLD AUTO: 4.41 M/UL (ref 3.8–5.2)
RBC MORPH BLD: ABNORMAL
RBC MORPH BLD: ABNORMAL
SODIUM SERPL-SCNC: 135 MMOL/L (ref 136–145)
WBC # BLD AUTO: 7.4 K/UL (ref 3.6–11)

## 2019-07-19 PROCEDURE — 80053 COMPREHEN METABOLIC PANEL: CPT

## 2019-07-19 PROCEDURE — 85025 COMPLETE CBC W/AUTO DIFF WBC: CPT

## 2019-07-19 PROCEDURE — 99283 EMERGENCY DEPT VISIT LOW MDM: CPT

## 2019-07-19 PROCEDURE — 93005 ELECTROCARDIOGRAM TRACING: CPT

## 2019-07-19 PROCEDURE — 36415 COLL VENOUS BLD VENIPUNCTURE: CPT

## 2019-07-19 RX ORDER — AZITHROMYCIN 250 MG/1
TABLET, FILM COATED ORAL
Qty: 6 TAB | Refills: 0 | OUTPATIENT
Start: 2019-07-19 | End: 2019-10-28

## 2019-07-19 RX ORDER — FLUCONAZOLE 100 MG/1
150 TABLET ORAL ONCE
Qty: 1 TAB | Refills: 1 | Status: SHIPPED | OUTPATIENT
Start: 2019-07-19 | End: 2019-07-19

## 2019-07-19 NOTE — ED PROVIDER NOTES
EMERGENCY DEPARTMENT HISTORY AND PHYSICAL EXAM      Date: 7/19/2019  Patient Name: Twin Garza    History of Presenting Illness     Chief Complaint   Patient presents with    Ear Swelling    Dizziness       History Provided By: Patient    HPI: Twin Garza, 37 y.o. female with PMHx significant for dizziness and ear pain, presents by private vehicle to the ED with cc of redness and left ear pain. This is a 49-year-old female who has left ear pain for 2 or 3 days. He claims that another doctor look in her ear 2 or 3 days ago but did not prescribe any medicines. She also complains of vague dizziness with no chest pains or shortness of breath. Happens with vision. No palpitations. There are no other complaints, changes, or physical findings at this time. PCP: Tamara De Anda MD    Current Outpatient Medications   Medication Sig Dispense Refill    azithromycin (ZITHROMAX Z-FERNANDO) 250 mg tablet Take two tablets today then one tablet daily 6 Tab 0    fluconazole (DIFLUCAN) 100 mg tablet Take 1.5 Tabs by mouth once for 1 dose. 1 Tab 1    busPIRone (BUSPAR) 7.5 mg tablet Take 7.5 mg by mouth daily.  ondansetron (ZOFRAN ODT) 4 mg disintegrating tablet Take 1 Tab by mouth every eight (8) hours as needed for Nausea. 10 Tab 0    fluticasone propionate (FLONASE) 50 mcg/actuation nasal spray 2 Sprays by Both Nostrils route daily. 1 Bottle 0    labetalol (NORMODYNE) 200 mg tablet Take 200 mg by mouth two (2) times a day.  aspirin 81 mg chewable tablet Take 81 mg by mouth daily.  albuterol (PROVENTIL HFA, VENTOLIN HFA, PROAIR HFA) 90 mcg/actuation inhaler Take 1-2 Puffs by inhalation every four (4) hours as needed for Wheezing. 1 Inhaler 1    esomeprazole (NEXIUM) 40 mg capsule Take 1 Cap by mouth daily. 20 Cap 0    metFORMIN (GLUCOPHAGE) 1,000 mg tablet Take 1,000 mg by mouth two (2) times daily (with meals).  Indications: type 2 diabetes mellitus      gabapentin (NEURONTIN) 400 mg capsule Take 400 mg by mouth three (3) times daily. Indications: NEUROPATHIC PAIN      ferrous sulfate 325 mg (65 mg iron) tablet Take 325 mg by mouth Daily (before lunch). Indications: Iron Deficiency Anemia      losartan (COZAAR) 25 mg tablet Take 100 mg by mouth daily.  metoclopramide HCl (REGLAN) 5 mg tablet Take 5 mg by mouth Before breakfast, lunch, dinner and at bedtime.          Past History     Past Medical History:  Past Medical History:   Diagnosis Date    Abnormal Pap smear     \"years ago\"     Asthma     bronchitis    Depression with anxiety 2010    Diabetes mellitus     type II diabetes mellitus since     GERD (gastroesophageal reflux disease)     HTN (hypertension) 2010    Ill-defined condition     High Cholesterol    Other and unspecified hyperlipidemia 2010    Postpartum depression     hospitalized after last delivery    Psychiatric disorder     Bipolar    Psychiatric problem     \"mood disorder\"    Rhinitis 2012       Past Surgical History:  Past Surgical History:   Procedure Laterality Date     DELIVERY ONLY      cesearean section 08    HX  SECTION      X 3    HX GYN      , tubal ligation       Family History:  Family History   Problem Relation Age of Onset    Diabetes Mother     Hypertension Mother     Stroke Mother     Heart Disease Mother     Hypertension Father     Stroke Father     Diabetes Father     Kidney Disease Father     Hypertension Maternal Grandmother     Diabetes Maternal Grandmother     Hypertension Maternal Grandfather     Diabetes Maternal Grandfather     Hypertension Paternal Grandmother     Diabetes Paternal Grandmother     Hypertension Paternal Grandfather     Diabetes Paternal Grandfather     Hypertension Sister     Diabetes Sister     Diabetes Sister     Hypertension Sister    [facility] Diabetes Sister     Hypertension Sister        Social History:  Social History     Tobacco Use    Smoking status: Former Smoker     Last attempt to quit: 10/16/2015     Years since quitting: 3.7    Smokeless tobacco: Never Used    Tobacco comment: quit 17 days ago   Substance Use Topics    Alcohol use: No     Frequency: Never     Comment: Occasionally    Drug use: No       Allergies: Allergies   Allergen Reactions    Latex Rash    Latex Hives    Other Food Hives     Citrus Fruits    Flagyl [Metronidazole] Hives and Itching    Lisinopril-Hydrochlorothiazide Angioedema    Clindamycin Rash and Itching    Ciprofloxacin Rash and Itching    Citrate Hives    Cottonseed Oil Rash    Cymbalta [Duloxetine] Hives and Itching    Flexeril [Cyclobenzaprine] Angioedema    Lemon Hives    Pcn [Penicillins] Hives    Sulfa (Sulfonamide Antibiotics) Shortness of Breath    Tomato Hives    Tomato Hives         Review of Systems   Review of Systems   Constitutional: Negative for chills and fever. HENT: Negative for congestion, rhinorrhea, sneezing and sore throat. Respiratory: Negative for shortness of breath. Cardiovascular: Negative for chest pain. Gastrointestinal: Negative for abdominal pain, nausea and vomiting. Musculoskeletal: Negative for back pain, myalgias and neck stiffness. Skin: Negative for rash. Neurological: Positive for dizziness. Negative for weakness and headaches. All other systems reviewed and are negative. Physical Exam   Physical Exam   Constitutional: She is oriented to person, place, and time. She appears well-developed and well-nourished. HENT:   Head: Normocephalic and atraumatic. Mouth/Throat: Oropharynx is clear and moist.   Eyes: Conjunctivae and EOM are normal.   Neck: Normal range of motion and full passive range of motion without pain. Neck supple. Cardiovascular: Normal rate, regular rhythm, S1 normal, S2 normal, normal heart sounds, intact distal pulses and normal pulses. No murmur heard.   Pulmonary/Chest: Effort normal and breath sounds normal. No respiratory distress. She has no wheezes. Abdominal: Soft. Normal appearance and bowel sounds are normal. She exhibits no distension. There is no tenderness. There is no rebound. Musculoskeletal: Normal range of motion. Neurological: She is alert and oriented to person, place, and time. She has normal strength. Skin: Skin is warm, dry and intact. No rash noted. Psychiatric: She has a normal mood and affect. Her speech is normal and behavior is normal. Judgment and thought content normal.   Nursing note and vitals reviewed. Diagnostic Study Results     Labs -     Recent Results (from the past 12 hour(s))   EKG, 12 LEAD, INITIAL    Collection Time: 07/19/19  2:25 PM   Result Value Ref Range    Ventricular Rate 75 BPM    Atrial Rate 75 BPM    P-R Interval 156 ms    QRS Duration 84 ms    Q-T Interval 386 ms    QTC Calculation (Bezet) 431 ms    Calculated P Axis 54 degrees    Calculated R Axis 41 degrees    Calculated T Axis 12 degrees    Diagnosis       Normal sinus rhythm  Minimal voltage criteria for LVH, may be normal variant  Borderline ECG  When compared with ECG of 17-JAN-2018 11:10,  No significant change was found     CBC WITH AUTOMATED DIFF    Collection Time: 07/19/19  2:39 PM   Result Value Ref Range    WBC 7.4 3.6 - 11.0 K/uL    RBC 4.41 3.80 - 5.20 M/uL    HGB 8.5 (L) 11.5 - 16.0 g/dL    HCT 29.7 (L) 35.0 - 47.0 %    MCV 67.3 (L) 80.0 - 99.0 FL    MCH 19.3 (L) 26.0 - 34.0 PG    MCHC 28.6 (L) 30.0 - 36.5 g/dL    RDW 18.9 (H) 11.5 - 14.5 %    PLATELET 727 401 - 934 K/uL    MPV 9.7 8.9 - 12.9 FL    NRBC 0.0 0  WBC    ABSOLUTE NRBC 0.00 0.00 - 0.01 K/uL    NEUTROPHILS 68 32 - 75 %    LYMPHOCYTES 23 12 - 49 %    MONOCYTES 6 5 - 13 %    EOSINOPHILS 1 0 - 7 %    BASOPHILS 1 0 - 1 %    IMMATURE GRANULOCYTES 1 (H) 0.0 - 0.5 %    ABS. NEUTROPHILS 5.0 1.8 - 8.0 K/UL    ABS. LYMPHOCYTES 1.7 0.8 - 3.5 K/UL    ABS. MONOCYTES 0.4 0.0 - 1.0 K/UL    ABS. EOSINOPHILS 0.1 0.0 - 0.4 K/UL    ABS.  BASOPHILS 0.1 0.0 - 0.1 K/UL    ABS. IMM. GRANS. 0.1 (H) 0.00 - 0.04 K/UL    DF SMEAR SCANNED      RBC COMMENTS ANISOCYTOSIS  1+        RBC COMMENTS HYPOCHROMIA  1+       METABOLIC PANEL, COMPREHENSIVE    Collection Time: 07/19/19  2:39 PM   Result Value Ref Range    Sodium 135 (L) 136 - 145 mmol/L    Potassium 4.0 3.5 - 5.1 mmol/L    Chloride 100 97 - 108 mmol/L    CO2 26 21 - 32 mmol/L    Anion gap 9 5 - 15 mmol/L    Glucose 249 (H) 65 - 100 mg/dL    BUN 6 6 - 20 MG/DL    Creatinine 0.93 0.55 - 1.02 MG/DL    BUN/Creatinine ratio 6 (L) 12 - 20      GFR est AA >60 >60 ml/min/1.73m2    GFR est non-AA >60 >60 ml/min/1.73m2    Calcium 8.7 8.5 - 10.1 MG/DL    Bilirubin, total 0.2 0.2 - 1.0 MG/DL    ALT (SGPT) 27 12 - 78 U/L    AST (SGOT) 18 15 - 37 U/L    Alk. phosphatase 99 45 - 117 U/L    Protein, total 7.0 6.4 - 8.2 g/dL    Albumin 3.3 (L) 3.5 - 5.0 g/dL    Globulin 3.7 2.0 - 4.0 g/dL    A-G Ratio 0.9 (L) 1.1 - 2.2         Radiologic Studies -   No orders to display     CT Results  (Last 48 hours)    None        CXR Results  (Last 48 hours)    None            Medical Decision Making   I am the first provider for this patient. I reviewed the vital signs, available nursing notes, past medical history, past surgical history, family history and social history. Vital Signs-Reviewed the patient's vital signs. Patient Vitals for the past 12 hrs:   Temp Pulse Resp BP SpO2   07/19/19 1401 97.9 °F (36.6 °C) 85 19 146/84 99 %         Records Reviewed: Nursing Notes    Provider Notes (Medical Decision Making):   Electrolyte abnormality versus arrhythmia vs vertigo    ED Course:   Initial assessment performed. The patients presenting problems have been discussed, and they are in agreement with the care plan formulated and outlined with them. I have encouraged them to ask questions as they arise throughout their visit.        Disposition:  Patient informed of results of workup and is comfortable with discharge to home to follow up with PCP. They are instructed to return as needed for worsening condition. PLAN:  1. Discharge Medication List as of 7/19/2019  3:43 PM        2. Follow-up Information     Follow up With Specialties Details Why Contact Info    Maude Mcgraw MD Internal Medicine Call  [address]  [facility] [address] [phone]      [facility] EMERGENCY DEPT Emergency Medicine  As needed, If symptoms worsen [address]  [phone]        Return to ED if worse     Diagnosis     Clinical Impression:   1. Anemia, unspecified type    2. Dizziness    3.  Otalgia of left ear

## 2019-07-19 NOTE — ED TRIAGE NOTES
Per pt reports that she has severe sleep apnea and was seen this week and was told that something is wrong with her left ear, pt denies pain at this time. Pt also reports that she was newly dx with COPD and was told that she may not be getting \"enough oxygen to the brain\" and may need to be on oxygen for 24 hrs. No acute distress noted, pt able to speak clearly without difficulty.

## 2019-07-19 NOTE — ED NOTES
Discharge summary and discharge medications reviewed with patient and appropriate educational materials and side effects teaching were provided. patient  Given 0 paper prescriptions and 2 electronic prescriptions sent to pt's listed pharmacy. Patient verbalized understanding of the importance of discussing medications with his or her physician or clinic they will be following up with. No si/s of acute distress prior to discharge. Patient offered wheelchair from treatment area to hospital entrance, patient declined wheelchair. patient provided with work excuse.

## 2019-07-19 NOTE — DISCHARGE INSTRUCTIONS
Patient Education        Anemia: Care Instructions  Your Care Instructions    Anemia is a low level of red blood cells, which carry oxygen throughout your body. Many things can cause anemia. Lack of iron is one of the most common causes. Your body needs iron to make hemoglobin, a substance in red blood cells that carries oxygen from the lungs to your body's cells. Without enough iron, the body produces fewer and smaller red blood cells. As a result, your body's cells do not get enough oxygen, and you feel tired and weak. And you may have trouble concentrating. Bleeding is the most common cause of a lack of iron. You may have heavy menstrual bleeding or bleeding caused by conditions such as ulcers, hemorrhoids, or cancer. Regular use of aspirin or other anti-inflammatory medicines (such as ibuprofen) also can cause bleeding in some people. A lack of iron in your diet also can cause anemia, especially at times when the body needs more iron, such as during pregnancy, infancy, and the teen years. Your doctor may have prescribed iron pills. It may take several months of treatment for your iron levels to return to normal. Your doctor also may suggest that you eat foods that are rich in iron, such as meat and beans. There are many other causes of anemia. It is not always due to a lack of iron. Finding the specific cause of your anemia will help your doctor find the right treatment for you. Follow-up care is a key part of your treatment and safety. Be sure to make and go to all appointments, and call your doctor if you are having problems. It's also a good idea to know your test results and keep a list of the medicines you take. How can you care for yourself at home? · Take your medicines exactly as prescribed. Call your doctor if you think you are having a problem with your medicine.   · If your doctor recommends iron pills, take them as directed:  ? Try to take the pills on an empty stomach about 1 hour before or 2 hours after meals. But you may need to take iron with food to avoid an upset stomach. ? Do not take antacids or drink milk or caffeine drinks (such as coffee, tea, or cola) at the same time or within 2 hours of the time that you take your iron. They can make it hard for your body to absorb the iron. ? Vitamin C (from food or supplements) helps your body absorb iron. Try taking iron pills with a glass of orange juice or some other food that is high in vitamin C, such as citrus fruits. ? Iron pills may cause stomach problems, such as heartburn, nausea, diarrhea, constipation, and cramps. Be sure to drink plenty of fluids, and include fruits, vegetables, and fiber in your diet each day. Iron pills often make your bowel movements dark or green. ? If you forget to take an iron pill, do not take a double dose of iron the next time you take a pill. ? Keep iron pills out of the reach of small children. An overdose of iron can be very dangerous. · Follow your doctor's advice about eating iron-rich foods. These include red meat, shellfish, poultry, eggs, beans, raisins, whole-grain bread, and leafy green vegetables. · Steam vegetables to help them keep their iron content. When should you call for help? Call 911 anytime you think you may need emergency care. For example, call if:    · You have symptoms of a heart attack. These may include:  ? Chest pain or pressure, or a strange feeling in the chest.  ? Sweating. ? Shortness of breath. ? Nausea or vomiting. ? Pain, pressure, or a strange feeling in the back, neck, jaw, or upper belly or in one or both shoulders or arms. ? Lightheadedness or sudden weakness. ? A fast or irregular heartbeat. After you call 911, the  may tell you to chew 1 adult-strength or 2 to 4 low-dose aspirin. Wait for an ambulance.  Do not try to drive yourself.     · You passed out (lost consciousness).    Call your doctor now or seek immediate medical care if:    · You have new or increased shortness of breath.     · You are dizzy or lightheaded, or you feel like you may faint.     · Your fatigue and weakness continue or get worse.     · You have any abnormal bleeding, such as:  ? Nosebleeds. ? Vaginal bleeding that is different (heavier, more frequent, at a different time of the month) than what you are used to.  ? Bloody or black stools, or rectal bleeding. ? Bloody or pink urine.    Watch closely for changes in your health, and be sure to contact your doctor if:    · You do not get better as expected. Where can you learn more? Go to http://rosalino-jordan.info/. Enter R301 in the search box to learn more about \"Anemia: Care Instructions. \"  Current as of: May 6, 2018  Content Version: 11.9  © 9961-9909 [employer]. Care instructions adapted under license by [employer] (which disclaims liability or warranty for this information). If you have questions about a medical condition or this instruction, always ask your healthcare professional. [place] [address] any warranty or liability for your use of this information. Patient Education        Dizziness: Care Instructions  Your Care Instructions  Dizziness is the feeling of unsteadiness or fuzziness in your head. It is different than having vertigo, which is a feeling that the room is spinning or that you are moving or falling. It is also different from lightheadedness, which is the feeling that you are about to faint. It can be hard to know what causes dizziness. Some people feel dizzy when they have migraine headaches. Sometimes bouts of flu can make you feel dizzy. Some medical conditions, such as heart problems or high blood pressure, can make you feel dizzy. Many medicines can cause dizziness, including medicines for high blood pressure, pain, or anxiety. If a medicine causes your symptoms, your doctor may recommend that you stop or change the medicine.  If it is a problem with your heart, you may need medicine to help your heart work better. If there is no clear reason for your symptoms, your doctor may suggest watching and waiting for a while to see if the dizziness goes away on its own. Follow-up care is a key part of your treatment and safety. Be sure to make and go to all appointments, and call your doctor if you are having problems. It's also a good idea to know your test results and keep a list of the medicines you take. How can you care for yourself at home? · If your doctor recommends or prescribes medicine, take it exactly as directed. Call your doctor if you think you are having a problem with your medicine. · Do not drive while you feel dizzy. · Try to prevent falls. Steps you can take include:  ? Using nonskid mats, adding grab bars near the tub, and using night-lights. ? Clearing your home so that walkways are free of anything you might trip on.  ? Letting family and friends know that you have been feeling dizzy. This will help them know how to help you. When should you call for help? Call 911 anytime you think you may need emergency care. For example, call if:    · You passed out (lost consciousness).     · You have dizziness along with symptoms of a heart attack. These may include:  ? Chest pain or pressure, or a strange feeling in the chest.  ? Sweating. ? Shortness of breath. ? Nausea or vomiting. ? Pain, pressure, or a strange feeling in the back, neck, jaw, or upper belly or in one or both shoulders or arms. ? Lightheadedness or sudden weakness. ? A fast or irregular heartbeat.     · You have symptoms of a stroke. These may include:  ? Sudden numbness, tingling, weakness, or loss of movement in your face, arm, or leg, especially on only one side of your body. ? Sudden vision changes. ? Sudden trouble speaking. ? Sudden confusion or trouble understanding simple statements. ? Sudden problems with walking or balance.   ? A sudden, severe headache that is different from past headaches.    Call your doctor now or seek immediate medical care if:    · You feel dizzy and have a fever, headache, or ringing in your ears.     · You have new or increased nausea and vomiting.     · Your dizziness does not go away or comes back.    Watch closely for changes in your health, and be sure to contact your doctor if:    · You do not get better as expected. Where can you learn more? Go to http://rosalino-jordan.info/. Enter R401 in the search box to learn more about \"Dizziness: Care Instructions. \"  Current as of: September 23, 2018  Content Version: 11.9  © 6719-9664 [employer]. Care instructions adapted under license by [employer] (which disclaims liability or warranty for this information). If you have questions about a medical condition or this instruction, always ask your healthcare professional. [address] any warranty or liability for your use of this information.

## 2019-07-19 NOTE — ED NOTES
patient awake and resting in stretcher. Patient social and talkative. Speaks in full and complete sentences w/o difficulty. Patient requesting antibiotics for \"ear infection\" and pain medications for c/o ear fullness. Emergency Department Nursing Plan of Care       The Nursing Plan of Care is developed from the Nursing assessment and Emergency Department Attending provider initial evaluation. The plan of care may be reviewed in the ED Provider note.     The Plan of Care was developed with the following considerations:   Patient / Family readiness to learn indicated by:verbalized understanding  Persons(s) to be included in education: patient  Barriers to Learning/Limitations:No    [address], RN    7/19/2019   3:01 PM 74 yo F last seen NL some time yesterday evening.  Found down in bathroom JPTA unresponsive.  Intubated in field by EMS with 7.0 tube and no medication given. EMS states pt on ASA and Coumadin for unknown reason.  Family not available and pt unable to give additional HX.

## 2019-07-19 NOTE — LETTER
[facility] EMERGENCY DEPT 
[address] [id number] 
[phone] Work/School Note Date: 7/19/2019 To Whom It May concern: Cory Krishnan was seen and treated today in the emergency room by the following provider(s): 
Attending Provider: Sha Sales MD. Cory Krishnan may return to work on 7/22/19. Sincerely, Linda Zaidi MD

## 2019-07-22 LAB
ATRIAL RATE: 75 BPM
CALCULATED P AXIS, ECG09: 54 DEGREES
CALCULATED R AXIS, ECG10: 41 DEGREES
CALCULATED T AXIS, ECG11: 12 DEGREES
DIAGNOSIS, 93000: NORMAL
P-R INTERVAL, ECG05: 156 MS
Q-T INTERVAL, ECG07: 386 MS
QRS DURATION, ECG06: 84 MS
QTC CALCULATION (BEZET), ECG08: 431 MS
VENTRICULAR RATE, ECG03: 75 BPM

## 2019-07-25 ENCOUNTER — HOSPITAL ENCOUNTER (EMERGENCY)
Age: 39
Discharge: HOME | End: 2019-07-25
Attending: EMERGENCY MEDICINE | Admitting: EMERGENCY MEDICINE
Payer: MEDICARE

## 2019-07-25 VITALS
OXYGEN SATURATION: 99 % | TEMPERATURE: 98.1 F | BODY MASS INDEX: 42.68 KG/M2 | HEART RATE: 100 BPM | WEIGHT: 250 LBS | DIASTOLIC BLOOD PRESSURE: 67 MMHG | RESPIRATION RATE: 18 BRPM | HEIGHT: 64 IN | SYSTOLIC BLOOD PRESSURE: 122 MMHG

## 2019-07-25 DIAGNOSIS — J02.9 PHARYNGITIS, UNSPECIFIED ETIOLOGY: Primary | ICD-10-CM

## 2019-07-25 DIAGNOSIS — M62.838 MUSCLE SPASMS OF NECK: ICD-10-CM

## 2019-07-25 PROCEDURE — 74011250636 HC RX REV CODE- 250/636: Performed by: EMERGENCY MEDICINE

## 2019-07-25 PROCEDURE — 74011000250 HC RX REV CODE- 250: Performed by: EMERGENCY MEDICINE

## 2019-07-25 PROCEDURE — 96372 THER/PROPH/DIAG INJ SC/IM: CPT

## 2019-07-25 PROCEDURE — 99283 EMERGENCY DEPT VISIT LOW MDM: CPT

## 2019-07-25 PROCEDURE — 74011250637 HC RX REV CODE- 250/637: Performed by: EMERGENCY MEDICINE

## 2019-07-25 RX ORDER — NAPROXEN 500 MG/1
500 TABLET ORAL 2 TIMES DAILY WITH MEALS
Qty: 20 TAB | Refills: 0 | Status: SHIPPED | OUTPATIENT
Start: 2019-07-25 | End: 2019-08-04

## 2019-07-25 RX ORDER — KETOROLAC TROMETHAMINE 30 MG/ML
30 INJECTION, SOLUTION INTRAMUSCULAR; INTRAVENOUS
Status: COMPLETED | OUTPATIENT
Start: 2019-07-25 | End: 2019-07-25

## 2019-07-25 RX ORDER — BENZONATATE 100 MG/1
200 CAPSULE ORAL
Status: DISCONTINUED | OUTPATIENT
Start: 2019-07-25 | End: 2019-07-25 | Stop reason: HOSPADM

## 2019-07-25 RX ORDER — DIAZEPAM 2 MG/1
2 TABLET ORAL
Qty: 6 TAB | Refills: 0 | Status: SHIPPED | OUTPATIENT
Start: 2019-07-25 | End: 2019-10-28

## 2019-07-25 RX ORDER — LIDOCAINE HYDROCHLORIDE 20 MG/ML
10 SOLUTION OROPHARYNGEAL
Status: COMPLETED | OUTPATIENT
Start: 2019-07-25 | End: 2019-07-25

## 2019-07-25 RX ADMIN — LIDOCAINE HYDROCHLORIDE 10 ML: 20 SOLUTION ORAL; TOPICAL at 04:08

## 2019-07-25 RX ADMIN — KETOROLAC TROMETHAMINE 30 MG: 30 INJECTION, SOLUTION INTRAMUSCULAR; INTRAVENOUS at 03:46

## 2019-07-25 NOTE — ED NOTES
Patient offered hot pack but declined stating \"I did that last night and slept all night with it but then the pain was worse. \"

## 2019-07-25 NOTE — ED NOTES
Spoke with patient again reports \"I think I came on the wrong night because yall aren't doing anything for me. \" Patient states she will attempt the lidocaine. Provider aware.

## 2019-07-25 NOTE — ED PROVIDER NOTES
25-year-old female with a history of diabetes, GERD, hypertension, asthma presents with right posterior neck pain. She states she was in [facility] May. Neck pain is worse with movement and associated with intermittent spasm. Patient also presents with throat pain for a week which began around the time that she was prescribed a Z-Bridger for presumed otitis. She states last 2 days she has had pain on swallowing and feels a fullness in her anterior neck. She states she called 911 tonight because she was anxious. She saw psychiatrist yesterday was diagnosed with PTSD. She woke up tonight feeling panicky and anxious. She denies fevers, headache, chest pain.            Past Medical History:   Diagnosis Date    Abnormal Pap smear     \"years ago\"     Asthma     bronchitis    Depression with anxiety 2010    Diabetes mellitus     type II diabetes mellitus since     GERD (gastroesophageal reflux disease)     HTN (hypertension) 2010    Ill-defined condition     High Cholesterol    Other and unspecified hyperlipidemia 2010    Postpartum depression     hospitalized after last delivery    Psychiatric disorder     Bipolar    Psychiatric problem     \"mood disorder\"    Rhinitis 2012       Past Surgical History:   Procedure Laterality Date     DELIVERY ONLY      cesearean section 08    HX  SECTION      X 3    HX GYN      , tubal ligation         Family History:   Problem Relation Age of Onset    Diabetes Mother     Hypertension Mother     Stroke Mother     Heart Disease Mother     Hypertension Father     Stroke Father     Diabetes Father     Kidney Disease Father     Hypertension Maternal Grandmother     Diabetes Maternal Grandmother     Hypertension Maternal Grandfather     Diabetes Maternal Grandfather     Hypertension Paternal Grandmother     Diabetes Paternal Grandmother     Hypertension Paternal Grandfather     Diabetes Paternal Josefine Seip Hypertension Sister     Diabetes Sister     Diabetes Sister     Hypertension Sister     Diabetes Sister     Hypertension Sister        Social History     Socioeconomic History    Marital status: SINGLE     Spouse name: Not on file    Number of children: Not on file    Years of education: Not on file    Highest education level: Not on file   Occupational History    Not on file   Social Needs    Financial resource strain: Not on file    Food insecurity:     Worry: Not on file     Inability: Not on file    Transportation needs:     Medical: Not on file     Non-medical: Not on file   Tobacco Use    Smoking status: Former Smoker     Last attempt to quit: 10/16/2015     Years since quitting: 3.7    Smokeless tobacco: Never Used    Tobacco comment: quit 17 days ago   Substance and Sexual Activity    Alcohol use: No     Frequency: Never     Comment: Occasionally    Drug use: No    Sexual activity: Yes     Partners: Female     Birth control/protection: Surgical     Comment: Tubal ligation   Lifestyle    Physical activity:     Days per week: Not on file     Minutes per session: Not on file    Stress: Not on file   Relationships    Social connections:     Talks on phone: Not on file     Gets together: Not on file     Attends Ep[employer] service: Not on file     Active member of club or organization: Not on file     Attends meetings of clubs or organizations: Not on file     Relationship status: Not on file    Intimate partner violence:     Fear of current or ex partner: Not on file     Emotionally abused: Not on file     Physically abused: Not on file     Forced sexual activity: Not on file   Other Topics Concern    Not on file   Social History Narrative    ** Merged History Encounter **              ALLERGIES: Latex; Latex; Other food; Flagyl [metronidazole]; Lisinopril-hydrochlorothiazide; Clindamycin; Ciprofloxacin; Citrate; Cottonseed oil; Cymbalta [duloxetine]; Flexeril [cyclobenzaprine];  [place]erches; Pcn [penicillins]; Sulfa (sulfonamide antibiotics); Tomato; and Tomato    Review of Systems   Constitutional: Positive for appetite change. HENT: Positive for sore throat. Musculoskeletal: Positive for neck pain. Vitals:    07/25/19 0317   BP: 146/81   Pulse: 100   Resp: 18   Temp: 98.1 °F (36.7 °C)   SpO2: 100%   Weight: 113.4 kg (250 lb)   Height: 5' 4\" (1.626 m)            Physical Exam   Constitutional: She is oriented to person, place, and time. She appears well-developed and well-nourished. HENT:   Head: Normocephalic and atraumatic. Anterior cervical lymphadenopathy. Oropharyngeal erythema/injection. No tonsillar exudate and no significant tonsillar enlargement. No visible abscess or asymmetry of tonsils  Handling secretions   Eyes: Conjunctivae and EOM are normal.   Neck: Neck supple. Cardiovascular: Normal rate, regular rhythm and intact distal pulses. Pulmonary/Chest: Effort normal. No respiratory distress. Abdominal: Soft. There is no tenderness. Musculoskeletal: Normal range of motion. She exhibits no deformity. Right paraspinal cervical muscle tenderness   Neurological: She is alert and oriented to person, place, and time. Skin: Skin is warm and dry. Psychiatric: She has a normal mood and affect. Her behavior is normal. Thought content normal.   Vitals reviewed. MDM  Number of Diagnoses or Management Options  Muscle spasms of neck:   Pharyngitis, unspecified etiology:   Diagnosis management comments: Pharyngitis - likely viral given persistence despite abx. Neck strain, sprain, spasm, cervical radiculopathy  anxiety         Procedures        LABORATORY TESTS:  No results found for this or any previous visit (from the past 12 hour(s)).     IMAGING RESULTS:  No orders to display       MEDICATIONS GIVEN:  Medications   benzonatate (TESSALON) capsule 200 mg (200 mg Oral Refused 7/25/19 0346)   ketorolac (TORADOL) injection 30 mg (30 mg IntraMUSCular Given 7/25/19 0346) lidocaine (XYLOCAINE) 2 % viscous solution 10 mL (10 mL Mouth/Throat Given 7/25/19 0408)       IMPRESSION:  1. Pharyngitis, unspecified etiology    2. Muscle spasms of neck        PLAN:  1. Discharge Medication List as of 7/25/2019  4:05 AM      START taking these medications    Details   diazePAM (VALIUM) 2 mg tablet Take 1 Tab by mouth every eight (8) hours as needed (muscle spasm). Max Daily Amount: 6 mg. Indications: muscle spasm, Print, Disp-6 Tab, R-0      naproxen (NAPROSYN) 500 mg tablet Take 1 Tab by mouth two (2) times daily (with meals) for 10 days. , Print, Disp-20 Tab, R-0         CONTINUE these medications which have NOT CHANGED    Details   azithromycin (ZITHROMAX Z-FERNANDO) 250 mg tablet Take two tablets today then one tablet daily, Normal, Disp-6 Tab, R-0      busPIRone (BUSPAR) 7.5 mg tablet Take 7.5 mg by mouth daily. , Historical Med      fluticasone propionate (FLONASE) 50 mcg/actuation nasal spray 2 Sprays by Both Nostrils route daily. , Normal, Disp-1 Bottle, R-0      labetalol (NORMODYNE) 200 mg tablet Take 200 mg by mouth two (2) times a day., Historical Med      aspirin 81 mg chewable tablet Take 81 mg by mouth daily. , Historical Med      albuterol (PROVENTIL HFA, VENTOLIN HFA, PROAIR HFA) 90 mcg/actuation inhaler Take 1-2 Puffs by inhalation every four (4) hours as needed for Wheezing., Normal, Disp-1 Inhaler, R-1      esomeprazole (NEXIUM) 40 mg capsule Take 1 Cap by mouth daily. , Print, Disp-20 Cap, R-0      metFORMIN (GLUCOPHAGE) 1,000 mg tablet Take 1,000 mg by mouth two (2) times daily (with meals). Indications: type 2 diabetes mellitus, Historical Med      gabapentin (NEURONTIN) 400 mg capsule Take 400 mg by mouth three (3) times daily. Indications: NEUROPATHIC PAIN, Historical Med      losartan (COZAAR) 25 mg tablet Take 100 mg by mouth daily. , Historical Med      metoclopramide HCl (REGLAN) 5 mg tablet Take 5 mg by mouth Before breakfast, lunch, dinner and at bedtime. , Historical Med 2.   Follow-up Information     Follow up With Specialties Details Why Contact Info    Anabella Gutierrez MD Internal Medicine Schedule an appointment as soon as possible for a visit  [phone]  [phone]      [facility] EMERGENCY DEPT Emergency Medicine  As needed, If symptoms worsen [address] [facility]  [phone]        Return to ED if worse

## 2019-07-25 NOTE — ED NOTES
Patient given copy of dc instructions and 2 paper script(s) and 0 electronic scripts. Patient verbalized understanding of instructions and script (s). Patient given a current medication reconciliation form and verbalized understanding of their medications. Patient verbalized understanding of the importance of discussing medications with  his or her physician or clinic they will be following up with. Patient alert and oriented and in no acute distress. Patient offered wheelchair from treatment area to hospital entrance, patient declined wheelchair. Patient requesting medicaid cab home. Charge nurse aware.

## 2019-07-25 NOTE — ED NOTES
Spoke with patient about her concerns again at this time. Patient reports to be having a lot of stress due to recent diagnoses. Patient confirmed she will follow up with her primary care.

## 2019-07-25 NOTE — DISCHARGE INSTRUCTIONS
Patient Education        Neck Spasm: Care Instructions  Your Care Instructions  A neck spasm is sudden tightness and pain in your neck muscles. A spasm may be caused by some activities or repeated movements. For example, you may be more likely to have a neck spasm if you slouch, paint a ceiling, work at a computer, or sleep with your neck twisted. But the cause isn't always clear. Home treatment includes using heat or ice, taking over-the-counter (OTC) pain medicines, and avoiding activities that may lead to neck pain. Gentle stretching, or treatments such as massage or manipulation, may also help ease a neck spasm. For a neck spasm that doesn't get better with home care, your doctor may prescribe medicine. He or she may also suggest exercise or physical therapy to help strengthen or relax your neck muscles. Follow-up care is a key part of your treatment and safety. Be sure to make and go to all appointments, and call your doctor if you are having problems. It's also a good idea to know your test results and keep a list of the medicines you take. How can you care for yourself at home? · To relieve pain, use heat or ice (whichever feels better) on the affected area. ? Put a warm water bottle, a heating pad set on low, or a warm cloth on your neck. Put a thin cloth between the heating pad and your skin. Do not go to sleep with a heating pad on your skin. ? Try ice or a cold pack on the area for 10 to 20 minutes at a time. Put a thin cloth between the ice and your skin. · Ask your doctor if you can take acetaminophen (such as Tylenol) or nonsteroidal anti-inflammatory drugs, such as ibuprofen or naproxen. Your doctor can prescribe stronger medicines if needed. Be safe with medicines. Read and follow all instructions on the label. · Stretch your muscles every day, especially before and after exercise and at bedtime. Regular stretching can help relax your muscles.   · Try to find a pillow and a position in bed that help improve your night's rest.  · Try to stay active. It's best to start activity slowly. If an exercise makes your pain worse, stop doing it. When should you call for help? Call 911 anytime you think you may need emergency care. For example, call if:    · You are unable to move an arm or a leg at all.   [facility] your doctor now or seek immediate medical care if:    · You have new or worse symptoms in your arms, legs, belly, or buttocks. Symptoms may include:  ? Numbness or tingling. ? Weakness. ? Pain.     · You lose bladder or bowel control.    Watch closely for changes in your health, and be sure to contact your doctor if:    · You do not get better as expected. Where can you learn more? Go to http://rosalino-jordan.info/. Enter A733 in the search box to learn more about \"Neck Spasm: Care Instructions. \"  Current as of: September 20, 2018  Content Version: 12.1  © 2561-8650 [employer]. Care instructions adapted under license by [employer] (which disclaims liability or warranty for this information). If you have questions about a medical condition or this instruction, always ask your healthcare professional. [place] [address] any warranty or liability for your use of this information. Patient Education        Sore Throat: Care Instructions  Your Care Instructions    Infection by bacteria or a virus causes most sore throats. Cigarette smoke, dry air, air pollution, allergies, and yelling can also cause a sore throat. Sore throats can be painful and annoying. Fortunately, most sore throats go away on their own. If you have a bacterial infection, your doctor may prescribe antibiotics. Follow-up care is a key part of your treatment and safety. Be sure to make and go to all appointments, and call your doctor if you are having problems. It's also a good idea to know your test results and keep a list of the medicines you take.   How can you care for yourself at home? · If your doctor prescribed antibiotics, take them as directed. Do not stop taking them just because you feel better. You need to take the full course of antibiotics. · Gargle with warm salt water once an hour to help reduce swelling and relieve discomfort. Use 1 teaspoon of salt mixed in 1 cup of warm water. · Take an over-the-counter pain medicine, such as acetaminophen (Tylenol), ibuprofen (Advil, Motrin), or naproxen (Aleve). Read and follow all instructions on the label. · Be careful when taking over-the-counter cold or flu medicines and Tylenol at the same time. Many of these medicines have acetaminophen, which is Tylenol. Read the labels to make sure that you are not taking more than the recommended dose. Too much acetaminophen (Tylenol) can be harmful. · Drink plenty of fluids. Fluids may help soothe an irritated throat. Hot fluids, such as tea or soup, may help decrease throat pain. · Use over-the-counter throat lozenges to soothe pain. Regular cough drops or hard candy may also help. These should not be given to young children because of the risk of choking. · Do not smoke or allow others to smoke around you. If you need help quitting, talk to your doctor about stop-smoking programs and medicines. These can increase your chances of quitting for good. · Use a vaporizer or humidifier to add moisture to your bedroom. Follow the directions for cleaning the machine. When should you call for help? Call your doctor now or seek immediate medical care if:    · You have new or worse trouble swallowing.     · Your sore throat gets much worse on one side.    Watch closely for changes in your health, and be sure to contact your doctor if you do not get better as expected. Where can you learn more? Go to http://rosalion-jordan.info/. Enter 062 [id number] in the search box to learn more about \"Sore Throat: Care Instructions. \"  Current as of: October 21, 2018  Content Version: 12.1  © 6287-9485 [employer]. Care instructions adapted under license by [employer] (which disclaims liability or warranty for this information). If you have questions about a medical condition or this instruction, always ask your healthcare professional. [address] any warranty or liability for your use of this information.

## 2019-07-25 NOTE — ED NOTES
Patient reports to ED via EMS due to sore throat and neck pain. Patient reports feeling stressed at home because \"too much bad news. \" Patient speaking clear sentences. Patient reports pain started a week ago when starting a Z-pac. Reports neck pain is worse with movement. Patient reports \"panicking\" more often. Patient in NAD. Emergency Department Nursing Plan of Care       The Nursing Plan of Care is developed from the Nursing assessment and Emergency Department Attending provider initial evaluation. The plan of care may be reviewed in the ED Provider note.     The Plan of Care was developed with the following considerations:   Patient / Family readiness to learn indicated by:verbalized understanding  Persons(s) to be included in education: patient  Barriers to Learning/Limitations:No    Signed     Tayo Rey RN    7/25/2019   3:23 AM

## 2019-07-25 NOTE — ED NOTES
Patient refusing tessalon perrles due to [facility] you giving me something when I said I can't swallow? \" Patient reports she can not swallow pills. Patient becoming very agitated reporting we're not doing anything for her. Patient requesting a cab home. Patient informed she has to have discharge paperwork for us to contact her insurance for a ride home.

## 2019-08-08 ENCOUNTER — HOSPITAL ENCOUNTER (OUTPATIENT)
Dept: GENERAL RADIOLOGY | Age: 39
Discharge: HOME | End: 2019-08-08
Payer: MEDICARE

## 2019-08-08 DIAGNOSIS — J44.9 COPD (CHRONIC OBSTRUCTIVE PULMONARY DISEASE) (HCC): ICD-10-CM

## 2019-08-08 PROCEDURE — 71046 X-RAY EXAM CHEST 2 VIEWS: CPT

## 2019-09-17 RX ORDER — SODIUM CHLORIDE 9 MG/ML
25 INJECTION, SOLUTION INTRAVENOUS CONTINUOUS
Status: DISPENSED | OUTPATIENT
Start: 2019-09-20 | End: 2019-09-20

## 2019-09-20 ENCOUNTER — HOSPITAL ENCOUNTER (OUTPATIENT)
Dept: INFUSION THERAPY | Age: 39
Discharge: HOME | End: 2019-09-20

## 2019-10-28 ENCOUNTER — HOSPITAL ENCOUNTER (EMERGENCY)
Age: 39
Discharge: HOME | End: 2019-10-28
Attending: EMERGENCY MEDICINE
Payer: MEDICARE

## 2019-10-28 VITALS
HEIGHT: 64 IN | WEIGHT: 251 LBS | TEMPERATURE: 98.3 F | BODY MASS INDEX: 42.85 KG/M2 | OXYGEN SATURATION: 93 % | SYSTOLIC BLOOD PRESSURE: 160 MMHG | HEART RATE: 90 BPM | DIASTOLIC BLOOD PRESSURE: 89 MMHG | RESPIRATION RATE: 16 BRPM

## 2019-10-28 DIAGNOSIS — K21.9 GASTROESOPHAGEAL REFLUX DISEASE, ESOPHAGITIS PRESENCE NOT SPECIFIED: ICD-10-CM

## 2019-10-28 DIAGNOSIS — J01.01 ACUTE RECURRENT MAXILLARY SINUSITIS: Primary | ICD-10-CM

## 2019-10-28 LAB — HCG UR QL: NEGATIVE

## 2019-10-28 PROCEDURE — 81025 URINE PREGNANCY TEST: CPT

## 2019-10-28 PROCEDURE — 74011000250 HC RX REV CODE- 250: Performed by: PHYSICIAN ASSISTANT

## 2019-10-28 PROCEDURE — 74011250637 HC RX REV CODE- 250/637: Performed by: PHYSICIAN ASSISTANT

## 2019-10-28 PROCEDURE — 99283 EMERGENCY DEPT VISIT LOW MDM: CPT

## 2019-10-28 RX ORDER — AZITHROMYCIN 250 MG/1
TABLET, FILM COATED ORAL
Qty: 6 TAB | Refills: 0 | Status: SHIPPED | OUTPATIENT
Start: 2019-10-28 | End: 2019-12-29 | Stop reason: ALTCHOICE

## 2019-10-28 RX ORDER — ACETAMINOPHEN 500 MG
1000 TABLET ORAL
Status: DISCONTINUED | OUTPATIENT
Start: 2019-10-28 | End: 2019-10-28 | Stop reason: HOSPADM

## 2019-10-28 RX ORDER — FLUTICASONE PROPIONATE 50 MCG
2 SPRAY, SUSPENSION (ML) NASAL DAILY
Qty: 1 BOTTLE | Refills: 0 | Status: SHIPPED | OUTPATIENT
Start: 2019-10-28 | End: 2020-10-05

## 2019-10-28 RX ORDER — SUCRALFATE 1 G/1
1 TABLET ORAL 4 TIMES DAILY
Qty: 20 TAB | Refills: 0 | Status: SHIPPED | OUTPATIENT
Start: 2019-10-28 | End: 2020-10-05

## 2019-10-28 RX ADMIN — LIDOCAINE HYDROCHLORIDE 40 ML: 20 SOLUTION ORAL; TOPICAL at 19:34

## 2019-10-28 NOTE — ED NOTES
Pt given printed discharge instructions and 4 script(s). Pt verbalized understanding of instructions and script(s). Pt verbalized importance of following up with PCP, ENT, GI as needed. Pt alert and oriented, in no acute distress, ambulatory with family.

## 2019-10-28 NOTE — ED TRIAGE NOTES
Patient presents to the Ed with c/o headache x2 days. Pt reports acid reflux. Pt reports taking omeprazole. Pt denies any photophobia or vision changes. Pt reports a dry nose with sores. Pt reports sinus pressure.

## 2019-10-28 NOTE — ED NOTES
Emergency Department Nursing Plan of Care       The Nursing Plan of Care is developed from the Nursing assessment and Emergency Department Attending provider initial evaluation. The plan of care may be reviewed in the ED Provider note.     The Plan of Care was developed with the following considerations:   Patient / Family readiness to learn indicated by:verbalized understanding  Persons(s) to be included in education: patient  Barriers to Learning/Limitations:No    Signed     James Garcia RN    10/28/2019   7:39 PM

## 2019-10-28 NOTE — DISCHARGE INSTRUCTIONS
Patient Education        Gastroesophageal Reflux Disease (GERD): Care Instructions  Your Care Instructions    Gastroesophageal reflux disease (GERD) is the backward flow of stomach acid into the esophagus. The esophagus is the tube that leads from your throat to your stomach. A one-way valve prevents the stomach acid from moving up into this tube. When you have GERD, this valve does not close tightly enough. If you have mild GERD symptoms including heartburn, you may be able to control the problem with antacids or over-the-counter medicine. Changing your diet, losing weight, and making other lifestyle changes can also help reduce symptoms. Follow-up care is a key part of your treatment and safety. Be sure to make and go to all appointments, and call your doctor if you are having problems. It's also a good idea to know your test results and keep a list of the medicines you take. How can you care for yourself at home? · Take your medicines exactly as prescribed. Call your doctor if you think you are having a problem with your medicine. · Your doctor may recommend over-the-counter medicine. For mild or occasional indigestion, antacids, such as Tums, Gaviscon, Mylanta, or Maalox, may help. Your doctor also may recommend over-the-counter acid reducers, such as Pepcid AC, Tagamet HB, Zantac 75, or Prilosec. Read and follow all instructions on the label. If you use these medicines often, talk with your doctor. · Change your eating habits. ? It's best to eat several small meals instead of two or three large meals. ? After you eat, wait 2 to 3 hours before you lie down. ? Chocolate, mint, and alcohol can make GERD worse. ? Spicy foods, foods that have a lot of acid (like tomatoes and oranges), and coffee can make GERD symptoms worse in some people. If your symptoms are worse after you eat a certain food, you may want to stop eating that food to see if your symptoms get better.   · Do not smoke or chew tobacco. Smoking can make GERD worse. If you need help quitting, talk to your doctor about stop-smoking programs and medicines. These can increase your chances of quitting for good. · If you have GERD symptoms at night, raise the head of your bed 6 to 8 inches by putting the frame on blocks or placing a foam wedge under the head of your mattress. (Adding extra pillows does not work.)  · Do not wear tight clothing around your middle. · Lose weight if you need to. Losing just 5 to 10 pounds can help. When should you call for help? Call your doctor now or seek immediate medical care if:    · You have new or different belly pain.     · Your stools are black and tarlike or have streaks of blood.    Watch closely for changes in your health, and be sure to contact your doctor if:    · Your symptoms have not improved after 2 days.     · Food seems to catch in your throat or chest.   Where can you learn more? Go to http://rosalino-jordan.info/. Enter A612 in the search box to learn more about \"Gastroesophageal Reflux Disease (GERD): Care Instructions. \"  Current as of: November 7, 2018  Content Version: 12.2  © 2848-0998 [employer]. Care instructions adapted under license by [employer] (which disclaims liability or warranty for this information). If you have questions about a medical condition or this instruction, always ask your healthcare professional. [place] [address] any warranty or liability for your use of this information. Patient Education        Sinusitis: Care Instructions  Your Care Instructions    Sinusitis is an infection of the lining of the sinus cavities in your head. Sinusitis often follows a cold. It causes pain and pressure in your head and face. In most cases, sinusitis gets better on its own in 1 to 2 weeks. But some mild symptoms may last for several weeks. Sometimes antibiotics are needed.   Follow-up care is a key part of your treatment and safety. Be sure to make and go to all appointments, and call your doctor if you are having problems. It's also a good idea to know your test results and keep a list of the medicines you take. How can you care for yourself at home? · Take an over-the-counter pain medicine, such as acetaminophen (Tylenol), ibuprofen (Advil, Motrin), or naproxen (Aleve). Read and follow all instructions on the label. · If the doctor prescribed antibiotics, take them as directed. Do not stop taking them just because you feel better. You need to take the full course of antibiotics. · Be careful when taking over-the-counter cold or flu medicines and Tylenol at the same time. Many of these medicines have acetaminophen, which is Tylenol. Read the labels to make sure that you are not taking more than the recommended dose. Too much acetaminophen (Tylenol) can be harmful. · Breathe warm, moist air from a steamy shower, a hot bath, or a sink filled with hot water. Avoid cold, dry air. Using a humidifier in your home may help. Follow the directions for cleaning the machine. · Use saline (saltwater) nasal washes to help keep your nasal passages open and wash out mucus and bacteria. You can buy saline nose drops at a grocery store or drugstore. Or you can make your own at home by adding 1 teaspoon of salt and 1 teaspoon of baking soda to 2 cups of distilled water. If you make your own, fill a bulb syringe with the solution, insert the tip into your nostril, and squeeze gently. Raynaldo Willard your nose. · Put a hot, wet towel or a warm gel pack on your face 3 or 4 times a day for 5 to 10 minutes each time. · Try a decongestant nasal spray like oxymetazoline (Afrin). Do not use it for more than 3 days in a row. Using it for more than 3 days can make your congestion worse. When should you call for help?   Call your doctor now or seek immediate medical care if:    · You have new or worse swelling or redness in your face or around your eyes.     · You have a new or higher fever.    Watch closely for changes in your health, and be sure to contact your doctor if:    · You have new or worse facial pain.     · The mucus from your nose becomes thicker (like pus) or has new blood in it.     · You are not getting better as expected. Where can you learn more? Go to http://rosalino-jordan.info/. Enter J932 in the search box to learn more about \"Sinusitis: Care Instructions. \"  Current as of: October 21, 2018  Content Version: 12.2  © 7401-2737 [employer]wise[employer]orporated. Care instructions adapted under license by [employer] (which disclaims liability or warranty for this information). If you have questions about a medical condition or this instruction, always ask your healthcare professional. [address] any warranty or liability for your use of this information.

## 2019-10-28 NOTE — ED NOTES
Pt reporting headache, abd pain, nasal congestion/pressure and generalized body aches. Pt also reports sore mucous membranes to internal nasal canal. Denies fever/chills. Pt resting on stretcher eating snacks with her child. Emergency Department Nursing Plan of Care       The Nursing Plan of Care is developed from the Nursing assessment and Emergency Department Attending provider initial evaluation. The plan of care may be reviewed in the ED Provider note.     The Plan of Care was developed with the following considerations:   Patient / Family readiness to learn indicated by:verbalized understanding  Persons(s) to be included in education: patient  Barriers to Learning/Limitations:No    Signed     Clif Del Cid RN    10/28/2019   7:42 PM

## 2019-10-29 NOTE — ED PROVIDER NOTES
EMERGENCY DEPARTMENT HISTORY AND PHYSICAL EXAM      Date: 10/28/2019  Patient Name: Frandy Esposito    History of Presenting Illness     Chief Complaint   Patient presents with    Headache    Heartburn     History Provided By: Patient    HPI: Frandy Esposito, 37 y.o. female with depression with anxiety, rhinitis, allergies, GERD, hypertension, diabetes, bipolar disorder, hyperlipidemia, tobacco abuse who presents ambulatory to the ED with cc of acute moderate recurrent burning esophagus pain/epigastric pain X 1 week. Patient endorses symptoms are consistent with acid reflux exacerbations in the past.  Requesting GI cocktail to treat symptoms. Denies any new symptoms including chest pain, shortness of breath, cough, fever, chills, nausea, vomiting, no abdominal pain. Omeprazole without relief. Patient additionally endorses acute recurrent sinus pressure, pain, congestion, frontal headache worsening within the last couple days. No medications or modifying factors for those symptoms. Additionally endorses she is having some sores in her mouth as well as nasal congestion and rhinorrhea. Denies ear pain, sore throat, neck stiffness, epistaxis. No lightheadedness, dizziness, tingling, speech difficulty, seizures, syncope, weakness. PCP: Wilson Lau NP    There are no other complaints, changes, or physical findings at this time. No current facility-administered medications on file prior to encounter. Current Outpatient Medications on File Prior to Encounter   Medication Sig Dispense Refill    busPIRone (BUSPAR) 7.5 mg tablet Take 7.5 mg by mouth daily.  aspirin 81 mg chewable tablet Take 81 mg by mouth daily.  albuterol (PROVENTIL HFA, VENTOLIN HFA, PROAIR HFA) 90 mcg/actuation inhaler Take 1-2 Puffs by inhalation every four (4) hours as needed for Wheezing. 1 Inhaler 1    metFORMIN (GLUCOPHAGE) 1,000 mg tablet Take 1,000 mg by mouth two (2) times daily (with meals).  Indications: type 2 diabetes mellitus      losartan (COZAAR) 25 mg tablet Take 100 mg by mouth daily.  [DISCONTINUED] diazePAM (VALIUM) 2 mg tablet Take 1 Tab by mouth every eight (8) hours as needed (muscle spasm). Max Daily Amount: 6 mg. Indications: muscle spasm 6 Tab 0    [DISCONTINUED] azithromycin (ZITHROMAX Z-FERNANDO) 250 mg tablet Take two tablets today then one tablet daily 6 Tab 0    [DISCONTINUED] fluticasone propionate (FLONASE) 50 mcg/actuation nasal spray 2 Sprays by Both Nostrils route daily. 1 Bottle 0    labetalol (NORMODYNE) 200 mg tablet Take 200 mg by mouth two (2) times a day.  esomeprazole (NEXIUM) 40 mg capsule Take 1 Cap by mouth daily. 20 Cap 0    gabapentin (NEURONTIN) 400 mg capsule Take 400 mg by mouth three (3) times daily. Indications: NEUROPATHIC PAIN      metoclopramide HCl (REGLAN) 5 mg tablet Take 5 mg by mouth Before breakfast, lunch, dinner and at bedtime.        Past History     Past Medical History:  Past Medical History:   Diagnosis Date    Abnormal Pap smear     \"years ago\"     Asthma     bronchitis    Depression with anxiety 2010    Diabetes mellitus     type II diabetes mellitus since     GERD (gastroesophageal reflux disease)     HTN (hypertension) 2010    Ill-defined condition     High Cholesterol    Other and unspecified hyperlipidemia 2010    Postpartum depression     hospitalized after last delivery    Psychiatric disorder     Bipolar    Psychiatric problem     \"mood disorder\"    Rhinitis 2012     Past Surgical History:  Past Surgical History:   Procedure Laterality Date     DELIVERY ONLY      cesearean section 08    HX  SECTION      X 3    HX GYN      , tubal ligation     Family History:  Family History   Problem Relation Age of Onset    Diabetes Mother     Hypertension Mother     Stroke Mother     Heart Disease Mother     Hypertension Father     Stroke Father     Diabetes Father     Kidney Disease Father     Hypertension Maternal Grandmother     Diabetes Maternal Grandmother     Hypertension Maternal Grandfather     Diabetes Maternal Grandfather     Hypertension Paternal Grandmother     Diabetes Paternal Grandmother     Hypertension Paternal Grandfather     Diabetes Paternal Grandfather     Hypertension Sister     Diabetes Sister     Diabetes Sister     Hypertension Sister     Diabetes Sister     Hypertension Sister      Social History:  Social History     Tobacco Use    Smoking status: Former Smoker     Last attempt to quit: 10/16/2015     Years since quittin.0    Smokeless tobacco: Never Used    Tobacco comment: quit 17 days ago   Substance Use Topics    Alcohol use: Not Currently     Frequency: Never     Comment: Occasionally    Drug use: No     Allergies: Allergies   Allergen Reactions    Latex Rash    Latex Hives    Other Food Hives     Citrus Fruits    Flagyl [Metronidazole] Hives and Itching    Lisinopril-Hydrochlorothiazide Angioedema    Clindamycin Rash and Itching    Ciprofloxacin Rash and Itching    Citrate Hives    Cottonseed Oil Rash    Cymbalta [Duloxetine] Hives and Itching    Flexeril [Cyclobenzaprine] Angioedema    Lemon Hives    Pcn [Penicillins] Hives    Sulfa (Sulfonamide Antibiotics) Shortness of Breath    Tomato Hives    Tomato Hives     Review of Systems   Review of Systems   Constitutional: Negative for activity change, chills, fatigue and fever. HENT: Positive for congestion, rhinorrhea, sinus pressure and sinus pain. Negative for drooling, ear discharge, ear pain, facial swelling, postnasal drip, sneezing, sore throat, tinnitus, trouble swallowing and voice change. Eyes: Negative for photophobia, pain, discharge and visual disturbance. Respiratory: Negative for cough, chest tightness, shortness of breath, wheezing and stridor. Cardiovascular: Negative for chest pain, palpitations and leg swelling.    Gastrointestinal: Positive for abdominal pain. Negative for constipation, diarrhea, nausea and vomiting. Genitourinary: Negative. Negative for dysuria and urgency. Musculoskeletal: Negative. Negative for myalgias and neck pain. Skin: Negative. Negative for rash. Neurological: Positive for headaches. Negative for dizziness, tremors, seizures, syncope, facial asymmetry, speech difficulty, weakness, light-headedness and numbness. Psychiatric/Behavioral: Negative. Physical Exam   Physical Exam   Constitutional: She is oriented to person, place, and time. She appears well-developed and well-nourished. No distress. Well-appearing obese -American female lying semisupine in no apparent distress. HENT:   Head: Normocephalic and atraumatic. Right Ear: Hearing, tympanic membrane, external ear and ear canal normal.   Left Ear: Hearing, tympanic membrane, external ear and ear canal normal.   Nose: Mucosal edema, rhinorrhea and sinus tenderness present. No nasal deformity, septal deviation or nasal septal hematoma. No epistaxis. Right sinus exhibits maxillary sinus tenderness. Right sinus exhibits no frontal sinus tenderness. Left sinus exhibits maxillary sinus tenderness. Left sinus exhibits no frontal sinus tenderness. Mouth/Throat: Uvula is midline, oropharynx is clear and moist and mucous membranes are normal.   Eyes: Pupils are equal, round, and reactive to light. Conjunctivae and EOM are normal.   Neck: Normal range of motion. Cardiovascular: Normal rate, regular rhythm, normal heart sounds and intact distal pulses. Pulmonary/Chest: Effort normal and breath sounds normal. No respiratory distress. She has no wheezes. She has no rales. Abdominal: Soft. Bowel sounds are normal. She exhibits no distension. There is no tenderness. There is no rigidity, no rebound, no guarding, no CVA tenderness, no tenderness at McBurney's point and negative Cruz's sign. Obese abdomen. Musculoskeletal: Normal range of motion. Neurological: She is alert and oriented to person, place, and time. She has normal strength. She is not disoriented. No cranial nerve deficit or sensory deficit. GCS eye subscore is 4. GCS verbal subscore is 5. GCS motor subscore is 6. Skin: Skin is warm and dry. She is not diaphoretic. Psychiatric: She has a normal mood and affect. Her behavior is normal. Judgment and thought content normal.   Nursing note and vitals reviewed. Diagnostic Study Results   Labs -     Recent Results (from the past 12 hour(s))   HCG URINE, QL. - POC    Collection Time: 10/28/19  7:30 PM   Result Value Ref Range    Pregnancy test,urine (POC) NEGATIVE  NEG         Radiologic Studies -   No orders to display     No results found. Medical Decision Making   I am the first provider for this patient. I reviewed the vital signs, available nursing notes, past medical history, past surgical history, family history and social history. Vital Signs-Reviewed the patient's vital signs. Patient Vitals for the past 12 hrs:   Temp Pulse Resp BP SpO2   10/28/19 1825 98.3 °F (36.8 °C) 90 16 160/89 93 %     Pulse Oximetry Analysis - 93% on RA  Records Reviewed: Nursing Notes, Old Medical Records, Previous Radiology Studies and Previous Laboratory Studies    Provider Notes (Medical Decision Making):   Patient presents with epigastric abdominal pain, burning chest pain. Differential includes gastritis/GERD, epigastritis, cholelithiasis, cholecystitis, hepatitis, muscular strain, renal pathology. Pt endorses sxs similar to chronic GERD. No new symptoms. Will try a GI cocktail to alleviate symptoms. The pathophysiology of reflux is discussed. Anti-reflux measures such as raising the head of the bed, avoiding tight clothing or belts, avoiding eating late at night and not lying down shortly after mealtime and achieving weight loss are discussed.  Avoid ASA, NSAID's, caffeine, peppermints, alcohol and tobacco. OTC H2 blockers and/or antacids are often very helpful for PRN use. For persisting chronic or daily symptoms, prescription strength H2 blockers or PPI's may be used. She should alert me if there are persistent symptoms, dysphagia, weight loss or GI bleeding. Patient additionally endorses exacerbation of sinus pressure and pain with nasal congestion and rhinorrhea with frontal headache. Symptoms consistent with chronic sinusitis. ED Course:   Initial assessment performed. The patients presenting problems have been discussed, and they are in agreement with the care plan formulated and outlined with them. I have encouraged them to ask questions as they arise throughout their visit. Progress Note:   Updated pt on all returned results and findings. Discussed the importance of proper follow up as referred below along with return precautions. Pt in agreement with the care plan and expresses agreement with and understanding of all items discussed. Disposition:  1941  I have discussed with patient their diagnosis, treatment, and follow up plan. The patient agrees to follow up as outlined in discharge paperwork and also to return to the ED with any worsening. Alan Pedraza PA-C      PLAN:  1. Discharge Medication List as of 10/28/2019  7:33 PM      START taking these medications    Details   loratadine-pseudoephedrine (CLARITIN-D 12 HOUR) 5-120 mg per tablet Take 1 Tab by mouth two (2) times a day., Normal, Disp-30 Tab, R-0      sucralfate (CARAFATE) 1 gram tablet Take 1 Tab by mouth four (4) times daily. , Normal, Disp-20 Tab, R-0         CONTINUE these medications which have CHANGED    Details   azithromycin (ZITHROMAX) 250 mg tablet Take two tablets today then one tablet daily, Normal, Disp-6 Tab, R-0      fluticasone propionate (FLONASE) 50 mcg/actuation nasal spray 2 Sprays by Both Nostrils route daily. , Normal, Disp-1 Bottle, R-0         CONTINUE these medications which have NOT CHANGED    Details   busPIRone (BUSPAR) 7.5 mg tablet Take 7.5 mg by mouth daily. , Historical Med      aspirin 81 mg chewable tablet Take 81 mg by mouth daily. , Historical Med      albuterol (PROVENTIL HFA, VENTOLIN HFA, PROAIR HFA) 90 mcg/actuation inhaler Take 1-2 Puffs by inhalation every four (4) hours as needed for Wheezing., Normal, Disp-1 Inhaler, R-1      metFORMIN (GLUCOPHAGE) 1,000 mg tablet Take 1,000 mg by mouth two (2) times daily (with meals). Indications: type 2 diabetes mellitus, Historical Med      losartan (COZAAR) 25 mg tablet Take 100 mg by mouth daily. , Historical Med      diazePAM (VALIUM) 2 mg tablet Take 1 Tab by mouth every eight (8) hours as needed (muscle spasm). Max Daily Amount: 6 mg. Indications: muscle spasm, Print, Disp-6 Tab, R-0      labetalol (NORMODYNE) 200 mg tablet Take 200 mg by mouth two (2) times a day., Historical Med      esomeprazole (NEXIUM) 40 mg capsule Take 1 Cap by mouth daily. , Print, Disp-20 Cap, R-0      gabapentin (NEURONTIN) 400 mg capsule Take 400 mg by mouth three (3) times daily. Indications: NEUROPATHIC PAIN, Historical Med      metoclopramide HCl (REGLAN) 5 mg tablet Take 5 mg by mouth Before breakfast, lunch, dinner and at bedtime. , Historical Med           2. Follow-up Information     Follow up With Specialties Details Why Contact Info    Aury Hodges MD Internal Medicine Schedule an appointment as soon as possible for a visit in 1 week As needed, If symptoms worsen [address]  [phone]      [address] [place] [address] DEPT OF OTOLARYNGOLOGY  Schedule an appointment as soon as possible for a visit in 1 week As needed [address]    Kel Grissom MD Internal Medicine, Gastroenterology Schedule an appointment as soon as possible for a visit in 1 week As needed, If symptoms worsen [facility]  [address]  [phone]          Return to ED if worse     Diagnosis     Clinical Impression:   1.  Acute recurrent maxillary sinusitis    2. Gastroesophageal reflux disease, esophagitis presence not specified            Please note that this dictation was completed with Dragon, computer voice recognition software. Quite often unanticipated grammatical, syntax, homophones, and other interpretive errors are inadvertently transcribed by the computer software. Please disregard these errors. Additionally, please excuse any errors that have escaped final proofreading.

## 2019-11-07 ENCOUNTER — TELEPHONE (OUTPATIENT)
Dept: SLEEP MEDICINE | Age: 39
End: 2019-11-07

## 2019-11-07 ENCOUNTER — HOSPITAL ENCOUNTER (OUTPATIENT)
Dept: SLEEP MEDICINE | Age: 39
Discharge: HOME | End: 2019-11-07
Payer: MEDICARE

## 2019-11-07 VITALS
WEIGHT: 249.8 LBS | BODY MASS INDEX: 42.65 KG/M2 | SYSTOLIC BLOOD PRESSURE: 140 MMHG | DIASTOLIC BLOOD PRESSURE: 97 MMHG | OXYGEN SATURATION: 97 % | HEIGHT: 64 IN | HEART RATE: 99 BPM

## 2019-11-07 DIAGNOSIS — G47.33 OSA (OBSTRUCTIVE SLEEP APNEA): Primary | ICD-10-CM

## 2019-11-07 PROCEDURE — 95811 POLYSOM 6/>YRS CPAP 4/> PARM: CPT | Performed by: SPECIALIST

## 2019-11-08 NOTE — TELEPHONE ENCOUNTER
Orders Placed This Encounter    POLYSOMNOGRAPHY (CPAP)     Standing Status:   Future     Standing Expiration Date:   5/8/2020     Order Specific Question:   Reason for Exam     Answer:   MIRYAM, Non-refreshed sleep on CPAP

## 2019-11-15 NOTE — ED NOTES
Pt presents to the ED with c/o nasal congestion and a  Frontal headache x2 days. Denies light sensitivity. Reports taking Flonase without relief. No meds PTA. Pt also reported to the provider that she hasn't been checking her blood sugar regularly but has been taking her metformin. Pt is alert, oriented and appropriate. Lung sounds clear bilateral.  
 
Emergency Department Nursing Plan of Care The Nursing Plan of Care is developed from the Nursing assessment and Emergency Department Attending provider initial evaluation. The plan of care may be reviewed in the ED Provider note. The Plan of Care was developed with the following considerations:  
Patient / Family readiness to learn indicated by:verbalized understanding Persons(s) to be included in education: patient Barriers to Learning/Limitations:No 
 
Signed Mark Anthony Boss 4/17/2019   8:32 AM 
 
 Patient is not pregnant (male or female)

## 2019-12-25 ENCOUNTER — APPOINTMENT (OUTPATIENT)
Dept: GENERAL RADIOLOGY | Age: 39
End: 2019-12-25
Attending: EMERGENCY MEDICINE
Payer: MEDICARE

## 2019-12-25 ENCOUNTER — HOSPITAL ENCOUNTER (EMERGENCY)
Age: 39
Discharge: HOME | End: 2019-12-25
Attending: EMERGENCY MEDICINE
Payer: MEDICARE

## 2019-12-25 VITALS
WEIGHT: 250 LBS | TEMPERATURE: 98.3 F | RESPIRATION RATE: 18 BRPM | HEART RATE: 83 BPM | SYSTOLIC BLOOD PRESSURE: 150 MMHG | DIASTOLIC BLOOD PRESSURE: 71 MMHG | BODY MASS INDEX: 42.91 KG/M2 | OXYGEN SATURATION: 99 %

## 2019-12-25 DIAGNOSIS — J06.9 ACUTE URI: Primary | ICD-10-CM

## 2019-12-25 DIAGNOSIS — H66.90 ACUTE OTITIS MEDIA, UNSPECIFIED OTITIS MEDIA TYPE: ICD-10-CM

## 2019-12-25 PROCEDURE — 99283 EMERGENCY DEPT VISIT LOW MDM: CPT

## 2019-12-25 PROCEDURE — 74011250637 HC RX REV CODE- 250/637: Performed by: EMERGENCY MEDICINE

## 2019-12-25 PROCEDURE — 71045 X-RAY EXAM CHEST 1 VIEW: CPT

## 2019-12-25 RX ORDER — AZITHROMYCIN 250 MG/1
TABLET, FILM COATED ORAL
Qty: 6 TAB | Refills: 0 | Status: SHIPPED | OUTPATIENT
Start: 2019-12-25 | End: 2019-12-29 | Stop reason: ALTCHOICE

## 2019-12-25 RX ORDER — IBUPROFEN 800 MG/1
800 TABLET ORAL
Qty: 30 TAB | Refills: 0 | Status: SHIPPED | OUTPATIENT
Start: 2019-12-25 | End: 2020-10-30

## 2019-12-25 RX ORDER — AZITHROMYCIN 500 MG/1
500 TABLET, FILM COATED ORAL
Status: COMPLETED | OUTPATIENT
Start: 2019-12-25 | End: 2019-12-25

## 2019-12-25 RX ORDER — IBUPROFEN 400 MG/1
800 TABLET ORAL ONCE
Status: COMPLETED | OUTPATIENT
Start: 2019-12-25 | End: 2019-12-25

## 2019-12-25 RX ADMIN — IBUPROFEN 800 MG: 400 TABLET ORAL at 02:52

## 2019-12-25 RX ADMIN — AZITHROMYCIN MONOHYDRATE 500 MG: 500 TABLET ORAL at 03:34

## 2019-12-25 NOTE — ED NOTES
Patient  given copy of dc instructions and 2 electronic script(s). Patient  verbalized understanding of instructions and script (s). Patient given a current medication reconciliation form and verbalized understanding of their medications. Patient verbalized understanding of the importance of discussing medications with  his or her physician or clinic they will be following up with. Patient alert and oriented and in no acute distress. Patient discharged home ambulatory.

## 2019-12-25 NOTE — DISCHARGE INSTRUCTIONS
Patient Education        Ear Infection (Otitis Media): Care Instructions  Your Care Instructions    An ear infection may start with a cold and affect the middle ear (otitis media). It can hurt a lot. Most ear infections clear up on their own in a couple of days. Most often you will not need antibiotics. This is because many ear infections are caused by a virus. Antibiotics don't work against a virus. Regular doses of pain medicines are the best way to reduce your fever and help you feel better. Follow-up care is a key part of your treatment and safety. Be sure to make and go to all appointments, and call your doctor if you are having problems. It's also a good idea to know your test results and keep a list of the medicines you take. How can you care for yourself at home? · Take pain medicines exactly as directed. ? If the doctor gave you a prescription medicine for pain, take it as prescribed. ? If you are not taking a prescription pain medicine, take an over-the-counter medicine, such as acetaminophen (Tylenol), ibuprofen (Advil, Motrin), or naproxen (Aleve). Read and follow all instructions on the label. ? Do not take two or more pain medicines at the same time unless the doctor told you to. Many pain medicines have acetaminophen, which is Tylenol. Too much acetaminophen (Tylenol) can be harmful. · Plan to take a full dose of pain reliever before bedtime. Getting enough sleep will help you get better. · Try a warm, moist washcloth on the ear. It may help relieve pain. · If your doctor prescribed antibiotics, take them as directed. Do not stop taking them just because you feel better. You need to take the full course of antibiotics. When should you call for help?   Call your doctor now or seek immediate medical care if:    · You have new or increasing ear pain.     · You have new or increasing pus or blood draining from your ear.     · You have a fever with a stiff neck or a severe headache.    Watch closely for changes in your health, and be sure to contact your doctor if:    · You have new or worse symptoms.     · You are not getting better after taking an antibiotic for 2 days. Where can you learn more? Go to http://rosalino-jordan.info/. Enter J448 in the search box to learn more about \"Ear Infection (Otitis Media): Care Instructions. \"  Current as of: October 21, 2018  Content Version: 12.2  © 0422-6344 [employer]. Care instructions adapted under license by [employer] (which disclaims liability or warranty for this information). If you have questions about a medical condition or this instruction, always ask your healthcare professional. [place] [address] any warranty or liability for your use of this information. Patient Education        Upper Respiratory Infection (Cold): Care Instructions  Your Care Instructions    An upper respiratory infection, or URI, is an infection of the nose, sinuses, or throat. URIs are spread by coughs, sneezes, and direct contact. The common cold is the most frequent kind of URI. The flu and sinus infections are other kinds of URIs. Almost all URIs are caused by viruses. Antibiotics won't cure them. But you can treat most infections with home care. This may include drinking lots of fluids and taking over-the-counter pain medicine. You will probably feel better in 4 to 10 days. The doctor has checked you carefully, but problems can develop later. If you notice any problems or new symptoms, get medical treatment right away. Follow-up care is a key part of your treatment and safety. Be sure to make and go to all appointments, and call your doctor if you are having problems. It's also a good idea to know your test results and keep a list of the medicines you take. How can you care for yourself at home?   · To prevent dehydration, drink plenty of fluids, enough so that your urine is light yellow or clear like water. Choose water and other caffeine-free clear liquids until you feel better. If you have kidney, heart, or liver disease and have to limit fluids, talk with your doctor before you increase the amount of fluids you drink. · Take an over-the-counter pain medicine, such as acetaminophen (Tylenol), ibuprofen (Advil, Motrin), or naproxen (Aleve). Read and follow all instructions on the label. · Before you use cough and cold medicines, check the label. These medicines may not be safe for young children or for people with certain health problems. · Be careful when taking over-the-counter cold or flu medicines and Tylenol at the same time. Many of these medicines have acetaminophen, which is Tylenol. Read the labels to make sure that you are not taking more than the recommended dose. Too much acetaminophen (Tylenol) can be harmful. · Get plenty of rest.  · Do not smoke or allow others to smoke around you. If you need help quitting, talk to your doctor about stop-smoking programs and medicines. These can increase your chances of quitting for good. When should you call for help? Call 911 anytime you think you may need emergency care. For example, call if:    · You have severe trouble breathing.    Call your doctor now or seek immediate medical care if:    · You seem to be getting much sicker.     · You have new or worse trouble breathing.     · You have a new or higher fever.     · You have a new rash.    Watch closely for changes in your health, and be sure to contact your doctor if:    · You have a new symptom, such as a sore throat, an earache, or sinus pain.     · You cough more deeply or more often, especially if you notice more mucus or a change in the color of your mucus.     · You do not get better as expected. Where can you learn more? Go to http://rosalino-jordan.info/. Enter R042 in the search box to learn more about \"Upper Respiratory Infection (Cold): Care Instructions. \"  Current as of: June 9, 2019  Content Version: 12.2  © 5707-4659 [employer]. Care instructions adapted under license by [employer] (which disclaims liability or warranty for this information). If you have questions about a medical condition or this instruction, always ask your healthcare professional. [address] any warranty or liability for your use of this information.

## 2019-12-25 NOTE — ED NOTES
.Pt arrived to ED  with c/o bilateral ear ache and nasal congestion for 2 days. Pt is in no acute distress. Will continue to monitor. See nursing assessment. Safety precautions in place; call light within reach. Emergency Department Nursing Plan of Care       The Nursing Plan of Care is developed from the Nursing assessment and Emergency Department Attending provider initial evaluation. The plan of care may be reviewed in the ED Provider note.     The Plan of Care was developed with the following considerations:   Patient / Family readiness to learn indicated by:verbalized understanding  Persons(s) to be included in education: patient  Barriers to Learning/Limitations:No    Signed     Concepcion Ochoa RN    12/25/2019   2:32 AM

## 2019-12-25 NOTE — ED PROVIDER NOTES
EMERGENCY DEPARTMENT HISTORY AND PHYSICAL EXAM      Date: 12/25/2019  Patient Name: Huber Mckeon    History of Presenting Illness     Chief Complaint   Patient presents with    Cold Symptoms       History Provided By: Patient    HPI: Huber Mckeon, 37 y.o. female with PMHx significant for nasal congestion and chills and cough, presents by private vehicle to the ED with cc of upper respiratory symptoms. This is a 42-year-old female with upper respiratory symptoms during flu season. She has cough congestion nasal congestion and fever. She is surprised to hear that she might have the flu during flu season. There are no other complaints, changes, or physical findings at this time. PCP: Leanne Juarez NP    Current Outpatient Medications   Medication Sig Dispense Refill    azithromycin (ZITHROMAX Z-FERNANDO) 250 mg tablet Take two tablets today then one tablet daily 6 Tab 0    ibuprofen (MOTRIN) 800 mg tablet Take 1 Tab by mouth every eight (8) hours as needed for Pain. 30 Tab 0    aspirin 81 mg chewable tablet Take 81 mg by mouth daily.  albuterol (PROVENTIL HFA, VENTOLIN HFA, PROAIR HFA) 90 mcg/actuation inhaler Take 1-2 Puffs by inhalation every four (4) hours as needed for Wheezing. 1 Inhaler 1    esomeprazole (NEXIUM) 40 mg capsule Take 1 Cap by mouth daily. 20 Cap 0    metFORMIN (GLUCOPHAGE) 1,000 mg tablet Take 1,000 mg by mouth two (2) times daily (with meals). Indications: type 2 diabetes mellitus      losartan (COZAAR) 25 mg tablet Take 100 mg by mouth daily.  metoclopramide HCl (REGLAN) 5 mg tablet Take 5 mg by mouth Before breakfast, lunch, dinner and at bedtime.  azithromycin (ZITHROMAX) 250 mg tablet Take two tablets today then one tablet daily 6 Tab 0    fluticasone propionate (FLONASE) 50 mcg/actuation nasal spray 2 Sprays by Both Nostrils route daily.  1 Bottle 0    loratadine-pseudoephedrine (CLARITIN-D 12 HOUR) 5-120 mg per tablet Take 1 Tab by mouth two (2) times a day. 30 Tab 0    sucralfate (CARAFATE) 1 gram tablet Take 1 Tab by mouth four (4) times daily. 20 Tab 0    busPIRone (BUSPAR) 7.5 mg tablet Take 7.5 mg by mouth daily.  labetalol (NORMODYNE) 200 mg tablet Take 200 mg by mouth two (2) times a day.  gabapentin (NEURONTIN) 400 mg capsule Take 400 mg by mouth three (3) times daily.  Indications: NEUROPATHIC PAIN         Past History     Past Medical History:  Past Medical History:   Diagnosis Date    Abnormal Pap smear     \"years ago\"     Asthma     bronchitis    Bipolar disorder ([facility]     Depression with anxiety 2010    Diabetes mellitus     type II diabetes mellitus since     GERD (gastroesophageal reflux disease)     HTN (hypertension) 2010    Ill-defined condition     High Cholesterol    [facility]     Other and unspecified hyperlipidemia 2010    Postpartum depression     hospitalized after last delivery    Rhinitis 2012       Past Surgical History:  Past Surgical History:   Procedure Laterality Date     DELIVERY ONLY      cesearean section 08    HX  SECTION      X 3    HX GYN      , tubal ligation       Family History:  Family History   Problem Relation Age of Onset    Diabetes Mother     Hypertension Mother     Stroke Mother     Heart Disease Mother     Hypertension Father     Stroke Father     Diabetes Father     Kidney Disease Father     Hypertension Maternal Grandmother     Diabetes Maternal Grandmother     Hypertension Maternal Grandfather     Diabetes Maternal Grandfather     Hypertension Paternal Grandmother     Diabetes Paternal [de-identified]     Hypertension Paternal Grandfather     Diabetes Paternal Grandfather     Hypertension Sister     Diabetes Sister     Diabetes Sister     Hypertension Sister     Diabetes Sister     Hypertension Sister        Social History:  Social History     Tobacco Use    Smoking status: Former Smoker     Last attempt to quit: 10/16/2015     Years since quittin.1    Smokeless tobacco: Never Used    Tobacco comment: quit 17 days ago   Substance Use Topics    Alcohol use: Not Currently     Frequency: Never     Comment: Occasionally    Drug use: No       Allergies: Allergies   Allergen Reactions    Latex Hives     rash    Other Food Hives     Citrus Fruits    Flagyl [Metronidazole] Hives and Itching    Lisinopril-Hydrochlorothiazide Angioedema    Clindamycin Rash and Itching    Ciprofloxacin Rash and Itching    Citrate Hives    Cottonseed Oil Rash    Cymbalta [Duloxetine] Hives and Itching    Flexeril [Cyclobenzaprine] Angioedema    Lemon Hives    Pcn [Penicillins] Hives    Sulfa (Sulfonamide Antibiotics) Shortness of Breath    Tomato Hives         Review of Systems   Review of Systems   Constitutional: Positive for chills, fatigue and fever. HENT: Positive for sinus pressure. Negative for congestion, rhinorrhea, sneezing and sore throat. Respiratory: Positive for cough. Negative for shortness of breath. Cardiovascular: Negative for chest pain. Gastrointestinal: Negative for abdominal pain, nausea and vomiting. Musculoskeletal: Negative for back pain, myalgias and neck stiffness. Skin: Negative for rash. Neurological: Negative for dizziness, weakness and headaches. All other systems reviewed and are negative. Physical Exam   Physical Exam  Vitals signs and nursing note reviewed. Constitutional:       Appearance: Normal appearance. She is well-developed. HENT:      Head: Normocephalic and atraumatic. Eyes:      Conjunctiva/sclera: Conjunctivae normal.   Neck:      Musculoskeletal: Full passive range of motion without pain, normal range of motion and neck supple. Cardiovascular:      Rate and Rhythm: Normal rate and regular rhythm. Pulses: Normal pulses. Heart sounds: Normal heart sounds, S1 normal and S2 normal. No murmur.    Pulmonary:      Effort: Pulmonary effort is normal. No respiratory distress. Breath sounds: Normal breath sounds. No wheezing. Abdominal:      General: Bowel sounds are normal. There is no distension. Palpations: Abdomen is soft. Tenderness: There is no tenderness. There is no rebound. Musculoskeletal: Normal range of motion. Skin:     General: Skin is warm and dry. Findings: No rash. Neurological:      Mental Status: She is alert and oriented to person, place, and time. Psychiatric:         Speech: Speech normal.         Behavior: Behavior normal.         Thought Content: Thought content normal.         Judgment: Judgment normal.         Diagnostic Study Results     Labs -   No results found for this or any previous visit (from the past 12 hour(s)). Radiologic Studies -   XR CHEST PORT   Final Result   IMPRESSION:   No acute process. CT Results  (Last 48 hours)    None        CXR Results  (Last 48 hours)               12/25/19 0253  XR CHEST PORT Final result    Impression:  IMPRESSION:   No acute process. Narrative:  PORTABLE CHEST RADIOGRAPH/S: 12/25/2019 2:53 AM       Clinical history: Cough   INDICATION:   cough   COMPARISON: 8/8/2019       FINDINGS:   AP portable upright view of the chest demonstrates a stable  cardiopericardial   silhouette. The lungs are adequately expanded. There is no edema, effusion,   consolidation, or pneumothorax. The osseous structures are unremarkable. Patient   is on a cardiac monitor. Medical Decision Making   I am the first provider for this patient. I reviewed the vital signs, available nursing notes, past medical history, past surgical history, family history and social history. Vital Signs-Reviewed the patient's vital signs.   Patient Vitals for the past 12 hrs:   Temp Pulse Resp BP SpO2   12/25/19 0330    150/71 99 %   12/25/19 0219 98.3 °F (36.8 °C) 83 18 155/88 99 %         Records Reviewed: Nursing Notes    Provider Notes (Medical Decision Making): URI versus bronchitis versus pneumonia versus influenza    ED Course:   Initial assessment performed. The patients presenting problems have been discussed, and they are in agreement with the care plan formulated and outlined with them. I have encouraged them to ask questions as they arise throughout their visit. Disposition:  Patient informed of results of workup and is comfortable with discharge to home to follow up with PCP. They are instructed to return as needed for worsening condition. PLAN:  1. Discharge Medication List as of 12/25/2019  3:29 AM      START taking these medications    Details   !! azithromycin (ZITHROMAX Z-FERNANDO) 250 mg tablet Take two tablets today then one tablet daily, Normal, Disp-6 Tab, R-0      ibuprofen (MOTRIN) 800 mg tablet Take 1 Tab by mouth every eight (8) hours as needed for Pain., Normal, Disp-30 Tab, R-0       !! - Potential duplicate medications found. Please discuss with provider. CONTINUE these medications which have NOT CHANGED    Details   aspirin 81 mg chewable tablet Take 81 mg by mouth daily. , Historical Med      albuterol (PROVENTIL HFA, VENTOLIN HFA, PROAIR HFA) 90 mcg/actuation inhaler Take 1-2 Puffs by inhalation every four (4) hours as needed for Wheezing., Normal, Disp-1 Inhaler, R-1      esomeprazole (NEXIUM) 40 mg capsule Take 1 Cap by mouth daily. , Print, Disp-20 Cap, R-0      metFORMIN (GLUCOPHAGE) 1,000 mg tablet Take 1,000 mg by mouth two (2) times daily (with meals). Indications: type 2 diabetes mellitus, Historical Med      losartan (COZAAR) 25 mg tablet Take 100 mg by mouth daily. , Historical Med      metoclopramide HCl (REGLAN) 5 mg tablet Take 5 mg by mouth Before breakfast, lunch, dinner and at bedtime. , Historical Med      !! azithromycin (ZITHROMAX) 250 mg tablet Take two tablets today then one tablet daily, Normal, Disp-6 Tab, R-0      fluticasone propionate (FLONASE) 50 mcg/actuation nasal spray 2 Sprays by Both Nostrils route daily. , Normal, Disp-1 Bottle, R-0      loratadine-pseudoephedrine (CLARITIN-D 12 HOUR) 5-120 mg per tablet Take 1 Tab by mouth two (2) times a day., Normal, Disp-30 Tab, R-0      sucralfate (CARAFATE) 1 gram tablet Take 1 Tab by mouth four (4) times daily. , Normal, Disp-20 Tab, R-0      busPIRone (BUSPAR) 7.5 mg tablet Take 7.5 mg by mouth daily. , Historical Med      labetalol (NORMODYNE) 200 mg tablet Take 200 mg by mouth two (2) times a day., Historical Med      gabapentin (NEURONTIN) 400 mg capsule Take 400 mg by mouth three (3) times daily. Indications: NEUROPATHIC PAIN, Historical Med       !! - Potential duplicate medications found. Please discuss with provider. 2.   Follow-up Information     Follow up With Specialties Details Why Contact Info    Lauar Jones NP Nurse Practitioner Schedule an appointment as soon as possible for a visit  [address] [place] [address] - [place] EMERGENCY DEPT Emergency Medicine  As needed, If symptoms worsen [facility]  [phone]        Return to ED if worse     Diagnosis     Clinical Impression:   1. Acute URI    2.  Acute otitis media, unspecified otitis media type

## 2019-12-25 NOTE — ED TRIAGE NOTES
Pt comes in with c/o congestion, cough, and bilateral ear pain x 2 days. Pt reports that daughter has been sick.

## 2019-12-27 ENCOUNTER — HOSPITAL ENCOUNTER (EMERGENCY)
Age: 39
Discharge: HOME | End: 2019-12-28
Attending: EMERGENCY MEDICINE
Payer: MEDICARE

## 2019-12-27 DIAGNOSIS — R10.13 ABDOMINAL PAIN, EPIGASTRIC: Primary | ICD-10-CM

## 2019-12-27 LAB
APPEARANCE UR: ABNORMAL
BACTERIA URNS QL MICRO: ABNORMAL /HPF
BILIRUB UR QL: NEGATIVE
COLOR UR: ABNORMAL
EPITH CASTS URNS QL MICRO: ABNORMAL /LPF
GLUCOSE UR STRIP.AUTO-MCNC: 500 MG/DL
HCG UR QL: NEGATIVE
HGB UR QL STRIP: ABNORMAL
KETONES UR QL STRIP.AUTO: NEGATIVE MG/DL
LEUKOCYTE ESTERASE UR QL STRIP.AUTO: ABNORMAL
MUCOUS THREADS URNS QL MICRO: ABNORMAL /LPF
NITRITE UR QL STRIP.AUTO: NEGATIVE
PH UR STRIP: 6 [PH] (ref 5–8)
PROT UR STRIP-MCNC: NEGATIVE MG/DL
RBC #/AREA URNS HPF: ABNORMAL /HPF (ref 0–5)
SP GR UR REFRACTOMETRY: 1.01 (ref 1–1.03)
UA: UC IF INDICATED,UAUC: ABNORMAL
UROBILINOGEN UR QL STRIP.AUTO: 0.2 EU/DL (ref 0.2–1)
WBC URNS QL MICRO: ABNORMAL /HPF (ref 0–4)

## 2019-12-27 PROCEDURE — 96375 TX/PRO/DX INJ NEW DRUG ADDON: CPT

## 2019-12-27 PROCEDURE — 99284 EMERGENCY DEPT VISIT MOD MDM: CPT

## 2019-12-27 PROCEDURE — 87147 CULTURE TYPE IMMUNOLOGIC: CPT

## 2019-12-27 PROCEDURE — 81001 URINALYSIS AUTO W/SCOPE: CPT

## 2019-12-27 PROCEDURE — 81025 URINE PREGNANCY TEST: CPT

## 2019-12-27 PROCEDURE — 87086 URINE CULTURE/COLONY COUNT: CPT

## 2019-12-27 PROCEDURE — 96374 THER/PROPH/DIAG INJ IV PUSH: CPT

## 2019-12-27 RX ORDER — KETOROLAC TROMETHAMINE 30 MG/ML
15 INJECTION, SOLUTION INTRAMUSCULAR; INTRAVENOUS
Status: COMPLETED | OUTPATIENT
Start: 2019-12-27 | End: 2019-12-28

## 2019-12-28 ENCOUNTER — APPOINTMENT (OUTPATIENT)
Dept: CT IMAGING | Age: 39
End: 2019-12-28
Attending: EMERGENCY MEDICINE
Payer: MEDICARE

## 2019-12-28 VITALS
HEART RATE: 91 BPM | WEIGHT: 245 LBS | OXYGEN SATURATION: 100 % | RESPIRATION RATE: 20 BRPM | TEMPERATURE: 97.9 F | BODY MASS INDEX: 41.83 KG/M2 | DIASTOLIC BLOOD PRESSURE: 74 MMHG | HEIGHT: 64 IN | SYSTOLIC BLOOD PRESSURE: 152 MMHG

## 2019-12-28 LAB
ALBUMIN SERPL-MCNC: 3.5 G/DL (ref 3.5–5)
ALBUMIN/GLOB SERPL: 0.9 {RATIO} (ref 1.1–2.2)
ALP SERPL-CCNC: 99 U/L (ref 45–117)
ALT SERPL-CCNC: 29 U/L (ref 12–78)
ANION GAP SERPL CALC-SCNC: 12 MMOL/L (ref 5–15)
AST SERPL-CCNC: 21 U/L (ref 15–37)
BASOPHILS # BLD: 0 K/UL (ref 0–0.1)
BASOPHILS NFR BLD: 1 % (ref 0–1)
BILIRUB SERPL-MCNC: 0.3 MG/DL (ref 0.2–1)
BUN SERPL-MCNC: 5 MG/DL (ref 6–20)
BUN/CREAT SERPL: 5 (ref 12–20)
CALCIUM SERPL-MCNC: 8.9 MG/DL (ref 8.5–10.1)
CHLORIDE SERPL-SCNC: 100 MMOL/L (ref 97–108)
CO2 SERPL-SCNC: 25 MMOL/L (ref 21–32)
CREAT SERPL-MCNC: 0.96 MG/DL (ref 0.55–1.02)
DIFFERENTIAL METHOD BLD: ABNORMAL
EOSINOPHIL # BLD: 0 K/UL (ref 0–0.4)
EOSINOPHIL NFR BLD: 1 % (ref 0–7)
ERYTHROCYTE [DISTWIDTH] IN BLOOD BY AUTOMATED COUNT: 14.1 % (ref 11.5–14.5)
GLOBULIN SER CALC-MCNC: 3.8 G/DL (ref 2–4)
GLUCOSE SERPL-MCNC: 285 MG/DL (ref 65–100)
HCT VFR BLD AUTO: 33.7 % (ref 35–47)
HGB BLD-MCNC: 10.8 G/DL (ref 11.5–16)
IMM GRANULOCYTES # BLD AUTO: 0 K/UL (ref 0–0.04)
IMM GRANULOCYTES NFR BLD AUTO: 1 % (ref 0–0.5)
LIPASE SERPL-CCNC: 160 U/L (ref 73–393)
LYMPHOCYTES # BLD: 2.1 K/UL (ref 0.8–3.5)
LYMPHOCYTES NFR BLD: 36 % (ref 12–49)
MCH RBC QN AUTO: 24.2 PG (ref 26–34)
MCHC RBC AUTO-ENTMCNC: 32 G/DL (ref 30–36.5)
MCV RBC AUTO: 75.6 FL (ref 80–99)
MONOCYTES # BLD: 0.4 K/UL (ref 0–1)
MONOCYTES NFR BLD: 7 % (ref 5–13)
NEUTS SEG # BLD: 3.3 K/UL (ref 1.8–8)
NEUTS SEG NFR BLD: 54 % (ref 32–75)
NRBC # BLD: 0 K/UL (ref 0–0.01)
NRBC BLD-RTO: 0 PER 100 WBC
PLATELET # BLD AUTO: 220 K/UL (ref 150–400)
PMV BLD AUTO: 10.9 FL (ref 8.9–12.9)
POTASSIUM SERPL-SCNC: 3.5 MMOL/L (ref 3.5–5.1)
PROT SERPL-MCNC: 7.3 G/DL (ref 6.4–8.2)
RBC # BLD AUTO: 4.46 M/UL (ref 3.8–5.2)
SODIUM SERPL-SCNC: 137 MMOL/L (ref 136–145)
WBC # BLD AUTO: 5.9 K/UL (ref 3.6–11)

## 2019-12-28 PROCEDURE — 96375 TX/PRO/DX INJ NEW DRUG ADDON: CPT

## 2019-12-28 PROCEDURE — 74011636320 HC RX REV CODE- 636/320

## 2019-12-28 PROCEDURE — 74177 CT ABD & PELVIS W/CONTRAST: CPT

## 2019-12-28 PROCEDURE — 74011250636 HC RX REV CODE- 250/636: Performed by: EMERGENCY MEDICINE

## 2019-12-28 PROCEDURE — 36415 COLL VENOUS BLD VENIPUNCTURE: CPT

## 2019-12-28 PROCEDURE — 83690 ASSAY OF LIPASE: CPT

## 2019-12-28 PROCEDURE — 96374 THER/PROPH/DIAG INJ IV PUSH: CPT

## 2019-12-28 PROCEDURE — 80053 COMPREHEN METABOLIC PANEL: CPT

## 2019-12-28 PROCEDURE — 85025 COMPLETE CBC W/AUTO DIFF WBC: CPT

## 2019-12-28 RX ORDER — ONDANSETRON 2 MG/ML
4 INJECTION INTRAMUSCULAR; INTRAVENOUS
Status: COMPLETED | OUTPATIENT
Start: 2019-12-28 | End: 2019-12-28

## 2019-12-28 RX ORDER — MORPHINE SULFATE 2 MG/ML
INJECTION, SOLUTION INTRAMUSCULAR; INTRAVENOUS
Status: DISCONTINUED
Start: 2019-12-28 | End: 2019-12-28 | Stop reason: HOSPADM

## 2019-12-28 RX ORDER — SODIUM CHLORIDE 0.9 % (FLUSH) 0.9 %
10 SYRINGE (ML) INJECTION
Status: COMPLETED | OUTPATIENT
Start: 2019-12-28 | End: 2019-12-28

## 2019-12-28 RX ORDER — SODIUM CHLORIDE 0.9 % (FLUSH) 0.9 %
SYRINGE (ML) INJECTION
Status: COMPLETED
Start: 2019-12-28 | End: 2019-12-28

## 2019-12-28 RX ORDER — MORPHINE SULFATE 2 MG/ML
2 INJECTION, SOLUTION INTRAMUSCULAR; INTRAVENOUS
Status: COMPLETED | OUTPATIENT
Start: 2019-12-28 | End: 2019-12-28

## 2019-12-28 RX ADMIN — KETOROLAC TROMETHAMINE 15 MG: 30 INJECTION, SOLUTION INTRAMUSCULAR; INTRAVENOUS at 00:09

## 2019-12-28 RX ADMIN — IOPAMIDOL 100 ML: 755 INJECTION, SOLUTION INTRAVENOUS at 02:08

## 2019-12-28 RX ADMIN — Medication 10 ML: at 02:09

## 2019-12-28 RX ADMIN — ONDANSETRON 4 MG: 2 SOLUTION INTRAMUSCULAR; INTRAVENOUS at 00:49

## 2019-12-28 RX ADMIN — MORPHINE SULFATE 2 MG: 2 INJECTION, SOLUTION INTRAMUSCULAR; INTRAVENOUS at 01:40

## 2019-12-28 NOTE — ED TRIAGE NOTES
Pt. C/o abdominal pain intermittently x3 weeks worsening tonight. Pt. States she saw PCP and an abdominal xray is scheduled for 1-24-19. Pt. States PCP stated may be GB. Pt. Also reports nausea.

## 2019-12-28 NOTE — ED NOTES
Pt states \"you don't know what you're doing, I want an older nurse who knows what she's doing to stick me. \"

## 2019-12-28 NOTE — DISCHARGE INSTRUCTIONS
Patient Education        Indigestion (Dyspepsia or Heartburn): Care Instructions  Your Care Instructions  Sometimes it can be hard to pinpoint the cause of indigestion. (It is also called dyspepsia or heartburn.) Most cases of an upset stomach with bloating, burning, burping, and nausea are minor and go away within several hours. Home treatment and over-the-counter medicine often are able to control symptoms. But if you take medicine to relieve your indigestion without making diet and lifestyle changes, your symptoms are likely to return again and again. If you get indigestion often, it may be a sign of a more serious medical problem. Be sure to follow up with your doctor, who may want to do tests to be sure of the cause of your indigestion. Follow-up care is a key part of your treatment and safety. Be sure to make and go to all appointments, and call your doctor if you are having problems. It's also a good idea to know your test results and keep a list of the medicines you take. How can you care for yourself at home? · Your doctor may recommend over-the-counter medicine. For mild or occasional indigestion, antacids such as Gaviscon, Mylanta, Maalox, or Tums, may help. Be safe with medicines. Be careful when you take over-the-counter antacid medicines. Many of these medicines have aspirin in them. Read the label to make sure that you are not taking more than the recommended dose. Too much aspirin can be harmful. · Your doctor also may recommend over-the-counter acid reducers, such as Pepcid AC, Tagamet HB, Zantac 75, or Prilosec. Read and follow all instructions on the label. If you use these medicines often, talk with your doctor. · Change your eating habits. ? It's best to eat several small meals instead of two or three large meals. ? After you eat, wait 2 to 3 hours before you lie down. ? Chocolate, mint, and alcohol can make GERD worse. ?  Spicy foods, foods that have a lot of acid (like tomatoes and oranges), and coffee can make GERD symptoms worse in some people. If your symptoms are worse after you eat a certain food, you may want to stop eating that food to see if your symptoms get better. · Do not smoke or chew tobacco. Smoking can make GERD worse. If you need help quitting, talk to your doctor about stop-smoking programs and medicines. These can increase your chances of quitting for good. · If you have GERD symptoms at night, raise the head of your bed 6 to 8 inches. You can do this by putting the frame on blocks or placing a foam wedge under the head of your mattress. (Adding extra pillows does not work.)  · Do not wear tight clothing around your middle. · Lose weight if you need to. Losing just 5 to 10 pounds can help. · Do not take anti-inflammatory medicines, such as aspirin, ibuprofen (Advil, Motrin), or naproxen (Aleve). These can irritate the stomach. If you need a pain medicine, try acetaminophen (Tylenol), which does not cause stomach upset. When should you call for help? Call your doctor now or seek immediate medical care if:    · You have new or worse belly pain.     · You are vomiting.    Watch closely for changes in your health, and be sure to contact your doctor if:    · You have new or worse symptoms of indigestion.     · You have trouble or pain swallowing.     · You are losing weight.     · You do not get better as expected. Where can you learn more? Go to http://rosalino-jordan.info/. Enter K123 in the search box to learn more about \"Indigestion (Dyspepsia or Heartburn): Care Instructions. \"  Current as of: November 7, 2018  Content Version: 12.2  © 1224-3877 [employer]. Care instructions adapted under license by [employer] (which disclaims liability or warranty for this information).  If you have questions about a medical condition or this instruction, always ask your healthcare professional. Damaris Kelly any warranty or liability for your use of this information.

## 2019-12-28 NOTE — ED NOTES
Pt reports intermittent abd pain x3wks that she states is \"all over\" and not specified to a particular region. Pt has hx of GI problems, has scheduled KUB and plans for endoscopy after. Pt also reports nausea. Denies vomiting, denies diarrhea. Pain is reproducible upon palpation. Warm blanket offered, call bell within reach, safety precautions in place, bed locked and in the lowest position. Emergency Department Nursing Plan of Care       The Nursing Plan of Care is developed from the Nursing assessment and Emergency Department Attending provider initial evaluation. The plan of care may be reviewed in the ED Provider note.     The Plan of Care was developed with the following considerations:   Patient / Family readiness to learn indicated by:verbalized understanding  Persons(s) to be included in education: patient  Barriers to Learning/Limitations:No    Signed     Nevaeh Chilel RN    12/28/2019   1:01 AM

## 2019-12-28 NOTE — ED NOTES
Hourly rounding completed on this pt. Offered assistance for toileting or hygiene at this time. Warm blanket offered, call bell within reach, safety precautions in place, bed locked and in the lowest position.

## 2019-12-28 NOTE — ED PROVIDER NOTES
EMERGENCY DEPARTMENT HISTORY AND PHYSICAL EXAM      Date: 2019  Patient Name: Chris Almonte    History of Presenting Illness     Chief Complaint   Patient presents with    Abdominal Pain       History Provided By: Patient    HPI: Chris Almonte, 37 y.o. female with PMHx significant for GERD, asthma, bipolar disorder who presents with a chief complaint of abdominal pain. Patient states that she has been having waxing and waning abdominal pain for about the last 3 weeks. She reports the pain got much worse today with associated nausea. It is located in her upper abdomen. She reports a history of \"stomach problems\" and states that she is followed by GI but cannot be more specific about her diagnosis. Is supposed to have an endoscopy next month. Notes that she is supposed to take Nexium daily but has not taken it for the last few days. Has a history of a  but no other abdominal surgeries. Denies cough or cold symptoms, urinary symptoms. PCP: Mary Grace Bass, NP    There are no other complaints, changes, or physical findings at this time. Current Outpatient Medications   Medication Sig Dispense Refill    azithromycin (ZITHROMAX Z-FERNANDO) 250 mg tablet Take two tablets today then one tablet daily 6 Tab 0    aspirin 81 mg chewable tablet Take 81 mg by mouth daily.  albuterol (PROVENTIL HFA, VENTOLIN HFA, PROAIR HFA) 90 mcg/actuation inhaler Take 1-2 Puffs by inhalation every four (4) hours as needed for Wheezing. 1 Inhaler 1    esomeprazole (NEXIUM) 40 mg capsule Take 1 Cap by mouth daily. 20 Cap 0    metFORMIN (GLUCOPHAGE) 1,000 mg tablet Take 1,000 mg by mouth two (2) times daily (with meals). Indications: type 2 diabetes mellitus      losartan (COZAAR) 25 mg tablet Take 100 mg by mouth daily.  metoclopramide HCl (REGLAN) 5 mg tablet Take 5 mg by mouth Before breakfast, lunch, dinner and at bedtime.       ibuprofen (MOTRIN) 800 mg tablet Take 1 Tab by mouth every eight (8) hours as needed for Pain. 30 Tab 0    azithromycin (ZITHROMAX) 250 mg tablet Take two tablets today then one tablet daily 6 Tab 0    fluticasone propionate (FLONASE) 50 mcg/actuation nasal spray 2 Sprays by Both Nostrils route daily. 1 Bottle 0    loratadine-pseudoephedrine (CLARITIN-D 12 HOUR) 5-120 mg per tablet Take 1 Tab by mouth two (2) times a day. 30 Tab 0    sucralfate (CARAFATE) 1 gram tablet Take 1 Tab by mouth four (4) times daily. 20 Tab 0    busPIRone (BUSPAR) 7.5 mg tablet Take 7.5 mg by mouth daily.  labetalol (NORMODYNE) 200 mg tablet Take 200 mg by mouth two (2) times a day.  gabapentin (NEURONTIN) 400 mg capsule Take 400 mg by mouth three (3) times daily.  Indications: NEUROPATHIC PAIN       Past History     Past Medical History:  Past Medical History:   Diagnosis Date    Abnormal Pap smear     \"years ago\"     Asthma     bronchitis    Bipolar disorder ([facility]     Depression with anxiety 2010    Diabetes mellitus     type II diabetes mellitus since     GERD (gastroesophageal reflux disease)     HTN (hypertension) 2010    Ill-defined condition     High Cholesterol    Mood disorder ([facility]     Other and unspecified hyperlipidemia 2010    Postpartum depression     hospitalized after last delivery    Rhinitis 2012     Past Surgical History:  Past Surgical History:   Procedure Laterality Date     DELIVERY ONLY      cesearean section 08    HX  SECTION      X 3    HX GYN      , tubal ligation     Family History:  Family History   Problem Relation Age of Onset    Diabetes Mother     Hypertension Mother     Stroke Mother     Heart Disease Mother     Hypertension Father     Stroke Father     Diabetes Father     Kidney Disease Father     Hypertension Maternal Grandmother     Diabetes Maternal Grandmother     Hypertension Maternal Grandfather     Diabetes Maternal Grandfather     Hypertension Paternal Grandmother    Raya Solis Diabetes Paternal Grandmother     Hypertension Paternal Grandfather     Diabetes Paternal Grandfather     Hypertension Sister     Diabetes Sister     Diabetes Sister     Hypertension Sister     Diabetes Sister     Hypertension Sister      Social History:  Social History     Tobacco Use    Smoking status: Former Smoker     Last attempt to quit: 10/16/2015     Years since quittin.2    Smokeless tobacco: Never Used    Tobacco comment: quit 17 days ago   Substance Use Topics    Alcohol use: Not Currently     Frequency: Never     Comment: Occasionally    Drug use: No     Allergies: Allergies   Allergen Reactions    Latex Hives     rash    Other Food Hives     Citrus Fruits    Flagyl [Metronidazole] Hives and Itching    Lisinopril-Hydrochlorothiazide Angioedema    Clindamycin Rash and Itching    Ciprofloxacin Rash and Itching    Citrate Hives    Cottonseed Oil Rash    Cymbalta [Duloxetine] Hives and Itching    Flexeril [Cyclobenzaprine] Angioedema    Lemon Hives    Pcn [Penicillins] Hives    Sulfa (Sulfonamide Antibiotics) Shortness of Breath    Tomato Hives     Review of Systems   Review of Systems   Constitutional: Negative for chills and fever. HENT: Negative for congestion, rhinorrhea and sore throat. Respiratory: Negative for cough and shortness of breath. Cardiovascular: Negative for chest pain. Gastrointestinal: Positive for abdominal pain and nausea. Negative for vomiting. Genitourinary: Negative for dysuria and urgency. Skin: Negative for rash. Neurological: Negative for dizziness, light-headedness and headaches. All other systems reviewed and are negative. Physical Exam   Physical Exam  Vitals signs and nursing note reviewed. Constitutional:       General: She is not in acute distress. Appearance: She is well-developed. HENT:      Head: Normocephalic and atraumatic.    Eyes:      Conjunctiva/sclera: Conjunctivae normal.      Pupils: Pupils are equal, round, and reactive to light. Neck:      Musculoskeletal: Normal range of motion. Cardiovascular:      Rate and Rhythm: Normal rate and regular rhythm. Pulmonary:      Effort: Pulmonary effort is normal. No respiratory distress. Breath sounds: Normal breath sounds. No stridor. Abdominal:      General: There is no distension. Palpations: Abdomen is soft. Tenderness: There is tenderness in the right upper quadrant, epigastric area and left upper quadrant. Musculoskeletal: Normal range of motion. Skin:     General: Skin is warm and dry. Neurological:      Mental Status: She is alert and oriented to person, place, and time.        Diagnostic Study Results   Labs -     Recent Results (from the past 12 hour(s))   URINALYSIS W/ REFLEX CULTURE    Collection Time: 12/27/19 10:44 PM   Result Value Ref Range    Color YELLOW/STRAW      Appearance CLOUDY (A) CLEAR      Specific gravity 1.010 1.003 - 1.030      pH (UA) 6.0 5.0 - 8.0      Protein NEGATIVE  NEG mg/dL    Glucose 500 (A) NEG mg/dL    Ketone NEGATIVE  NEG mg/dL    Bilirubin NEGATIVE  NEG      Blood TRACE (A) NEG      Urobilinogen 0.2 0.2 - 1.0 EU/dL    Nitrites NEGATIVE  NEG      Leukocyte Esterase TRACE (A) NEG      WBC 5-10 0 - 4 /hpf    RBC 0-5 0 - 5 /hpf    Epithelial cells FEW FEW /lpf    Bacteria 1+ (A) NEG /hpf    UA:UC IF INDICATED URINE CULTURE ORDERED (A) CNI      Mucus 1+ (A) NEG /lpf   HCG URINE, QL. - POC    Collection Time: 12/27/19 10:45 PM   Result Value Ref Range    Pregnancy test,urine (POC) NEGATIVE  NEG     CBC WITH AUTOMATED DIFF    Collection Time: 12/28/19 12:03 AM   Result Value Ref Range    WBC 5.9 3.6 - 11.0 K/uL    RBC 4.46 3.80 - 5.20 M/uL    HGB 10.8 (L) 11.5 - 16.0 g/dL    HCT 33.7 (L) 35.0 - 47.0 %    MCV 75.6 (L) 80.0 - 99.0 FL    MCH 24.2 (L) 26.0 - 34.0 PG    MCHC 32.0 30.0 - 36.5 g/dL    RDW 14.1 11.5 - 14.5 %    PLATELET 921 705 - 611 K/uL    MPV 10.9 8.9 - 12.9 FL    NRBC 0.0 0  WBC    ABSOLUTE NRBC 0.00 0.00 - 0.01 K/uL    NEUTROPHILS 54 32 - 75 %    LYMPHOCYTES 36 12 - 49 %    MONOCYTES 7 5 - 13 %    EOSINOPHILS 1 0 - 7 %    BASOPHILS 1 0 - 1 %    IMMATURE GRANULOCYTES 1 (H) 0.0 - 0.5 %    ABS. NEUTROPHILS 3.3 1.8 - 8.0 K/UL    ABS. LYMPHOCYTES 2.1 0.8 - 3.5 K/UL    ABS. MONOCYTES 0.4 0.0 - 1.0 K/UL    ABS. EOSINOPHILS 0.0 0.0 - 0.4 K/UL    ABS. BASOPHILS 0.0 0.0 - 0.1 K/UL    ABS. IMM. GRANS. 0.0 0.00 - 0.04 K/UL    DF AUTOMATED     METABOLIC PANEL, COMPREHENSIVE    Collection Time: 12/28/19 12:03 AM   Result Value Ref Range    Sodium 137 136 - 145 mmol/L    Potassium 3.5 3.5 - 5.1 mmol/L    Chloride 100 97 - 108 mmol/L    CO2 25 21 - 32 mmol/L    Anion gap 12 5 - 15 mmol/L    Glucose 285 (H) 65 - 100 mg/dL    BUN 5 (L) 6 - 20 MG/DL    Creatinine 0.96 0.55 - 1.02 MG/DL    BUN/Creatinine ratio 5 (L) 12 - 20      GFR est AA >60 >60 ml/min/1.73m2    GFR est non-AA >60 >60 ml/min/1.73m2    Calcium 8.9 8.5 - 10.1 MG/DL    Bilirubin, total 0.3 0.2 - 1.0 MG/DL    ALT (SGPT) 29 12 - 78 U/L    AST (SGOT) 21 15 - 37 U/L    Alk. phosphatase 99 45 - 117 U/L    Protein, total 7.3 6.4 - 8.2 g/dL    Albumin 3.5 3.5 - 5.0 g/dL    Globulin 3.8 2.0 - 4.0 g/dL    A-G Ratio 0.9 (L) 1.1 - 2.2     LIPASE    Collection Time: 12/28/19 12:03 AM   Result Value Ref Range    Lipase 160 73 - 393 U/L       Radiologic Studies -   CT ABD PELV W CONT   Final Result   IMPRESSION:   No acute intraperitoneal process        Ct Abd Pelv W Cont    Result Date: 12/28/2019  IMPRESSION: No acute intraperitoneal process    Medical Decision Making   I am the first provider for this patient. I reviewed the vital signs, available nursing notes, past medical history, past surgical history, family history and social history. Vital Signs-Reviewed the patient's vital signs.   Patient Vitals for the past 12 hrs:   Temp Pulse Resp BP SpO2   12/28/19 0133     100 %   12/28/19 0132    152/74    12/27/19 2131 97.9 °F (36.6 °C) 91 20 (!) 153/114 96 %       Pulse Oximetry Analysis - 100% on RA      Records Reviewed: Nursing Notes and Old Medical Records    Provider Notes (Medical Decision Making):   Ddx: GERD, gastritis, pancreatitis, cholecystitis, colitis, diverticulitis, UTI    Plan for basic lab work, lipase, urinalysis, CT abdomen, pain and nausea medication. Reevaluation pending labs and imaging    ED Course:   Initial assessment performed. The patients presenting problems have been discussed, and they are in agreement with the care plan formulated and outlined with them. I have encouraged them to ask questions as they arise throughout their visit. patient feeling better. Labs and imaging unremarkable. Advised close follow up with PCP and GI. Also advised she take her nexium daily as prescribed     Critical Care:  none    Disposition:  Discharge Note:  2:33 AM  The patient has been re-evaluated and is ready for discharge. Reviewed available results with patient. Counseled patient on diagnosis and care plan. Patient has expressed understanding, and all questions have been answered. Patient agrees with plan and agrees to follow up as recommended, or to return to the ED if their symptoms worsen. Discharge instructions have been provided and explained to the patient, along with reasons to return to the ED. PLAN:  1. Discharge Medication List as of 12/28/2019  2:33 AM        2.    Follow-up Information     Follow up With Specialties Details Why Contact Info    Surinder Dominguez NP Nurse Practitioner Schedule an appointment as soon as possible for a visit  [address] [facility] [address] [facility]      Corie Cook MD Gastroenterology Schedule an appointment as soon as possible for a visit  [address] [facility] [phone]      [facility] EMERGENCY DEPT Emergency Medicine  As needed, If symptoms worsen Mohan Alvarez  [phone]        Return to ED if worse     Diagnosis Clinical Impression:   1. Abdominal pain, epigastric        This note will not be viewable in [employer]. Please note that this dictation was completed with [employer], the computer voice recognition software. Quite often unanticipated grammatical, syntax, homophones, and other interpretive errors are inadvertently transcribed by the computer software. Please disregard these errors.   Please excuse any errors that have escaped final proofreading

## 2019-12-29 ENCOUNTER — HOSPITAL ENCOUNTER (EMERGENCY)
Age: 39
Discharge: HOME | End: 2019-12-30
Attending: EMERGENCY MEDICINE
Payer: MEDICARE

## 2019-12-29 VITALS
OXYGEN SATURATION: 99 % | WEIGHT: 243 LBS | HEART RATE: 86 BPM | SYSTOLIC BLOOD PRESSURE: 138 MMHG | BODY MASS INDEX: 41.48 KG/M2 | HEIGHT: 64 IN | DIASTOLIC BLOOD PRESSURE: 77 MMHG | RESPIRATION RATE: 20 BRPM | TEMPERATURE: 98.5 F

## 2019-12-29 DIAGNOSIS — N30.90 CYSTITIS: Primary | ICD-10-CM

## 2019-12-29 DIAGNOSIS — H92.03 OTALGIA OF BOTH EARS: ICD-10-CM

## 2019-12-29 DIAGNOSIS — R11.0 NAUSEA WITHOUT VOMITING: ICD-10-CM

## 2019-12-29 LAB
BACTERIA SPEC CULT: ABNORMAL
BACTERIA SPEC CULT: ABNORMAL
CC UR VC: ABNORMAL
SERVICE CMNT-IMP: ABNORMAL

## 2019-12-29 PROCEDURE — 99283 EMERGENCY DEPT VISIT LOW MDM: CPT

## 2019-12-29 RX ORDER — NITROFURANTOIN 25; 75 MG/1; MG/1
100 CAPSULE ORAL
Status: COMPLETED | OUTPATIENT
Start: 2019-12-29 | End: 2019-12-30

## 2019-12-29 RX ORDER — CEPHALEXIN 500 MG/1
500 CAPSULE ORAL 4 TIMES DAILY
Qty: 28 CAP | Refills: 0 | Status: SHIPPED | OUTPATIENT
Start: 2019-12-29 | End: 2020-01-05

## 2019-12-29 RX ORDER — DIPHENHYDRAMINE HCL 25 MG
50 CAPSULE ORAL
Qty: 100 CAP | Refills: 0 | Status: SHIPPED | OUTPATIENT
Start: 2019-12-29 | End: 2020-01-08

## 2019-12-29 RX ORDER — NITROFURANTOIN 25; 75 MG/1; MG/1
100 CAPSULE ORAL 2 TIMES DAILY
Qty: 10 CAP | Refills: 0 | Status: SHIPPED | OUTPATIENT
Start: 2019-12-29 | End: 2020-01-03

## 2019-12-29 RX ORDER — ONDANSETRON 4 MG/1
4 TABLET, ORALLY DISINTEGRATING ORAL
Qty: 10 TAB | Refills: 0 | OUTPATIENT
Start: 2019-12-29 | End: 2019-12-30

## 2019-12-29 RX ORDER — TRIPROLIDINE/PSEUDOEPHEDRINE 2.5MG-60MG
600 TABLET ORAL
Status: DISCONTINUED | OUTPATIENT
Start: 2019-12-29 | End: 2019-12-30 | Stop reason: HOSPADM

## 2019-12-29 RX ORDER — EPINEPHRINE 0.3 MG/.3ML
0.3 INJECTION SUBCUTANEOUS
Qty: 0.3 ML | Refills: 0 | Status: SHIPPED | OUTPATIENT
Start: 2019-12-29 | End: 2019-12-29

## 2019-12-29 RX ORDER — ONDANSETRON 4 MG/1
4 TABLET, ORALLY DISINTEGRATING ORAL
Status: DISCONTINUED | OUTPATIENT
Start: 2019-12-29 | End: 2019-12-30 | Stop reason: HOSPADM

## 2019-12-29 RX ORDER — FLUCONAZOLE 150 MG/1
150 TABLET ORAL
Qty: 1 TAB | Refills: 1 | Status: SHIPPED | OUTPATIENT
Start: 2019-12-29 | End: 2019-12-29

## 2019-12-30 PROCEDURE — 74011250637 HC RX REV CODE- 250/637: Performed by: EMERGENCY MEDICINE

## 2019-12-30 RX ORDER — PROMETHAZINE HYDROCHLORIDE 25 MG/1
25 TABLET ORAL
Qty: 12 TAB | Refills: 0 | Status: SHIPPED | OUTPATIENT
Start: 2019-12-30 | End: 2020-10-05

## 2019-12-30 RX ORDER — FLUCONAZOLE 150 MG/1
150 TABLET ORAL
Status: COMPLETED | OUTPATIENT
Start: 2019-12-30 | End: 2019-12-30

## 2019-12-30 RX ORDER — PROMETHAZINE HYDROCHLORIDE 25 MG/1
25 TABLET ORAL
Status: COMPLETED | OUTPATIENT
Start: 2019-12-30 | End: 2019-12-30

## 2019-12-30 RX ADMIN — PROMETHAZINE HYDROCHLORIDE 25 MG: 25 TABLET ORAL at 00:32

## 2019-12-30 RX ADMIN — FLUCONAZOLE 150 MG: 150 TABLET ORAL at 00:32

## 2019-12-30 RX ADMIN — NITROFURANTOIN MONOHYDRATE/MACROCRYSTALLINE 100 MG: 25; 75 CAPSULE ORAL at 00:10

## 2019-12-30 NOTE — DISCHARGE INSTRUCTIONS
Patient Education        Earache: Care Instructions  Your Care Instructions    Even though infection is a common cause of ear pain, not all ear pain means an infection. If you have ear pain and don't have an infection, it could be because of a jaw problem, such as temporomandibular joint (TMJ) pain. Or it could be because of a neck problem. When ear discomfort or pain is mild or comes and goes without other symptoms, home treatment may be all you need. Follow-up care is a key part of your treatment and safety. Be sure to make and go to all appointments, and call your doctor if you are having problems. It's also a good idea to know your test results and keep a list of the medicines you take. How can you care for yourself at home? · Apply heat on the ear to ease pain. To apply heat, put a warm water bottle, a heating pad set on low, or a warm cloth on your ear. Do not go to sleep with a heating pad on your skin. · Take an over-the-counter pain medicine, such as acetaminophen (Tylenol), ibuprofen (Advil, Motrin), or naproxen (Aleve). Be safe with medicines. Read and follow all instructions on the label. · Do not take two or more pain medicines at the same time unless the doctor told you to. Many pain medicines have acetaminophen, which is Tylenol. Too much acetaminophen (Tylenol) can be harmful. · Never insert anything, such as a cotton swab or a valeriano pin, into the ear. When should you call for help? Call your doctor now or seek immediate medical care if:    · You have new or worse symptoms of infection, such as:  ? Increased pain, swelling, warmth, or redness. ? Red streaks leading from the area. ? Pus draining from the area. ? A fever.    Watch closely for changes in your health, and be sure to contact your doctor if:    · You have new or worse discharge coming from the ear.     · You do not get better as expected. Where can you learn more? Go to http://rosalino-jordan.info/.   Enter J539 in the search box to learn more about \"Earache: Care Instructions. \"  Current as of: October 21, 2018  Content Version: 12.2  © 4812-2158 [employer]. Care instructions adapted under license by [employer] (which disclaims liability or warranty for this information). If you have questions about a medical condition or this instruction, always ask your healthcare professional. [address] any warranty or liability for your use of this information. Patient Education        Nausea and Vomiting: Care Instructions  Your Care Instructions    When you are nauseated, you may feel weak and sweaty and notice a lot of saliva in your mouth. Nausea often leads to vomiting. Most of the time you do not need to worry about nausea and vomiting, but they can be signs of other illnesses. Two common causes of nausea and vomiting are stomach flu and food poisoning. Nausea and vomiting from viral stomach flu will usually start to improve within 24 hours. Nausea and vomiting from food poisoning may last from 12 to 48 hours. The doctor has checked you carefully, but problems can develop later. If you notice any problems or new symptoms, get medical treatment right away. Follow-up care is a key part of your treatment and safety. Be sure to make and go to all appointments, and call your doctor if you are having problems. It's also a good idea to know your test results and keep a list of the medicines you take. How can you care for yourself at home? · To prevent dehydration, drink plenty of fluids, enough so that your urine is light yellow or clear like water. Choose water and other caffeine-free clear liquids until you feel better. If you have kidney, heart, or liver disease and have to limit fluids, talk with your doctor before you increase the amount of fluids you drink. · Rest in bed until you feel better.   · When you are able to eat, try clear soups, mild foods, and liquids until all symptoms are gone for 12 to 48 hours. Other good choices include dry toast, crackers, cooked cereal, and gelatin dessert, such as Jell-O. When should you call for help? Call 911 anytime you think you may need emergency care. For example, call if:    · You passed out (lost consciousness).    Call your doctor now or seek immediate medical care if:    · You have symptoms of dehydration, such as:  ? Dry eyes and a dry mouth. ? Passing only a little dark urine. ? Feeling thirstier than usual.     · You have new or worsening belly pain.     · You have a new or higher fever.     · You vomit blood or what looks like coffee grounds.    Watch closely for changes in your health, and be sure to contact your doctor if:    · You have ongoing nausea and vomiting.     · Your vomiting is getting worse.     · Your vomiting lasts longer than 2 days.     · You are not getting better as expected. Where can you learn more? Go to http://rosalino-jordan.info/. Enter 25 [id number] in the search box to learn more about \"Nausea and Vomiting: Care Instructions. \"  Current as of: June 26, 2019  Content Version: 12.2  © 4308-0409 [employer]. Care instructions adapted under license by [employer] (which disclaims liability or warranty for this information). If you have questions about a medical condition or this instruction, always ask your healthcare professional. [place] [address] any warranty or liability for your use of this information. Patient Education        Urinary Tract Infection in Women: Care Instructions  Your Care Instructions    A urinary tract infection, or UTI, is a general term for an infection anywhere between the kidneys and the urethra (where urine comes out). Most UTIs are bladder infections. They often cause pain or burning when you urinate. UTIs are caused by bacteria and can be cured with antibiotics.  Be sure to complete your treatment so that the infection goes away. Follow-up care is a key part of your treatment and safety. Be sure to make and go to all appointments, and call your doctor if you are having problems. It's also a good idea to know your test results and keep a list of the medicines you take. How can you care for yourself at home? · Take your antibiotics as directed. Do not stop taking them just because you feel better. You need to take the full course of antibiotics. · Drink extra water and other fluids for the next day or two. This may help wash out the bacteria that are causing the infection. (If you have kidney, heart, or liver disease and have to limit fluids, talk with your doctor before you increase your fluid intake.)  · Avoid drinks that are carbonated or have caffeine. They can irritate the bladder. · Urinate often. Try to empty your bladder each time. · To relieve pain, take a hot bath or lay a heating pad set on low over your lower belly or genital area. Never go to sleep with a heating pad in place. To prevent UTIs  · Drink plenty of water each day. This helps you urinate often, which clears bacteria from your system. (If you have kidney, heart, or liver disease and have to limit fluids, talk with your doctor before you increase your fluid intake.)  · Urinate when you need to. · Urinate right after you have sex. · Change sanitary pads often. · Avoid douches, bubble baths, feminine hygiene sprays, and other feminine hygiene products that have deodorants. · After going to the bathroom, wipe from front to back. When should you call for help? Call your doctor now or seek immediate medical care if:    · Symptoms such as fever, chills, nausea, or vomiting get worse or appear for the first time.     · You have new pain in your back just below your rib cage.  This is called flank pain.     · There is new blood or pus in your urine.     · You have any problems with your antibiotic medicine.    Watch closely for changes in your health, and be sure to contact your doctor if:    · You are not getting better after taking an antibiotic for 2 days.     · Your symptoms go away but then come back. Where can you learn more? Go to http://rosalino-jordan.info/. Enter S010 in the search box to learn more about \"Urinary Tract Infection in Women: Care Instructions. \"  Current as of: December 19, 2018  Content Version: 12.2  © 8209-1651 [employer]. Care instructions adapted under license by [employer] (which disclaims liability or warranty for this information). If you have questions about a medical condition or this instruction, always ask your healthcare professional. [address] any warranty or liability for your use of this information.

## 2019-12-30 NOTE — ED PROVIDER NOTES
EMERGENCY DEPARTMENT HISTORY AND PHYSICAL EXAM      Date: 12/29/2019  Patient Name: Elvis Seen    History of Presenting Illness     Chief Complaint   Patient presents with    Abdominal Pain     all over her abdomen x 2 days- she states she thinks she has food poisoning from some food she ate 2 days ago she is also c/o nausea    Bladder Infection     She states she was seen her previously and we called her telling her she has a UTI and we sent prescriptions that her insurance does not cover    Ear Pain     right ear x 3 days was previously seen here for it       History Provided By: Patient    HPI: Elvis Seen, 37 y.o. female with PMHx as noted below presents the emergency department with multiple complaints. Patient complaining of abdominal pain since her last visit to the emergency department 2 days ago. Patient describes diffuse, abdominal cramping that is intermittent and seems to without relieving or exacerbating factors. She does have some associated nausea but has had no vomiting or diarrhea. Patient also notes she continues to have some mild dysuria that is been going on over the same time course. Patient also notes persistent ear pain for 3 days however has not completed her course of azithromycin at this point. Patient notes they had called in an antibiotic for her UTI however she was concerned that she may be allergic so did not fill the prescription. Denies any fevers, flank pain, chills, headache, visual changes, neck stiffness or sore throat.       PCP: Sandra Ball NP    Current Facility-Administered Medications   Medication Dose Route Frequency Provider Last Rate Last Dose    nitrofurantoin (macrocrystal-monohydrate) (MACROBID) capsule 100 mg  100 mg Oral NOW Allison Jennings MD        ondansetron (ZOFRAN ODT) tablet 4 mg  4 mg Oral NOW Allison Jennings MD        ibuprofen (ADVIL;MOTRIN) 100 mg/5 mL oral suspension 600 mg  600 mg Oral NOW Allison Jennings MD Current Outpatient Medications   Medication Sig Dispense Refill    ondansetron (ZOFRAN ODT) 4 mg disintegrating tablet Take 1 Tab by mouth every eight (8) hours as needed for Nausea. 10 Tab 0    nitrofurantoin, macrocrystal-monohydrate, (MACROBID) 100 mg capsule Take 1 Cap by mouth two (2) times a day for 5 days. 10 Cap 0    cephALEXin (KEFLEX) 500 mg capsule Take 1 Cap by mouth four (4) times daily for 7 days. 28 Cap 0    diphenhydrAMINE (BENADRYL) 25 mg capsule Take 2 Caps by mouth every six (6) hours as needed (hives) for up to 10 days. 100 Cap 0    ibuprofen (MOTRIN) 800 mg tablet Take 1 Tab by mouth every eight (8) hours as needed for Pain. 30 Tab 0    fluticasone propionate (FLONASE) 50 mcg/actuation nasal spray 2 Sprays by Both Nostrils route daily. 1 Bottle 0    loratadine-pseudoephedrine (CLARITIN-D 12 HOUR) 5-120 mg per tablet Take 1 Tab by mouth two (2) times a day. 30 Tab 0    sucralfate (CARAFATE) 1 gram tablet Take 1 Tab by mouth four (4) times daily. 20 Tab 0    busPIRone (BUSPAR) 7.5 mg tablet Take 7.5 mg by mouth daily.  labetalol (NORMODYNE) 200 mg tablet Take 200 mg by mouth two (2) times a day.  aspirin 81 mg chewable tablet Take 81 mg by mouth daily.  albuterol (PROVENTIL HFA, VENTOLIN HFA, PROAIR HFA) 90 mcg/actuation inhaler Take 1-2 Puffs by inhalation every four (4) hours as needed for Wheezing. 1 Inhaler 1    esomeprazole (NEXIUM) 40 mg capsule Take 1 Cap by mouth daily. 20 Cap 0    metFORMIN (GLUCOPHAGE) 1,000 mg tablet Take 1,000 mg by mouth two (2) times daily (with meals). Indications: type 2 diabetes mellitus      gabapentin (NEURONTIN) 400 mg capsule Take 400 mg by mouth three (3) times daily. Indications: NEUROPATHIC PAIN      losartan (COZAAR) 25 mg tablet Take 100 mg by mouth daily.  metoclopramide HCl (REGLAN) 5 mg tablet Take 5 mg by mouth Before breakfast, lunch, dinner and at bedtime.          Past History     Past Medical History:  Past Medical History:   Diagnosis Date    Abnormal Pap smear     \"years ago\"     Asthma     bronchitis    Bipolar disorder ([facility]     Depression with anxiety 2010    Diabetes mellitus     type II diabetes mellitus since     GERD (gastroesophageal reflux disease)     HTN (hypertension) 2010    Ill-defined condition     High Cholesterol    Mood disorder ([facility]     Other and unspecified hyperlipidemia 2010    Postpartum depression     hospitalized after last delivery    Rhinitis 2012       Past Surgical History:  Past Surgical History:   Procedure Laterality Date     DELIVERY ONLY      cesearean section 08    HX  SECTION      X 3    HX GYN      , tubal ligation       Family History:  Family History   Problem Relation Age of Onset    Diabetes Mother     Hypertension Mother     Stroke Mother     Heart Disease Mother     Hypertension Father     Stroke Father     Diabetes Father     Kidney Disease Father     Hypertension Maternal Grandmother     Diabetes Maternal Grandmother     Hypertension Maternal Grandfather     Diabetes Maternal Grandfather     Hypertension Paternal Grandmother     Diabetes Paternal Grandmother     Hypertension Paternal Grandfather     Diabetes Paternal Grandfather     Hypertension Sister     Diabetes Sister     Diabetes Sister     Hypertension Sister     Diabetes Sister     Hypertension Sister        Social History:  Social History     Tobacco Use    Smoking status: Former Smoker     Last attempt to quit: 10/16/2015     Years since quittin.2    Smokeless tobacco: Never Used    Tobacco comment: quit 17 days ago   Substance Use Topics    Alcohol use: Not Currently     Frequency: Never     Comment: Occasionally    Drug use: No       Allergies:   Allergies   Allergen Reactions    Latex Hives     rash    Other Food Hives     Citrus Fruits    Flagyl [Metronidazole] Hives and Itching    Lisinopril-Hydrochlorothiazide Angioedema    Clindamycin Rash and Itching    Ciprofloxacin Rash and Itching    Citrate Hives    Cottonseed Oil Rash    Cymbalta [Duloxetine] Hives and Itching    Flexeril [Cyclobenzaprine] Angioedema    Lemon Hives    Pcn [Penicillins] Hives    Sulfa (Sulfonamide Antibiotics) Shortness of Breath    Tomato Hives         Review of Systems   Review of Systems  Constitutional: Negative for fever, chills, and fatigue. HENT: Negative for congestion, sore throat, rhinorrhea, sneezing and neck stiffness   Eyes: Negative for discharge and redness. Respiratory: Negative for  shortness of breath, wheezing   Cardiovascular: Negative for chest pain, palpitations   Gastrointestinal: Positive for nausea,  abdominal pain. negative constipation, diarrhea and blood in stool. Genitourinary: Positive for dysuria.negative hematuria, flank pain, decreased urine volume, discharge,   Musculoskeletal: Negative for myalgias or joint pain . Skin: Negative for rash or lesions . Neurological: Negative weakness, light-headedness, numbness and headaches. Physical Exam   Physical Exam    GENERAL: alert and oriented, no acute distress  EYES: PEERL, No injection, discharge or icterus. ENT: Mucous membranes pink and moist.  Tympanic membranes clear bilaterally  NECK: Supple  LUNGS: Airway patent. Non-labored respirations. Breath sounds clear with good air entry bilaterally. HEART: Regular rate and rhythm. No peripheral edema  ABDOMEN: Non-distended and non-tender, without guarding or rebound. SKIN:  warm, dry  MSK/EXTREMITIES: Without swelling, tenderness or deformity, symmetric with normal ROM  NEUROLOGICAL: Alert, oriented      Diagnostic Study Results     Labs -   No results found for this or any previous visit (from the past 12 hour(s)).     Radiologic Studies -   No orders to display     CT Results  (Last 48 hours)               12/28/19 0209  CT ABD PELV W CONT Final result Impression:  IMPRESSION:   No acute intraperitoneal process       Narrative:  EXAM: CT ABD PELV W CONT   Clinical history: Abdominal pain and hyperlipidemia   INDICATION: Abdominal pain       COMPARISON: 9/27/2012        CONTRAST: 100 mL of Isovue-370. TECHNIQUE:    Following the uneventful intravenous administration of contrast, thin axial   images were obtained through the abdomen and pelvis. Coronal and sagittal   reconstructions were generated. Oral contrast was not administered. CT dose   reduction was achieved through use of a standardized protocol tailored for this   examination and automatic exposure control for dose modulation. FINDINGS:    LUNG BASES: Clear. INCIDENTALLY IMAGED HEART AND MEDIASTINUM: Unremarkable. LIVER: There is a significant degree of hepatic steatosis   GALLBLADDER: Unremarkable. SPLEEN: No mass. PANCREAS: No mass or ductal dilatation. ADRENALS: Unremarkable. KIDNEYS: No mass, calculus, or hydronephrosis. STOMACH: Unremarkable. SMALL BOWEL: No dilatation or wall thickening. COLON: Colonic diverticula. APPENDIX: Unremarkable. PERITONEUM: No ascites or pneumoperitoneum. RETROPERITONEUM: No lymphadenopathy or aortic aneurysm. REPRODUCTIVE ORGANS: Remarkable. URINARY BLADDER: No mass or calculus. BONES: No destructive bone lesion. ADDITIONAL COMMENTS: N/A               CXR Results  (Last 48 hours)    None            Medical Decision Making   I am the first provider for this patient. I reviewed the vital signs, available nursing notes, past medical history, past surgical history, family history and social history. Vital Signs-Reviewed the patient's vital signs. Patient Vitals for the past 12 hrs:   Temp Pulse Resp BP SpO2   12/29/19 2245 98.5 °F (36.9 °C) 86 20 138/77 99 %       Records Reviewed: Nursing Notes and Old Medical Records    Provider Notes (Medical Decision Making):    On presentation, the patient is well appearing, in no acute distress with normal vital signs. Based on my history and exam the differential diagnosis for this patient includes UTI, pyelonephritis, cystitis, medication side effect, otitis. Patient's abdomen is soft, benign and overall reassuring so do not feel that repeat labs or imaging is indicated at this time. Patient's nausea and abdominal discomfort may be secondary to the azithromycin. I have also encouraged her to complete this course of medication as her potential otitis may be only partially treated at this time although exam today is unremarkable. Patient did have beta-hemolytic strep growing in her urine and in the setting of her having some nausea and dysuria will treat with course of Macrobid as she is allergic to penicillin. This should give adequate coverage. ED Course:   Initial assessment performed. The patients presenting problems have been discussed, and they are in agreement with the care plan formulated and outlined with them. I have encouraged them to ask questions as they arise throughout their visit. PROGRESS NOTE:  The patient has been re-evaluated and is ready for discharge. Reviewed available results with patient and have counseled them on diagnosis and care plan. They have expressed understanding, and all their questions have been answered. They agree with plan and agree to have pt F/U as recommended, or return to the ED if their sxs worsen. Discharge instructions have been provided and explained to them, along with reasons to have pt return to the ED. The patient is amenable to discharge so will discharge pt at this time      Disposition:  home    PLAN:  1. Current Discharge Medication List      START taking these medications    Details   ondansetron (ZOFRAN ODT) 4 mg disintegrating tablet Take 1 Tab by mouth every eight (8) hours as needed for Nausea.   Qty: 10 Tab, Refills: 0      nitrofurantoin, macrocrystal-monohydrate, (MACROBID) 100 mg capsule Take 1 Cap by mouth two (2) times a day for 5 days. Qty: 10 Cap, Refills: 0           2. Follow-up Information     Follow up With Specialties Details Why Contact Info    Lexis Garcia NP Nurse Practitioner Schedule an appointment as soon as possible for a visit in 2 days  [phone] EMERGENCY DEPT Emergency Medicine  If symptoms worsen [place]  [phone]        Return to ED if worse     Diagnosis     Clinical Impression:   1. Cystitis    2. Otalgia of both ears    3. Nausea without vomiting        Please note that this dictation was completed with Dragon, computer voice recognition software. Quite often unanticipated grammatical, syntax, homophones, and other interpretive errors are inadvertently transcribed by the computer software. Please disregard these errors. Additionally, please excuse any errors that have escaped final proofreading.

## 2019-12-30 NOTE — ED NOTES
Pt arrived to ED  with c/o nausea x 3 days and unable to afford medication for UTI (keflex) which was prescribed 2 days ago at her las er encounter. Pt is in no acute distress. Will continue to monitor. See nursing assessment. Safety precautions in place; call light within reach. Emergency Department Nursing Plan of Care       The Nursing Plan of Care is developed from the Nursing assessment and Emergency Department Attending provider initial evaluation. The plan of care may be reviewed in the ED Provider note.     The Plan of Care was developed with the following considerations:   Patient / Family readiness to learn indicated by:verbalized understanding  Persons(s) to be included in education: patient  Barriers to Learning/Limitations:No    Signed     Elinor Hernandez RN    12/29/2019   11:17 PM

## 2019-12-30 NOTE — ED NOTES
Pt is requesting pain medication stronger than ibuprofwen. Wants to see another md.  Pt advised only one physican working at night.

## 2020-03-02 ENCOUNTER — HOSPITAL ENCOUNTER (EMERGENCY)
Age: 40
Discharge: HOME | End: 2020-03-02
Attending: STUDENT IN AN ORGANIZED HEALTH CARE EDUCATION/TRAINING PROGRAM | Admitting: STUDENT IN AN ORGANIZED HEALTH CARE EDUCATION/TRAINING PROGRAM
Payer: MEDICARE

## 2020-03-02 ENCOUNTER — APPOINTMENT (OUTPATIENT)
Dept: CT IMAGING | Age: 40
End: 2020-03-02
Attending: EMERGENCY MEDICINE
Payer: MEDICARE

## 2020-03-02 VITALS
DIASTOLIC BLOOD PRESSURE: 90 MMHG | HEART RATE: 85 BPM | OXYGEN SATURATION: 100 % | TEMPERATURE: 97.7 F | RESPIRATION RATE: 16 BRPM | SYSTOLIC BLOOD PRESSURE: 155 MMHG

## 2020-03-02 DIAGNOSIS — R10.84 ABDOMINAL PAIN, GENERALIZED: Primary | ICD-10-CM

## 2020-03-02 LAB
ALBUMIN SERPL-MCNC: 3.8 G/DL (ref 3.5–5)
ALBUMIN/GLOB SERPL: 1 {RATIO} (ref 1.1–2.2)
ALP SERPL-CCNC: 97 U/L (ref 45–117)
ALT SERPL-CCNC: 28 U/L (ref 12–78)
ANION GAP SERPL CALC-SCNC: 9 MMOL/L (ref 5–15)
APPEARANCE UR: CLEAR
AST SERPL-CCNC: 13 U/L (ref 15–37)
BACTERIA URNS QL MICRO: ABNORMAL /HPF
BASOPHILS # BLD: 0.1 K/UL (ref 0–0.1)
BASOPHILS NFR BLD: 1 % (ref 0–1)
BILIRUB SERPL-MCNC: 0.2 MG/DL (ref 0.2–1)
BILIRUB UR QL: NEGATIVE
BUN SERPL-MCNC: 7 MG/DL (ref 6–20)
BUN/CREAT SERPL: 8 (ref 12–20)
CALCIUM SERPL-MCNC: 9.1 MG/DL (ref 8.5–10.1)
CHLORIDE SERPL-SCNC: 103 MMOL/L (ref 97–108)
CO2 SERPL-SCNC: 22 MMOL/L (ref 21–32)
COLOR UR: ABNORMAL
CREAT SERPL-MCNC: 0.84 MG/DL (ref 0.55–1.02)
DIFFERENTIAL METHOD BLD: ABNORMAL
EOSINOPHIL # BLD: 0.1 K/UL (ref 0–0.4)
EOSINOPHIL NFR BLD: 1 % (ref 0–7)
EPITH CASTS URNS QL MICRO: ABNORMAL /LPF
ERYTHROCYTE [DISTWIDTH] IN BLOOD BY AUTOMATED COUNT: 15.9 % (ref 11.5–14.5)
GLOBULIN SER CALC-MCNC: 4 G/DL (ref 2–4)
GLUCOSE SERPL-MCNC: 221 MG/DL (ref 65–100)
GLUCOSE UR STRIP.AUTO-MCNC: >1000 MG/DL
HCT VFR BLD AUTO: 32.6 % (ref 35–47)
HGB BLD-MCNC: 9.8 G/DL (ref 11.5–16)
HGB UR QL STRIP: NEGATIVE
HYALINE CASTS URNS QL MICRO: ABNORMAL /LPF (ref 0–5)
IMM GRANULOCYTES # BLD AUTO: 0 K/UL (ref 0–0.04)
IMM GRANULOCYTES NFR BLD AUTO: 0 % (ref 0–0.5)
KETONES UR QL STRIP.AUTO: ABNORMAL MG/DL
LEUKOCYTE ESTERASE UR QL STRIP.AUTO: NEGATIVE
LIPASE SERPL-CCNC: 137 U/L (ref 73–393)
LYMPHOCYTES # BLD: 2.3 K/UL (ref 0.8–3.5)
LYMPHOCYTES NFR BLD: 24 % (ref 12–49)
MCH RBC QN AUTO: 22.1 PG (ref 26–34)
MCHC RBC AUTO-ENTMCNC: 30.1 G/DL (ref 30–36.5)
MCV RBC AUTO: 73.6 FL (ref 80–99)
MONOCYTES # BLD: 0.7 K/UL (ref 0–1)
MONOCYTES NFR BLD: 7 % (ref 5–13)
NEUTS SEG # BLD: 6.4 K/UL (ref 1.8–8)
NEUTS SEG NFR BLD: 67 % (ref 32–75)
NITRITE UR QL STRIP.AUTO: NEGATIVE
NRBC # BLD: 0 K/UL (ref 0–0.01)
NRBC BLD-RTO: 0 PER 100 WBC
PH UR STRIP: 5 [PH] (ref 5–8)
PLATELET # BLD AUTO: 252 K/UL (ref 150–400)
PMV BLD AUTO: 11.3 FL (ref 8.9–12.9)
POTASSIUM SERPL-SCNC: 3.7 MMOL/L (ref 3.5–5.1)
PROT SERPL-MCNC: 7.8 G/DL (ref 6.4–8.2)
PROT UR STRIP-MCNC: NEGATIVE MG/DL
RBC # BLD AUTO: 4.43 M/UL (ref 3.8–5.2)
RBC #/AREA URNS HPF: ABNORMAL /HPF (ref 0–5)
SODIUM SERPL-SCNC: 134 MMOL/L (ref 136–145)
SP GR UR REFRACTOMETRY: 1.02
UR CULT HOLD, URHOLD: NORMAL
UROBILINOGEN UR QL STRIP.AUTO: 0.2 EU/DL (ref 0.2–1)
WBC # BLD AUTO: 9.5 K/UL (ref 3.6–11)
WBC URNS QL MICRO: ABNORMAL /HPF (ref 0–4)

## 2020-03-02 PROCEDURE — 83690 ASSAY OF LIPASE: CPT

## 2020-03-02 PROCEDURE — 74011250636 HC RX REV CODE- 250/636: Performed by: EMERGENCY MEDICINE

## 2020-03-02 PROCEDURE — 96374 THER/PROPH/DIAG INJ IV PUSH: CPT

## 2020-03-02 PROCEDURE — 85025 COMPLETE CBC W/AUTO DIFF WBC: CPT

## 2020-03-02 PROCEDURE — 81001 URINALYSIS AUTO W/SCOPE: CPT

## 2020-03-02 PROCEDURE — 99281 EMR DPT VST MAYX REQ PHY/QHP: CPT

## 2020-03-02 PROCEDURE — 80053 COMPREHEN METABOLIC PANEL: CPT

## 2020-03-02 PROCEDURE — 36415 COLL VENOUS BLD VENIPUNCTURE: CPT

## 2020-03-02 RX ORDER — SODIUM CHLORIDE 0.9 % (FLUSH) 0.9 %
10 SYRINGE (ML) INJECTION
Status: DISCONTINUED | OUTPATIENT
Start: 2020-03-02 | End: 2020-03-02

## 2020-03-02 RX ORDER — KETOROLAC TROMETHAMINE 30 MG/ML
15 INJECTION, SOLUTION INTRAMUSCULAR; INTRAVENOUS
Status: COMPLETED | OUTPATIENT
Start: 2020-03-02 | End: 2020-03-02

## 2020-03-02 RX ORDER — SODIUM CHLORIDE 9 MG/ML
1000 INJECTION, SOLUTION INTRAVENOUS ONCE
Status: COMPLETED | OUTPATIENT
Start: 2020-03-02 | End: 2020-03-02

## 2020-03-02 RX ADMIN — KETOROLAC TROMETHAMINE 15 MG: 30 INJECTION, SOLUTION INTRAMUSCULAR at 10:46

## 2020-03-02 RX ADMIN — SODIUM CHLORIDE 1000 ML: 900 INJECTION, SOLUTION INTRAVENOUS at 10:45

## 2020-03-02 NOTE — ED NOTES
Patient voiced that she wanted to leave, as she has to  her children. Patient states \"I don't have time to be here all day\". Boneta Handler PA made aware.

## 2020-03-02 NOTE — DISCHARGE INSTRUCTIONS
You have chosen to leave prior to getting picutures taken of your abdomen. You may return at any time for repeat evaluation. Return to ER for any fever, chills, nausea, vomiting, inability to eat or drink, worsening pain. Follow-up with your doctor in next day. Abdominal Pain: Care Instructions  Your Care Instructions    Abdominal pain has many possible causes. Some aren't serious and get better on their own in a few days. Others need more testing and treatment. If your pain continues or gets worse, you need to be rechecked and may need more tests to find out what is wrong. You may need surgery to correct the problem. Don't ignore new symptoms, such as fever, nausea and vomiting, urination problems, pain that gets worse, and dizziness. These may be signs of a more serious problem. Your doctor may have recommended a follow-up visit in the next 8 to 12 hours. If you are not getting better, you may need more tests or treatment. The doctor has checked you carefully, but problems can develop later. If you notice any problems or new symptoms, get medical treatment right away. Follow-up care is a key part of your treatment and safety. Be sure to make and go to all appointments, and call your doctor if you are having problems. It's also a good idea to know your test results and keep a list of the medicines you take. How can you care for yourself at home? · Rest until you feel better. · To prevent dehydration, drink plenty of fluids, enough so that your urine is light yellow or clear like water. Choose water and other caffeine-free clear liquids until you feel better. If you have kidney, heart, or liver disease and have to limit fluids, talk with your doctor before you increase the amount of fluids you drink. · If your stomach is upset, eat mild foods, such as rice, dry toast or crackers, bananas, and applesauce. Try eating several small meals instead of two or three large ones.   · Wait until 48 hours after all symptoms have gone away before you have spicy foods, alcohol, and drinks that contain caffeine. · Do not eat foods that are high in fat. · Avoid anti-inflammatory medicines such as aspirin, ibuprofen (Advil, Motrin), and naproxen (Aleve). These can cause stomach upset. Talk to your doctor if you take daily aspirin for another health problem. When should you call for help? Call 911 anytime you think you may need emergency care. For example, call if:    · You passed out (lost consciousness).     · You pass maroon or very bloody stools.     · You vomit blood or what looks like coffee grounds.     · You have new, severe belly pain.    Call your doctor now or seek immediate medical care if:    · Your pain gets worse, especially if it becomes focused in one area of your belly.     · You have a new or higher fever.     · Your stools are black and look like tar, or they have streaks of blood.     · You have unexpected vaginal bleeding.     · You have symptoms of a urinary tract infection. These may include:  ? Pain when you urinate. ? Urinating more often than usual.  ? Blood in your urine.     · You are dizzy or lightheaded, or you feel like you may faint.    Watch closely for changes in your health, and be sure to contact your doctor if:    · You are not getting better after 1 day (24 hours). Where can you learn more? Go to http://rosalino-jordan.info/. Enter J519 in the search box to learn more about \"Abdominal Pain: Care Instructions. \"  Current as of: June 26, 2019  Content Version: 12.2  © 9574-8726 [employer] Care instructions adapted under license by [employer] (which disclaims liability or warranty for this information). If you have questions about a medical condition or this instruction, always ask your healthcare professional. [address] any warranty or liability for your use of this information.

## 2020-03-02 NOTE — ED NOTES
Patient walked out demanding IV be taken out. IV d/c'd per patients request.  Patient requesting lab results. Tracy ARMSTRONG made aware.

## 2020-03-02 NOTE — ED PROVIDER NOTES
61-year-old female presenting to the emergency room for evaluation of aminal pain and back pain. Patient reports for the past 1 week she has had intermittent generalized abdominal pain. No fevers or chills. Patient reports nausea but no vomiting. No chest pain, shortness breath difficulty breathing. No dizziness or lightheadedness. No cough or sore throat. No URI symptoms. She does report some mild dysuria. No frequency urgency. No discharge or bleeding. She has not taken any medicines over the week. No alleviating or aggravating factors. Social hx  Nonsmoker  No alcohol    The history is provided by the patient. No  was used. Abdominal Pain    Associated symptoms include nausea, dysuria and back pain. Pertinent negatives include no fever, no vomiting, no frequency, no hematuria and no chest pain.         Past Medical History:   Diagnosis Date    Abnormal Pap smear     \"years ago\"     Asthma     bronchitis    Bipolar disorder ([facility] [address]     Depression with anxiety 2010    Diabetes mellitus     type II diabetes mellitus since     GERD (gastroesophageal reflux disease)     HTN (hypertension) 2010    Ill-defined condition     High Cholesterol    Mood disorder ([facility] [address]     Other and unspecified hyperlipidemia 2010    Postpartum depression     hospitalized after last delivery    Rhinitis 2012    UTI (urinary tract infection)        Past Surgical History:   Procedure Laterality Date     DELIVERY ONLY      cesearean section 08    HX  SECTION      X 3    HX GYN      , tubal ligation         Family History:   Problem Relation Age of Onset    Diabetes Mother     Hypertension Mother     Stroke Mother     Heart Disease Mother     Hypertension Father     Stroke Father     Diabetes Father     Kidney Disease Father     Hypertension Maternal Grandmother     Diabetes Maternal Grandmother     Hypertension Maternal Grandfather  Diabetes Maternal Grandfather     Hypertension Paternal Grandmother     Diabetes Paternal Grandmother     Hypertension Paternal Grandfather     Diabetes Paternal Grandfather     Hypertension Sister     Diabetes Sister     Diabetes Sister     Hypertension Sister     Diabetes Sister     Hypertension Sister        Social History     Socioeconomic History    Marital status: SINGLE     Spouse name: Not on file    Number of children: Not on file    Years of education: Not on file    Highest education level: Not on file   Occupational History    Not on file   Social Needs    Financial resource strain: Not on file    Food insecurity:     Worry: Not on file     Inability: Not on file    Transportation needs:     Medical: Not on file     Non-medical: Not on file   Tobacco Use    Smoking status: Former Smoker     Last attempt to quit: 10/16/2015     Years since quittin.3    Smokeless tobacco: Never Used    Tobacco comment: quit 17 days ago   Substance and Sexual Activity    Alcohol use: Not Currently     Frequency: Never     Comment: Occasionally    Drug use: No    Sexual activity: Yes     Partners: Female     Birth control/protection: Surgical     Comment: Tubal ligation   Lifestyle    Physical activity:     Days per week: Not on file     Minutes per session: Not on file    Stress: Not on file   Relationships    Social connections:     Talks on phone: Not on file     Gets together: Not on file     Attends L[employer]-day service: Not on file     Active member of club or organization: Not on file     Attends meetings of clubs or organizations: Not on file     Relationship status: Not on file    Intimate partner violence:     Fear of current or ex partner: Not on file     Emotionally abused: Not on file     Physically abused: Not on file     Forced sexual activity: Not on file   Other Topics Concern    Not on file   Social History Narrative    ** Merged History Encounter **              ALLERGIES: Latex; Other food; Flagyl [metronidazole]; Lisinopril-hydrochlorothiazide; Clindamycin; Ciprofloxacin; Citrate; Cottonseed oil; Cymbalta [duloxetine]; Flexeril [cyclobenzaprine]; Lemon; Pcn [penicillins]; Sulfa (sulfonamide antibiotics); and Tomato    Review of Systems   Constitutional: Negative for chills and fever. HENT: Positive for congestion. Negative for sinus pressure and sore throat. Respiratory: Negative for cough. Cardiovascular: Negative for chest pain. Gastrointestinal: Positive for abdominal pain and nausea. Negative for vomiting. Genitourinary: Positive for dysuria and urgency. Negative for frequency and hematuria. Musculoskeletal: Positive for back pain. All other systems reviewed and are negative. Vitals:    03/02/20 0857   BP: 155/90   Pulse: 85   Resp: 16   Temp: 97.7 °F (36.5 °C)   SpO2: 100%            Physical Exam  Vitals signs and nursing note reviewed. Constitutional:       General: She is not in acute distress. Appearance: Normal appearance. She is well-developed. HENT:      Head: Normocephalic and atraumatic. Right Ear: External ear normal.      Left Ear: External ear normal.   Eyes:      Conjunctiva/sclera: Conjunctivae normal.      Pupils: Pupils are equal, round, and reactive to light. Neck:      Musculoskeletal: Normal range of motion and neck supple. Cardiovascular:      Rate and Rhythm: Normal rate and regular rhythm. Heart sounds: Normal heart sounds. Pulmonary:      Effort: Pulmonary effort is normal. No respiratory distress. Breath sounds: Normal breath sounds. No wheezing. Abdominal:      General: Bowel sounds are normal. There is no distension or abdominal bruit. Palpations: Abdomen is soft. Abdomen is not rigid. There is no shifting dullness, hepatomegaly, splenomegaly, mass or pulsatile mass. Tenderness: There is no abdominal tenderness. There is no right CVA tenderness, left CVA tenderness, guarding or rebound. Negative signs include Cruz's sign and McBurney's sign. Comments: Abdomen exposed for exam.  Soft, no peritoneal signs  Mild diffuse tenderness to palpation. No focal point of pain on exam.     Musculoskeletal: Normal range of motion. General: No tenderness. Skin:     General: Skin is warm and dry. Findings: No erythema or rash. Neurological:      Mental Status: She is alert and oriented to person, place, and time. Cranial Nerves: No cranial nerve deficit. Coordination: Coordination normal.      Deep Tendon Reflexes: Reflexes are normal and symmetric. Reflexes normal.   Psychiatric:         Behavior: Behavior normal.         Thought Content: Thought content normal.         Judgment: Judgment normal.          MDM  Number of Diagnoses or Management Options  Abdominal pain, generalized:   Diagnosis management comments: 40-year-old female presenting to the emergency room for generalized abdominal pain intermittently for a week. She is afebrile. No peritoneal signs. Lungs are clear. No acute distress. Plan: IV fluid, CT    12:13 PM  Labs and urinalysis with no acute process occurring. Patient states she needs to leave ER to get her children from school. Patient is unable to wait for CAT scan. She is aware that evaluation is not complete and we have not eliminated all potential intra-abdominal pathology. Patient will return to ER when she can for CAT scan or follow-up with her regular doctor. Patient's results have been reviewed with them. Patient and/or family have verbally conveyed their understanding and agreement of the patient's signs, symptoms, diagnosis, treatment and prognosis and additionally agree to follow up as recommended or return to the Emergency Room should their condition change prior to follow-up.   Discharge instructions have also been provided to the patient with some educational information regarding their diagnosis as well a list of reasons why they would want to return to the ER prior to their follow-up appointment should their condition change. Amount and/or Complexity of Data Reviewed  Discuss the patient with other providers: yes (ER attending-Heather)    Patient Progress  Patient progress: stable         Procedures        Pt case including HPI, PE, and all available lab and radiology results has been discussed with attending physician. Opportunity to evaluate patient has been provided to ER attending. Discharge and prescription plan has been agreed upon.

## 2020-06-03 ENCOUNTER — HOSPITAL ENCOUNTER (EMERGENCY)
Age: 40
Discharge: HOME | End: 2020-06-04
Attending: EMERGENCY MEDICINE
Payer: MEDICARE

## 2020-06-03 DIAGNOSIS — M79.602 LEFT ARM PAIN: Primary | ICD-10-CM

## 2020-06-03 DIAGNOSIS — M62.838 NECK MUSCLE SPASM: ICD-10-CM

## 2020-06-03 PROCEDURE — 96374 THER/PROPH/DIAG INJ IV PUSH: CPT

## 2020-06-03 PROCEDURE — 99284 EMERGENCY DEPT VISIT MOD MDM: CPT

## 2020-06-03 RX ORDER — LIDOCAINE 4 G/100G
1 PATCH TOPICAL EVERY 24 HOURS
Status: DISCONTINUED | OUTPATIENT
Start: 2020-06-03 | End: 2020-06-04 | Stop reason: HOSPADM

## 2020-06-04 VITALS
TEMPERATURE: 97.5 F | HEART RATE: 93 BPM | DIASTOLIC BLOOD PRESSURE: 87 MMHG | WEIGHT: 231.6 LBS | RESPIRATION RATE: 18 BRPM | SYSTOLIC BLOOD PRESSURE: 148 MMHG | BODY MASS INDEX: 39.54 KG/M2 | OXYGEN SATURATION: 99 % | HEIGHT: 64 IN

## 2020-06-04 LAB
ATRIAL RATE: 83 BPM
CALCULATED P AXIS, ECG09: 40 DEGREES
CALCULATED R AXIS, ECG10: 16 DEGREES
CALCULATED T AXIS, ECG11: 19 DEGREES
DIAGNOSIS, 93000: NORMAL
P-R INTERVAL, ECG05: 158 MS
Q-T INTERVAL, ECG07: 372 MS
QRS DURATION, ECG06: 84 MS
QTC CALCULATION (BEZET), ECG08: 437 MS
TROPONIN I BLD-MCNC: <0.04 NG/ML (ref 0–0.08)
VENTRICULAR RATE, ECG03: 83 BPM

## 2020-06-04 PROCEDURE — 93005 ELECTROCARDIOGRAM TRACING: CPT

## 2020-06-04 PROCEDURE — 84484 ASSAY OF TROPONIN QUANT: CPT

## 2020-06-04 PROCEDURE — 74011000250 HC RX REV CODE- 250: Performed by: EMERGENCY MEDICINE

## 2020-06-04 PROCEDURE — 74011250636 HC RX REV CODE- 250/636: Performed by: EMERGENCY MEDICINE

## 2020-06-04 RX ORDER — METHOCARBAMOL 750 MG/1
750 TABLET, FILM COATED ORAL 4 TIMES DAILY
Qty: 15 TAB | Refills: 0 | Status: SHIPPED | OUTPATIENT
Start: 2020-06-04 | End: 2020-10-05

## 2020-06-04 RX ORDER — KETOROLAC TROMETHAMINE 30 MG/ML
30 INJECTION, SOLUTION INTRAMUSCULAR; INTRAVENOUS
Status: COMPLETED | OUTPATIENT
Start: 2020-06-04 | End: 2020-06-04

## 2020-06-04 RX ORDER — TRAMADOL HYDROCHLORIDE 50 MG/1
50 TABLET ORAL
Qty: 12 TAB | Refills: 0 | Status: SHIPPED | OUTPATIENT
Start: 2020-06-04 | End: 2020-06-07

## 2020-06-04 RX ORDER — LIDOCAINE 4 G/100G
PATCH TOPICAL
Qty: 30 PATCH | Refills: 0 | Status: SHIPPED | OUTPATIENT
Start: 2020-06-04 | End: 2020-10-05

## 2020-06-04 RX ORDER — LIDOCAINE 4 G/100G
PATCH TOPICAL
Qty: 30 PATCH | Refills: 0 | Status: SHIPPED | OUTPATIENT
Start: 2020-06-04 | End: 2020-06-04

## 2020-06-04 RX ADMIN — KETOROLAC TROMETHAMINE 30 MG: 30 INJECTION, SOLUTION INTRAMUSCULAR; INTRAVENOUS at 00:22

## 2020-06-04 NOTE — ED NOTES
Pt arrived to ED via ambulatory with c/o posterior neck pain, which radiates down left shoulder x1 month  Pt. Denies injury/trauma. Lungs clear. Pt is in no acute distress. Will continue to monitor. See nursing assessment. Safety precautions in place; call light within reach. Emergency Department Nursing Plan of Care       The Nursing Plan of Care is developed from the Nursing assessment and Emergency Department Attending provider initial evaluation. The plan of care may be reviewed in the ED Provider note.     The Plan of Care was developed with the following considerations:   Patient / Family readiness to learn indicated by:verbalized understanding  Persons(s) to be included in education: patient  Barriers to Learning/Limitations:No    Signed     Rafael Foreman RN    6/4/2020   12:35 AM

## 2020-06-04 NOTE — ED NOTES
Pt comes in ambulatory with c/o L neck pain x 1 month which is now radiating L arm x 1 day. Pt reports hx HTN and diabetes with maternal hx of MI. Pt denies SOB.

## 2020-06-04 NOTE — DISCHARGE INSTRUCTIONS
Patient Education        Neck Spasm: Care Instructions  Your Care Instructions  A neck spasm is sudden tightness and pain in your neck muscles. A spasm may be caused by some activities or repeated movements. For example, you may be more likely to have a neck spasm if you slouch, paint a ceiling, work at a computer, or sleep with your neck twisted. But the cause isn't always clear. Home treatment includes using heat or ice, taking over-the-counter (OTC) pain medicines, and avoiding activities that may lead to neck pain. Gentle stretching, or treatments such as massage or manipulation, may also help ease a neck spasm. For a neck spasm that doesn't get better with home care, your doctor may prescribe medicine. He or she may also suggest exercise or physical therapy to help strengthen or relax your neck muscles. Follow-up care is a key part of your treatment and safety. Be sure to make and go to all appointments, and call your doctor if you are having problems. It's also a good idea to know your test results and keep a list of the medicines you take. How can you care for yourself at home? · To relieve pain, use heat or ice (whichever feels better) on the affected area. ? Put a warm water bottle, a heating pad set on low, or a warm cloth on your neck. Put a thin cloth between the heating pad and your skin. Do not go to sleep with a heating pad on your skin. ? Try ice or a cold pack on the area for 10 to 20 minutes at a time. Put a thin cloth between the ice and your skin. · Ask your doctor if you can take acetaminophen (such as Tylenol) or nonsteroidal anti-inflammatory drugs, such as ibuprofen or naproxen. Your doctor can prescribe stronger medicines if needed. Be safe with medicines. Read and follow all instructions on the label. · Stretch your muscles every day, especially before and after exercise and at bedtime. Regular stretching can help relax your muscles.   · Try to find a pillow and a position in bed that help improve your night's rest.  · Try to stay active. It's best to start activity slowly. If an exercise makes your pain worse, stop doing it. When should you call for help? [id number] anytime you think you may need emergency care. For example, call if:  · You are unable to move an arm or a leg at all. Call your doctor now or seek immediate medical care if:  · You have new or worse symptoms in your arms, legs, belly, or buttocks. Symptoms may include:  ? Numbness or tingling. ? Weakness. ? Pain. · You lose bladder or bowel control. Watch closely for changes in your health, and be sure to contact your doctor if:  · You do not get better as expected. Where can you learn more? Go to http://rosalinoManageSocialjordan.info/  Enter [id number] in the search box to learn more about \"Neck Spasm: Care Instructions. \"  Current as of: March 2, 2020               Content Version: 12.5  © 2006-2020 [employer]. Care instructions adapted under license by [employer] (which disclaims liability or warranty for this information). If you have questions about a medical condition or this instruction, always ask your healthcare professional. [place] [address] any warranty or liability for your use of this information. Patient Education        Arm Pain: Care Instructions  Your Care Instructions     You can hurt your arm by using it too much or by injuring it. Biking, wrestling, and home repair projects are examples of activities that can lead to arm pain. Everyday wear and tear, especially as you get older, can cause arm pain. Your forearms, wrists, hands, and fingers are the parts of your arm that are most likely to become painful. A minor arm injury usually will heal on its own with home treatment to relieve swelling and pain. If you have a more serious injury, you may need tests and treatment. Follow-up care is a key part of your treatment and safety.  Be sure to make and go to all appointments, and call your doctor if you are having problems. It's also a good idea to know your test results and keep a list of the medicines you take. How can you care for yourself at home? · Take pain medicines exactly as directed. ? If the doctor gave you a prescription medicine for pain, take it as prescribed. ? If you are not taking a prescription pain medicine, ask your doctor if you can take an over-the-counter medicine. · Rest and protect your arm. Take a break from any activity that may cause pain. · Put ice or a cold pack on your arm for 10 to 20 minutes at a time. Put a thin cloth between the ice and your skin. · Prop up the sore arm on a pillow when you ice it or anytime you sit or lie down during the next 3 days. Try to keep it above the level of your heart. This will help reduce swelling. · If your doctor recommends a sling to support your arm, wear it as directed. When should you call for help? [id number] anytime you think you may need emergency care. For example, call if:  · Your arm or hand is cool or pale or changes color. Call your doctor now or seek immediate medical care if:  · You cannot use your arm. · You have signs of infection, such as:  ? Increased pain, swelling, warmth, or redness. ? Red streaks running up or down your arm. ? Pus draining from an area of your arm. ? A fever. · You have tingling, weakness, or numbness in your arm. Watch closely for changes in your health, and be sure to contact your doctor if:  · You do not get better as expected. Where can you learn more? Go to http://rosalino-jordan.info/  Enter B641 in the search box to learn more about \"Arm Pain: Care Instructions. \"  Current as of: June 26, 2019               Content Version: 12.5  © 6085-1096 [employer]. Care instructions adapted under license by [email or web address] (which disclaims liability or warranty for this information).  If you have questions about a medical condition or this instruction, always ask your healthcare professional. [place] [address] any warranty or liability for your use of this information.

## 2020-07-13 ENCOUNTER — HOSPITAL ENCOUNTER (EMERGENCY)
Age: 40
Discharge: HOME | End: 2020-07-13
Attending: EMERGENCY MEDICINE
Payer: MEDICARE

## 2020-07-13 VITALS
BODY MASS INDEX: 42 KG/M2 | HEIGHT: 64 IN | WEIGHT: 246 LBS | DIASTOLIC BLOOD PRESSURE: 86 MMHG | RESPIRATION RATE: 18 BRPM | TEMPERATURE: 98.8 F | OXYGEN SATURATION: 99 % | SYSTOLIC BLOOD PRESSURE: 164 MMHG | HEART RATE: 81 BPM

## 2020-07-13 DIAGNOSIS — K21.9 GASTROESOPHAGEAL REFLUX DISEASE, ESOPHAGITIS PRESENCE NOT SPECIFIED: Primary | ICD-10-CM

## 2020-07-13 PROCEDURE — 74011250637 HC RX REV CODE- 250/637: Performed by: EMERGENCY MEDICINE

## 2020-07-13 PROCEDURE — 74011000250 HC RX REV CODE- 250: Performed by: EMERGENCY MEDICINE

## 2020-07-13 PROCEDURE — 99283 EMERGENCY DEPT VISIT LOW MDM: CPT

## 2020-07-13 RX ORDER — ONDANSETRON 4 MG/1
4 TABLET, ORALLY DISINTEGRATING ORAL
Qty: 25 TAB | Refills: 0 | Status: SHIPPED | OUTPATIENT
Start: 2020-07-13 | End: 2020-10-05

## 2020-07-13 RX ORDER — ONDANSETRON 4 MG/1
4 TABLET, ORALLY DISINTEGRATING ORAL
Status: COMPLETED | OUTPATIENT
Start: 2020-07-13 | End: 2020-07-13

## 2020-07-13 RX ORDER — ONDANSETRON 4 MG/1
4 TABLET, ORALLY DISINTEGRATING ORAL
Status: DISCONTINUED | OUTPATIENT
Start: 2020-07-13 | End: 2020-07-13 | Stop reason: SDUPTHER

## 2020-07-13 RX ADMIN — ONDANSETRON 4 MG: 4 TABLET, ORALLY DISINTEGRATING ORAL at 02:38

## 2020-07-13 RX ADMIN — LIDOCAINE HYDROCHLORIDE 40 ML: 20 SOLUTION ORAL; TOPICAL at 02:51

## 2020-07-13 NOTE — ED TRIAGE NOTES
Pt comes in with anterior abdominal burning x 1 week. Pt reports, \"I must've eaten something bad over the holiday season. I have GERD. I need a GI cocktail. Those always work for me. \" Pt endorses being compliant with her Nexium.

## 2020-07-13 NOTE — DISCHARGE INSTRUCTIONS
Purchase over the counter Maalox which you can use as needed for these symptoms. GI Cocktail is mostly Maalox (plus lidocaine).

## 2020-07-13 NOTE — ED NOTES
Discharge instructions were given to the patient by Natali Ahn RN. The patient left the Emergency Department ambulatory, alert and oriented and in no acute distress with 1 prescriptions. The patient was encouraged to call or return to the ED for worsening issues or problems and was encouraged to schedule a follow up appointment for continuing care. The patient verbalized understanding of discharge instructions and prescriptions, all questions were answered. The patient has no further concerns at this time.

## 2020-07-13 NOTE — ED NOTES
Pt presents ambulatory to ED complaining of anterior abdominal burning x 1 week. Pt reports, \"I must've eaten something bad over the holiday season. I have GERD. I need a GI cocktail. Those always work for me. \" Pt endorses being compliant with her Nexium. . Pt is alert and oriented x 4, RR even and unlabored, skin is warm and dry. Assesment completed and pt updated on plan of care. Emergency Department Nursing Plan of Care       The Nursing Plan of Care is developed from the Nursing assessment and Emergency Department Attending provider initial evaluation. The plan of care may be reviewed in the ED Provider note.     The Plan of Care was developed with the following considerations:   Patient / Family readiness to learn indicated by:verbalized understanding  Persons(s) to be included in education: patient  Barriers to Learning/Limitations:No    Signed     [address], RN    7/13/2020   3:04 AM

## 2020-07-14 ENCOUNTER — PATIENT OUTREACH (OUTPATIENT)
Dept: CASE MANAGEMENT | Age: 40
End: 2020-07-14

## 2020-07-14 NOTE — PROGRESS NOTES
Patient contacted regarding recent discharge and COVID-19 risk. Discussed COVID-19 related testing which was not done at this time. Test results were not done. Patient informed of results, if available? N/A      Care Transition Nurse/ Ambulatory Care Manager contacted the patient by telephone to perform post discharge assessment; lvm requesting a return phone call to this ACM. Patient has following risk factors of: asthma and diabetes. Rx -zofran. Patient was advised at ED discharge to purchase over the counter Maalox which you can use as needed for these symptoms, GI Cocktail is mostly Maalox (plus lidocaine). Pt was advised to f/u with PCP (MAYLIN Lowery-non[facility] Provider) post-discharge.

## 2020-07-15 NOTE — PROGRESS NOTES
7/15/2020  2:49 PM    Patient contacted regarding recent discharge and COVID-19 risk. Discussed COVID-19 related testing which was not done at this time. Test results were not done. Patient informed of results, if available? N/A       Care Transition Nurse/ Ambulatory Care Manager contacted the patient by telephone to perform post discharge assessment. Verified name and  with patient as identifiers. Patient has following risk factors of: asthma and diabetes. CTN/ACM reviewed discharge instructions, medical action plan and red flags related to discharge diagnosis. Reviewed and educated them on any new and changed medications related to discharge diagnosis; Rx -zofran. Patient was advised at ED discharge to purchase over the counter Maalox which you can use as needed for these symptoms, GI Cocktail is mostly Maalox (plus lidocaine). Advised obtaining a 90-day supply of all daily and as-needed medications. Education provided regarding infection prevention, and signs and symptoms of COVID-19 and when to seek medical attention with patient who verbalized understanding. Discussed exposure protocols and quarantine from [address] you at higher risk for severe illness  and given an opportunity for questions and concerns. The patient agrees to contact the COVID-19 hotline [phone] or PCP office for questions related to their healthcare. CTN/ACM provided contact information for future reference. From CDC: Are you at higher risk for severe illness?  Wash your hands often.  Avoid close contact (6 feet, which is about two arm lengths) with people who are sick.  Put distance between yourself and other people if COVID-19 is spreading in your community.  Clean and disinfect frequently touched surfaces.  Avoid all cruise travel and non-essential air travel.  Call your healthcare professional if you have concerns about COVID-19 and your underlying condition or if you are sick.     For more information on steps you can take to protect yourself, see CDC's How to Protect Yourself      Patient/family/caregiver given information for [employer] Loop and agrees to enroll no  Patient's preferred e-mail:  N/A  Patient's preferred phone number: N/A  Based on Loop alert triggers, patient will be contacted by nurse care manager for worsening symptoms. Pt was advised to f/u with PCP (MAYLIN Lowery-non[facility] Provider) post-discharge. Plan for follow-up call in 7-14 days based on severity of symptoms and risk factors.

## 2020-07-15 NOTE — PROGRESS NOTES
7/15/2020  2:45 PM    Second patient outreach attempt by this AC to perform initial post-discharge assessment; lvm requesting a return phone call to this ACM. COVID Care Transitions episode resolved at this time due to ACM inability to reach patient.

## 2020-09-10 ENCOUNTER — HOSPITAL ENCOUNTER (EMERGENCY)
Age: 40
Discharge: HOME | End: 2020-09-10
Attending: EMERGENCY MEDICINE | Admitting: EMERGENCY MEDICINE
Payer: MEDICARE

## 2020-09-10 ENCOUNTER — APPOINTMENT (OUTPATIENT)
Dept: CT IMAGING | Age: 40
End: 2020-09-10
Attending: EMERGENCY MEDICINE
Payer: MEDICARE

## 2020-09-10 VITALS
BODY MASS INDEX: 40.97 KG/M2 | SYSTOLIC BLOOD PRESSURE: 148 MMHG | HEIGHT: 64 IN | RESPIRATION RATE: 18 BRPM | TEMPERATURE: 98.5 F | DIASTOLIC BLOOD PRESSURE: 84 MMHG | WEIGHT: 240 LBS | HEART RATE: 91 BPM | OXYGEN SATURATION: 100 %

## 2020-09-10 DIAGNOSIS — N83.201 RIGHT OVARIAN CYST: ICD-10-CM

## 2020-09-10 DIAGNOSIS — D50.0 IRON DEFICIENCY ANEMIA DUE TO CHRONIC BLOOD LOSS: Primary | ICD-10-CM

## 2020-09-10 LAB
ALBUMIN SERPL-MCNC: 3.6 G/DL (ref 3.5–5)
ALBUMIN/GLOB SERPL: 0.9 {RATIO} (ref 1.1–2.2)
ALP SERPL-CCNC: 93 U/L (ref 45–117)
ALT SERPL-CCNC: 27 U/L (ref 12–78)
ANION GAP SERPL CALC-SCNC: 11 MMOL/L (ref 5–15)
APPEARANCE UR: CLEAR
AST SERPL-CCNC: 23 U/L (ref 15–37)
BACTERIA URNS QL MICRO: NEGATIVE /HPF
BASOPHILS # BLD: 0.1 K/UL (ref 0–0.1)
BASOPHILS NFR BLD: 1 % (ref 0–1)
BILIRUB SERPL-MCNC: 0.2 MG/DL (ref 0.2–1)
BILIRUB UR QL: NEGATIVE
BUN SERPL-MCNC: 5 MG/DL (ref 6–20)
BUN/CREAT SERPL: 6 (ref 12–20)
CALCIUM SERPL-MCNC: 9.2 MG/DL (ref 8.5–10.1)
CHLORIDE SERPL-SCNC: 101 MMOL/L (ref 97–108)
CO2 SERPL-SCNC: 25 MMOL/L (ref 21–32)
COLOR UR: ABNORMAL
CREAT SERPL-MCNC: 0.86 MG/DL (ref 0.55–1.02)
DIFFERENTIAL METHOD BLD: ABNORMAL
EOSINOPHIL # BLD: 0.1 K/UL (ref 0–0.4)
EOSINOPHIL NFR BLD: 1 % (ref 0–7)
EPITH CASTS URNS QL MICRO: ABNORMAL /LPF
ERYTHROCYTE [DISTWIDTH] IN BLOOD BY AUTOMATED COUNT: 19.9 % (ref 11.5–14.5)
GLOBULIN SER CALC-MCNC: 4.1 G/DL (ref 2–4)
GLUCOSE SERPL-MCNC: 284 MG/DL (ref 65–100)
GLUCOSE UR STRIP.AUTO-MCNC: >1000 MG/DL
HCG UR QL: NEGATIVE
HCT VFR BLD AUTO: 26.7 % (ref 35–47)
HEMOCCULT STL QL: NEGATIVE
HGB BLD-MCNC: 7.6 G/DL (ref 11.5–16)
HGB UR QL STRIP: NEGATIVE
IMM GRANULOCYTES # BLD AUTO: 0.1 K/UL (ref 0–0.04)
IMM GRANULOCYTES NFR BLD AUTO: 1 % (ref 0–0.5)
KETONES UR QL STRIP.AUTO: NEGATIVE MG/DL
LEUKOCYTE ESTERASE UR QL STRIP.AUTO: NEGATIVE
LYMPHOCYTES # BLD: 2.3 K/UL (ref 0.8–3.5)
LYMPHOCYTES NFR BLD: 31 % (ref 12–49)
MCH RBC QN AUTO: 17.4 PG (ref 26–34)
MCHC RBC AUTO-ENTMCNC: 28.5 G/DL (ref 30–36.5)
MCV RBC AUTO: 61 FL (ref 80–99)
MONOCYTES # BLD: 0.5 K/UL (ref 0–1)
MONOCYTES NFR BLD: 7 % (ref 5–13)
NEUTS SEG # BLD: 4.2 K/UL (ref 1.8–8)
NEUTS SEG NFR BLD: 59 % (ref 32–75)
NITRITE UR QL STRIP.AUTO: NEGATIVE
NRBC # BLD: 0 K/UL (ref 0–0.01)
NRBC BLD-RTO: 0 PER 100 WBC
PH UR STRIP: 5 [PH] (ref 5–8)
PLATELET # BLD AUTO: 252 K/UL (ref 150–400)
POTASSIUM SERPL-SCNC: 3.9 MMOL/L (ref 3.5–5.1)
PROT SERPL-MCNC: 7.7 G/DL (ref 6.4–8.2)
PROT UR STRIP-MCNC: NEGATIVE MG/DL
RBC # BLD AUTO: 4.38 M/UL (ref 3.8–5.2)
RBC #/AREA URNS HPF: ABNORMAL /HPF (ref 0–5)
RBC MORPH BLD: ABNORMAL
SODIUM SERPL-SCNC: 137 MMOL/L (ref 136–145)
SP GR UR REFRACTOMETRY: 1.01 (ref 1–1.03)
UA: UC IF INDICATED,UAUC: ABNORMAL
UROBILINOGEN UR QL STRIP.AUTO: 0.2 EU/DL (ref 0.2–1)
WBC # BLD AUTO: 7.3 K/UL (ref 3.6–11)
WBC URNS QL MICRO: ABNORMAL /HPF (ref 0–4)

## 2020-09-10 PROCEDURE — 74177 CT ABD & PELVIS W/CONTRAST: CPT

## 2020-09-10 PROCEDURE — 81025 URINE PREGNANCY TEST: CPT

## 2020-09-10 PROCEDURE — 74011250636 HC RX REV CODE- 250/636: Performed by: EMERGENCY MEDICINE

## 2020-09-10 PROCEDURE — 85025 COMPLETE CBC W/AUTO DIFF WBC: CPT

## 2020-09-10 PROCEDURE — 81001 URINALYSIS AUTO W/SCOPE: CPT

## 2020-09-10 PROCEDURE — 36415 COLL VENOUS BLD VENIPUNCTURE: CPT

## 2020-09-10 PROCEDURE — 96375 TX/PRO/DX INJ NEW DRUG ADDON: CPT

## 2020-09-10 PROCEDURE — 99283 EMERGENCY DEPT VISIT LOW MDM: CPT

## 2020-09-10 PROCEDURE — 74011000636 HC RX REV CODE- 636: Performed by: EMERGENCY MEDICINE

## 2020-09-10 PROCEDURE — 82272 OCCULT BLD FECES 1-3 TESTS: CPT

## 2020-09-10 PROCEDURE — 80053 COMPREHEN METABOLIC PANEL: CPT

## 2020-09-10 PROCEDURE — 96374 THER/PROPH/DIAG INJ IV PUSH: CPT

## 2020-09-10 RX ORDER — KETOROLAC TROMETHAMINE 30 MG/ML
30 INJECTION, SOLUTION INTRAMUSCULAR; INTRAVENOUS
Status: COMPLETED | OUTPATIENT
Start: 2020-09-10 | End: 2020-09-10

## 2020-09-10 RX ORDER — FERROUS SULFATE 325(65) MG
325 TABLET, DELAYED RELEASE (ENTERIC COATED) ORAL
Qty: 90 TAB | Refills: 0 | Status: SHIPPED | OUTPATIENT
Start: 2020-09-10 | End: 2021-09-17

## 2020-09-10 RX ORDER — DOCUSATE SODIUM 100 MG/1
100 CAPSULE, LIQUID FILLED ORAL 2 TIMES DAILY
Qty: 60 CAP | Refills: 2 | Status: SHIPPED | OUTPATIENT
Start: 2020-09-10 | End: 2020-10-30

## 2020-09-10 RX ORDER — TRAMADOL HYDROCHLORIDE 50 MG/1
50 TABLET ORAL
Qty: 15 TAB | Refills: 0 | Status: SHIPPED | OUTPATIENT
Start: 2020-09-10 | End: 2020-09-13

## 2020-09-10 RX ORDER — MORPHINE SULFATE 2 MG/ML
4 INJECTION, SOLUTION INTRAMUSCULAR; INTRAVENOUS
Status: COMPLETED | OUTPATIENT
Start: 2020-09-10 | End: 2020-09-10

## 2020-09-10 RX ADMIN — SODIUM CHLORIDE 1000 ML: 900 INJECTION, SOLUTION INTRAVENOUS at 03:20

## 2020-09-10 RX ADMIN — IOPAMIDOL 100 ML: 755 INJECTION, SOLUTION INTRAVENOUS at 04:16

## 2020-09-10 RX ADMIN — MORPHINE SULFATE 4 MG: 2 INJECTION, SOLUTION INTRAMUSCULAR; INTRAVENOUS at 03:21

## 2020-09-10 RX ADMIN — KETOROLAC TROMETHAMINE 30 MG: 30 INJECTION, SOLUTION INTRAMUSCULAR; INTRAVENOUS at 03:21

## 2020-09-10 NOTE — ED NOTES
Assumed patient care for task only. Patient  given copy of dc instructions and 3 paper script(s) and 0 electronic scripts. Patient  verbalized understanding of instructions and script (s). Patient given a current medication reconciliation form and verbalized understanding of their medications. Patient ( verbalized understanding of the importance of discussing medications with  his or her physician or clinic they will be following up with. Patient alert and oriented and in no acute distress. Patient offered wheelchair from treatment area to hospital entrance, patient declined wheelchair.

## 2020-09-10 NOTE — ED PROVIDER NOTES
35yo female presents with history of diabetes, GERD, hypertension, prior UTI presents with low abdominal pain and dysuria for 3 weeks 1 months. Pain got worse several days ago despite home treatment with cranberry juice. Pain is currently 10 out of 10 and associated with nausea. It is worse in the left lower quadrant nonradiating. She denies vaginal bleeding, rectal bleeding, vaginal discharge, fever, vomiting, diarrhea.            Past Medical History:   Diagnosis Date    Abnormal Pap smear     \"years ago\"     Asthma     bronchitis    Bipolar disorder ([facility] [address]     Depression with anxiety 2010    Diabetes mellitus     type II diabetes mellitus since     GERD (gastroesophageal reflux disease)     HTN (hypertension) 2010    Ill-defined condition     High Cholesterol    Mood disorder ([facility] [address]     Other and unspecified hyperlipidemia 2010    Postpartum depression     hospitalized after last delivery    Rhinitis 2012    UTI (urinary tract infection)        Past Surgical History:   Procedure Laterality Date     DELIVERY ONLY      cesearean section 08    HX  SECTION      X 3    HX GYN      , tubal ligation         Family History:   Problem Relation Age of Onset    Diabetes Mother     Hypertension Mother     Stroke Mother     Heart Disease Mother     Hypertension Father     Stroke Father     Diabetes Father     Kidney Disease Father     Hypertension Maternal Grandmother     Diabetes Maternal Grandmother     Hypertension Maternal Grandfather     Diabetes Maternal Grandfather     Hypertension Paternal Grandmother     Diabetes Paternal [de-identified]     Hypertension Paternal Grandfather     Diabetes Paternal Grandfather     Hypertension Sister     Diabetes Sister     Diabetes Sister     Hypertension Sister     Diabetes Sister     Hypertension Sister        Social History     Socioeconomic History    Marital status: SINGLE     Spouse name: Not on file    Number of children: Not on file    Years of education: Not on file    Highest education level: Not on file   Occupational History    Not on file   Social Needs    Financial resource strain: Not on file    Food insecurity     Worry: Not on file     Inability: Not on file    Transportation needs     Medical: Not on file     Non-medical: Not on file   Tobacco Use    Smoking status: Former Smoker     Last attempt to quit: 10/16/2015     Years since quittin.9    Smokeless tobacco: Never Used    Tobacco comment: quit 17 days ago   Substance and Sexual Activity    Alcohol use: Not Currently     Frequency: Never     Comment: Occasionally    Drug use: No    Sexual activity: Yes     Partners: Female     Birth control/protection: Surgical     Comment: Tubal ligation   Lifestyle    Physical activity     Days per week: Not on file     Minutes per session: Not on file    Stress: Not on file   Relationships    Social connections     Talks on phone: Not on file     Gets together: Not on file     Attends Latter day service: Not on file     Active member of club or organization: Not on file     Attends meetings of clubs or organizations: Not on file     Relationship status: Not on file    Intimate partner violence     Fear of current or ex partner: Not on file     Emotionally abused: Not on file     Physically abused: Not on file     Forced sexual activity: Not on file   Other Topics Concern    Not on file   Social History Narrative    ** Merged History Encounter **              ALLERGIES: Latex; Other food; Flagyl [metronidazole]; Lisinopril-hydrochlorothiazide; Clindamycin; Ciprofloxacin; Citrate; Cottonseed oil; Cymbalta [duloxetine]; Flexeril [cyclobenzaprine]; Lemon; Pcn [penicillins]; Sulfa (sulfonamide antibiotics); and Tomato    Review of Systems   Constitutional: Negative. Negative for chills, fever and unexpected weight change. HENT: Negative.   Negative for congestion and trouble swallowing. Eyes: Negative for discharge. Respiratory: Negative. Negative for cough, chest tightness and shortness of breath. Cardiovascular: Negative. Negative for chest pain. Gastrointestinal: Positive for abdominal pain and nausea. Negative for abdominal distention, constipation and diarrhea. Endocrine: Negative. Genitourinary: Positive for dysuria and pelvic pain. Negative for difficulty urinating, frequency and urgency. Musculoskeletal: Negative. Negative for arthralgias and myalgias. Skin: Negative. Negative for color change. Allergic/Immunologic: Negative. Neurological: Negative. Negative for dizziness, speech difficulty and headaches. Hematological: Negative. Psychiatric/Behavioral: Negative. Negative for agitation and confusion. All other systems reviewed and are negative. Vitals:    09/10/20 0015   BP: 148/84   Pulse: 91   Resp: 18   Temp: 98.5 °F (36.9 °C)   SpO2: 100%   Weight: 108.9 kg (240 lb)   Height: 5' 4\" (1.626 m)            Physical Exam  Vitals signs and nursing note reviewed. Constitutional:       General: She is not in acute distress. Appearance: She is well-developed. She is not ill-appearing, toxic-appearing or diaphoretic. HENT:      Head: Normocephalic and atraumatic. Eyes:      Conjunctiva/sclera: Conjunctivae normal.   Neck:      Musculoskeletal: Neck supple. Cardiovascular:      Rate and Rhythm: Normal rate and regular rhythm. Pulmonary:      Effort: Pulmonary effort is normal. No respiratory distress. Abdominal:      Palpations: Abdomen is soft. Tenderness: There is abdominal tenderness. There is no guarding or rebound. Comments: mcburney's tenderness   Genitourinary:     Comments: REctal - Soft brown stool. No obvious hemorrhoid/fissure or gross blood. Musculoskeletal: Normal range of motion. General: No deformity. Skin:     General: Skin is warm and dry. Coloration: Skin is not pale.    Neurological: Mental Status: She is alert and oriented to person, place, and time. Psychiatric:         Behavior: Behavior normal.         Thought Content: Thought content normal.          MDM  Number of Diagnoses or Management Options  Iron deficiency anemia due to chronic blood loss:   Right ovarian cyst:   Diagnosis management comments: Uti, pyelonephritis, renal colic, diverticulitis, appendicitis    ED Course as of Sep 10 0554   Thu Sep 10, 2020   0323 hemoglobin of 7.6 noted. Discussed with patient. She does have a history of heavy vaginal bleeding and has been on iron up until a few months ago. Stop taking iron b/c it caused constipation. Denies active bleeding or current dizziness/weakness. Admits to chronic fatigue. Given abdominal pain, will check Hemoccult stool to rule out GI bleed. Will restart her iron for chronic anemia and have her follow up ASAP with OB/GYN. To return for worsening fatigue, vag bleeding, or dizziness/weakness/dyspnea/other symptoms of anemia.    [SS]      ED Course User Index  [SS] Theo Marcano MD       Procedures    LABORATORY TESTS:  Recent Results (from the past 12 hour(s))   URINALYSIS W/ REFLEX CULTURE    Collection Time: 09/10/20  2:30 AM    Specimen: Urine   Result Value Ref Range    Color YELLOW/STRAW      Appearance CLEAR CLEAR      Specific gravity 1.015 1.003 - 1.030      pH (UA) 5.0 5.0 - 8.0      Protein Negative NEG mg/dL    Glucose >1,000 (A) NEG mg/dL    Ketone Negative NEG mg/dL    Bilirubin Negative NEG      Blood Negative NEG      Urobilinogen 0.2 0.2 - 1.0 EU/dL    Nitrites Negative NEG      Leukocyte Esterase Negative NEG      WBC 0-4 0 - 4 /hpf    RBC 0-5 0 - 5 /hpf    Epithelial cells FEW FEW /lpf    Bacteria Negative NEG /hpf    UA:UC IF INDICATED CULTURE NOT INDICATED BY UA RESULT CNI     CBC WITH AUTOMATED DIFF    Collection Time: 09/10/20  2:30 AM   Result Value Ref Range    WBC 7.3 3.6 - 11.0 K/uL    RBC 4.38 3.80 - 5.20 M/uL    HGB 7.6 (L) 11.5 - 16.0 g/dL    HCT 26.7 (L) 35.0 - 47.0 %    MCV 61.0 (L) 80.0 - 99.0 FL    MCH 17.4 (L) 26.0 - 34.0 PG    MCHC 28.5 (L) 30.0 - 36.5 g/dL    RDW 19.9 (H) 11.5 - 14.5 %    PLATELET 490 836 - 899 K/uL    NRBC 0.0 0  WBC    ABSOLUTE NRBC 0.00 0.00 - 0.01 K/uL    NEUTROPHILS 59 32 - 75 %    LYMPHOCYTES 31 12 - 49 %    MONOCYTES 7 5 - 13 %    EOSINOPHILS 1 0 - 7 %    BASOPHILS 1 0 - 1 %    IMMATURE GRANULOCYTES 1 (H) 0.0 - 0.5 %    ABS. NEUTROPHILS 4.2 1.8 - 8.0 K/UL    ABS. LYMPHOCYTES 2.3 0.8 - 3.5 K/UL    ABS. MONOCYTES 0.5 0.0 - 1.0 K/UL    ABS. EOSINOPHILS 0.1 0.0 - 0.4 K/UL    ABS. BASOPHILS 0.1 0.0 - 0.1 K/UL    ABS. IMM. GRANS. 0.1 (H) 0.00 - 0.04 K/UL    DF SMEAR SCANNED      RBC COMMENTS ANISOCYTOSIS  2+        RBC COMMENTS MACROCYTOSIS  PRESENT        RBC COMMENTS HYPOCHROMIA  3+        RBC COMMENTS POIKILOCYTOSIS  PRESENT       METABOLIC PANEL, COMPREHENSIVE    Collection Time: 09/10/20  2:30 AM   Result Value Ref Range    Sodium 137 136 - 145 mmol/L    Potassium 3.9 3.5 - 5.1 mmol/L    Chloride 101 97 - 108 mmol/L    CO2 25 21 - 32 mmol/L    Anion gap 11 5 - 15 mmol/L    Glucose 284 (H) 65 - 100 mg/dL    BUN 5 (L) 6 - 20 MG/DL    Creatinine 0.86 0.55 - 1.02 MG/DL    BUN/Creatinine ratio 6 (L) 12 - 20      GFR est AA >60 >60 ml/min/1.73m2    GFR est non-AA >60 >60 ml/min/1.73m2    Calcium 9.2 8.5 - 10.1 MG/DL    Bilirubin, total 0.2 0.2 - 1.0 MG/DL    ALT (SGPT) 27 12 - 78 U/L    AST (SGOT) 23 15 - 37 U/L    Alk.  phosphatase 93 45 - 117 U/L    Protein, total 7.7 6.4 - 8.2 g/dL    Albumin 3.6 3.5 - 5.0 g/dL    Globulin 4.1 (H) 2.0 - 4.0 g/dL    A-G Ratio 0.9 (L) 1.1 - 2.2     HCG URINE, QL. - POC    Collection Time: 09/10/20  2:31 AM   Result Value Ref Range    Pregnancy test,urine (POC) Negative NEG     OCCULT BLOOD, STOOL    Collection Time: 09/10/20  3:33 AM   Result Value Ref Range    Occult blood, stool Negative NEG         IMAGING RESULTS:  CT ABD PELV W CONT   Final Result   IMPRESSION:   1.7 cm right ovarian cyst is likely physiologic but could be a source of right   lower quadrant pain. Normal appendix. Unchanged hepatic steatosis. MEDICATIONS GIVEN:  Medications   sodium chloride 0.9 % bolus infusion 1,000 mL (0 mL IntraVENous IV Completed 9/10/20 0450)   ketorolac (TORADOL) injection 30 mg (30 mg IntraVENous Given 9/10/20 0321)   morphine injection 4 mg (4 mg IntraVENous Given 9/10/20 0321)   iopamidoL (ISOVUE-370) 76 % injection 100 mL (100 mL IntraVENous Given 9/10/20 0416)       IMPRESSION:  1. Iron deficiency anemia due to chronic blood loss    2. Right ovarian cyst        PLAN:  1. Discharge Medication List as of 9/10/2020  4:40 AM      START taking these medications    Details   ferrous sulfate (IRON) 325 mg (65 mg iron) EC tablet Take 1 Tab by mouth three (3) times daily (with meals). , Print, Disp-90 Tab,R-0      docusate sodium (COLACE) 100 mg capsule Take 1 Cap by mouth two (2) times a day for 90 days. , Print, Disp-60 Cap,R-2      traMADoL (Ultram) 50 mg tablet Take 1 Tab by mouth every six (6) hours as needed for Pain for up to 3 days. Max Daily Amount: 200 mg., Print, Disp-15 Tab,R-0         CONTINUE these medications which have NOT CHANGED    Details   ondansetron (ZOFRAN ODT) 4 mg disintegrating tablet Take 1 Tab by mouth every six (6) hours as needed for Nausea., Normal, Disp-25 Tab,R-0      methocarbamoL (ROBAXIN) 750 mg tablet Take 1 Tab by mouth four (4) times daily. , Print, Disp-15 Tab, R-0      lidocaine 4 % patch Apply patch to left neck .  Apply patch to the affected area for 12 hours a day and remove for 12 hours a day., Print, Disp-30 Patch, R-0      promethazine (PHENERGAN) 25 mg tablet Take 1 Tab by mouth every six (6) hours as needed for Nausea., Normal, Disp-12 Tab, R-0      ibuprofen (MOTRIN) 800 mg tablet Take 1 Tab by mouth every eight (8) hours as needed for Pain., Normal, Disp-30 Tab, R-0      fluticasone propionate (FLONASE) 50 mcg/actuation nasal spray 2 Sprays by Both Nostrils route daily. , Normal, Disp-1 Bottle, R-0      loratadine-pseudoephedrine (CLARITIN-D 12 HOUR) 5-120 mg per tablet Take 1 Tab by mouth two (2) times a day., Normal, Disp-30 Tab, R-0      sucralfate (CARAFATE) 1 gram tablet Take 1 Tab by mouth four (4) times daily. , Normal, Disp-20 Tab, R-0      busPIRone (BUSPAR) 7.5 mg tablet Take 7.5 mg by mouth daily. , Historical Med      labetalol (NORMODYNE) 200 mg tablet Take 200 mg by mouth two (2) times a day., Historical Med      aspirin 81 mg chewable tablet Take 81 mg by mouth daily. , Historical Med      albuterol (PROVENTIL HFA, VENTOLIN HFA, PROAIR HFA) 90 mcg/actuation inhaler Take 1-2 Puffs by inhalation every four (4) hours as needed for Wheezing., Normal, Disp-1 Inhaler, R-1      esomeprazole (NEXIUM) 40 mg capsule Take 1 Cap by mouth daily. , Print, Disp-20 Cap, R-0      metFORMIN (GLUCOPHAGE) 1,000 mg tablet Take 1,000 mg by mouth two (2) times daily (with meals). Indications: type 2 diabetes mellitus, Historical Med      gabapentin (NEURONTIN) 400 mg capsule Take 400 mg by mouth three (3) times daily. Indications: NEUROPATHIC PAIN, Historical Med      losartan (COZAAR) 25 mg tablet Take 100 mg by mouth daily. , Historical Med      metoclopramide HCl (REGLAN) 5 mg tablet Take 5 mg by mouth Before breakfast, lunch, dinner and at bedtime. , Historical Med           2.    Follow-up Information     Follow up With Specialties Details Why Contact Info    Juanito Cordoba NP Nurse Practitioner Schedule an appointment as soon as possible for a visit  [address]  [address] [facility] [address] [facility] Gynecology  Schedule an appointment as soon as possible for a visit  [place] [address] [id number]  [phone]    [facility] [place], [place] EMERGENCY DEPT Emergency Medicine  As needed, If symptoms worsen [place]  [phone]        Return to ED if worse

## 2020-09-10 NOTE — DISCHARGE INSTRUCTIONS
Patient Education        Anemia From Heavy Bleeding: Care Instructions  Your Care Instructions     Anemia means that your body does not have enough red blood cells. Red blood cells carry oxygen around the body. When you have anemia, you may feel dizzy, tired, and weak. You may also feel your heart pounding. For some people, it's hard to focus and think clearly. One common cause of anemia is bleeding. Bleeding from ulcers, hemorrhoids, cancer, or other problems can cause anemia. It may also be caused by heavy menstrual periods. Your treatment may include iron pills. Iron helps your body make hemoglobin. Hemoglobin is the part of the red blood cell that carries oxygen. If you have severe anemia, you may need a blood transfusion to give you red blood cells as quickly as possible. Sometimes it takes several months to get iron levels back to normal.  Follow-up care is a key part of your treatment and safety. Be sure to make and go to all appointments, and call your doctor if you are having problems. It's also a good idea to know your test results and keep a list of the medicines you take. How can you care for yourself at home? · Be safe with medicines. Take your medicines exactly as prescribed. Call your doctor if you think you are having a problem with your medicine. · Follow your doctor's advice about eating foods that have a lot of iron in them. These include red meat, shellfish, poultry, and eggs. They also include beans, raisins, whole-grain bread, and leafy green vegetables. · Steam your vegetables. This is the best way to prepare them if you want to get as much iron as possible. · Iron pills can cause constipation. If you take them, there are things you can do to avoid constipation. Drink plenty of fluids, eat foods with a lot of fiber, and exercise every day. When should you call for help? Call 911 anytime you think you may need emergency care.  For example, call if:    · You passed out (lost consciousness).     · Your stools are maroon or very bloody. Call your doctor now or seek immediate medical care if:    · You are short of breath.     · You have new or worse bleeding.     · You are dizzy or light-headed, or you feel like you may faint. Watch closely for changes in your health, and be sure to contact your doctor if:    · You feel weaker or more tired than usual.     · You do not get better as expected. Where can you learn more? Go to http://rosalino-jordan.info/  Enter I435 in the search box to learn more about \"Anemia From Heavy Bleeding: Care Instructions. \"  Current as of: November 8, 2019               Content Version: 12.6  © 5099-8673 [employer]. Care instructions adapted under license by [employer] (which disclaims liability or warranty for this information). If you have questions about a medical condition or this instruction, always ask your healthcare professional. [place] [address] any warranty or liability for your use of this information. Patient Education        Iron Deficiency Anemia: Care Instructions  Your Care Instructions     Anemia means that you don't have enough red blood cells. Red blood cells carry oxygen around your body. When you have anemia, it can make you pale, weak, and tired. Many things can cause anemia. The most common cause is loss of blood. This can happen if you have heavy menstrual periods. It can also happen if you have bleeding in your stomach or bowel. You can also get anemia if you don't have enough iron in your diet or if it's hard for your body to absorb iron. In some cases, pregnancy causes anemia. That's because a pregnant woman needs more iron. Your doctor may do more tests to find the cause of your anemia. If a disease or other health problem is causing it, your doctor will treat that problem.   It's important to follow up with your doctor to make sure that your iron level returns to normal.  Follow-up care is a key part of your treatment and safety. Be sure to make and go to all appointments, and call your doctor if you are having problems. It's also a good idea to know your test results and keep a list of the medicines you take. How can you care for yourself at home? · If your doctor recommended iron pills, take them as directed. ? Try to take the pills on an empty stomach. You can do this about 1 hour before or 2 hours after meals. But you may need to take iron with food to avoid an upset stomach. ? Do not take antacids or drink milk or anything with caffeine within 2 hours of when you take your iron. They can keep your body from absorbing the iron well. ? Vitamin C helps your body absorb iron. You may want to take iron pills with a glass of orange juice or some other food high in vitamin C.  ? Iron pills may cause stomach problems. These include heartburn, nausea, diarrhea, constipation, and cramps. It can help to drink plenty of fluids and include fruits, vegetables, and fiber in your diet. ? It's normal for iron pills to make your stool a greenish or grayish black. But internal bleeding can also cause dark stool. So it's important to tell your doctor about any color changes. ? Call your doctor if you think you are having a problem with your iron pills. Even after you start to feel better, it will take several months for your body to build up its supply of iron. ? If you miss a pill, don't take a double dose. ? Keep iron pills out of the reach of small children. Too much iron can be very dangerous. · Eat foods with a lot of iron. These include red meat, shellfish, poultry, and eggs. They also include beans, raisins, whole-grain bread, and leafy green vegetables. · Steam your vegetables. This is the best way to prepare them if you want to get as much iron as possible. · Be safe with medicines.  Do not take nonsteroidal anti-inflammatory pain relievers unless your doctor tells you to. These include aspirin, naproxen (Aleve), and ibuprofen (Advil, Motrin). · Liquid iron can stain your teeth. But you can mix it with water or juice and drink it with a straw. Then it won't get on your teeth. When should you call for help? Call 911 anytime you think you may need emergency care. For example, call if:    · You passed out (lost consciousness). Call your doctor now or seek immediate medical care if:    · You are short of breath.     · You are dizzy or light-headed, or you feel like you may faint.     · You have new or worse bleeding. Watch closely for changes in your health, and be sure to contact your doctor if:    · You feel weaker or more tired than usual.     · You do not get better as expected. Where can you learn more? Go to http://rosalinoSingulexjordan.info/  Enter Z825 in the search box to learn more about \"Iron Deficiency Anemia: Care Instructions. \"  Current as of: November 8, 2019               Content Version: 12.6  © 1791-3684 [employer]. Care instructions adapted under license by [employer] (which disclaims liability or warranty for this information). If you have questions about a medical condition or this instruction, always ask your healthcare professional. [address] any warranty or liability for your use of this information. Patient Education        Functional Ovarian Cyst: Care Instructions  Your Care Instructions     A functional ovarian cyst is a sac that forms on the surface of a woman's ovary during ovulation. The sac holds a maturing egg. Usually the sac goes away after the egg is released. But if the egg is not released, or if the sac closes up after the egg is released, the sac can swell up with fluid and form a cyst.  Functional ovarian cysts are different than ovarian growths caused by other problems, such as cancer.  Most functional ovarian cysts cause no symptoms and go away on their own. Some cause mild pain. Others can cause severe pain when they rupture or bleed. Follow-up care is a key part of your treatment and safety. Be sure to make and go to all appointments, and call your doctor if you are having problems. It's also a good idea to know your test results and keep a list of the medicines you take. How can you care for yourself at home? · Use heat, such as a hot water bottle, a heating pad set on low, or a warm bath, to relax tense muscles and relieve cramping. · Be safe with medicines. Take pain medicines exactly as directed. ? If the doctor gave you a prescription medicine for pain, take it as prescribed. ? If you are not taking a prescription pain medicine, ask your doctor if you can take an over-the-counter medicine. · Avoid constipation. Make sure you drink enough fluids and include fruits, vegetables, and fiber in your diet each day. Constipation does not cause ovarian cysts, but it may make your pelvic pain worse. When should you call for help? Call your doctor now or seek immediate medical care if:    · You have severe vaginal bleeding.     · You have new or worse belly or pelvic pain. Watch closely for changes in your health, and be sure to contact your doctor if:    · You have unusual vaginal bleeding.     · You do not get better as expected. Where can you learn more? Go to http://rosalino-jordan.info/  Enter I547 in the search box to learn more about \"Functional Ovarian Cyst: Care Instructions. \"  Current as of: November 8, 2019               Content Version: 12.6  © 1410-4289 [employer]. Care instructions adapted under license by [employer] (which disclaims liability or warranty for this information). If you have questions about a medical condition or this instruction, always ask your healthcare professional. [address] any warranty or liability for your use of this information.

## 2020-09-10 NOTE — ED NOTES
Pt in ED w/ complaint of lower abd pain w/ dysuria X 3 weeks. Pt reports she has a UTI. Pt reports pain to the touch. Pt is A&O X 4 and appears to be in no distress. Emergency Department Nursing Plan of Care       The Nursing Plan of Care is developed from the Nursing assessment and Emergency Department Attending provider initial evaluation. The plan of care may be reviewed in the ED Provider note.     The Plan of Care was developed with the following considerations:   Patient / Family readiness to learn indicated by:verbalized understanding  Persons(s) to be included in education: patient  Barriers to Learning/Limitations:No    Signed     Myranda Varela RN    9/10/2020   5:02 AM

## 2020-10-05 ENCOUNTER — OFFICE VISIT (OUTPATIENT)
Dept: OBGYN CLINIC | Age: 40
End: 2020-10-05
Payer: MEDICARE

## 2020-10-05 VITALS
SYSTOLIC BLOOD PRESSURE: 160 MMHG | WEIGHT: 240 LBS | BODY MASS INDEX: 40.97 KG/M2 | DIASTOLIC BLOOD PRESSURE: 94 MMHG | HEIGHT: 64 IN

## 2020-10-05 DIAGNOSIS — N83.201 RIGHT OVARIAN CYST: ICD-10-CM

## 2020-10-05 DIAGNOSIS — Z12.31 SCREENING MAMMOGRAM, ENCOUNTER FOR: ICD-10-CM

## 2020-10-05 DIAGNOSIS — D50.9 IRON DEFICIENCY ANEMIA, UNSPECIFIED IRON DEFICIENCY ANEMIA TYPE: ICD-10-CM

## 2020-10-05 DIAGNOSIS — N93.9 ABNORMAL UTERINE BLEEDING (AUB): Primary | ICD-10-CM

## 2020-10-05 PROCEDURE — 99203 OFFICE O/P NEW LOW 30 MIN: CPT | Performed by: OBSTETRICS & GYNECOLOGY

## 2020-10-05 RX ORDER — ACETAMINOPHEN AND CODEINE PHOSPHATE 120; 12 MG/5ML; MG/5ML
1 SOLUTION ORAL DAILY
Qty: 3 PACKAGE | Refills: 4 | Status: SHIPPED | OUTPATIENT
Start: 2020-10-05 | End: 2020-10-30

## 2020-10-05 NOTE — PATIENT INSTRUCTIONS
Abnormal Uterine Bleeding: Care Instructions Your Care Instructions Abnormal uterine bleeding is irregular bleeding from the uterus that is longer or heavier than usual or does not occur at your regular time. Sometimes it is caused by changes in hormone levels. It can also be caused by growths in the uterus, such as fibroids or polyps. Sometimes a cause cannot be found. You may have heavy bleeding when you are not expecting your period. Your doctor may suggest a pregnancy test, if you think you are pregnant. Follow-up care is a key part of your treatment and safety. Be sure to make and go to all appointments, and call your doctor if you are having problems. It's also a good idea to know your test results and keep a list of the medicines you take. How can you care for yourself at home? · Be safe with medicines. Take pain medicines exactly as directed. ? If the doctor gave you a prescription medicine for pain, take it as prescribed. ? If you are not taking a prescription pain medicine, ask your doctor if you can take an over-the-counter medicine. · You may be low in iron because of blood loss. Eat a balanced diet that is high in iron and vitamin C. Foods rich in iron include red meat, shellfish, eggs, beans, and leafy green vegetables. Talk to your doctor about whether you need to take iron pills or a multivitamin. When should you call for help? Call 911 anytime you think you may need emergency care. For example, call if: 
  · You passed out (lost consciousness). Call your doctor now or seek immediate medical care if: 
  · You have new or worse belly or pelvic pain.  
  · You have severe vaginal bleeding.  
  · You feel dizzy or lightheaded, or you feel like you may faint. Watch closely for changes in your health, and be sure to contact your doctor if: 
  · You think you may be pregnant.  
  · Your bleeding gets worse.  
  · You do not get better as expected. Where can you learn more? Go to http://www.gray.com/ Enter B143 in the search box to learn more about \"Abnormal Uterine Bleeding: Care Instructions. \" Current as of: November 8, 2019               Content Version: 12.6 © 7298-9304 [employer]. Care instructions adapted under license by [employer] (which disclaims liability or warranty for this information). If you have questions about a medical condition or this instruction, always ask your healthcare professional. [place] [address] any warranty or liability for your use of this information.

## 2020-10-05 NOTE — PROGRESS NOTES
Problem Visit    Alejandro Ramirez is a 41 y.o.  presenting for problem visit. Her main concern today is follow up for a recent ED visit. She was seen in the ED on 9/10 for c/o abdominal pain and pain with voiding. She was diagnosed with anemia (Hgb 7.6) and a R ovarian cyst. She had previously been taking an iron supplement for chronic anemia but stopped due to constipation. ED advised her to restart iron TID with colace for constipation. Urine culture not done based on UA results. Today she reports it took a few weeks for her abdominal pain to resolve. She is compliant with iron TID, tolerating it well with colace. She reports that her \"stomach stopped running\" after the birth of her daugther almost 9 years ago. Reports her menses are very heavy and with severe cramping. She had not seen a gynecologist for a regular exam in many years. Ob/Gyn Hx:    - she reports hx of 3 c/s deliveries, one living daughter and two fetal demises (both male), remainder were early miscarriages  LMP-   Menses- very heavy with clots and painful cramps  Contraception- s/p BTL at [facility] at the time of her last c/s   SA- yes, male.  Same partner for 13 years (he is 61yo)      Past Medical History:   Diagnosis Date    Abnormal Pap smear     \"years ago\"     Asthma     bronchitis    Bipolar disorder ([address]     Depression with anxiety 2010    Diabetes mellitus     type II diabetes mellitus since 2004    GERD (gastroesophageal reflux disease)     HTN (hypertension) 2010    Ill-defined condition     High Cholesterol    Mood disorder ([address]     Other and unspecified hyperlipidemia 2010    Postpartum depression     hospitalized after last delivery    Rhinitis 2012    UTI (urinary tract infection)        Past Surgical History:   Procedure Laterality Date     DELIVERY ONLY      cesearean section 08    HX  SECTION      X 3    HX GYN      , tubal ligation Family History   Problem Relation Age of Onset    Diabetes Mother     Hypertension Mother     Stroke Mother     Heart Disease Mother     Hypertension Father     Stroke Father     Diabetes Father     Kidney Disease Father     Hypertension Maternal Grandmother     Diabetes Maternal Grandmother     Hypertension Maternal Grandfather     Diabetes Maternal Grandfather     Hypertension Paternal Grandmother     Diabetes Paternal [de-identified]     Hypertension Paternal Grandfather     Diabetes Paternal Grandfather     Hypertension Sister     Diabetes Sister     Diabetes Sister     Hypertension Sister     Diabetes Sister     Hypertension Sister        Social History     Socioeconomic History    Marital status: SINGLE     Spouse name: Not on file    Number of children: Not on file    Years of education: Not on file    Highest education level: Not on file   Occupational History    Not on file   Social Needs    Financial resource strain: Not on file    Food insecurity     Worry: Not on file     Inability: Not on file    Transportation needs     Medical: Not on file     Non-medical: Not on file   Tobacco Use    Smoking status: Former Smoker     Last attempt to quit: 10/16/2015     Years since quittin.9    Smokeless tobacco: Never Used    Tobacco comment: quit 17 days ago   Substance and Sexual Activity    Alcohol use: Not Currently     Frequency: Never     Comment: Occasionally    Drug use: No    Sexual activity: Yes     Partners: Female     Birth control/protection: Surgical     Comment: Tubal ligation   Lifestyle    Physical activity     Days per week: Not on file     Minutes per session: Not on file    Stress: Not on file   Relationships    Social connections     Talks on phone: Not on file     Gets together: Not on file     Attends Islam service: Not on file     Active member of club or organization: Not on file     Attends meetings of clubs or organizations: Not on file Relationship status: Not on file    Intimate partner violence     Fear of current or ex partner: Not on file     Emotionally abused: Not on file     Physically abused: Not on file     Forced sexual activity: Not on file   Other Topics Concern    Not on file   Social History Narrative    ** Merged History Encounter **            Current Outpatient Medications   Medication Sig Dispense Refill    norethindrone (Mamie) 0.35 mg tab Take 1 Tab by mouth daily. 3 Package 4    ferrous sulfate (IRON) 325 mg (65 mg iron) EC tablet Take 1 Tab by mouth three (3) times daily (with meals). 90 Tab 0    docusate sodium (COLACE) 100 mg capsule Take 1 Cap by mouth two (2) times a day for 90 days. 60 Cap 2    aspirin 81 mg chewable tablet Take 81 mg by mouth daily.  esomeprazole (NEXIUM) 40 mg capsule Take 1 Cap by mouth daily. 20 Cap 0    metFORMIN (GLUCOPHAGE) 1,000 mg tablet Take 1,000 mg by mouth two (2) times daily (with meals). Indications: type 2 diabetes mellitus      gabapentin (NEURONTIN) 100 mg capsule Take 100 mg by mouth three (3) times daily. Indications: neuropathic pain      losartan (COZAAR) 25 mg tablet Take 100 mg by mouth daily.  metoclopramide HCl (REGLAN) 5 mg tablet Take 5 mg by mouth Before breakfast, lunch, dinner and at bedtime.  ibuprofen (MOTRIN) 800 mg tablet Take 1 Tab by mouth every eight (8) hours as needed for Pain. 30 Tab 0    labetalol (NORMODYNE) 200 mg tablet Take 200 mg by mouth two (2) times a day.  albuterol (PROVENTIL HFA, VENTOLIN HFA, PROAIR HFA) 90 mcg/actuation inhaler Take 1-2 Puffs by inhalation every four (4) hours as needed for Wheezing.  1 Inhaler 1       Allergies   Allergen Reactions    Latex Hives     rash    Other Food Hives     Citrus Fruits    Flagyl [Metronidazole] Hives and Itching    Lisinopril-Hydrochlorothiazide Angioedema    Clindamycin Rash and Itching    Ciprofloxacin Rash and Itching    Citrate Hives    Cottonseed Oil Rash    Cymbalta [Duloxetine] Hives and Itching    Flexeril [Cyclobenzaprine] Angioedema    Lemon Hives    Pcn [Penicillins] Hives    Sulfa (Sulfonamide Antibiotics) Shortness of Breath    Tomato Hives       Review of Systems - History obtained from the patient  Constitutional: negative for weight loss, fever, night sweats  HEENT: negative for hearing loss, earache, congestion, snoring, sorethroat  CV: negative for chest pain, palpitations, edema  Resp: negative for cough, shortness of breath, wheezing  GI: negative for change in bowel habits, abdominal pain, black or bloody stools  : negative for frequency, dysuria, hematuria, vaginal discharge  MSK: negative for back pain, joint pain, muscle pain  Breast: negative for breast lumps, nipple discharge, galactorrhea  Skin :negative for itching, rash, hives  Neuro: negative for dizziness, headache, confusion, weakness  Psych: negative for anxiety, depression, change in mood  Heme/lymph: negative for bleeding, bruising, pallor    Physical Exam    Visit Vitals  BP (!) 160/94 (BP 1 Location: Left arm, BP Patient Position: Sitting)   Ht 5' 4\" (1.626 m)   Wt 240 lb (108.9 kg)   LMP 09/20/2020   BMI 41.20 kg/m²         OBGyn Exam      Constitutional  · Appearance: well-nourished, obese, alert, in no acute distress    HENT  · Head and Face: appears normal    Neck  · Inspection/Palpation: normal appearance, no masses or tenderness  · Thyroid: gland size normal, nontender    Chest  · Respiratory Effort: non-labored breathing    Cardiovascular  · Extremities: no peripheral edema    Gastrointestinal  · Abdominal Examination: abdomen non-distended, non-tender to palpation, no masses present  · Liver and spleen: no hepatomegaly present, spleen not palpable  · Hernias: no hernias identified      Skin  · General Inspection: no rash, no lesions identified    Neurologic/Psychiatric  · Mental Status:  · Orientation: grossly oriented to person, place and time  · Mood and Affect: mood normal, affect appropriate      Assessment/Plan:    1. Screening mammogram, encounter for  Mammogram ordered  - [facility] MAMMO BI SCREENING INCL CAD; Future    2. Abnormal uterine bleeding (AUB)  We discussed her heavy menses and her severe iron deficiency anemia. We reviewed management options including POP, Depo, Nexplanon, LNG IUD. After discussion, she wishes to start with a pill. Rx judith sent (estrogen CI due to her HTN). 3. Iron deficiency anemia, unspecified iron deficiency anemia type  Check uterus for fibroids, polyps. Cont iron tid  Start on POP      4. Right ovarian cyst  Reassured that most cysts[place]follic[place] [facility] [place] physiologic and would not likely be the source of pain. Check US in 4 weeks to eval for cyst resolution.        Elevated BP today in office (chronic HTN) - follow-up with her PCP    Rosalba Morgan MD

## 2020-10-13 ENCOUNTER — HOSPITAL ENCOUNTER (EMERGENCY)
Age: 40
Discharge: HOME | End: 2020-10-14
Attending: EMERGENCY MEDICINE
Payer: MEDICARE

## 2020-10-13 VITALS
OXYGEN SATURATION: 100 % | HEIGHT: 64 IN | BODY MASS INDEX: 40.97 KG/M2 | SYSTOLIC BLOOD PRESSURE: 151 MMHG | DIASTOLIC BLOOD PRESSURE: 91 MMHG | WEIGHT: 240 LBS | HEART RATE: 82 BPM | RESPIRATION RATE: 16 BRPM | TEMPERATURE: 98.7 F

## 2020-10-13 DIAGNOSIS — I10 ACCELERATED HYPERTENSION: ICD-10-CM

## 2020-10-13 DIAGNOSIS — H69.90 DISORDER OF EUSTACHIAN TUBE, UNSPECIFIED LATERALITY: ICD-10-CM

## 2020-10-13 DIAGNOSIS — J30.9 ALLERGIC RHINITIS, UNSPECIFIED SEASONALITY, UNSPECIFIED TRIGGER: Primary | ICD-10-CM

## 2020-10-13 DIAGNOSIS — J06.9 ACUTE UPPER RESPIRATORY INFECTION: ICD-10-CM

## 2020-10-13 PROCEDURE — 99283 EMERGENCY DEPT VISIT LOW MDM: CPT

## 2020-10-14 PROCEDURE — 74011250637 HC RX REV CODE- 250/637: Performed by: EMERGENCY MEDICINE

## 2020-10-14 RX ORDER — ALBUTEROL SULFATE 90 UG/1
1-2 AEROSOL, METERED RESPIRATORY (INHALATION)
Qty: 1 INHALER | Refills: 1 | Status: SHIPPED | OUTPATIENT
Start: 2020-10-14 | End: 2021-10-29 | Stop reason: SDUPTHER

## 2020-10-14 RX ORDER — MONTELUKAST SODIUM 10 MG/1
10 TABLET ORAL DAILY
Qty: 30 TAB | Refills: 0 | Status: SHIPPED | OUTPATIENT
Start: 2020-10-14 | End: 2021-09-17

## 2020-10-14 RX ORDER — MONTELUKAST SODIUM 10 MG/1
10 TABLET ORAL DAILY
Qty: 30 TAB | Refills: 0 | Status: SHIPPED | OUTPATIENT
Start: 2020-10-14 | End: 2020-10-14

## 2020-10-14 RX ORDER — ALBUTEROL SULFATE 90 UG/1
1-2 AEROSOL, METERED RESPIRATORY (INHALATION)
Qty: 1 INHALER | Refills: 1 | Status: SHIPPED | OUTPATIENT
Start: 2020-10-14 | End: 2020-10-14

## 2020-10-14 RX ORDER — OXYMETAZOLINE HCL 0.05 %
2 SPRAY, NON-AEROSOL (ML) NASAL ONCE
Status: COMPLETED | OUTPATIENT
Start: 2020-10-14 | End: 2020-10-14

## 2020-10-14 RX ADMIN — OXYMETAZOLINE HCL 2 SPRAY: 0.05 SPRAY NASAL at 00:24

## 2020-10-14 NOTE — DISCHARGE INSTRUCTIONS
Patient Education        Managing Your Allergies: Care Instructions  Your Care Instructions     Managing your allergies is an important part of staying healthy. Your doctor will help you find out what may be the cause of the allergies. Common causes of symptoms are house dust and dust mites, animal dander, mold, and pollen. As soon as you know what triggers your symptoms, try to avoid those things. This can help prevent allergy symptoms, asthma, and other health problems. Ask your doctor about allergy medicine or immunotherapy. These treatments may help reduce or prevent allergy symptoms. Follow-up care is a key part of your treatment and safety. Be sure to make and go to all appointments, and call your doctor if you are having problems. It's also a good idea to know your test results and keep a list of the medicines you take. How can you care for yourself at home? · If you have been told by your doctor that dust or dust mites are causing your allergy, decrease the dust around your bed:  ? Wash sheets, pillowcases, and other bedding in hot water every week. ? Use dust-proof covers for pillows, duvets, and mattresses. Avoid plastic covers because they tear easily and do not \"breathe. \" Wash as instructed on the label. ? Do not use any blankets and pillows that you do not need. ? Use blankets that you can wash in your washing machine. ? Consider removing drapes and carpets, which attract and hold dust, from your bedroom. · If you are allergic to house dust and mites, do not use home humidifiers. Your doctor can suggest ways you can control dust and mites. · Look for signs of cockroaches. Cockroaches cause allergic reactions. Use cockroach baits to get rid of them. Then, clean your home well. Cockroaches like areas where grocery bags, newspapers, empty bottles, or cardboard boxes are stored. Do not keep these inside your home, and keep trash and food containers sealed.  Seal off any spots where cockroaches might enter your home. · If you are allergic to mold, get rid of furniture, rugs, and drapes that smell musty. Check for mold in the bathroom. · If you are allergic to outdoor pollen or mold spores, use air-conditioning. Change or clean all filters every month. Keep windows closed. · If you are allergic to pollen, stay inside when pollen counts are high. Use a vacuum  with a HEPA filter or a double-thickness filter at least two times each week. · Stay inside when air pollution is bad. Avoid paint fumes, perfumes, and other strong odors. · Avoid conditions that make your allergies worse. Stay away from smoke. Do not smoke or let anyone else smoke in your house. Do not use fireplaces or wood-burning stoves. · If you are allergic to your pets, change the air filter in your furnace every month. Use high-efficiency filters. · If you are allergic to pet dander, keep pets outside or out of your bedroom. Old carpet and cloth furniture can hold a lot of animal dander. You may need to replace them. When should you call for help? Give an epinephrine shot if:    · You think you are having a severe allergic reaction. After giving an epinephrine shot call 911, even if you feel better. Call 911 if:    · You have symptoms of a severe allergic reaction. These may include:  ? Sudden raised, red areas (hives) all over your body. ? Swelling of the throat, mouth, lips, or tongue. ? Trouble breathing. ? Passing out (losing consciousness). Or you may feel very lightheaded or suddenly feel weak, confused, or restless.     · You have been given an epinephrine shot, even if you feel better. Call your doctor now or seek immediate medical care if:    · You have symptoms of an allergic reaction, such as:  ? A rash or hives (raised, red areas on the skin). ? Itching. ? Swelling. ? Belly pain, nausea, or vomiting.    Watch closely for changes in your health, and be sure to contact your doctor if:    · Your allergies get worse.     · You need help controlling your allergies.     · You have questions about allergy testing.     · You do not get better as expected. Where can you learn more? Go to http://www.gray.com/  Enter L249 in the search box to learn more about \"Managing Your Allergies: Care Instructions. \"  Current as of: June 29, 2020               Content Version: 12.6  © 7405-9032 [employer]. Care instructions adapted under license by [employer] (which disclaims liability or warranty for this information). If you have questions about a medical condition or this instruction, always ask your healthcare professional. [place] [address] any warranty or liability for your use of this information.

## 2020-10-14 NOTE — ED NOTES
Pt in ED w/ complaint of nasal congestion w/ pain, facial pain, and R ear pain w/ fullness all starting today. Pt reports taking Mucinex for her symptoms w/o relief. Pt is A&O X 4 and appears to be in no distress. Emergency Department Nursing Plan of Care       The Nursing Plan of Care is developed from the Nursing assessment and Emergency Department Attending provider initial evaluation. The plan of care may be reviewed in the ED Provider note.     The Plan of Care was developed with the following considerations:   Patient / Family readiness to learn indicated by:verbalized understanding  Persons(s) to be included in education: patient  Barriers to Learning/Limitations:No    Signed     Torsten Wen RN    10/14/2020   12:34 AM

## 2020-10-14 NOTE — ED NOTES
Patient (s) was given copy of dc instructions and no paper script(s) and two electronic scripts. Patient (s) has verbalized understanding of instructions and script (s). Patient was given a current medication reconciliation form and verbalized understanding of their medications. Patient (s) has verbalized understanding of the importance of discussing medications with  his or her physician or clinic they will be following up with. Patient alert and oriented and in no acute distress. Patient offered wheelchair from treatment area to hospital entrance, patient declined wheelchair. Patient left ED with family.

## 2020-10-14 NOTE — ED PROVIDER NOTES
EMERGENCY DEPARTMENT HISTORY AND PHYSICAL EXAM      Please note that this dictation was completed with the assistance of \"Dragon\", the computer voice recognition software. Quite often unanticipated grammatical, syntax, homophones, and other interpretive errors are inadvertently transcribed by the computer software. Please disregard these errors and any errors that have escaped final proofreading. Thank you. Patient Name: Dalila Geiger  : 1980  MRN: [id number]  History of Presenting Illness     Chief Complaint   Patient presents with    Cold Symptoms       History Provided By: Patient    HPI: Dalila Geiger, 41 y.o. female with past medical history as documented below presents to the ED with c/o of acute onset of 6 hours of nasal congestion, sinus pressure and pain, and bilateral ear fullness. Patient states that she did take Mucinex prior to arrival with for symptoms without relief. She denies any fever, shortness of breath. She denies any exposure to COVID-19 or sick contacts. Pt denies any other alleviating or exacerbating factors. Additionally, pt specifically denies any recent fever, chills, headache, nausea, vomiting, abdominal pain, CP, SOB, lightheadedness, dizziness, numbness, weakness, lower extremity swelling, heart palpitations, urinary sxs, diarrhea, constipation, melena, hematochezia. There are no other complaints, changes or physical findings pertinent to the HPI at this time.     PCP: Huang Sr MD    Past History   Past Medical History:  Past Medical History:   Diagnosis Date    Abnormal Pap smear     \"years ago\"     Asthma     bronchitis    [facility] disorder ([facility] [address]     Depression with anxiety 2010    Diabetes mellitus     type II diabetes mellitus since     GERD (gastroesophageal reflux disease)     HTN (hypertension) 2010    Ill-defined condition     High Cholesterol    Mood disorder ([address]     Other and unspecified hyperlipidemia 2010    Postpartum depression     hospitalized after last delivery    Rhinitis 2012    UTI (urinary tract infection)        Past Surgical History:  Past Surgical History:   Procedure Laterality Date     DELIVERY ONLY      cesearean section 08    HX  SECTION      X 3    HX GYN      , tubal ligation       Family History:  Family History   Problem Relation Age of Onset    Diabetes Mother     Hypertension Mother     Stroke Mother     Heart Disease Mother     Hypertension Father     Stroke Father     Diabetes Father     Kidney Disease Father     Hypertension Maternal Grandmother     Diabetes Maternal Grandmother     Hypertension Maternal Grandfather     Diabetes Maternal Grandfather     Hypertension Paternal Grandmother     Diabetes Paternal Grandmother     Hypertension Paternal Grandfather     Diabetes Paternal Grandfather     Hypertension Sister     Diabetes Sister     Diabetes Sister     Hypertension Sister     Diabetes Sister     Hypertension Sister        Social History:  Social History     Tobacco Use    Smoking status: Former Smoker     Last attempt to quit: 10/16/2015     Years since quittin.0    Smokeless tobacco: Never Used    Tobacco comment: quit 17 days ago   Substance Use Topics    Alcohol use: Not Currently     Frequency: Never     Comment: Occasionally    Drug use: No       Allergies:   Allergies   Allergen Reactions    Latex Hives     rash    Other Food Hives     Citrus Fruits    Flagyl [Metronidazole] Hives and Itching    Lisinopril-Hydrochlorothiazide Angioedema    Clindamycin Rash and Itching    Ciprofloxacin Rash and Itching    Citrate Hives    Cottonseed Oil Rash    Cymbalta [Duloxetine] Hives and Itching    Flexeril [Cyclobenzaprine] Angioedema    Lemon Hives    Pcn [Penicillins] Hives    Sulfa (Sulfonamide Antibiotics) Shortness of Breath    Tomato Hives       Current Medications:  No current facility-administered medications on file prior to encounter. Current Outpatient Medications on File Prior to Encounter   Medication Sig Dispense Refill    norethindrone (Mamie) 0.35 mg tab Take 1 Tab by mouth daily. 3 Package 4    ferrous sulfate (IRON) 325 mg (65 mg iron) EC tablet Take 1 Tab by mouth three (3) times daily (with meals). 90 Tab 0    docusate sodium (COLACE) 100 mg capsule Take 1 Cap by mouth two (2) times a day for 90 days. 60 Cap 2    ibuprofen (MOTRIN) 800 mg tablet Take 1 Tab by mouth every eight (8) hours as needed for Pain. 30 Tab 0    labetalol (NORMODYNE) 200 mg tablet Take 200 mg by mouth two (2) times a day.  aspirin 81 mg chewable tablet Take 81 mg by mouth daily.  [DISCONTINUED] albuterol (PROVENTIL HFA, VENTOLIN HFA, PROAIR HFA) 90 mcg/actuation inhaler Take 1-2 Puffs by inhalation every four (4) hours as needed for Wheezing. 1 Inhaler 1    esomeprazole (NEXIUM) 40 mg capsule Take 1 Cap by mouth daily. 20 Cap 0    metFORMIN (GLUCOPHAGE) 1,000 mg tablet Take 1,000 mg by mouth two (2) times daily (with meals). Indications: type 2 diabetes mellitus      gabapentin (NEURONTIN) 100 mg capsule Take 100 mg by mouth three (3) times daily. Indications: neuropathic pain      losartan (COZAAR) 25 mg tablet Take 100 mg by mouth daily.  metoclopramide HCl (REGLAN) 5 mg tablet Take 5 mg by mouth Before breakfast, lunch, dinner and at bedtime. Review of Systems   Review of Systems   Constitutional: Negative. Negative for chills and fever. HENT: Positive for congestion, ear pain and sinus pressure. Negative for facial swelling, rhinorrhea, sore throat, trouble swallowing and voice change. Eyes: Negative. Respiratory: Negative. Negative for apnea, cough, chest tightness, shortness of breath and wheezing. Cardiovascular: Negative. Negative for chest pain, palpitations and leg swelling. Gastrointestinal: Negative.   Negative for abdominal distention, abdominal pain, blood in stool, constipation, diarrhea, nausea and vomiting. Endocrine: Negative. Negative for cold intolerance, heat intolerance and polyuria. Genitourinary: Negative. Negative for difficulty urinating, dysuria, flank pain, frequency, hematuria and urgency. Musculoskeletal: Negative. Negative for arthralgias, back pain, myalgias, neck pain and neck stiffness. Skin: Negative. Negative for color change and rash. Neurological: Negative. Negative for dizziness, syncope, facial asymmetry, speech difficulty, weakness, light-headedness, numbness and headaches. Hematological: Negative. Does not bruise/bleed easily. Psychiatric/Behavioral: Negative. Negative for confusion and self-injury. The patient is not nervous/anxious. Physical Exam   Physical Exam  Vitals signs and nursing note reviewed. Constitutional:       General: She is not in acute distress. Appearance: She is well-developed. She is not diaphoretic. HENT:      Head: Normocephalic and atraumatic. Right Ear: Tympanic membrane, ear canal and external ear normal.      Left Ear: Tympanic membrane, ear canal and external ear normal.      Ears:      Comments: Bilateral TMs retracted consistent with negative pressure, eustachian tube dysfunction, no erythema or perforation noted. Mouth/Throat:      Pharynx: No oropharyngeal exudate. Eyes:      Conjunctiva/sclera: Conjunctivae normal.      Pupils: Pupils are equal, round, and reactive to light. Neck:      Musculoskeletal: Normal range of motion. Cardiovascular:      Rate and Rhythm: Normal rate and regular rhythm. Heart sounds: Normal heart sounds. No murmur. No friction rub. No gallop. Pulmonary:      Effort: Pulmonary effort is normal. No respiratory distress. Breath sounds: Normal breath sounds. No wheezing or rales. Chest:      Chest wall: No tenderness. Abdominal:      General: Bowel sounds are normal. There is no distension.       Palpations: Abdomen is soft. There is no mass. Tenderness: There is no abdominal tenderness. There is no guarding or rebound. Musculoskeletal: Normal range of motion. General: No tenderness or deformity. Skin:     General: Skin is warm. Findings: No rash. Neurological:      Mental Status: She is alert and oriented to person, place, and time. Cranial Nerves: No cranial nerve deficit. Motor: No abnormal muscle tone. Coordination: Coordination normal.      Deep Tendon Reflexes: Reflexes normal.         Diagnostic Study Results     Labs -   I have personally reviewed and interpreted all laboratory results. No results found for this or any previous visit (from the past 24 hour(s)). Radiologic Studies -   I have personally reviewed and interpreted all imaging studies and agree with radiology interpretation and report. No orders to display     CT Results  (Last 48 hours)    None        CXR Results  (Last 48 hours)    None          Medical Decision Making   I reviewed the vital signs, available nursing notes, past medical history, past surgical history, family history and social history. Vital Signs-Reviewed the patient's vital signs. Patient Vitals for the past 24 hrs:   Temp Pulse Resp BP SpO2   10/13/20 2329 98.7 °F (37.1 °C) 82 16 (!) 151/91 100 %       Pulse Oximetry Analysis - 100% on RA    Cardiac Monitor:   Rate: 82 bpm  Rhythm: Normal Sinus Rhythm      Records Reviewed: Nursing Notes, Old Medical Records, Previous electrocardiograms, Previous Radiology Studies and Previous Laboratory Studies    Provider Notes (Medical Decision Making):   Pt presents with acute URI symptoms including nasal congestion, rhinorrhea and sinus pressure. Ear exam c/w eustachian tube dysfunction without signs of infection. Pt is well-appearing with stable vitals and benign exam; symptoms are consistent with an uncomplicated URI. DDx: acute bronchitis, bacterial sinusitis vs. pharyngitis, migraine, flu. Symptomatic therapy suggested: acetaminophen, ibuprofen, antihistamine-decongestant of choice, cough suppressant of choice. Increase fluids, use vaporizer, stay in steamy bathroom tid 15 min prn severe cough, tylenol as needed, rest, avoid smoky areas. Lack of antibiotic effectiveness discussed with her. Symptomatic therapy suggested: gargle for sore throat, use mist at bedside for congestion. Apply facial warm packs for sinus pain or use nasal saline sprays. Follow up prn if not better in 72 hours. ED Course:   I am the first provider for this patient's ED visit today. Initial assessment performed. I discussed presenting problems, concerns and my formulated plan for today's visit with the patient and any available family members at bedside. I encouraged them to ask questions as they arise throughout the visit. HYPERTENSION COUNSELING  For 10 minutes, education was provided to the patient today regarding their hypertension. Patient is made aware of their elevated blood pressure and is instructed to follow up this week with their Primary Care for a recheck. Patient is counseled regarding consequences of chronic, uncontrolled hypertension including kidney disease, heart disease, stroke or even death. Patient states their understanding and agrees to follow up this week. Additionally, during their visit, I discussed sodium restriction, maintaining ideal body weight and regular exercise program as physiologic means to achieve blood pressure control. The patient will strive towards this. I reviewed our electronic medical record system for any past medical records that were available that may contribute to the patient's current condition, the nursing notes and vital signs from today's visit.   Shy Alberto MD    ED Orders Placed :  Orders Placed This Encounter    oxymetazoline (AFRIN) 0.05 % nasal spray 2 Spray    DISCONTD: montelukast (Singulair) 10 mg tablet    DISCONTD: albuterol (PROVENTIL HFA, VENTOLIN HFA, PROAIR HFA) 90 mcg/actuation inhaler    montelukast (Singulair) 10 mg tablet    albuterol (PROVENTIL HFA, VENTOLIN HFA, PROAIR HFA) 90 mcg/actuation inhaler       ED Medications Administered:  Medications   oxymetazoline (AFRIN) 0.05 % nasal spray 2 Spray (2 Sprays Both Nostrils Given 10/14/20 0024)        Progress Note:  Patient has been reassessed and reports feeling better and symptoms have improved significantly after ED treatment. Neno Mir's final labs and imaging have been reviewed with her and available family and/or caregiver. They have been counseled regarding her diagnosis. She verbally conveys understanding and agreement of the signs, symptoms, diagnosis, treatment and prognosis and additionally agrees to follow up as recommended with Dr. Tu Coombs MD and/or specialist in 24 - 48 hours. She also agrees with the care-plan we created together and conveys that all of her questions have been answered. I have also put together some discharge instructions for her that include: 1) educational information regarding their diagnosis, 2) how to care for their diagnosis at home, as well a 3) list of reasons why they would want to return to the ED prior to their follow-up appointment should the patient's condition change or symptoms worsen. I have answered all questions to the patient's satisfaction. Strict return precautions given. She both understood and agreed with plan as discussed. Vital signs stable for discharge. Pt very appreciative of care today. Disposition: Discharge  The pt is ready for discharge. The pt's signs, symptoms, diagnosis, and discharge instructions have been discussed and pt and/or family have conveyed their understanding. The pt is to follow up as recommended or return to ER should their symptoms worsen. Plan has been discussed and all parties are in full agreement. Plan:  1. Return precautions as discussed with patient and available family and/or caregiver. 2.   Discharge Medication List as of 10/14/2020 12:14 AM      START taking these medications    Details   montelukast (Singulair) 10 mg tablet Take 1 Tab by mouth daily. , Normal, Disp-30 Tab,R-0         CONTINUE these medications which have CHANGED    Details   albuterol (PROVENTIL HFA, VENTOLIN HFA, PROAIR HFA) 90 mcg/actuation inhaler Take 1-2 Puffs by inhalation every four (4) hours as needed for Wheezing., Normal, Disp-1 Inhaler,R-1         CONTINUE these medications which have NOT CHANGED    Details   norethindrone (Mamie) 0.35 mg tab Take 1 Tab by mouth daily. , Normal, Disp-3 Package,R-4      ferrous sulfate (IRON) 325 mg (65 mg iron) EC tablet Take 1 Tab by mouth three (3) times daily (with meals). , Print, Disp-90 Tab,R-0      docusate sodium (COLACE) 100 mg capsule Take 1 Cap by mouth two (2) times a day for 90 days. , Print, Disp-60 Cap,R-2      ibuprofen (MOTRIN) 800 mg tablet Take 1 Tab by mouth every eight (8) hours as needed for Pain., Normal, Disp-30 Tab, R-0      labetalol (NORMODYNE) 200 mg tablet Take 200 mg by mouth two (2) times a day., Historical Med      aspirin 81 mg chewable tablet Take 81 mg by mouth daily. , Historical Med      esomeprazole (NEXIUM) 40 mg capsule Take 1 Cap by mouth daily. , Print, Disp-20 Cap, R-0      metFORMIN (GLUCOPHAGE) 1,000 mg tablet Take 1,000 mg by mouth two (2) times daily (with meals). Indications: type 2 diabetes mellitus, Historical Med      gabapentin (NEURONTIN) 100 mg capsule Take 100 mg by mouth three (3) times daily. Indications: neuropathic pain, Historical Med      losartan (COZAAR) 25 mg tablet Take 100 mg by mouth daily. , Historical Med      metoclopramide HCl (REGLAN) 5 mg tablet Take 5 mg by mouth Before breakfast, lunch, dinner and at bedtime. , Historical Med           3.    Follow-up Information     Follow up With Specialties Details Why [address] [place] [address], [address], [address]  [phone] [facility] EMERGENCY DEPT Emergency Medicine  As needed, If symptoms worsen [address] [address] DEPT OF OTOLARYNGOLOGY  Schedule an appointment as soon as possible for a visit As needed, If symptoms worsen [address]  [phone]          Instructed to return to ED if worse  Diagnosis   Clinical Impression:  1. Allergic rhinitis, unspecified seasonality, unspecified trigger    2. Accelerated hypertension    3. Disorder of Eustachian tube, unspecified laterality    4. Acute upper respiratory infection        Attestation:  Belle Lima MD, am the attending of record for this patient. I personally performed the services described in this documentation on this date, 10/13/2020 for patient, Blanco Bernal. I have reviewed the chart and verified that the record is accurate and complete. This note will not be viewable in [address]

## 2020-10-14 NOTE — ED TRIAGE NOTES
Patient presents to the ED with c/o having nasal congestion x today. Pt stated \"something is clogged in my nose. \" reported not being able to breath out of her nose. Denies fevers, body aches or coughing. Reports drainage in her throat and her ears hurting. Reports blowing her nose frequently.

## 2020-10-16 ENCOUNTER — DOCUMENTATION ONLY (OUTPATIENT)
Dept: SLEEP MEDICINE | Age: 40
End: 2020-10-16

## 2020-10-20 ENCOUNTER — HOSPITAL ENCOUNTER (EMERGENCY)
Age: 40
Discharge: HOME | End: 2020-10-20
Attending: EMERGENCY MEDICINE
Payer: MEDICARE

## 2020-10-20 ENCOUNTER — VIRTUAL VISIT (OUTPATIENT)
Dept: SLEEP MEDICINE | Age: 40
End: 2020-10-20
Payer: MEDICARE

## 2020-10-20 VITALS
HEIGHT: 64 IN | SYSTOLIC BLOOD PRESSURE: 189 MMHG | WEIGHT: 251.99 LBS | HEART RATE: 74 BPM | DIASTOLIC BLOOD PRESSURE: 93 MMHG | BODY MASS INDEX: 43.02 KG/M2 | TEMPERATURE: 98.1 F | OXYGEN SATURATION: 100 % | RESPIRATION RATE: 14 BRPM

## 2020-10-20 DIAGNOSIS — L50.9 LOCALIZED HIVES: ICD-10-CM

## 2020-10-20 DIAGNOSIS — Z91.018 FOOD ALLERGY: ICD-10-CM

## 2020-10-20 DIAGNOSIS — I10 ACCELERATED HYPERTENSION: ICD-10-CM

## 2020-10-20 DIAGNOSIS — T78.40XA ALLERGIC REACTION, INITIAL ENCOUNTER: Primary | ICD-10-CM

## 2020-10-20 DIAGNOSIS — G47.33 OSA (OBSTRUCTIVE SLEEP APNEA): Primary | ICD-10-CM

## 2020-10-20 PROCEDURE — G8938 BMI DOC ONL FUP NT DOC: HCPCS | Performed by: SPECIALIST

## 2020-10-20 PROCEDURE — G9231 DOC ESRD DIA TRANS PREG: HCPCS | Performed by: SPECIALIST

## 2020-10-20 PROCEDURE — 74011250637 HC RX REV CODE- 250/637: Performed by: EMERGENCY MEDICINE

## 2020-10-20 PROCEDURE — G8428 CUR MEDS NOT DOCUMENT: HCPCS | Performed by: SPECIALIST

## 2020-10-20 PROCEDURE — 99284 EMERGENCY DEPT VISIT MOD MDM: CPT

## 2020-10-20 PROCEDURE — G9717 DOC PT DX DEP/BP F/U NT REQ: HCPCS | Performed by: SPECIALIST

## 2020-10-20 PROCEDURE — 99204 OFFICE O/P NEW MOD 45 MIN: CPT | Performed by: SPECIALIST

## 2020-10-20 RX ORDER — EPINEPHRINE 0.3 MG/.3ML
0.3 INJECTION SUBCUTANEOUS
Qty: 1 SYRINGE | Refills: 0 | Status: SHIPPED | OUTPATIENT
Start: 2020-10-20 | End: 2020-10-20

## 2020-10-20 RX ORDER — DEXAMETHASONE SODIUM PHOSPHATE 100 MG/10ML
10 INJECTION INTRAMUSCULAR; INTRAVENOUS
Status: DISCONTINUED | OUTPATIENT
Start: 2020-10-20 | End: 2020-10-20 | Stop reason: SDUPTHER

## 2020-10-20 RX ORDER — DIPHENHYDRAMINE HCL 25 MG
50 CAPSULE ORAL
Qty: 20 CAP | Refills: 0 | Status: SHIPPED | OUTPATIENT
Start: 2020-10-20 | End: 2020-10-30

## 2020-10-20 RX ORDER — PREDNISONE 50 MG/1
50 TABLET ORAL DAILY
Qty: 5 TAB | Refills: 0 | Status: SHIPPED | OUTPATIENT
Start: 2020-10-20 | End: 2020-10-25

## 2020-10-20 RX ORDER — DIPHENHYDRAMINE HCL 25 MG
50 CAPSULE ORAL
Status: COMPLETED | OUTPATIENT
Start: 2020-10-20 | End: 2020-10-20

## 2020-10-20 RX ORDER — FAMOTIDINE 20 MG/1
20 TABLET, FILM COATED ORAL
Status: COMPLETED | OUTPATIENT
Start: 2020-10-20 | End: 2020-10-20

## 2020-10-20 RX ORDER — DEXAMETHASONE SODIUM PHOSPHATE 4 MG/ML
10 INJECTION, SOLUTION INTRA-ARTICULAR; INTRALESIONAL; INTRAMUSCULAR; INTRAVENOUS; SOFT TISSUE
Status: COMPLETED | OUTPATIENT
Start: 2020-10-20 | End: 2020-10-20

## 2020-10-20 RX ORDER — FAMOTIDINE 20 MG/1
20 TABLET, FILM COATED ORAL 2 TIMES DAILY
Qty: 20 TAB | Refills: 0 | Status: SHIPPED | OUTPATIENT
Start: 2020-10-20 | End: 2020-10-30

## 2020-10-20 RX ADMIN — DEXAMETHASONE SODIUM PHOSPHATE 10 MG: 4 INJECTION, SOLUTION INTRAMUSCULAR; INTRAVENOUS at 02:53

## 2020-10-20 RX ADMIN — FAMOTIDINE 20 MG: 20 TABLET, FILM COATED ORAL at 02:53

## 2020-10-20 RX ADMIN — DIPHENHYDRAMINE HYDROCHLORIDE 50 MG: 25 CAPSULE ORAL at 02:53

## 2020-10-20 NOTE — ED NOTES
Pt in ED w/ complaint of sensation of \"something stuck in her throat\". Pt reports she felt the sensation after eating shrimp at 1600 on yesterday. Pt is A&O X 4 and appears to be in no distress. Emergency Department Nursing Plan of Care       The Nursing Plan of Care is developed from the Nursing assessment and Emergency Department Attending provider initial evaluation. The plan of care may be reviewed in the ED Provider note.     The Plan of Care was developed with the following considerations:   Patient / Family readiness to learn indicated by:verbalized understanding  Persons(s) to be included in education: patient  Barriers to Learning/Limitations:No    Signed     Libby Rodas RN    10/20/2020   3:17 AM

## 2020-10-20 NOTE — PROGRESS NOTES
[address] [facility] [place]. [place], [address]  Tel.  [phone]  Fax. 100 [facility]  [place], [address] [facility]  Tel.  [phone]  Fax. [phone] [address] [facility], [place]   Tel.  [phone]  Fax. [phone]     Darcie Pillai is a 41 y.o. female who was seen by synchronous (real-time) audio-video technology on 10/20/2020. Consent:  She and/or her healthcare decision maker is aware that this patient-initiated Telehealth encounter is a billable service, with coverage as determined by her insurance carrier. She is aware that she may receive a bill and has provided verbal consent to proceed: Yes    I was in the office while conducting this encounter. Chief Complaint       No chief complaint on file. HPI      Darcie Pillai is 41 y.o. female seen for evaluation of a sleep disorder. Patient had been previously followed by Dr. Meli Devi. She reported a history of snoring and daytime fatigue. She normally retires between midnight is 1 AM and will awaken at 6 AM.  She may awaken 1-3 times during the night. She reported history of snoring, waking with a gasp or snort, kicking/twitching. She denies sleep talking or sleepwalking, bruxism or nocturnal incontinence, abnormal arm movements, hypnagogic hallucinations, sleep paralysis or cataplexy. She notes that she may doze if she is seated quietly after lunch. She was evaluated at [address] [place] [address] in April 2019. Sleep study demonstrated severe sleep disordered breathing characterized by an overall AHI of 57/h associated with minimal SaO2 of 62%. Events were more prominent in rem sleep with a REMrelated AHI of 100.9/h. Titration study performed on 11/7/2019. That study demonstrated CPAP increased to 13 cm. At that level: 95.5 minutes recorded which 94.5 minutes spent asleep and 52.1 minutes in rem. Corresponding AHI 2.5/h. Minimal SaO2 93%.     Patient states that she was started on CPAP. She notes that [employer] is her DME company. She states that her unit was \"not set\". She is unable to elaborate further in this regard. Compliance data details not currently available. Allergies   Allergen Reactions    Latex Hives     rash    Other Food Hives     Citrus Fruits    Flagyl [Metronidazole] Hives and Itching    Lisinopril-Hydrochlorothiazide Angioedema    Clindamycin Rash and Itching    Ciprofloxacin Rash and Itching    Citrate Hives    Cottonseed Oil Rash    Cymbalta [Duloxetine] Hives and Itching    Flexeril [Cyclobenzaprine] Angioedema    Lemon Hives    Pcn [Penicillins] Hives    Shrimp Itching    Sulfa (Sulfonamide Antibiotics) Shortness of Breath    Tomato Hives       Current Outpatient Medications   Medication Sig Dispense Refill    predniSONE (DELTASONE) 50 mg tablet Take 1 Tab by mouth daily for 5 days. 5 Tab 0    famotidine (Pepcid) 20 mg tablet Take 1 Tab by mouth two (2) times a day for 10 days. 20 Tab 0    diphenhydrAMINE (BenadryL) 25 mg capsule Take 2 Caps by mouth every six (6) hours as needed for Itching for up to 10 days. 20 Cap 0    EPINEPHrine (EPIPEN) 0.3 mg/0.3 mL injection 0.3 mL by IntraMUSCular route once as needed for Allergic Response for up to 1 dose. 1 Syringe 0    montelukast (Singulair) 10 mg tablet Take 1 Tab by mouth daily. 30 Tab 0    albuterol (PROVENTIL HFA, VENTOLIN HFA, PROAIR HFA) 90 mcg/actuation inhaler Take 1-2 Puffs by inhalation every four (4) hours as needed for Wheezing. 1 Inhaler 1    norethindrone (Mamie) 0.35 mg tab Take 1 Tab by mouth daily. 3 Package 4    ferrous sulfate (IRON) 325 mg (65 mg iron) EC tablet Take 1 Tab by mouth three (3) times daily (with meals). 90 Tab 0    docusate sodium (COLACE) 100 mg capsule Take 1 Cap by mouth two (2) times a day for 90 days. 60 Cap 2    ibuprofen (MOTRIN) 800 mg tablet Take 1 Tab by mouth every eight (8) hours as needed for Pain.  30 Tab 0    labetalol (NORMODYNE) 200 mg tablet Take 200 mg by mouth two (2) times a day.  aspirin 81 mg chewable tablet Take 81 mg by mouth daily.  esomeprazole (NEXIUM) 40 mg capsule Take 1 Cap by mouth daily. 20 Cap 0    metFORMIN (GLUCOPHAGE) 1,000 mg tablet Take 1,000 mg by mouth two (2) times daily (with meals). Indications: type 2 diabetes mellitus      gabapentin (NEURONTIN) 100 mg capsule Take 100 mg by mouth three (3) times daily. Indications: neuropathic pain      losartan (COZAAR) 25 mg tablet Take 100 mg by mouth daily.  metoclopramide HCl (REGLAN) 5 mg tablet Take 5 mg by mouth Before breakfast, lunch, dinner and at bedtime. She  has a past medical history of Abnormal Pap smear, Asthma, Bipolar disorder ([facility] Depression with anxiety (2010), Diabetes mellitus, GERD (gastroesophageal reflux disease), HTN (hypertension) (2010), Ill-defined condition, [facility] Other and unspecified hyperlipidemia (2010), Postpartum depression, Rhinitis (2012), and UTI (urinary tract infection). She  has a past surgical history that includes pr  delivery only; hx gyn; and hx  section. She family history includes Diabetes in her father, maternal grandfather, maternal grandmother, mother, paternal grandfather, paternal grandmother, sister, sister, and sister; Heart Disease in her mother; Hypertension in her father, maternal grandfather, maternal grandmother, mother, paternal grandfather, paternal grandmother, sister, sister, and sister; Kidney Disease in her father; Stroke in her father and mother. She  reports that she quit smoking about 5 years ago. She has never used smokeless tobacco. She reports previous alcohol use. She reports that she does not use drugs. Review of Systems:  Review of Systems   Constitutional: Negative for chills and fever. HENT: Negative for hearing loss and tinnitus. Eyes: Positive for blurred vision.    Respiratory: Negative for cough and shortness of breath. Cardiovascular: Negative for chest pain and palpitations. Gastrointestinal: Positive for abdominal pain, heartburn and nausea. Genitourinary: Positive for frequency. Musculoskeletal: Positive for back pain, joint pain and neck pain. Skin: Negative for itching and rash. Neurological: Negative for dizziness and headaches. Psychiatric/Behavioral: Positive for depression. The patient is nervous/anxious and has insomnia. ESS: 6  Modified FOSQ: 18.5    Objective:     Weight: 251 lb  BMI: 43.25    General:   Conversant, cooperative   Eyes:   no nystagmus, fundi clear   Oropharynx:   tongue large                   Skin:   no obvious rashes   Neuro:  Speech fluent, face symmetrical, tongue movement normal   Psych:  Normal affect,  normal countenance        Assessment:       ICD-10-CM ICD-9-CM    1. MIRYAM (obstructive sleep apnea)  G47.33 327.23        History of severe sleep disordered breathing responding to CPAP increased to 13 cm. [employer] will be contacted to obtain compliance download. That will be reviewed with the patient. Plan:     No orders of the defined types were placed in this encounter. * Patient has a history and examination consistent with the diagnosis of sleep apnea. * She was provided information on sleep apnea including corresponding risk factors and the importance of proper treatment. * Treatment options if indicated were reviewed today. Instructions:  o A copy of compliance data will be obtained and reviewed in detail. o The patient would benefit from weight reduction measures. o Do not engage in activities requiring a normal degree of alertness if fatigue is present.   o The patient understands that untreated or undertreated sleep apnea could impair judgement and the ability to function normally during the day.  o Call or return if symptoms worsen or persist.          Sasha Lopez MD, [facility]  Electronically signed 10/20/20     Pursuant to the emergency declaration under the [facility], [facility] waiver authority and the [employer] Act, this Virtual  Visit was conducted, with patient's consent, to reduce the patient's risk of exposure to COVID-19 and provide continuity of care for an established patient. Services were provided through a video synchronous discussion virtually to substitute for in-person clinic visit. Mary White MD     Visit duration: 20 minutes  This note was created using voice recognition software. Despite editing, there may be syntax errors. This note will not be viewable in [address]

## 2020-10-20 NOTE — DISCHARGE INSTRUCTIONS
Patient Education        Allergic Reaction: Care Instructions  Your Care Instructions     An allergic reaction is an excessive response from your immune system to a medicine, chemical, food, insect bite, or other substance. A reaction can range from mild to life-threatening. Some people have a mild rash, hives, and itching or stomach cramps. In severe reactions, swelling of your tongue and throat can close up your airway so that you cannot breathe. Follow-up care is a key part of your treatment and safety. Be sure to make and go to all appointments, and call your doctor if you are having problems. It's also a good idea to know your test results and keep a list of the medicines you take. How can you care for yourself at home? · If you know what caused your allergic reaction, be sure to avoid it. Your allergy may become more severe each time you have a reaction. · Take an over-the-counter antihistamine, such as cetirizine (Zyrtec) or loratadine (Claritin), to treat mild symptoms. Read and follow directions on the label. Some antihistamines can make you feel sleepy. Do not give antihistamines to a child unless you have checked with your doctor first. Mild symptoms include sneezing or an itchy or runny nose; an itchy mouth; a few hives or mild itching; and mild nausea or stomach discomfort. · Do not scratch hives or a rash. Put a cold, moist towel on them or take cool baths to relieve itching. Put ice packs on hives, swelling, or insect stings for 10 to 15 minutes at a time. Put a thin cloth between the ice pack and your skin. Do not take hot baths or showers. They will make the itching worse. · Your doctor may prescribe a shot of epinephrine to carry with you in case you have a severe reaction. Learn how to give yourself the shot and keep it with you at all times. Make sure it is not .   · Go to the emergency room every time you have a severe reaction, even if you have used your shot of epinephrine and are feeling better. Symptoms can come back after a shot. · Wear medical alert jewelry that lists your allergies. You can buy this at most drugstores. · If your child has a severe allergy, make sure that his or her teachers, babysitters, coaches, and other caregivers know about the allergy. They should have an epinephrine shot, know how and when to give it, and have a plan to take your child to the hospital.  When should you call for help? Give an epinephrine shot if:    · You think you are having a severe allergic reaction.     · You have symptoms in more than one body area, such as mild nausea and an itchy mouth. After giving an epinephrine shot call 911, even if you feel better. Call 911 if:    · You have symptoms of a severe allergic reaction. These may include:  ? Sudden raised, red areas (hives) all over your body. ? Swelling of the throat, mouth, lips, or tongue. ? Trouble breathing. ? Passing out (losing consciousness). Or you may feel very lightheaded or suddenly feel weak, confused, or restless.     · You have been given an epinephrine shot, even if you feel better. Call your doctor now or seek immediate medical care if:    · You have symptoms of an allergic reaction, such as:  ? A rash or hives (raised, red areas on the skin). ? Itching. ? Swelling. ? Belly pain, nausea, or vomiting. Watch closely for changes in your health, and be sure to contact your doctor if:    · You do not get better as expected. Where can you learn more? Go to http://www.gray.com/  Enter Q284 in the search box to learn more about \"Allergic Reaction: Care Instructions. \"  Current as of: June 29, 2020               Content Version: 12.6  © 8839-9624 [employer]. Care instructions adapted under license by [employer] (which disclaims liability or warranty for this information).  If you have questions about a medical condition or this instruction, always ask your healthcare professional. [address] any warranty or liability for your use of this information.

## 2020-10-20 NOTE — ED TRIAGE NOTES
Patient presents to the ED with c/o eating shrimp tonight at 4 PM. Stated her throat feels \"funny like there is something stuck in her throat. \" pt stated she ate crackers and drank water and hot tea without relief. Denies feeling SOB. Pt has a hx of anxiety and did not want to have a panic attack at home. Pt is able to speak in full complete sentences without difficult.y 98% on room air.

## 2020-10-20 NOTE — ED PROVIDER NOTES
EMERGENCY DEPARTMENT HISTORY AND PHYSICAL EXAM      Please note that this dictation was completed with the assistance of \"Dragon\", the computer voice recognition software. Quite often unanticipated grammatical, syntax, homophones, and other interpretive errors are inadvertently transcribed by the computer software. Please disregard these errors and any errors that have escaped final proofreading. Thank you. Patient Name: Cristin Granados  : 1980  MRN: [id number]  History of Presenting Illness     Chief Complaint   Patient presents with    Allergic Reaction       History Provided By: Patient    HPI: Cristin Granados, 41 y.o. female with past medical history as documented below presents to the ED with c/o of acute onset of concerns for allergic reaction onset around 4 PM.  Patient states that around 4 PM she consumed shrimp and broccoli and states that soon after she noticed sensation of throat swelling. Patient also reports feeling generalized hives. Patient reports a history of anxiety and states that the current symptoms is triggering the anxiety. Patient reports trying to drink some water and hot tea without significant relief of symptoms. She denies any previous history of anaphylaxis. She has taken no medications prior to arrival. Pt denies any other alleviating or exacerbating factors. Additionally, pt specifically denies any recent fever, chills, headache, nausea, vomiting, abdominal pain, CP, SOB, lightheadedness, dizziness, numbness, weakness, lower extremity swelling, heart palpitations, urinary sxs, diarrhea, constipation, melena, hematochezia, cough, or congestion. There are no other complaints, changes or physical findings pertinent to the HPI at this time.     PCP: Elidia Clark MD    Past History   Past Medical History:  Past Medical History:   Diagnosis Date    Abnormal Pap smear     \"years ago\"     Asthma     bronchitis    Bipolar disorder ([facility] [address]     Depression with anxiety 2010    Diabetes mellitus     type II diabetes mellitus since     GERD (gastroesophageal reflux disease)     HTN (hypertension) 2010    Ill-defined condition     High Cholesterol    Mood disorder ([facility] [address]     Other and unspecified hyperlipidemia 2010    Postpartum depression     hospitalized after last delivery    Rhinitis 2012    UTI (urinary tract infection)        Past Surgical History:  Past Surgical History:   Procedure Laterality Date     DELIVERY ONLY      cesearean section 08    HX  SECTION      X 3    HX GYN      , tubal ligation       Family History:  Family History   Problem Relation Age of Onset    Diabetes Mother     Hypertension Mother     Stroke Mother     Heart Disease Mother     Hypertension Father     Stroke Father     Diabetes Father     Kidney Disease Father     Hypertension Maternal Grandmother     Diabetes Maternal Grandmother     Hypertension Maternal Grandfather     Diabetes Maternal Grandfather     Hypertension Paternal Grandmother     Diabetes Paternal Grandmother     Hypertension Paternal Grandfather     Diabetes Paternal Grandfather     Hypertension Sister     Diabetes Sister     Diabetes Sister     Hypertension Sister     Diabetes Sister     Hypertension Sister        Social History:  Social History     Tobacco Use    Smoking status: Former Smoker     Last attempt to quit: 10/16/2015     Years since quittin.0    Smokeless tobacco: Never Used    Tobacco comment: quit 17 days ago   Substance Use Topics    Alcohol use: Not Currently     Frequency: Never     Comment: Occasionally    Drug use: No       Allergies:   Allergies   Allergen Reactions    Latex Hives     rash    Other Food Hives     Citrus Fruits    Flagyl [Metronidazole] Hives and Itching    Lisinopril-Hydrochlorothiazide Angioedema    Clindamycin Rash and Itching    Ciprofloxacin Rash and Itching    Citrate Hives    Cottonseed Oil Rash    Cymbalta [Duloxetine] Hives and Itching    Flexeril [Cyclobenzaprine] Angioedema    Lemon Hives    Pcn [Penicillins] Hives    Shrimp Itching    Sulfa (Sulfonamide Antibiotics) Shortness of Breath    Tomato Hives       Current Medications:  No current facility-administered medications on file prior to encounter. Current Outpatient Medications on File Prior to Encounter   Medication Sig Dispense Refill    montelukast (Singulair) 10 mg tablet Take 1 Tab by mouth daily. 30 Tab 0    albuterol (PROVENTIL HFA, VENTOLIN HFA, PROAIR HFA) 90 mcg/actuation inhaler Take 1-2 Puffs by inhalation every four (4) hours as needed for Wheezing. 1 Inhaler 1    norethindrone (Mamie) 0.35 mg tab Take 1 Tab by mouth daily. 3 Package 4    ferrous sulfate (IRON) 325 mg (65 mg iron) EC tablet Take 1 Tab by mouth three (3) times daily (with meals). 90 Tab 0    docusate sodium (COLACE) 100 mg capsule Take 1 Cap by mouth two (2) times a day for 90 days. 60 Cap 2    ibuprofen (MOTRIN) 800 mg tablet Take 1 Tab by mouth every eight (8) hours as needed for Pain. 30 Tab 0    labetalol (NORMODYNE) 200 mg tablet Take 200 mg by mouth two (2) times a day.  aspirin 81 mg chewable tablet Take 81 mg by mouth daily.  esomeprazole (NEXIUM) 40 mg capsule Take 1 Cap by mouth daily. 20 Cap 0    metFORMIN (GLUCOPHAGE) 1,000 mg tablet Take 1,000 mg by mouth two (2) times daily (with meals). Indications: type 2 diabetes mellitus      gabapentin (NEURONTIN) 100 mg capsule Take 100 mg by mouth three (3) times daily. Indications: neuropathic pain      losartan (COZAAR) 25 mg tablet Take 100 mg by mouth daily.  metoclopramide HCl (REGLAN) 5 mg tablet Take 5 mg by mouth Before breakfast, lunch, dinner and at bedtime. Review of Systems   Review of Systems   Constitutional: Negative. Negative for chills and fever. HENT: Positive for trouble swallowing.  Negative for congestion, facial swelling, rhinorrhea, sore throat and voice change. Eyes: Negative. Respiratory: Negative. Negative for apnea, cough, chest tightness, shortness of breath and wheezing. Cardiovascular: Negative. Negative for chest pain, palpitations and leg swelling. Gastrointestinal: Negative. Negative for abdominal distention, abdominal pain, blood in stool, constipation, diarrhea, nausea and vomiting. Endocrine: Negative. Negative for cold intolerance, heat intolerance and polyuria. Genitourinary: Negative. Negative for difficulty urinating, dysuria, flank pain, frequency, hematuria and urgency. Musculoskeletal: Negative. Negative for arthralgias, back pain, myalgias, neck pain and neck stiffness. Skin: Positive for rash. Negative for color change. Neurological: Negative. Negative for dizziness, syncope, facial asymmetry, speech difficulty, weakness, light-headedness, numbness and headaches. Hematological: Negative. Does not bruise/bleed easily. Psychiatric/Behavioral: Negative. Negative for confusion and self-injury. The patient is not nervous/anxious. Physical Exam   Physical Exam  Vitals signs and nursing note reviewed. Constitutional:       General: She is not in acute distress. Appearance: She is well-developed. She is not diaphoretic. HENT:      Head: Normocephalic and atraumatic. Comments: Uvula is midline, no posterior pharyngeal bulge, patient tolerates secretions well. No stridor noted. Mouth/Throat:      Pharynx: No oropharyngeal exudate. Eyes:      Conjunctiva/sclera: Conjunctivae normal.      Pupils: Pupils are equal, round, and reactive to light. Neck:      Musculoskeletal: Normal range of motion. Cardiovascular:      Rate and Rhythm: Normal rate and regular rhythm. Heart sounds: Normal heart sounds. No murmur. No friction rub. No gallop. Pulmonary:      Effort: Pulmonary effort is normal. No respiratory distress. Breath sounds: Normal breath sounds. No wheezing or rales. Chest:      Chest wall: No tenderness. Abdominal:      General: Bowel sounds are normal. There is no distension. Palpations: Abdomen is soft. There is no mass. Tenderness: There is no abdominal tenderness. There is no guarding or rebound. Musculoskeletal: Normal range of motion. General: No tenderness or deformity. Skin:     General: Skin is warm. Findings: Rash (Scattered hives noted to the upper trunk, no intraoral involvement.) present. Neurological:      Mental Status: She is alert and oriented to person, place, and time. Cranial Nerves: No cranial nerve deficit. Motor: No abnormal muscle tone. Coordination: Coordination normal.      Deep Tendon Reflexes: Reflexes normal.         Diagnostic Study Results     Labs -   I have personally reviewed and interpreted all laboratory results. No results found for this or any previous visit (from the past 24 hour(s)). Radiologic Studies -   I have personally reviewed and interpreted all imaging studies and agree with radiology interpretation and report. No orders to display     CT Results  (Last 48 hours)    None        CXR Results  (Last 48 hours)    None          Medical Decision Making   I reviewed the vital signs, available nursing notes, past medical history, past surgical history, family history and social history. Vital Signs-Reviewed the patient's vital signs.   Patient Vitals for the past 24 hrs:   Temp Pulse Resp BP SpO2   10/20/20 0323  74 14  100 %   10/20/20 0300     100 %   10/20/20 0230    (!) 189/93 100 %   10/20/20 0200    (!) 186/102 99 %   10/20/20 0151     100 %   10/20/20 0145     99 %   10/20/20 0140     100 %   10/20/20 0135     100 %   10/20/20 0134 98.1 °F (36.7 °C) 77 16 (!) 190/102 98 %       Pulse Oximetry Analysis - 98% on RA    Cardiac Monitor:   Rate: 77 bpm  Rhythm: Normal Sinus Rhythm        Records Reviewed: Nursing Notes, Old Medical Records, Previous electrocardiograms, Previous Radiology Studies and Previous Laboratory Studies    Provider Notes (Medical Decision Making):   Patient presents with rash; rash and clinical picture not worrisome for scabies, measles, meningococcemia, varicella, bullous disorder, Sanchez-Ben syndrome, Toxic epidermal necrolysis, staph scalded skin syndrome, toxic shock syndrome, secondary syphilis, or disseminated herpes. Stable vitals and without constitutional symptoms. No mucosal involvement. DDx: allergic reaction, contact dermatitis, viral exanthem, staph infection. Will treat with antihistamines, steroids. No indication for EpiPen at this time. Will refer to PCP and Allergist PRN. Instructions given to patient to use over the counter benadryl 50mg every 6 hours until the rash resolves. Please also take Steroids as prescribed for next few days and Pepcid twice a day as prescribed. Call if symptoms persist or worsen. If you have shortness of breath or severe lip/tongue swelling, return to the ER immediately. ED Course:   I am the first provider for this patient's ED visit today. Initial assessment performed. I discussed presenting problems, concerns and my formulated plan for today's visit with the patient and any available family members at bedside. I encouraged them to ask questions as they arise throughout the visit. HYPERTENSION COUNSELING  For 10 minutes, education was provided to the patient today regarding their hypertension. Patient is made aware of their elevated blood pressure and is instructed to follow up this week with their Primary Care for a recheck. Patient is counseled regarding consequences of chronic, uncontrolled hypertension including kidney disease, heart disease, stroke or even death. Patient states their understanding and agrees to follow up this week.  Additionally, during their visit, I discussed sodium restriction, maintaining ideal body weight and regular exercise program as physiologic means to achieve blood pressure control. The patient will strive towards this. I reviewed our electronic medical record system for any past medical records that were available that may contribute to the patient's current condition, the nursing notes and vital signs from today's visit. Dedra Olivas MD    ED Orders Placed :  Orders Placed This Encounter    DISCONTD: dexamethasone (DECADRON) 10 mg/mL Oral 10 mg    diphenhydrAMINE (BENADRYL) capsule 50 mg    famotidine (PEPCID) tablet 20 mg    predniSONE (DELTASONE) 50 mg tablet    famotidine (Pepcid) 20 mg tablet    diphenhydrAMINE (BenadryL) 25 mg capsule    EPINEPHrine (EPIPEN) 0.3 mg/0.3 mL injection    dexamethasone (DECADRON) 4 mg/mL injection 10 mg       ED Medications Administered:  Medications   diphenhydrAMINE (BENADRYL) capsule 50 mg (50 mg Oral Given 10/20/20 0253)   famotidine (PEPCID) tablet 20 mg (20 mg Oral Given 10/20/20 0253)   dexamethasone (DECADRON) 4 mg/mL injection 10 mg (10 mg Oral Given 10/20/20 0253)        Progress Note:  Patient has been reassessed and reports feeling better and symptoms have improved significantly after ED treatment. Pj Mir's final labs and imaging have been reviewed with her and available family and/or caregiver. They have been counseled regarding her diagnosis. She verbally conveys understanding and agreement of the signs, symptoms, diagnosis, treatment and prognosis and additionally agrees to follow up as recommended with Dr. Stan Trevino MD and/or specialist in 24 - 48 hours. She also agrees with the care-plan we created together and conveys that all of her questions have been answered.   I have also put together some discharge instructions for her that include: 1) educational information regarding their diagnosis, 2) how to care for their diagnosis at home, as well a 3) list of reasons why they would want to return to the ED prior to their follow-up appointment should the patient's condition change or symptoms worsen. I have answered all questions to the patient's satisfaction. Strict return precautions given. She both understood and agreed with plan as discussed. Vital signs stable for discharge. Pt very appreciative of care today. Disposition: Discharge  The pt is ready for discharge. The pt's signs, symptoms, diagnosis, and discharge instructions have been discussed and pt and/or family have conveyed their understanding. The pt is to follow up as recommended or return to ER should their symptoms worsen. Plan has been discussed and all parties are in full agreement. Plan:  1. Return precautions as discussed with patient and available family and/or caregiver. 2.   Discharge Medication List as of 10/20/2020  2:16 AM      START taking these medications    Details   predniSONE (DELTASONE) 50 mg tablet Take 1 Tab by mouth daily for 5 days. , Normal, Disp-5 Tab,R-0      famotidine (Pepcid) 20 mg tablet Take 1 Tab by mouth two (2) times a day for 10 days. , Normal, Disp-20 Tab,R-0      diphenhydrAMINE (BenadryL) 25 mg capsule Take 2 Caps by mouth every six (6) hours as needed for Itching for up to 10 days. , Normal, Disp-20 Cap,R-0      EPINEPHrine (EPIPEN) 0.3 mg/0.3 mL injection 0.3 mL by IntraMUSCular route once as needed for Allergic Response for up to 1 dose., Normal, Disp-1 Syringe,R-0         CONTINUE these medications which have NOT CHANGED    Details   montelukast (Singulair) 10 mg tablet Take 1 Tab by mouth daily. , Normal, Disp-30 Tab,R-0      albuterol (PROVENTIL HFA, VENTOLIN HFA, PROAIR HFA) 90 mcg/actuation inhaler Take 1-2 Puffs by inhalation every four (4) hours as needed for Wheezing., Normal, Disp-1 Inhaler,R-1      norethindrone (Mamie) 0.35 mg tab Take 1 Tab by mouth daily. , Normal, Disp-3 Package,R-4      ferrous sulfate (IRON) 325 mg (65 mg iron) EC tablet Take 1 Tab by mouth three (3) times daily (with meals). , Print, Disp-90 Tab,R-0      docusate sodium (COLACE) 100 mg capsule Take 1 Cap by mouth two (2) times a day for 90 days. , Print, Disp-60 Cap,R-2      ibuprofen (MOTRIN) 800 mg tablet Take 1 Tab by mouth every eight (8) hours as needed for Pain., Normal, Disp-30 Tab, R-0      labetalol (NORMODYNE) 200 mg tablet Take 200 mg by mouth two (2) times a day., Historical Med      aspirin 81 mg chewable tablet Take 81 mg by mouth daily. , Historical Med      esomeprazole (NEXIUM) 40 mg capsule Take 1 Cap by mouth daily. , Print, Disp-20 Cap, R-0      metFORMIN (GLUCOPHAGE) 1,000 mg tablet Take 1,000 mg by mouth two (2) times daily (with meals). Indications: type 2 diabetes mellitus, Historical Med      gabapentin (NEURONTIN) 100 mg capsule Take 100 mg by mouth three (3) times daily. Indications: neuropathic pain, Historical Med      losartan (COZAAR) 25 mg tablet Take 100 mg by mouth daily. , Historical Med      metoclopramide HCl (REGLAN) 5 mg tablet Take 5 mg by mouth Before breakfast, lunch, dinner and at bedtime. , Historical Med           3. Follow-up Information     Follow up With Specialties Details Why [address] [place] [address], [address] Mayda Lopez MD Family Medicine   [address] [place] [address]  [phone]      [facility] EMERGENCY DEPT Emergency Medicine  As needed, If symptoms worsen [address] [facility]    [facility]  Schedule an appointment as soon as possible for a visit As needed, If symptoms worsen [address]  [address]          Instructed to return to ED if worse  Diagnosis   Clinical Impression:  1. Allergic reaction, initial encounter    2. Food allergy    3. Accelerated hypertension    4. Localized hives        Attestation:  Regine Huerta MD, am the attending of record for this patient. I personally performed the services described in this documentation on this date, 10/20/2020 for patient, Astrid Minors.  I have reviewed the chart and verified that the record is accurate and complete. This note will not be viewable in [address]

## 2020-10-30 ENCOUNTER — APPOINTMENT (OUTPATIENT)
Dept: GENERAL RADIOLOGY | Age: 40
End: 2020-10-30
Attending: EMERGENCY MEDICINE
Payer: MEDICARE

## 2020-10-30 ENCOUNTER — HOSPITAL ENCOUNTER (EMERGENCY)
Age: 40
Discharge: HOME | End: 2020-10-30
Attending: EMERGENCY MEDICINE
Payer: MEDICARE

## 2020-10-30 VITALS
HEART RATE: 83 BPM | WEIGHT: 230.5 LBS | TEMPERATURE: 98.2 F | OXYGEN SATURATION: 100 % | RESPIRATION RATE: 18 BRPM | SYSTOLIC BLOOD PRESSURE: 150 MMHG | HEIGHT: 64 IN | BODY MASS INDEX: 39.35 KG/M2 | DIASTOLIC BLOOD PRESSURE: 92 MMHG

## 2020-10-30 DIAGNOSIS — R13.10 DYSPHAGIA, UNSPECIFIED TYPE: Primary | ICD-10-CM

## 2020-10-30 DIAGNOSIS — R07.9 CHEST PAIN, UNSPECIFIED TYPE: ICD-10-CM

## 2020-10-30 LAB
ALBUMIN SERPL-MCNC: 3.5 G/DL (ref 3.5–5)
ALBUMIN/GLOB SERPL: 0.9 {RATIO} (ref 1.1–2.2)
ALP SERPL-CCNC: 94 U/L (ref 45–117)
ALT SERPL-CCNC: 23 U/L (ref 12–78)
ANION GAP SERPL CALC-SCNC: 12 MMOL/L (ref 5–15)
AST SERPL-CCNC: 5 U/L (ref 15–37)
BASOPHILS # BLD: 0 K/UL (ref 0–0.1)
BASOPHILS NFR BLD: 0 % (ref 0–1)
BILIRUB SERPL-MCNC: 0.2 MG/DL (ref 0.2–1)
BUN SERPL-MCNC: 10 MG/DL (ref 6–20)
BUN/CREAT SERPL: 8 (ref 12–20)
CALCIUM SERPL-MCNC: 8.6 MG/DL (ref 8.5–10.1)
CHLORIDE SERPL-SCNC: 97 MMOL/L (ref 97–108)
CO2 SERPL-SCNC: 23 MMOL/L (ref 21–32)
CREAT SERPL-MCNC: 1.31 MG/DL (ref 0.55–1.02)
DIFFERENTIAL METHOD BLD: ABNORMAL
EOSINOPHIL # BLD: 0 K/UL (ref 0–0.4)
EOSINOPHIL NFR BLD: 0 % (ref 0–7)
ERYTHROCYTE [DISTWIDTH] IN BLOOD BY AUTOMATED COUNT: ABNORMAL % (ref 11.5–14.5)
GLOBULIN SER CALC-MCNC: 3.8 G/DL (ref 2–4)
GLUCOSE SERPL-MCNC: 433 MG/DL (ref 65–100)
HCT VFR BLD AUTO: 38.1 % (ref 35–47)
HGB BLD-MCNC: 12.2 G/DL (ref 11.5–16)
IMM GRANULOCYTES # BLD AUTO: 0.1 K/UL (ref 0–0.04)
IMM GRANULOCYTES NFR BLD AUTO: 1 % (ref 0–0.5)
LYMPHOCYTES # BLD: 2.6 K/UL (ref 0.8–3.5)
LYMPHOCYTES NFR BLD: 20 % (ref 12–49)
MCH RBC QN AUTO: 24 PG (ref 26–34)
MCHC RBC AUTO-ENTMCNC: 32 G/DL (ref 30–36.5)
MCV RBC AUTO: 74.9 FL (ref 80–99)
MONOCYTES # BLD: 0.9 K/UL (ref 0–1)
MONOCYTES NFR BLD: 7 % (ref 5–13)
NEUTS SEG # BLD: 9.6 K/UL (ref 1.8–8)
NEUTS SEG NFR BLD: 72 % (ref 32–75)
NRBC # BLD: 0 K/UL (ref 0–0.01)
NRBC BLD-RTO: 0 PER 100 WBC
PLATELET # BLD AUTO: 265 K/UL (ref 150–400)
PMV BLD AUTO: ABNORMAL FL (ref 8.9–12.9)
POTASSIUM SERPL-SCNC: 3.8 MMOL/L (ref 3.5–5.1)
PROT SERPL-MCNC: 7.3 G/DL (ref 6.4–8.2)
RBC # BLD AUTO: 5.09 M/UL (ref 3.8–5.2)
RBC MORPH BLD: ABNORMAL
SODIUM SERPL-SCNC: 132 MMOL/L (ref 136–145)
TROPONIN I SERPL-MCNC: <0.05 NG/ML
WBC # BLD AUTO: 13.2 K/UL (ref 3.6–11)

## 2020-10-30 PROCEDURE — 36415 COLL VENOUS BLD VENIPUNCTURE: CPT

## 2020-10-30 PROCEDURE — 85025 COMPLETE CBC W/AUTO DIFF WBC: CPT

## 2020-10-30 PROCEDURE — 99285 EMERGENCY DEPT VISIT HI MDM: CPT

## 2020-10-30 PROCEDURE — 70360 X-RAY EXAM OF NECK: CPT

## 2020-10-30 PROCEDURE — 74011250637 HC RX REV CODE- 250/637: Performed by: EMERGENCY MEDICINE

## 2020-10-30 PROCEDURE — 71046 X-RAY EXAM CHEST 2 VIEWS: CPT

## 2020-10-30 PROCEDURE — 93005 ELECTROCARDIOGRAM TRACING: CPT

## 2020-10-30 PROCEDURE — 80053 COMPREHEN METABOLIC PANEL: CPT

## 2020-10-30 PROCEDURE — 84484 ASSAY OF TROPONIN QUANT: CPT

## 2020-10-30 RX ORDER — FAMOTIDINE 20 MG/1
20 TABLET, FILM COATED ORAL 2 TIMES DAILY
Qty: 20 TAB | Refills: 0 | Status: SHIPPED | OUTPATIENT
Start: 2020-10-30 | End: 2020-11-09

## 2020-10-30 RX ORDER — METOCLOPRAMIDE 10 MG/1
10 TABLET ORAL
Status: COMPLETED | OUTPATIENT
Start: 2020-10-30 | End: 2020-10-30

## 2020-10-30 RX ADMIN — METOCLOPRAMIDE 10 MG: 10 TABLET ORAL at 21:56

## 2020-10-30 NOTE — ED TRIAGE NOTES
Pt reports intermittent mid CP w/ that radiates to L arm X 1 week. Pt denies any SOB, N/V/D, or heavy lifting.

## 2020-10-31 NOTE — DISCHARGE INSTRUCTIONS

## 2020-10-31 NOTE — ED PROVIDER NOTES
EMERGENCY DEPARTMENT HISTORY AND PHYSICAL EXAM    Please note that this dictation was completed with [employer], the computer voice recognition software. Quite often unanticipated grammatical, syntax, homophones, and other interpretive errors are inadvertently transcribed by the computer software. Please disregard these errors. Please excuse any errors that have escaped final proofreading. Date: 10/30/2020  Patient Name: Luis Madison  Patient Age and Sex: 41 y.o. female    History of Presenting Illness     Chief Complaint   Patient presents with    Chest Pain       History Provided By: Patient    HPI: Luis Madison, is a 41 y.o. female whose medical history is noted below and includes GERD, dysphagia and sensation that food is stuck in her throat just beneath clavicles, presents to the ED today for an evaluation intermittent and episodic sub xyphoid burning pain that has been recurring for the past week and radiating up to her chest. During few episodes, she has also experienced a dull radiating pain in her left arm. As part of her dysphagia work-up, Ms. Hossein March is scheduled for an EGD next week and wanted to make sure that her chest pain is not due to a serious condition, most importantly due to a heart attack. She has no associated shortness of breath, cough, fevers or chills. She cannot identify any triggers for the chest pain the episodes of the pain can last anywhere between minutes to close to half hour. Not exacerbated by anything specifically. She spoke with her primary care doctor about this and her doctors suggested to start taking famotidine but sent the prescription she intended for the patient to the wrong pharmacy. Currently, the patient has no symptoms. Pt denies any other alleviating or exacerbating factors. No other associated signs or symptoms. There are no other complaints, changes or physical findings at this time.      PCP: Isai Xavier MD    Past History   All documented elements of the [address] reviewed and verified by me. -Tabatha Tyson MD    Past Medical History:  Past Medical History:   Diagnosis Date    Abnormal Pap smear     \"years ago\"     Asthma     bronchitis    [facility] disorder ([facility] [address]     Depression with anxiety 2010    Diabetes mellitus     type II diabetes mellitus since     GERD (gastroesophageal reflux disease)     HTN (hypertension) 2010    Ill-defined condition     High Cholesterol    Mood disorder ([address]     Other and unspecified hyperlipidemia 2010    Postpartum depression     hospitalized after last delivery    Rhinitis 2012    UTI (urinary tract infection)        Past Surgical History:  Past Surgical History:   Procedure Laterality Date     DELIVERY ONLY      cesearean section 08    HX  SECTION      X 3    HX GYN      , tubal ligation       Family History:  Family History   Problem Relation Age of Onset    Diabetes Mother     Hypertension Mother     Stroke Mother     Heart Disease Mother     Hypertension Father     Stroke Father     Diabetes Father     Kidney Disease Father     Hypertension Maternal Grandmother     Diabetes Maternal Grandmother     Hypertension Maternal Grandfather     Diabetes Maternal Grandfather     Hypertension Paternal Grandmother     Diabetes Paternal Grandmother     Hypertension Paternal Grandfather     Diabetes Paternal Grandfather     Hypertension Sister     Diabetes Sister     Diabetes Sister     Hypertension Sister     Diabetes Sister     Hypertension Sister        Social History:  Social History     Tobacco Use    Smoking status: Former Smoker     Last attempt to quit: 10/16/2015     Years since quittin.0    Smokeless tobacco: Never Used    Tobacco comment: quit 17 days ago   Substance Use Topics    Alcohol use: Not Currently     Frequency: Never     Comment: Occasionally    Drug use: No       Allergies:   Allergies   Allergen Reactions    Latex Hives     rash    Other Food Hives     Citrus Fruits    Flagyl [Metronidazole] Hives and Itching    Lisinopril-Hydrochlorothiazide Angioedema    Clindamycin Rash and Itching    Ciprofloxacin Rash and Itching    Citrate Hives    Cottonseed Oil Rash    Cymbalta [Duloxetine] Hives and Itching    Flexeril [Cyclobenzaprine] Angioedema    Lemon Hives    Pcn [Penicillins] Hives    Shrimp Itching    Sulfa (Sulfonamide Antibiotics) Shortness of Breath    Tomato Hives       Review of Systems   All other systems reviewed and negative    Review of Systems   Constitutional: Negative for appetite change and fever. HENT: Negative. Eyes: Negative. Negative for photophobia. Respiratory: Negative for cough and shortness of breath. Cardiovascular: Positive for chest pain. Negative for palpitations. Gastrointestinal: Negative for abdominal pain, diarrhea, nausea and vomiting. Endocrine: Negative. Negative for polydipsia, polyphagia and polyuria. Genitourinary: Negative for dysuria and flank pain. Musculoskeletal: Negative for back pain. Skin: Negative. Neurological: Negative for dizziness, seizures, syncope, facial asymmetry, speech difficulty, weakness, light-headedness, numbness and headaches. Hematological: Negative. All other systems reviewed and are negative. Physical Exam   Reviewed patients vital signs and nursing note    Physical Exam  Vitals signs and nursing note reviewed. HENT:      Head: Atraumatic. Mouth/Throat:      Mouth: Mucous membranes are moist.   Eyes:      General: No scleral icterus. Extraocular Movements: Extraocular movements intact. Conjunctiva/sclera: Conjunctivae normal.      Pupils: Pupils are equal, round, and reactive to light. Neck:      Musculoskeletal: Normal range of motion and neck supple. Thyroid: No thyromegaly. Vascular: No JVD. Trachea: No tracheal deviation.    Cardiovascular:      Rate and Rhythm: Normal rate and regular rhythm. Pulses: Normal pulses. Heart sounds: Normal heart sounds. Pulmonary:      Effort: Pulmonary effort is normal.      Breath sounds: Normal breath sounds. Chest:      Chest wall: No mass or crepitus. Abdominal:      General: Bowel sounds are normal.      Palpations: Abdomen is soft. Tenderness: There is no abdominal tenderness. Musculoskeletal: Normal range of motion. Lymphadenopathy:      Cervical: No cervical adenopathy. Skin:     General: Skin is warm and dry. Capillary Refill: Capillary refill takes less than 2 seconds. Neurological:      General: No focal deficit present. Mental Status: She is alert and oriented to person, place, and time. Cranial Nerves: Cranial nerves are intact. Sensory: Sensation is intact. Motor: Motor function is intact. Coordination: Coordination is intact. Gait: Gait is intact. Deep Tendon Reflexes: Babinski sign absent on the right side. Babinski sign absent on the left side. Reflex Scores:       Bicep reflexes are 2+ on the right side and 2+ on the left side. Patellar reflexes are 2+ on the right side and 2+ on the left side. Psychiatric:         Mood and Affect: Mood normal.         Behavior: Behavior normal.         Diagnostic Study Results     Labs - I have personally reviewed and interpreted all laboratory results.  Francisco June MD, MSc  Recent Results (from the past 24 hour(s))   EKG, 12 LEAD, INITIAL    Collection Time: 10/30/20  8:00 PM   Result Value Ref Range    Ventricular Rate 87 BPM    Atrial Rate 87 BPM    P-R Interval 150 ms    QRS Duration 80 ms    Q-T Interval 374 ms    QTC Calculation (Bezet) 450 ms    Calculated P Axis 53 degrees    Calculated R Axis 20 degrees    Calculated T Axis 43 degrees    Diagnosis       Normal sinus rhythm  Minimal voltage criteria for LVH, may be normal variant  Borderline ECG  When compared with ECG of 04-JUN-2020 00:15,  No significant change was found     CBC WITH AUTOMATED DIFF    Collection Time: 10/30/20  8:38 PM   Result Value Ref Range    WBC 13.2 (H) 3.6 - 11.0 K/uL    RBC 5.09 3.80 - 5.20 M/uL    HGB 12.2 11.5 - 16.0 g/dL    HCT 38.1 35.0 - 47.0 %    MCV 74.9 (L) 80.0 - 99.0 FL    MCH 24.0 (L) 26.0 - 34.0 PG    MCHC 32.0 30.0 - 36.5 g/dL    RDW ABNORMAL 11.5 - 14.5 %    PLATELET 147 186 - 519 K/uL    MPV ABNORMAL 8.9 - 12.9 FL    NRBC 0.0 0  WBC    ABSOLUTE NRBC 0.00 0.00 - 0.01 K/uL    NEUTROPHILS 72 32 - 75 %    LYMPHOCYTES 20 12 - 49 %    MONOCYTES 7 5 - 13 %    EOSINOPHILS 0 0 - 7 %    BASOPHILS 0 0 - 1 %    IMMATURE GRANULOCYTES 1 (H) 0.0 - 0.5 %    ABS. NEUTROPHILS 9.6 (H) 1.8 - 8.0 K/UL    ABS. LYMPHOCYTES 2.6 0.8 - 3.5 K/UL    ABS. MONOCYTES 0.9 0.0 - 1.0 K/UL    ABS. EOSINOPHILS 0.0 0.0 - 0.4 K/UL    ABS. BASOPHILS 0.0 0.0 - 0.1 K/UL    ABS. IMM. GRANS. 0.1 (H) 0.00 - 0.04 K/UL    DF SMEAR SCANNED      RBC COMMENTS ANISOCYTOSIS  2+       METABOLIC PANEL, COMPREHENSIVE    Collection Time: 10/30/20  8:38 PM   Result Value Ref Range    Sodium 132 (L) 136 - 145 mmol/L    Potassium 3.8 3.5 - 5.1 mmol/L    Chloride 97 97 - 108 mmol/L    CO2 23 21 - 32 mmol/L    Anion gap 12 5 - 15 mmol/L    Glucose 433 (H) 65 - 100 mg/dL    BUN 10 6 - 20 MG/DL    Creatinine 1.31 (H) 0.55 - 1.02 MG/DL    BUN/Creatinine ratio 8 (L) 12 - 20      GFR est AA 54 (L) >60 ml/min/1.73m2    GFR est non-AA 45 (L) >60 ml/min/1.73m2    Calcium 8.6 8.5 - 10.1 MG/DL    Bilirubin, total 0.2 0.2 - 1.0 MG/DL    ALT (SGPT) 23 12 - 78 U/L    AST (SGOT) 5 (L) 15 - 37 U/L    Alk.  phosphatase 94 45 - 117 U/L    Protein, total 7.3 6.4 - 8.2 g/dL    Albumin 3.5 3.5 - 5.0 g/dL    Globulin 3.8 2.0 - 4.0 g/dL    A-G Ratio 0.9 (L) 1.1 - 2.2     TROPONIN I    Collection Time: 10/30/20  8:38 PM   Result Value Ref Range    Troponin-I, Qt. <0.05 <0.05 ng/mL       Radiologic Studies - I have personally reviewed and interpreted all imaging studies and agree with radiology interpretation and report. Ananya Brothers MD, MSc  XR NECK SOFT TISSUE   Final Result   IMPRESSION: Stable appearance of the neck soft tissues, with no radiopaque   foreign body identified. XR CHEST PA LAT   Final Result   IMPRESSION:   1. No radiographic evidence of acute cardiopulmonary disease. Medical Decision Making   I am the first provider for this patient. Records Reviewed: I reviewed our electronic medical record system for any past medical records that were available that may contribute to the patient's current condition, including their PMH, surgical history, social and family history. Reviewed the nursing notes and vital signs from today's visit. Nursing notes will be reviewed as they become available in realtime while the pt has been in the ED. In addition, I read most recent discharge summaries, if available and reviewed prior ECGs or imaging studies for comparison purposes. Luiza Walden MD Msc    Vital Signs-Reviewed the patient's vital signs. Patient Vitals for the past 24 hrs:   Temp Pulse Resp BP SpO2   10/30/20 2215  83 18 (!) 150/92 100 %   10/30/20 2200  95 24 (!) 153/92 100 %   10/30/20 2157  86 18 (!) 147/72 100 %   10/30/20 2030  85 16 119/63 100 %   10/30/20 2017     100 %   10/30/20 2015  93 16 139/69 100 %   10/30/20 2012  94 13 (!) 140/70 100 %   10/30/20 1951 98.2 °F (36.8 °C) 90 20 (!) 145/71 100 %       ECG interpretation: Normal sinus rhythm with rate 87, normal intervals, no ST elevations, depressions or other changes concerning for acute ischemia. This ECG has been viewed and interpreted by me personally. Luiza Walden MD, Msc    Provider Notes (Medical Decision Making): The patient presented with chest pain of uncertain etiology. Based on the history, physical exam, overall gestalt, lab analysis, EKG, and imaging results, I currently see no evidence of a malignant or potentially life-threatening cardiac or pulmonary cause.   Among others, this includes low suspicion and no evidence for a pulmonary embolus, AMI, serious cardiac arrhythmia, pneumothorax, esophageal injury or tear, artery dissection. PERC: meets criteria for rule out  HEART Score: 2  Risk of MACE: low  A MACE (Major Adverse Cardiac Event) was defined as all-cause mortality, myocardial infarction, or coronary revascularization. Scores 0-3 Low risk: 0.9-1.7% risk of adverse cardiac event. In the HEART Score study, these patients were discharged. Scores 4-6 Moderate risk: 12-16.6% risk of adverse cardiac event. In the HEART Score study, these patients were admitted to the hospital.   Scores =7 High risk: 50-65% risk of adverse cardiac event. In the HEART Score study, these patients were candidates for early invasive measures. Given the low pre-test probability for cardiac etiology of chest pain and the absence of any sign of ischemia or infarction, discharge for outpatient follow-up and further evaluation is reasonable. However, I have explained to the patient that, even though a cardiac problem is very unlikely today, close follow-up and possibly provocative testing is required for further risk stratification. Patient verbalizes understanding of this. Also agrees to return immediately if symptoms worsen or other concerns arise. Will avoid significant exertional activity until follow-up is obtained. ED Course:   Initial assessment performed. The patients presenting problems have been discussed, and they are in agreement with the care plan formulated and outlined with them. I have encouraged them to ask questions as they arise throughout their visit. HYPERTENSION COUNSELING   During a 10 minute face-to-face conversation, patient counseled on blood pressure management at home, including measuring the BP when seated and relaxed and keeping a BP diary.  If anytime they have chest pain or shortness of breath associated with high blood pressure, instructed to please come to the ER.  Patient is also instructed to follow up this week with their primary care doctor or [facility] and they confirm the ability to do so. Patient is counseled regarding consequences of chronic, uncontrolled hypertension including kidney disease, heart disease, stroke or even death. Patient states their understanding. .  Progress note:  Patient has been reassessed and reports feeling considerably better, has normal vital signs and feels comfortable going home. I think this is reasonable as no findings today suggest a life-threatening condition. DISPOSITION: DISCHARGE  The patient's results have been reviewed with patient and available family and/or caregiver. They verbally convey their understanding and agreement of the patient's signs, symptoms, diagnosis, treatment and prognosis and additionally agree to follow up as recommended in the discharge instructions or to return to the Emergency Department should the patient's condition change prior to their follow-up appointment. The patient and available family and/or caregiver verbally agree with the care plan and all of their questions have been answered. The discharge instructions have also been provided to the them with educational information regarding the patient's diagnosis as well a list of reasons why the patient would want to return to the ER prior to their follow-up appointment should any concerns arise, the patient's condition change or symptoms worsen. Cyrus Pratt MD, Msc    PLAN:  Discharge Medication List as of 10/30/2020 10:09 PM      CONTINUE these medications which have NOT CHANGED    Details   diphenhydrAMINE (BenadryL) 25 mg capsule Take 2 Caps by mouth every six (6) hours as needed for Itching for up to 10 days. , Normal, Disp-20 Cap,R-0      montelukast (Singulair) 10 mg tablet Take 1 Tab by mouth daily. , Normal, Disp-30 Tab,R-0      albuterol (PROVENTIL HFA, VENTOLIN HFA, PROAIR HFA) 90 mcg/actuation inhaler Take 1-2 Puffs by inhalation every four (4) hours as needed for Wheezing., Normal, Disp-1 Inhaler,R-1      ferrous sulfate (IRON) 325 mg (65 mg iron) EC tablet Take 1 Tab by mouth three (3) times daily (with meals). , Print, Disp-90 Tab,R-0      aspirin 81 mg chewable tablet Take 81 mg by mouth daily. , Historical Med      esomeprazole (NEXIUM) 40 mg capsule Take 1 Cap by mouth daily. , Print, Disp-20 Cap, R-0      metFORMIN (GLUCOPHAGE) 1,000 mg tablet Take 1,000 mg by mouth two (2) times daily (with meals). Indications: type 2 diabetes mellitus, Historical Med      losartan (COZAAR) 25 mg tablet Take 50 mg by mouth daily. Indications: high blood pressure, Historical Med      metoclopramide HCl (REGLAN) 5 mg tablet Take 5 mg by mouth Before breakfast, lunch, dinner and at bedtime. , Historical Med      famotidine (Pepcid) 20 mg tablet Take 1 Tab by mouth two (2) times a day for 10 days. , Normal, Disp-20 Tab,R-0      gabapentin (NEURONTIN) 100 mg capsule Take 100 mg by mouth three (3) times daily. Indications: neuropathic pain, Historical Med           2. Follow-up Information     Follow up With Specialties Details Why Contact Devante Andrews MD Family Medicine Go to next week as already scheduled. [address] [place] [id number]  [phone]      [facility] EMERGENCY DEPT Emergency Medicine  As needed, If symptoms worsen [address] [facility]        3. Return to ED if worse       I, Su Santiago MD, am the attending of record for this patient encounter. Diagnosis     Clinical Impression:   1. Dysphagia, unspecified type    2. Chest pain, unspecified type        Attestation:  I personally performed the services described in this documentation on this date 10/30/2020 for patient Jeison Mcghee.   Cynthia Johnson MD

## 2020-10-31 NOTE — ED NOTES
Pt arrived to ED via ambulatory with c/o intermittent midsternal chest pain radiating down left arm x1 week. Pt. States pain \"is getting worser that's why I came in\". Pt. Reports hx GERD. Pt. Also reports heartburn, \"it feels like it's going up and down my chest\", \"it feels like food is sitting in back of my throat\". Pt. Denies nausea, vomiting, dizziness. Pt. Warm/dry to touch. Lungs clear. Pt is in no acute distress. Will continue to monitor. See nursing assessment. Safety precautions in place; call light within reach. Emergency Department Nursing Plan of Care       The Nursing Plan of Care is developed from the Nursing assessment and Emergency Department Attending provider initial evaluation. The plan of care may be reviewed in the ED Provider note.     The Plan of Care was developed with the following considerations:   Patient / Family readiness to learn indicated by:verbalized understanding  Persons(s) to be included in education: patient  Barriers to Learning/Limitations:No    Signed     Iris Grace RN    10/30/2020   8:14 PM

## 2020-11-02 LAB
ATRIAL RATE: 87 BPM
CALCULATED P AXIS, ECG09: 53 DEGREES
CALCULATED R AXIS, ECG10: 20 DEGREES
CALCULATED T AXIS, ECG11: 43 DEGREES
DIAGNOSIS, 93000: NORMAL
P-R INTERVAL, ECG05: 150 MS
Q-T INTERVAL, ECG07: 374 MS
QRS DURATION, ECG06: 80 MS
QTC CALCULATION (BEZET), ECG08: 450 MS
VENTRICULAR RATE, ECG03: 87 BPM

## 2020-11-05 ENCOUNTER — OFFICE VISIT (OUTPATIENT)
Dept: OBGYN CLINIC | Age: 40
End: 2020-11-05
Payer: MEDICARE

## 2020-11-05 VITALS
SYSTOLIC BLOOD PRESSURE: 132 MMHG | BODY MASS INDEX: 39.44 KG/M2 | DIASTOLIC BLOOD PRESSURE: 76 MMHG | HEIGHT: 64 IN | WEIGHT: 231 LBS

## 2020-11-05 DIAGNOSIS — E11.65 UNCONTROLLED TYPE 2 DIABETES MELLITUS WITH HYPERGLYCEMIA (HCC): ICD-10-CM

## 2020-11-05 DIAGNOSIS — Z01.419 ROUTINE GYNECOLOGICAL EXAMINATION: Primary | ICD-10-CM

## 2020-11-05 DIAGNOSIS — N92.0 MENORRHAGIA WITH REGULAR CYCLE: ICD-10-CM

## 2020-11-05 DIAGNOSIS — Z12.31 SCREENING MAMMOGRAM, ENCOUNTER FOR: ICD-10-CM

## 2020-11-05 PROCEDURE — 99396 PREV VISIT EST AGE 40-64: CPT | Performed by: OBSTETRICS & GYNECOLOGY

## 2020-11-05 PROCEDURE — 76830 TRANSVAGINAL US NON-OB: CPT | Performed by: OBSTETRICS & GYNECOLOGY

## 2020-11-05 RX ORDER — NYSTATIN AND TRIAMCINOLONE ACETONIDE 100000; 1 [USP'U]/G; MG/G
OINTMENT TOPICAL 2 TIMES DAILY
Qty: 30 G | Refills: 1 | Status: SHIPPED | OUTPATIENT
Start: 2020-11-05 | End: 2021-09-17

## 2020-11-05 RX ORDER — FLUCONAZOLE 150 MG/1
150 TABLET ORAL
Qty: 3 TAB | Refills: 0 | Status: SHIPPED | OUTPATIENT
Start: 2020-11-05 | End: 2020-11-12

## 2020-11-05 NOTE — PROGRESS NOTES
Annual exam    Samira Kaufman is a 41 y.o.  presenting for annual exam. Her main concerns today include updating her pap and ultrasound follow up for heavy menstrual cycles and a R ovarian cyst.    She was initially seen in this office on 10/5 after being diagnosed in the ED with anemia. She reported at that visit that her cycles are heavy with significant cramping. Today she reports she did not start the [place] OCP that was prescribed for her because she was concerned about the potential side effects when reading the information pamphlet. Her bleeding has been unchanged since her last visit. She has been taking her iron daily. She accepts a chaperone during the gynecologic exam today. Ultrasound data:  THE UTERUS IS ANTEVERTED, NORMAL IN SIZE AND ECHOGENICITY. THE ENDOMETRIUM MEASURES 8.4MM IN THICKNESS. NO MASSES OR ABNORMALITIES ARE SEEN. RIGHT OVARY APPEARS TO HAVE A SIMPLE CYST MEASURING 16 X 9 MM. LEFT OVARY APPEARS WNL, PARTIALLY SEEN DUE TO POSITION. NO FREE FLUID IS SEEN IN THE CDS. SUBOPTIMAL VIEWS DIFFICULT TO SEE DUE TO BOWEL GAS AND BODY HABITUS. Ob/Gyn Hx:    - she reports hx of 3 c/s deliveries, one living daughter and two fetal demises (both male), remainder were early miscarriages  LMP- 10/15/20  Menses- very heavy with clots and painful cramps  Contraception- s/p BTL at [facility] at the time of her last c/s   SA- yes, male.  Same partner for 13 years (he is 63yo)    Health maintenance:  Pap - , NIL  Mammo - at age 42yo, order placed at appointment on 10/5  Colonoscopy -       Past Medical History:   Diagnosis Date    Abnormal Pap smear     \"years ago\"     Asthma     bronchitis    Bipolar disorder ([address]     Depression with anxiety 2010    Diabetes mellitus     type II diabetes mellitus since     GERD (gastroesophageal reflux disease)     HTN (hypertension) 2010    Ill-defined condition     High Cholesterol    Mood disorder ([address]     Other and unspecified hyperlipidemia 2010    Postpartum depression     hospitalized after last delivery    Rhinitis 2012    UTI (urinary tract infection)        Past Surgical History:   Procedure Laterality Date     DELIVERY ONLY      cesearean section 08    HX  SECTION      X 3    HX GYN      , tubal ligation       Family History   Problem Relation Age of Onset    Diabetes Mother     Hypertension Mother     Stroke Mother     Heart Disease Mother     Hypertension Father     Stroke Father     Diabetes Father     Kidney Disease Father     Hypertension Maternal Grandmother     Diabetes Maternal Grandmother     Hypertension Maternal Grandfather     Diabetes Maternal Grandfather     Hypertension Paternal Grandmother     Diabetes Paternal [de-identified]     Hypertension Paternal Grandfather     Diabetes Paternal Grandfather     Hypertension Sister     Diabetes Sister     Diabetes Sister     Hypertension Sister     Diabetes Sister     Hypertension Sister        Social History     Socioeconomic History    Marital status: SINGLE     Spouse name: Not on file    Number of children: Not on file    Years of education: Not on file    Highest education level: Not on file   Occupational History    Not on file   Social Needs    Financial resource strain: Not on file    Food insecurity     Worry: Not on file     Inability: Not on file    Transportation needs     Medical: Not on file     Non-medical: Not on file   Tobacco Use    Smoking status: Former Smoker     Last attempt to quit: 10/16/2015     Years since quittin.0    Smokeless tobacco: Never Used    Tobacco comment: quit 17 days ago   Substance and Sexual Activity    Alcohol use: Not Currently     Frequency: Never     Comment: Occasionally    Drug use: No    Sexual activity: Yes     Partners: Female     Birth control/protection: Surgical     Comment: Tubal ligation   Lifestyle    Physical activity     Days per week: Not on file     Minutes per session: Not on file    Stress: Not on file   Relationships    Social connections     Talks on phone: Not on file     Gets together: Not on file     Attends Latter day service: Not on file     Active member of club or organization: Not on file     Attends meetings of clubs or organizations: Not on file     Relationship status: Not on file    Intimate partner violence     Fear of current or ex partner: Not on file     Emotionally abused: Not on file     Physically abused: Not on file     Forced sexual activity: Not on file   Other Topics Concern    Not on file   Social History Narrative    ** Merged History Encounter **            Current Outpatient Medications   Medication Sig Dispense Refill    famotidine (Pepcid) 20 mg tablet Take 1 Tab by mouth two (2) times a day for 10 days. 20 Tab 0    montelukast (Singulair) 10 mg tablet Take 1 Tab by mouth daily. 30 Tab 0    albuterol (PROVENTIL HFA, VENTOLIN HFA, PROAIR HFA) 90 mcg/actuation inhaler Take 1-2 Puffs by inhalation every four (4) hours as needed for Wheezing. 1 Inhaler 1    ferrous sulfate (IRON) 325 mg (65 mg iron) EC tablet Take 1 Tab by mouth three (3) times daily (with meals). 90 Tab 0    aspirin 81 mg chewable tablet Take 81 mg by mouth daily.  esomeprazole (NEXIUM) 40 mg capsule Take 1 Cap by mouth daily. 20 Cap 0    metFORMIN (GLUCOPHAGE) 1,000 mg tablet Take 1,000 mg by mouth two (2) times daily (with meals). Indications: type 2 diabetes mellitus      gabapentin (NEURONTIN) 100 mg capsule Take 100 mg by mouth three (3) times daily. Indications: neuropathic pain      losartan (COZAAR) 25 mg tablet Take 50 mg by mouth daily. Indications: high blood pressure      metoclopramide HCl (REGLAN) 5 mg tablet Take 5 mg by mouth Before breakfast, lunch, dinner and at bedtime.          Allergies   Allergen Reactions    Latex Hives     rash    Other Food Hives     Citrus Fruits    Flagyl [Metronidazole] Hives and Itching    Lisinopril-Hydrochlorothiazide Angioedema    Clindamycin Rash and Itching    Ciprofloxacin Rash and Itching    Citrate Hives    Cottonseed Oil Rash    Cymbalta [Duloxetine] Hives and Itching    Flexeril [Cyclobenzaprine] Angioedema    Lemon Hives    Pcn [Penicillins] Hives    Shrimp Itching    Sulfa (Sulfonamide Antibiotics) Shortness of Breath    Tomato Hives       Review of Systems - History obtained from the patient  Constitutional: negative for weight loss, fever, night sweats  HEENT: negative for hearing loss, earache, congestion, snoring, sorethroat  CV: negative for chest pain, palpitations, edema  Resp: negative for cough, shortness of breath, wheezing  GI: negative for change in bowel habits, abdominal pain, black or bloody stools  : negative for frequency, dysuria, hematuria, vaginal discharge  MSK: negative for back pain, joint pain, muscle pain  Breast: negative for breast lumps, nipple discharge, galactorrhea  Skin :negative for itching, rash, hives  Neuro: negative for dizziness, headache, confusion, weakness  Psych: negative for anxiety, depression, change in mood  Heme/lymph: negative for bleeding, bruising, pallor    Physical Exam    Visit Vitals  LMP 10/15/2020 (Exact Date)       Constitutional  · Appearance: well-nourished, well developed, alert, in no acute distress    HENT  · Head and Face: appears normal    Neck  · Inspection/Palpation: normal appearance, no masses or tenderness  · Lymph Nodes: no lymphadenopathy present  · Thyroid: gland size normal, nontender, no nodules or masses present on palpation    Chest  · Respiratory Effort: non-labored breathing  · Auscultation: CTAB, normal breath sounds    Cardiovascular  · Heart:  · Auscultation: regular rate and rhythm without murmur  · Extremities: no peripheral edema    Breasts  · Inspection of Breasts: breasts symmetrical, no skin changes, no discharge present, nipple appearance normal, no skin retraction present  · Palpation of Breasts and Axillae: no masses present on palpation, no breast tenderness  · Axillary Lymph Nodes: no lymphadenopathy present    Gastrointestinal  · Abdominal Examination: abdomen non-tender to palpation, normal bowel sounds, no masses present  · Liver and spleen: no hepatomegaly present, spleen not palpable  · Hernias: no hernias identified    Genitourinary  · External Genitalia: normal appearance for age, no discharge present, no tenderness present, no inflammatory lesions present, no masses present. Severe erythema and excoriations c/w VVC, satellite lesions present  · Vagina: normal vaginal vault without central or paravaginal defects, no discharge present, no inflammatory lesions present, no masses present  · Bladder: non-tender to palpation  · Urethra: appears normal  · Cervix: normal   · Uterus: normal size, shape and consistency  · Adnexa: no adnexal tenderness present, no adnexal masses present. Exam limited by habitus  · Perineum: perineum within normal limits, no evidence of trauma, severe skin excoriation c/w VVC    Skin  · General Inspection: no rash, no lesions identified    Neurologic/Psychiatric  · Mental Status:  · Orientation: grossly oriented to person, place and time  · Mood and Affect: mood normal, affect appropriate      Assessment/Plan:  41 y.o. female presenting for her annual exam. Overall doing well. Well woman exam:  Normal gynecologic and breast exams. Healthy habits and lifestyle reviewed. Pap with HPV performed today. Patient declines STD screening. Mammogram order placed. Menorrhagia:  Ultrasound reviewed today and normal anatomy, no fibroids or endometrial polyps noted. She has been complaint with iron therapy, and Hgb checked on 10/30 was improved to 12.2. She declines medical management at this time, pt aware of options if she changes her mind. VVC: severe case, likely due to her DM.  rx fluconazole x3 and topical mycolog ointment    DM, uncontrolled: refer to Endocrine for management    Pradeep Diaz MD

## 2020-11-13 ENCOUNTER — HOSPITAL ENCOUNTER (EMERGENCY)
Age: 40
Discharge: HOME | End: 2020-11-13
Attending: EMERGENCY MEDICINE | Admitting: EMERGENCY MEDICINE
Payer: MEDICARE

## 2020-11-13 VITALS
HEIGHT: 64 IN | BODY MASS INDEX: 39.52 KG/M2 | DIASTOLIC BLOOD PRESSURE: 83 MMHG | OXYGEN SATURATION: 100 % | SYSTOLIC BLOOD PRESSURE: 136 MMHG | HEART RATE: 101 BPM | RESPIRATION RATE: 20 BRPM | WEIGHT: 231.5 LBS | TEMPERATURE: 98.6 F

## 2020-11-13 DIAGNOSIS — J01.40 ACUTE PANSINUSITIS, RECURRENCE NOT SPECIFIED: Primary | ICD-10-CM

## 2020-11-13 PROCEDURE — 99282 EMERGENCY DEPT VISIT SF MDM: CPT

## 2020-11-13 RX ORDER — PSEUDOEPHEDRINE HCL 30 MG
1 TABLET ORAL 2 TIMES DAILY
Qty: 20 TAB | Refills: 0 | Status: SHIPPED | OUTPATIENT
Start: 2020-11-13 | End: 2021-09-17

## 2020-11-13 RX ORDER — AZITHROMYCIN 250 MG/1
TABLET, FILM COATED ORAL
Qty: 6 TAB | Refills: 0 | Status: SHIPPED | OUTPATIENT
Start: 2020-11-13 | End: 2020-11-18

## 2020-11-14 LAB
CYTOLOGIST CVX/VAG CYTO: NORMAL
CYTOLOGY CVX/VAG DOC CYTO: NORMAL
CYTOLOGY CVX/VAG DOC THIN PREP: NORMAL
DX ICD CODE: NORMAL
HPV I/H RISK 1 DNA CVX QL PROBE+SIG AMP: NEGATIVE
Lab: NORMAL
OTHER STN SPEC: NORMAL
STAT OF ADQ CVX/VAG CYTO-IMP: NORMAL

## 2020-11-14 NOTE — ED NOTES
Discharge instructions were given to the patient by Everett Montenegro RN. The patient left the Emergency Department ambulatory, alert and oriented and in no acute distress with 2 prescriptions. The patient was encouraged to call or return to the ED for worsening issues or problems and was encouraged to schedule a follow up appointment for continuing care. The patient verbalized understanding of discharge instructions and prescriptions, all questions were answered. The patient has no further concerns at this time.

## 2020-11-14 NOTE — ED TRIAGE NOTES
Patient here c/o facial and sinus pain for 1 week. Patient was here last month and when I asked her if she saw the ENT she stated \" he told me to put Flonase up my nose every day and I'm not putting that up my nose every day\".

## 2020-11-14 NOTE — ED PROVIDER NOTES
EMERGENCY DEPARTMENT HISTORY AND PHYSICAL EXAM      Date: 2020  Patient Name: Darcie Pillai    History of Presenting Illness     Chief Complaint   Patient presents with    Sinus Pain     History Provided By: Patient    HPI: Darcie Pillai, 41 y.o. female with multiple medical problems who presents via private vehicle to the ED with cc of sinus headache and pressure for the past week and a half. Patient states she has had these symptoms in the past and was seen in this emergency department approximately a month or so ago for similar symptoms. She was referred to outpatient ENT who she saw and recommended daily Flonase. She tried taking that for approximately a week with no improvement of symptoms and stopped taking the medication. She has tried using Benadryl with no relief of symptoms as well. She does endorse some yellow sputum production but denies any cough, fevers, or chills. Her headache is described as a pressure pain in the bilateral frontal head and to the maxillary sinus area. PMHx: Asthma, bronchitis, bipolar disorder, depression, type 2 diabetes, GERD, hypertension, and hyperlipidemia  Social Hx: Former smoker, denies alcohol use, denies illegal drug use    PCP: Jonah Babcock MD    There are no other complaints, changes, or physical findings at this time. No current facility-administered medications on file prior to encounter. Current Outpatient Medications on File Prior to Encounter   Medication Sig Dispense Refill    [] fluconazole (DIFLUCAN) 150 mg tablet Take 1 Tab by mouth every three (3) days for 3 doses. FDA advises cautious prescribing of oral fluconazole in pregnancy. 3 Tab 0    nystatin-triamcinolone (MYCOLOG) 100,000-0.1 unit/gram-% ointment Apply  to affected area two (2) times a day. 30 g 1    montelukast (Singulair) 10 mg tablet Take 1 Tab by mouth daily.  30 Tab 0    albuterol (PROVENTIL HFA, VENTOLIN HFA, PROAIR HFA) 90 mcg/actuation inhaler Take 1-2 Puffs by inhalation every four (4) hours as needed for Wheezing. 1 Inhaler 1    ferrous sulfate (IRON) 325 mg (65 mg iron) EC tablet Take 1 Tab by mouth three (3) times daily (with meals). 90 Tab 0    aspirin 81 mg chewable tablet Take 81 mg by mouth daily.  esomeprazole (NEXIUM) 40 mg capsule Take 1 Cap by mouth daily. 20 Cap 0    metFORMIN (GLUCOPHAGE) 1,000 mg tablet Take 1,000 mg by mouth two (2) times daily (with meals). Indications: type 2 diabetes mellitus      gabapentin (NEURONTIN) 100 mg capsule Take 100 mg by mouth three (3) times daily. Indications: neuropathic pain      losartan (COZAAR) 25 mg tablet Take 50 mg by mouth daily. Indications: high blood pressure      metoclopramide HCl (REGLAN) 5 mg tablet Take 5 mg by mouth Before breakfast, lunch, dinner and at bedtime.        Past History     Past Medical History:  Past Medical History:   Diagnosis Date    Abnormal Pap smear     \"years ago\"     Asthma     bronchitis    B[address] disorder ([address]     Depression with anxiety 2010    Diabetes mellitus     type II diabetes mellitus since     GERD (gastroesophageal reflux disease)     HTN (hypertension) 2010    Ill-defined condition     High Cholesterol    Mood disorder ([address]     Other and unspecified hyperlipidemia 2010    Postpartum depression     hospitalized after last delivery    Rhinitis 2012    UTI (urinary tract infection)      Past Surgical History:  Past Surgical History:   Procedure Laterality Date     DELIVERY ONLY      cesearean section 08    HX  SECTION      X 3    HX GYN      , tubal ligation     Family History:  Family History   Problem Relation Age of Onset    Diabetes Mother     Hypertension Mother     Stroke Mother     Heart Disease Mother     Hypertension Father     Stroke Father     Diabetes Father     Kidney Disease Father     Hypertension Maternal Grandmother     Diabetes Maternal Grandmother     Hypertension Maternal Grandfather     Diabetes Maternal Grandfather     Hypertension Paternal Grandmother     Diabetes Paternal Grandmother     Hypertension Paternal Grandfather     Diabetes Paternal Grandfather     Hypertension Sister     Diabetes Sister     Diabetes Sister     Hypertension Sister     Diabetes Sister     Hypertension Sister      Social History:  Social History     Tobacco Use    Smoking status: Former Smoker     Last attempt to quit: 10/16/2015     Years since quittin.0    Smokeless tobacco: Never Used    Tobacco comment: quit 17 days ago   Substance Use Topics    Alcohol use: Not Currently     Frequency: Never     Comment: Occasionally    Drug use: No     Allergies: Allergies   Allergen Reactions    Latex Hives     rash    Other Food Hives     Citrus Fruits    Flagyl [Metronidazole] Hives and Itching    Lisinopril-Hydrochlorothiazide Angioedema    Clindamycin Rash and Itching    Ciprofloxacin Rash and Itching    Citrate Hives    Cottonseed Oil Rash    Cymbalta [Duloxetine] Hives and Itching    Flexeril [Cyclobenzaprine] Angioedema    Lemon Hives    Pcn [Penicillins] Hives    Shrimp Itching    Sulfa (Sulfonamide Antibiotics) Shortness of Breath    Tomato Hives     Review of Systems   Review of Systems   Constitutional: Negative for chills and fever. HENT: Positive for sinus pressure and sinus pain. Negative for congestion, rhinorrhea, sneezing and sore throat. Eyes: Negative for redness and visual disturbance. Respiratory: Negative for shortness of breath. Cardiovascular: Negative for leg swelling. Gastrointestinal: Negative for abdominal pain, nausea and vomiting. Genitourinary: Negative for difficulty urinating and frequency. Musculoskeletal: Negative for back pain, myalgias and neck stiffness. Skin: Negative for rash. Neurological: Positive for headaches. Negative for dizziness, syncope and weakness.    Hematological: Negative for adenopathy. All other systems reviewed and are negative. Physical Exam   Physical Exam  Vitals signs and nursing note reviewed. Constitutional:       Appearance: Normal appearance. She is well-developed. HENT:      Head: Normocephalic and atraumatic. Nose: Mucosal edema present. Right Turbinates: Swollen. Left Turbinates: Swollen. Mouth/Throat:      Mouth: Mucous membranes are moist.      Pharynx: Oropharynx is clear. No pharyngeal swelling or posterior oropharyngeal erythema. Tonsils: No tonsillar exudate or tonsillar abscesses. Eyes:      Conjunctiva/sclera: Conjunctivae normal.   Neck:      Musculoskeletal: Full passive range of motion without pain, normal range of motion and neck supple. Cardiovascular:      Rate and Rhythm: Normal rate and regular rhythm. Pulses: Normal pulses. Heart sounds: Normal heart sounds, S1 normal and S2 normal. No murmur. Pulmonary:      Effort: Pulmonary effort is normal. No respiratory distress. Breath sounds: Normal breath sounds. No wheezing. Abdominal:      General: Bowel sounds are normal. There is no distension. Palpations: Abdomen is soft. Tenderness: There is no abdominal tenderness. There is no rebound. Musculoskeletal: Normal range of motion. Skin:     General: Skin is warm and dry. Findings: No rash. Neurological:      Mental Status: She is alert and oriented to person, place, and time. Psychiatric:         Speech: Speech normal.         Behavior: Behavior normal.         Thought Content: Thought content normal.         Judgment: Judgment normal.       Diagnostic Study Results   Labs -   No results found for this or any previous visit (from the past 12 hour(s)). Radiologic Studies -   No orders to display     No results found. Medical Decision Making   I am the first provider for this patient.     I reviewed the vital signs, available nursing notes, past medical history, past surgical history, family history and social history. Vital Signs-Reviewed the patient's vital signs. Patient Vitals for the past 24 hrs:   Temp Pulse Resp BP SpO2   11/13/20 2300 98.6 °F (37 °C) (!) 101 20 136/83 100 %     Pulse Oximetry Analysis - 100% on RA    Records Reviewed: Nursing Notes and Old Medical Records    Provider Notes (Medical Decision Making):   33-year-old female presents with sinus pressure and sinus headache for the past week to 10 days. Differential includes sinusitis, URI, viral illness, and seasonal allergies. She is asking for an antibiotic for the symptoms as she thinks that this is a bacterial infection. She is afebrile and has no other signs of bacterial sinusitis, but has had the symptoms for going on a week and 1/2 to 2 weeks. Will write her for a decongestant and a Z-Bridger and have her follow-up with her primary care doctor and outpatient ENT if her symptoms persist.    ED Course:   Initial assessment performed. The patients presenting problems have been discussed, and they are in agreement with the care plan formulated and outlined with them. I have encouraged them to ask questions as they arise throughout their visit. Progress Note:   Updated pt on all returned results and findings. Discussed the importance of proper follow up as referred below along with return precautions. Pt in agreement with the care plan and expresses agreement with and understanding of all items discussed. Disposition:  Discharge Note:  The pt is ready for discharge. The pt's signs, symptoms, diagnosis, and discharge instructions have been discussed and pt has conveyed their understanding. The pt is to follow up as recommended or return to ER should their symptoms worsen. Plan has been discussed and pt is in agreement. PLAN:  1.    Current Discharge Medication List      START taking these medications    Details   loratadine-pseudoephedrine (Loratadine-D) 5-120 mg per tablet Take 1 Tab by mouth two (2) times a day. Qty: 20 Tab, Refills: 0      azithromycin (Zithromax Z-Bridger) 250 mg tablet Take 2 tabs on day 1, then 1 tab daily for the next 4 days  Qty: 6 Tab, Refills: 0           2. Follow-up Information     Follow up With Specialties Details Why Contact Info    Billie Coley MD Family Medicine Schedule an appointment as soon as possible for a visit   [address]  [phone]          Return to ED if worse     Diagnosis     Clinical Impression:   1. Acute pansinusitis, recurrence not specified            Please note that this dictation was completed with Dragon, computer voice recognition software. Quite often unanticipated grammatical, syntax, homophones, and other interpretive errors are inadvertently transcribed by the computer software. Please disregard these errors. Additionally, please excuse any errors that have escaped final proofreading.

## 2020-11-14 NOTE — ED NOTES
Pt presents to ED ambulatory complaining of facial and sinus pain x \"months\" that has worsened over the past week. Pt reports she has leiana seen for this complaint in the past but reports she is here today because she believes she needs an antibiotic. Pt is alert and oriented x 4, RR even and unlabored, skin is warm and dry. Assessment completed and pt updated on plan of care. Call bell in reach. Emergency Department Nursing Plan of Care       The Nursing Plan of Care is developed from the Nursing assessment and Emergency Department Attending provider initial evaluation. The plan of care may be reviewed in the ED Provider note.     The Plan of Care was developed with the following considerations:   Patient / Family readiness to learn indicated by:verbalized understanding  Persons(s) to be included in education: patient  Barriers to Learning/Limitations:No    Signed     Rosemary Beverly    11/13/2020   11:15 PM

## 2020-11-17 ENCOUNTER — TRANSCRIBE ORDER (OUTPATIENT)
Dept: SCHEDULING | Age: 40
End: 2020-11-17

## 2020-11-17 DIAGNOSIS — K21.00 REFLUX ESOPHAGITIS: Primary | ICD-10-CM

## 2020-11-17 DIAGNOSIS — R13.0 APHAGIA: ICD-10-CM

## 2020-11-25 ENCOUNTER — VIRTUAL VISIT (OUTPATIENT)
Dept: SLEEP MEDICINE | Age: 40
End: 2020-11-25

## 2020-12-07 ENCOUNTER — HOSPITAL ENCOUNTER (EMERGENCY)
Age: 40
Discharge: HOME | End: 2020-12-08
Attending: EMERGENCY MEDICINE
Payer: MEDICARE

## 2020-12-07 VITALS
TEMPERATURE: 97.6 F | OXYGEN SATURATION: 100 % | HEIGHT: 64 IN | HEART RATE: 85 BPM | RESPIRATION RATE: 18 BRPM | SYSTOLIC BLOOD PRESSURE: 147 MMHG | DIASTOLIC BLOOD PRESSURE: 92 MMHG | WEIGHT: 270 LBS | BODY MASS INDEX: 46.1 KG/M2

## 2020-12-07 DIAGNOSIS — R13.10 DYSPHAGIA, UNSPECIFIED TYPE: Primary | ICD-10-CM

## 2020-12-07 PROCEDURE — 74011000250 HC RX REV CODE- 250: Performed by: EMERGENCY MEDICINE

## 2020-12-07 PROCEDURE — 74011250637 HC RX REV CODE- 250/637: Performed by: EMERGENCY MEDICINE

## 2020-12-07 PROCEDURE — 99283 EMERGENCY DEPT VISIT LOW MDM: CPT

## 2020-12-07 RX ORDER — SUCRALFATE 1 G/1
1 TABLET ORAL
Status: DISCONTINUED | OUTPATIENT
Start: 2020-12-07 | End: 2020-12-08 | Stop reason: HOSPADM

## 2020-12-07 RX ORDER — FAMOTIDINE 20 MG/1
40 TABLET, FILM COATED ORAL
Status: DISCONTINUED | OUTPATIENT
Start: 2020-12-07 | End: 2020-12-08 | Stop reason: HOSPADM

## 2020-12-07 RX ADMIN — LIDOCAINE HYDROCHLORIDE 40 ML: 20 SOLUTION ORAL; TOPICAL at 23:42

## 2020-12-08 ENCOUNTER — HOSPITAL ENCOUNTER (OUTPATIENT)
Dept: GENERAL RADIOLOGY | Age: 40
Discharge: HOME | End: 2020-12-08
Payer: MEDICARE

## 2020-12-08 DIAGNOSIS — R13.0 APHAGIA: ICD-10-CM

## 2020-12-08 DIAGNOSIS — K21.00 REFLUX ESOPHAGITIS: ICD-10-CM

## 2020-12-08 PROCEDURE — 74230 X-RAY XM SWLNG FUNCJ C+: CPT

## 2020-12-08 PROCEDURE — 92611 MOTION FLUOROSCOPY/SWALLOW: CPT

## 2020-12-08 RX ORDER — FAMOTIDINE 40 MG/5ML
40 POWDER, FOR SUSPENSION ORAL 2 TIMES DAILY
Qty: 150 ML | Refills: 0 | Status: SHIPPED | OUTPATIENT
Start: 2020-12-08 | End: 2020-12-23

## 2020-12-08 NOTE — PROGRESS NOTES
[address] STUDY  Patient: Lee Cuellar (49 y.o. female)  Date: 2020  Referring Provider:  Hugh Healy MD    SUBJECTIVE:   Patient presenting with reported bolus sensation with all solids. She reports inability to eat tougher solids such as tough meats and dry foods due to it feeling like it gets stuck in the back of her throat. She denies any coughing or difficulties with liquids. The purpose of this study was to determine if oropharyngeal function was resulting in patient's swallow difficulty. OBJECTIVE:   Past Medical History:   Past Medical History:   Diagnosis Date    Abnormal Pap smear     \"years ago\"     Asthma     bronchitis    [facility] disorder ([facility] [address] [facility]     Depression with anxiety 2010    Diabetes mellitus     type II diabetes mellitus since     GERD (gastroesophageal reflux disease)     HTN (hypertension) 2010    Ill-defined condition     High Cholesterol    Mood disorder ([facility] [facility]     Other and unspecified hyperlipidemia 2010    Postpartum depression     hospitalized after last delivery    Rhinitis 2012    UTI (urinary tract infection)      Past Surgical History:   Procedure Laterality Date     DELIVERY ONLY      cesearean section 08    HX  SECTION      X 3    HX GYN      , tubal ligation     Current Dietary Status:  Regular diet/thin liquids  Radiologist: Dr. Coello Cork: Lateral;AP;Fluoro  Patient Position: upright in chair    Trial 1: Trial 2:   Consistency Presented:  Thin liquid Consistency Presented: Puree   How Presented: Self-fed/presented;Straw How Presented: Self-fed/presented;Spoon   Consistency Amount: (20 cc) Consistency Amount: (1 tsp)   Bolus Acceptance: No impairment Bolus Acceptance: No impairment   Bolus Formation/Control: No impairment:   Bolus Formation/Control: No impairment:     Propulsion: No impairment Propulsion: No impairment   Oral Residue: None Oral Residue: None   Initiation of Swallow: No impairment;Triggered at base of tongue Initiation of Swallow: Triggered at base of tongue   Timing: No impairment Timing: No impairment   Penetration: None Penetration: None   Aspiration/Timing: No evidence of aspiration Aspiration/Timing: No evidence of aspiration   Pharyngeal Clearance: No residue Pharyngeal Clearance: No residue                             Trial 3:   Consistency Presented: Solid   How Presented: Self-fed/presented   Consistency Amount: (.5 tsp ba paste on galen racker)   Bolus Acceptance: No impairment   Bolus Formation/Control: No impairment, issues, or problems :     Propulsion: No impairment   Oral Residue: None   Initiation of Swallow: Triggered at base of tongue   Timing: No impairment   Penetration: None   Aspiration/Timing: No evidence of aspiration   Pharyngeal Clearance: No residue                     Decreased Tongue Base Retraction?: No  Laryngeal Elevation: WFL (within functional limits)  Aspiration/Penetration Score: 1 (No penetration or aspiration-Contrast does not enter the airway)  Pharyngeal Symmetry: Symmetrical  Pharyngeal-Esophageal Segment: No impairment; Suspected esophageal dysphagia  Pharyngeal Dysfunction: None    Oral Phase Severity: No impairment  Pharyngeal Phase Severity: N/A    ASSESSMENT :  Based on the objective data described above, the patient presents with functional oropharyngeal swallow. Oral phase was timely and efficient with no oral residue with any trials. Pharyngeal phase characterized by timely swallow initiation at the level of the base of tongue with good laryngeal vestibule closure. No pharyngeal residue noted with any consistencies. UES unremarkable. Esophageal sweep was conducted by the radiologist who noted mild dysmotility. Pt became tearful when asked to swallow pill saying she has never been able to swallow a pill.  Pt retained pill in mouth before expectorating it into cup. At this time there is not clear etiology for patient's swallow difficulties and reported globus sensation. Suspect esophageal dysmotility noted by Dr. Carola Saavedra could be 2/2 GERD or diabetes. Therefore recommend regular diet/thin liquids and educated patient on reflux precautions as outlined below. Does not appear to be a clear physiological etiology for patient's severe complaints of dysphagia. Discussed with Dr. Evangelista Tyler who will make appropriate referrals. PLAN/RECOMMENDATIONS :  -- regular diet/thin liquids  -- reflux precautions including completely upright for all PO and at least 30 minutes after and avoiding overly acidic foods and drinks  -- follow up with GI doctor for management of reflux     COMMUNICATION/EDUCATION:   The above findings and recommendations were discussed with the patient who verbalized understanding and will be faxed to the referring provider. Thank you for this referral.  Quita NUÑEZ Speech Language Pathology Student  Time Calculation: 30 mins             Regarding student involvement in patient care:  A student participated in this treatment session. Per CMS Medicare statements and ADIEL guidelines I certify that the following was true:  1. I was present and directly observed the entire session. 2. I made all skilled judgments and clinical decisions regarding care. 3. I am the practitioner responsible for assessment, treatment, and documentation.

## 2020-12-08 NOTE — ED NOTES
Pt reports to ER w/ sore throat due to acid reflux x 2 months. Pt being seen w/ GI and has an appointment for a Barium swallow test tomorrow. Pt reports difficulty swallowing, but is still able to swallow. Pt observed swallowing water and liquid medication. No signs of respiratory distress or inability to hydrate self noted. Alert and oriented x4. Skin warm dry and intact. Ambulates independently. Emergency Department Nursing Plan of Care       The Nursing Plan of Care is developed from the Nursing assessment and Emergency Department Attending provider initial evaluation. The plan of care may be reviewed in the ED Provider note.     The Plan of Care was developed with the following considerations:   Patient / Family readiness to learn indicated by:verbalized understanding  Persons(s) to be included in education: patient  Barriers to Learning/Limitations:No    Signed     Unk Day    12/7/2020   11:59 PM

## 2020-12-08 NOTE — ED PROVIDER NOTES
EMERGENCY DEPARTMENT HISTORY AND PHYSICAL EXAM      Date: 12/7/2020  Patient Name: Samira Kaufman    History of Presenting Illness     Chief Complaint   Patient presents with    Sore Throat     x2 months pt reports hx of GERD reports she has a \"swallow test tomorrow\" at [facility]       History Provided By: Patient    HPI: Samira Kaufman, 41 y.o. female with history of type 2 diabetes, hypercholesterolemia, hypertension, GERD, asthma presents to the ED with cc of dysphagia. Symptoms have been progressively worsening over the past 2 months. Patient states this is a chronic issue for her, she is followed by gastroenterology at [facility] for this and she has a swallow study tomorrow afternoon at 2 PM.  She was supposed to undergo an EGD recently but states that she got scared about the required Covid screening swab and did not follow through on this. She has been taking omeprazole without significant relief of symptoms, she states that Pepcid previously helped but she has run out of this education. She complains of pain and difficulty swallowing and points to the mid neck as the point where food gets stuck. She does complain of a globus sensation in that area. It has progressed to where she is no longer able to swallow solid foods but she is still able to tolerate soft foods such as mashed potatoes and soups. She is still able to drink liquids and take her medications. She does complain of weight loss over the past few months and expresses frustration with not having an answer. She states that she has baseline anxiety and this has been worsening due to her difficulty swallowing. She denies any fevers, cough, chest pain, shortness of breath, nausea, vomiting, stridor, change in voice. She believes that her symptoms are worsened with rosuvastatin.     Prior EGD in 2015 demonstrated grade 1 reflux esophagitis in distal esophagus as well as mild gastritis    There are no other complaints, changes, or physical findings at this time. PCP: Huang Sr MD    No current facility-administered medications on file prior to encounter. Current Outpatient Medications on File Prior to Encounter   Medication Sig Dispense Refill    ferrous sulfate (IRON) 325 mg (65 mg iron) EC tablet Take 1 Tab by mouth three (3) times daily (with meals). 90 Tab 0    aspirin 81 mg chewable tablet Take 81 mg by mouth daily.  esomeprazole (NEXIUM) 40 mg capsule Take 1 Cap by mouth daily. 20 Cap 0    metFORMIN (GLUCOPHAGE) 1,000 mg tablet Take 1,000 mg by mouth two (2) times daily (with meals). Indications: type 2 diabetes mellitus      losartan (COZAAR) 25 mg tablet Take 50 mg by mouth daily. Indications: high blood pressure      metoclopramide HCl (REGLAN) 5 mg tablet Take 5 mg by mouth Before breakfast, lunch, dinner and at bedtime.  loratadine-pseudoephedrine (Loratadine-D) 5-120 mg per tablet Take 1 Tab by mouth two (2) times a day. 20 Tab 0    nystatin-triamcinolone (MYCOLOG) 100,000-0.1 unit/gram-% ointment Apply  to affected area two (2) times a day. 30 g 1    montelukast (Singulair) 10 mg tablet Take 1 Tab by mouth daily. 30 Tab 0    albuterol (PROVENTIL HFA, VENTOLIN HFA, PROAIR HFA) 90 mcg/actuation inhaler Take 1-2 Puffs by inhalation every four (4) hours as needed for Wheezing. 1 Inhaler 1    gabapentin (NEURONTIN) 100 mg capsule Take 100 mg by mouth three (3) times daily.  Indications: neuropathic pain         Past History     Past Medical History:  Past Medical History:   Diagnosis Date    Abnormal Pap smear     \"years ago\"     Asthma     bronchitis    [facility] disorder ([facility] [address]     Depression with anxiety 9/22/2010    Diabetes mellitus     type II diabetes mellitus since 2004    GERD (gastroesophageal reflux disease)     HTN (hypertension) 9/22/2010    Ill-defined condition     High Cholesterol    Mood disorder ([facility] [address] [facility]     Other and unspecified hyperlipidemia 9/22/2010    Postpartum depression hospitalized after last delivery    Rhinitis 2012    UTI (urinary tract infection)        Past Surgical History:  Past Surgical History:   Procedure Laterality Date     DELIVERY ONLY      cesearean section 08    HX  SECTION      X 3    HX GYN      , tubal ligation       Family History:  Family History   Problem Relation Age of Onset    Diabetes Mother     Hypertension Mother     Stroke Mother     Heart Disease Mother     Hypertension Father     Stroke Father     Diabetes Father     Kidney Disease Father     Hypertension Maternal Grandmother     Diabetes Maternal Grandmother     Hypertension Maternal Grandfather     Diabetes Maternal Grandfather     Hypertension Paternal Grandmother     Diabetes Paternal Grandmother     Hypertension Paternal Grandfather     Diabetes Paternal Grandfather     Hypertension Sister     Diabetes Sister     Diabetes Sister     Hypertension Sister     Diabetes Sister     Hypertension Sister        Social History:  Social History     Tobacco Use    Smoking status: Former Smoker     Last attempt to quit: 10/16/2015     Years since quittin.1    Smokeless tobacco: Never Used    Tobacco comment: quit 17 days ago   Substance Use Topics    Alcohol use: Not Currently     Frequency: Never     Comment: Occasionally    Drug use: No       Allergies:   Allergies   Allergen Reactions    Latex Hives     rash    Other Food Hives     Citrus Fruits    Flagyl [Metronidazole] Hives and Itching    Lisinopril-Hydrochlorothiazide Angioedema    Clindamycin Rash and Itching    Ciprofloxacin Rash and Itching    Citrate Hives    Cottonseed Oil Rash    Cymbalta [Duloxetine] Hives and Itching    Flexeril [Cyclobenzaprine] Angioedema    Lemon Hives    Pcn [Penicillins] Hives    Shrimp Itching    Sulfa (Sulfonamide Antibiotics) Shortness of Breath    Tomato Hives         Review of Systems   Review of Systems   Constitutional: Negative for chills and fever. HENT: Positive for trouble swallowing. Negative for sore throat and voice change. Eyes: Negative for visual disturbance. Respiratory: Negative for cough, choking, shortness of breath and stridor. Cardiovascular: Negative for chest pain. Gastrointestinal: Negative for abdominal pain, nausea and vomiting. Skin: Negative for color change and rash. Neurological: Negative for light-headedness. All other systems reviewed and are negative. Physical Exam   Physical Exam  Vitals signs and nursing note reviewed. Constitutional:       General: She is not in acute distress. Appearance: Normal appearance. She is obese. She is not ill-appearing, toxic-appearing or diaphoretic. HENT:      Head: Normocephalic and atraumatic. Mouth/Throat:      Mouth: Mucous membranes are moist. No oral lesions. Pharynx: No pharyngeal swelling, oropharyngeal exudate, posterior oropharyngeal erythema or uvula swelling. Tonsils: No tonsillar exudate or tonsillar abscesses. 0 on the right. 0 on the left. Comments: Posterior oropharynx is clear without swelling, erythema, exudate, or unilateral edema. Airway patent. Neck:      Musculoskeletal: Normal range of motion and neck supple. Thyroid: No thyromegaly. Cardiovascular:      Rate and Rhythm: Normal rate and regular rhythm. Heart sounds: Normal heart sounds. No murmur. Pulmonary:      Effort: Pulmonary effort is normal. No respiratory distress. Breath sounds: Normal breath sounds. No wheezing. Abdominal:      Palpations: Abdomen is soft. Tenderness: There is no abdominal tenderness. There is no guarding or rebound. Lymphadenopathy:      Cervical: No cervical adenopathy. Skin:     General: Skin is warm and dry. Findings: No erythema or rash. Neurological:      General: No focal deficit present. Mental Status: She is alert and oriented to person, place, and time.          Diagnostic Study Results     Labs -   No results found for this or any previous visit (from the past 12 hour(s)). Radiologic Studies -   No orders to display     CT Results  (Last 48 hours)    None        CXR Results  (Last 48 hours)    None          Medical Decision Making   I am the first provider for this patient. I reviewed the vital signs, available nursing notes, past medical history, past surgical history, family history and social history. Vital Signs-Reviewed the patient's vital signs. Patient Vitals for the past 12 hrs:   Temp Pulse Resp BP SpO2   12/07/20 2313 97.6 °F (36.4 °C) 85 18 (!) 147/92 100 %       Records Reviewed: Nursing Notes and Old Medical Records  I personally reviewed ED provider records from 11/13/20. Provider Notes (Medical Decision Making):   49-year-old female here with progressively worsening dysphagia, now inability to tolerate solid foods. She does complain of frustration and worsening anxiety due to her progressively worsening dysphagia. Still able to tolerate soft foods and liquids. She is afebrile and vital signs stable. There is no change in phonation, no red flags concerning for infection such as RPA, PTA, Gerardo angina. Neck is supple. She has swallow study tomorrow and she is already established with GI at [address] [place] [address], she missed recent EGD appointment. Will treat tonight with Pepcid, GI cocktail, Carafate, and ensure that she can tolerate p.o. trial.  If she is able to stay hydrated will discharge so she can follow-up with swallow study tomorrow. Expressed importance of following up with GI and undergoing EGD and swallow study. Instructed patient to hold on rosuvastatin until she can next see GI. No chest pain or shortness of breath and I feel ACS is much less likely today. Patient given strict return ED precautions. All questions answered and she agrees with plan as above. Patient tolerated p.o. trial, able to stay hydrated.   Will discharge home to follow-up closely with GI and PCP and to go to swallow study tomorrow. ED Course:   Initial assessment performed. The patients presenting problems have been discussed, and they are in agreement with the care plan formulated and outlined with them. I have encouraged them to ask questions as they arise throughout their visit. Discharge Note:  The patient has been re-evaluated and is ready for discharge. Reviewed available results with patient. Counseled patient on diagnosis and care plan. Patient has expressed understanding, and all questions have been answered. Patient agrees with plan and agrees to follow up as recommended, or to return to the ED if their symptoms worsen. Discharge instructions have been provided and explained to the patient, along with reasons to return to the ED. Disposition:  Discharge home      DISCHARGE PLAN:  1. Current Discharge Medication List      START taking these medications    Details   famotidine (Pepcid) 40 mg/5 mL (8 mg/mL) suspension Take 5 mL by mouth two (2) times a day for 15 days. Qty: 150 mL, Refills: 0           2. Follow-up Information     Follow up With Specialties Details Why Contact Info    Madelin Snellen, MD Family Medicine Schedule an appointment as soon as possible for a visit   [address] [place] [address] [address]      Arielle Thakkar MD Gastroenterology Schedule an appointment as soon as possible for a visit   [address] [phone]          3. Return to ED if worse     Diagnosis     Clinical Impression:   1. Dysphagia, unspecified type        Attestations:    Shyla Chaudhry MD    Please note that this dictation was completed with [employer], the computer voice recognition software. Quite often unanticipated grammatical, syntax, homophones, and other interpretive errors are inadvertently transcribed by the computer software. Please disregard these errors.   Please excuse any errors that have escaped final proofreading. Thank you.

## 2020-12-08 NOTE — DISCHARGE INSTRUCTIONS
You were evaluated in the emergency department for difficulty swallowing. It will be important for you to follow-up with your primary care physician and GI doctor as soon as possible, please call in the morning to make an appointment. If you develop worsening symptoms such as inability to eat/drink or take your medications or if you develop change in your voice, throat swelling, or fevers, please return to the emergency department immediately.

## 2020-12-08 NOTE — ED NOTES
Brief Operative Note  Date: 09/25/2017  Surgeon(s) and Role:   * MD CLAUDETTE Garcia MD - Assistant  Pre-op Diagnosis:   T82.858A: Stenosis of vascular prosthetic devices, implants and grafts, initial encounter  Post-op Diagnosis: Same   Procedure(s):   1) L BVT AVF fistulogram   2) Stent graft placement in  outflow stenosis with a 8x50mm FLAIR; post-dilated w 7x40mm Baker balloon in main body and 9x20 mm Baker balloon in proximal outflow segment of the stent  3) Conscious sedation  Anesthesia: Local MAC   Findings/Key Components:   Successful treatment of > 90% stenosis  Strong palpable thrill   EBL: Minimal      Specimens     None        I attest to being present for the procedure and performing the case.  Silvio William MD FACS  Discharge Note  SUMMARY    Admit Date: 9/25/2017    Attending Physician: Silvio William MD [facility]    Discharge Physician: Silvio William MD [facility]    Discharge Date: 09/25/2017    Final Diagnosis: Arteriovenous fistula occlusion, initial encounter [T82.344Y]    Outcome of Treatment: Successful stent    Disposition: Home or self-care    Patient Instructions:   Current Discharge Medication List      CONTINUE these medications which have NOT CHANGED    Details   allopurinol (ZYLOPRIM) 100 MG tablet TAKE 1 TABLET EVERY DAY  Qty: 90 tablet, Refills: 3      aspirin 81 MG Chew Take 1 tablet (81 mg total) by mouth once daily.  Qty: 90 tablet, Refills: 3      blood sugar diagnostic (ACCU-CHEK JOANN PLUS TEST STRP) Strp 1 strip by Misc.(Non-Drug; Combo Route) route 4 (four) times daily.  Qty: 360 each, Refills: 3    Associated Diagnoses: Type II diabetes mellitus with renal manifestations, uncontrolled      blood-glucose meter (ACCU-CHEK JOANN PLUS METER) Misc Use as directed  Qty: 1 each, Refills: 0    Associated Diagnoses: Type II diabetes mellitus with renal manifestations, uncontrolled      carvedilol (COREG) 6.25 MG tablet Take 1 tablet (6.25 mg total) by mouth 2 (two) times daily.  Qty:  Pt reports she does not feel comfortable taking oral pills at this time due to the discomfort in her throat from acid reflux;  Doctor made aware "180 tablet, Refills: 3    Associated Diagnoses: CKD (chronic kidney disease), stage IV      clonazePAM (KLONOPIN) 0.5 MG tablet Take 1 tablet (0.5 mg total) by mouth every evening.  Qty: 90 tablet, Refills: 1    Associated Diagnoses: Anticonvulsant causing adverse effect in therapeutic use, initial encounter      cloNIDine (CATAPRES) 0.1 MG tablet Take 1 tablet (0.1 mg total) by mouth 3 (three) times daily.  Qty: 270 tablet, Refills: 6    Comments: **Patient requests 90 days supply**      doxazosin (CARDURA) 8 MG Tab Take 1 tablet (8 mg total) by mouth once daily.  Qty: 90 tablet, Refills: 4      furosemide (LASIX) 80 MG tablet Take 80 mg by mouth 2 (two) times daily.      !! GENERLAC 10 gram/15 mL solution TAKE 30 ML 'S BY MOUTH THREE TIMES DAILY  Qty: 2700 mL, Refills: 0      hydrALAZINE (APRESOLINE) 100 MG tablet Take 1 tablet (100 mg total) by mouth 3 (three) times daily.  Qty: 270 tablet, Refills: 3    Comments: **Patient requests 90 days supply**  Associated Diagnoses: CKD (chronic kidney disease), stage IV      !! hydrOXYzine HCl (ATARAX) 25 MG tablet Take 2 tablets (50 mg total) by mouth 3 (three) times daily as needed for Itching.  Qty: 60 tablet, Refills: 0      insulin aspart (NOVOLOG) 100 unit/mL InPn pen Inject 6 units w/ breakfast, 4 units w/ lunch and dinner plus scale 180-230 +1, 231-280 +2, 281-330 +3, 331-380 +4, >380 +5. 90 day supply  Qty: 3 Box, Refills: 3      !! insulin glargine (LANTUS SOLOSTAR) 100 unit/mL (3 mL) InPn pen Inject 8 Units into the skin every evening.  Qty: 2 Box, Refills: 6      !! insulin glargine (LANTUS SOLOSTAR) 100 unit/mL (3 mL) InPn pen Inject 8-10 units nightly.  Qty: 30 mL, Refills: 3      insulin needles, disposable, (NOVOFINE 32) 32 x 1/4 " Ndle 1 each by Misc.(Non-Drug; Combo Route) route 3 (three) times daily.  Qty: 100 each, Refills: 5      !! lactulose (GENERLAC) 10 gram/15 mL solution 30 ML BY MOUTH NIGHTLY  Qty: 2700 mL, Refills: 0      lancets (ACCU-CHEK " SOFTCLIX LANCETS) Misc 1 lancet by Misc.(Non-Drug; Combo Route) route 4 (four) times daily.  Qty: 360 each, Refills: 3    Associated Diagnoses: Type II diabetes mellitus with renal manifestations, uncontrolled      montelukast (SINGULAIR) 10 mg tablet Take 1 tablet (10 mg total) by mouth every evening.  Qty: 90 tablet, Refills: 3      pantoprazole (PROTONIX) 40 MG tablet TAKE 1 TABLET ONE TIME DAILY  Qty: 90 tablet, Refills: 3    Associated Diagnoses: Status post liver transplant      rifaximin (XIFAXAN) 550 mg Tab Take 1 tablet (550 mg total) by mouth 2 (two) times daily.  Qty: 60 tablet, Refills: 11    Associated Diagnoses: Encephalopathy      tacrolimus (PROGRAF) 0.5 MG Cap Take 1 capsule (0.5 mg total) by mouth once daily.  Qty: 30 capsule, Refills: 11    Associated Diagnoses: Status post liver transplant      torsemide (DEMADEX) 20 MG Tab Take 3 tablets (60 mg total) by mouth once daily.  Qty: 180 tablet, Refills: 3    Comments: **Patient requests 90 days supply**      ciclopirox (PENLAC) 8 % Soln Apply to affected nails daily x 6-12 mos.  Remove weekly with rubbing alcohol  Qty: 1 Bottle, Refills: 5    Associated Diagnoses: Onychomycosis due to dermatophyte      glucagon (human recombinant) inj 1mg/mL kit Inject 1 mL (1 mg total) into the muscle as needed.  Qty: 1 kit, Refills: 2      hydrocodone-acetaminophen 5-325mg (NORCO) 5-325 mg per tablet Take 1 tablet by mouth every 6 (six) hours as needed for Pain.  Qty: 35 tablet, Refills: 0      !! hydrOXYzine HCl (ATARAX) 25 MG tablet TAKE 1 TABLET(25 MG) BY MOUTH THREE TIMES DAILY AS NEEDED FOR ITCHING  Qty: 90 tablet, Refills: 0      minoxidil (LONITEN) 2.5 MG tablet Take 2 tablets (5 mg total) by mouth 2 (two) times daily.  Qty: 360 tablet, Refills: 6      ondansetron (ZOFRAN-ODT) 4 MG TbDL DISSOLVE 1 TABLET(4 MG) ON THE TONGUE EVERY 12 HOURS AS NEEDED  Qty: 30 tablet, Refills: 0      urea (CARMOL) 40 % Crea Apply topically once daily.  Qty: 85 g, Refills: 5        !! - Potential duplicate medications found. Please discuss with provider.          Diet:  Resume pre-operative diet    Activity:  Ad thai    Follow-up:  Follow-up in clinic with Dr William within 2 days; please call clinic nurse at

## 2020-12-16 ENCOUNTER — DOCUMENTATION ONLY (OUTPATIENT)
Dept: SLEEP MEDICINE | Age: 40
End: 2020-12-16

## 2020-12-16 ENCOUNTER — TRANSCRIBE ORDER (OUTPATIENT)
Dept: REGISTRATION | Age: 40
End: 2020-12-16

## 2020-12-16 ENCOUNTER — HOSPITAL ENCOUNTER (OUTPATIENT)
Dept: PREADMISSION TESTING | Age: 40
Discharge: HOME | End: 2020-12-16
Payer: MEDICARE

## 2020-12-16 DIAGNOSIS — Z01.812 PRE-PROCEDURE LAB EXAM: Primary | ICD-10-CM

## 2020-12-16 DIAGNOSIS — Z01.812 PRE-PROCEDURE LAB EXAM: ICD-10-CM

## 2020-12-16 PROCEDURE — 87635 SARS-COV-2 COVID-19 AMP PRB: CPT

## 2020-12-16 NOTE — PROGRESS NOTES
Patient LVM to reschedule appointment from 11/25/2020, return patient call LVM for patient to call office to reschedule appointment

## 2020-12-17 LAB — SARS-COV-2, COV2NT: NOT DETECTED

## 2020-12-21 ENCOUNTER — HOSPITAL ENCOUNTER (OUTPATIENT)
Age: 40
Setting detail: OUTPATIENT SURGERY
Discharge: HOME | End: 2020-12-21
Attending: INTERNAL MEDICINE | Admitting: INTERNAL MEDICINE
Payer: MEDICARE

## 2020-12-21 ENCOUNTER — ANESTHESIA EVENT (OUTPATIENT)
Dept: ENDOSCOPY | Age: 40
End: 2020-12-21
Payer: MEDICARE

## 2020-12-21 ENCOUNTER — ANESTHESIA (OUTPATIENT)
Dept: ENDOSCOPY | Age: 40
End: 2020-12-21
Payer: MEDICARE

## 2020-12-21 VITALS
HEIGHT: 64 IN | RESPIRATION RATE: 18 BRPM | WEIGHT: 225.9 LBS | OXYGEN SATURATION: 98 % | BODY MASS INDEX: 38.57 KG/M2 | SYSTOLIC BLOOD PRESSURE: 125 MMHG | TEMPERATURE: 98 F | HEART RATE: 92 BPM | DIASTOLIC BLOOD PRESSURE: 85 MMHG

## 2020-12-21 LAB
GLUCOSE BLD STRIP.AUTO-MCNC: 125 MG/DL (ref 65–100)
HCG UR QL: NEGATIVE
SERVICE CMNT-IMP: ABNORMAL

## 2020-12-21 PROCEDURE — 74011000250 HC RX REV CODE- 250: Performed by: NURSE ANESTHETIST, CERTIFIED REGISTERED

## 2020-12-21 PROCEDURE — 76040000019: Performed by: INTERNAL MEDICINE

## 2020-12-21 PROCEDURE — 74011250636 HC RX REV CODE- 250/636: Performed by: NURSE ANESTHETIST, CERTIFIED REGISTERED

## 2020-12-21 PROCEDURE — 82962 GLUCOSE BLOOD TEST: CPT

## 2020-12-21 PROCEDURE — 88305 TISSUE EXAM BY PATHOLOGIST: CPT

## 2020-12-21 PROCEDURE — 76060000031 HC ANESTHESIA FIRST 0.5 HR: Performed by: INTERNAL MEDICINE

## 2020-12-21 PROCEDURE — 2709999900 HC NON-CHARGEABLE SUPPLY: Performed by: INTERNAL MEDICINE

## 2020-12-21 PROCEDURE — 81025 URINE PREGNANCY TEST: CPT

## 2020-12-21 PROCEDURE — 77030021593 HC FCPS BIOP ENDOSC BSC -A: Performed by: INTERNAL MEDICINE

## 2020-12-21 RX ORDER — LIDOCAINE HYDROCHLORIDE 20 MG/ML
INJECTION, SOLUTION EPIDURAL; INFILTRATION; INTRACAUDAL; PERINEURAL AS NEEDED
Status: DISCONTINUED | OUTPATIENT
Start: 2020-12-21 | End: 2020-12-21 | Stop reason: HOSPADM

## 2020-12-21 RX ORDER — SODIUM CHLORIDE 9 MG/ML
INJECTION, SOLUTION INTRAVENOUS
Status: DISCONTINUED | OUTPATIENT
Start: 2020-12-21 | End: 2020-12-21 | Stop reason: HOSPADM

## 2020-12-21 RX ORDER — PROPOFOL 10 MG/ML
INJECTION, EMULSION INTRAVENOUS AS NEEDED
Status: DISCONTINUED | OUTPATIENT
Start: 2020-12-21 | End: 2020-12-21 | Stop reason: HOSPADM

## 2020-12-21 RX ADMIN — PROPOFOL 50 MG: 10 INJECTION, EMULSION INTRAVENOUS at 17:02

## 2020-12-21 RX ADMIN — PROPOFOL 50 MG: 10 INJECTION, EMULSION INTRAVENOUS at 17:06

## 2020-12-21 RX ADMIN — PROPOFOL 50 MG: 10 INJECTION, EMULSION INTRAVENOUS at 17:01

## 2020-12-21 RX ADMIN — LIDOCAINE HYDROCHLORIDE 40 MG: 20 INJECTION, SOLUTION EPIDURAL; INFILTRATION; INTRACAUDAL; PERINEURAL at 17:02

## 2020-12-21 RX ADMIN — PROPOFOL 50 MG: 10 INJECTION, EMULSION INTRAVENOUS at 17:04

## 2020-12-21 RX ADMIN — SODIUM CHLORIDE: 900 INJECTION, SOLUTION INTRAVENOUS at 16:45

## 2020-12-21 RX ADMIN — PROPOFOL 50 MG: 10 INJECTION, EMULSION INTRAVENOUS at 17:00

## 2020-12-21 NOTE — ANESTHESIA PREPROCEDURE EVALUATION
Relevant Problems   No relevant active problems       Anesthetic History   No history of anesthetic complications            Review of Systems / Medical History  Patient summary reviewed, nursing notes reviewed and pertinent labs reviewed    Pulmonary  Within defined limits      Sleep apnea    Asthma        Neuro/Psych   Within defined limits           Cardiovascular  Within defined limits  Hypertension                   GI/Hepatic/Renal  Within defined limits   GERD           Endo/Other  Within defined limits  Diabetes         Other Findings              Physical Exam    Airway  Mallampati: II  TM Distance: > 6 cm  Neck ROM: normal range of motion   Mouth opening: Normal     Cardiovascular  Regular rate and rhythm,  S1 and S2 normal,  no murmur, click, rub, or gallop             Dental  No notable dental hx       Pulmonary  Breath sounds clear to auscultation               Abdominal  GI exam deferred       Other Findings            Anesthetic Plan    ASA: 3  Anesthesia type: MAC            Anesthetic plan and risks discussed with: Patient

## 2020-12-21 NOTE — PROGRESS NOTES

## 2020-12-21 NOTE — H&P
[address]  [address] [place] [address] [address]  [phone]        History and Physical     NAME: Blanco Bernal   :  1980   MRN:  [id number]         HPI:  Blanco Bernal is a 41 y.o. female here for an EGD for recent onset dysphagia. Appears to be proximal esophageal and associated with weight loss.      Past Surgical History:   Procedure Laterality Date     DELIVERY ONLY      cesearean section 08    HX  SECTION      X 3    HX GYN      , tubal ligation     Past Medical History:   Diagnosis Date    Abnormal Pap smear     \"years ago\"     Asthma     bronchitis    Bipolar disorder ([address]     Depression with anxiety 2010    Diabetes mellitus     type II diabetes mellitus since     GERD (gastroesophageal reflux disease)     HTN (hypertension) 2010    Ill-defined condition     High Cholesterol    Mood disorder (HCC)     Other and unspecified hyperlipidemia 2010    Postpartum depression     hospitalized after last delivery    Rhinitis 2012    Sleep apnea     uses cpap    UTI (urinary tract infection)      Social History     Tobacco Use    Smoking status: Former Smoker     Quit date: 10/16/2015     Years since quittin.1    Smokeless tobacco: Never Used    Tobacco comment: quit 17 days ago   Substance Use Topics    Alcohol use: Not Currently     Frequency: Never     Comment: Occasionally    Drug use: Yes     Types: Marijuana     Allergies   Allergen Reactions    Latex Hives     rash    Other Food Hives     Citrus Fruits    Flagyl [Metronidazole] Hives and Itching    Lisinopril-Hydrochlorothiazide Angioedema    Clindamycin Rash and Itching    Ciprofloxacin Rash and Itching    Citrate Hives    Cottonseed Oil Rash    Cymbalta [Duloxetine] Hives and Itching    Flexeril [Cyclobenzaprine] Angioedema    Lemon Hives    Pcn [Penicillins] Hives    Shrimp Itching    Sulfa (Sulfonamide Antibiotics) Shortness of Breath    Tomato Hives     Family History   Problem Relation Age of Onset    Diabetes Mother     Hypertension Mother     Stroke Mother     Heart Disease Mother     Hypertension Father     Stroke Father     Diabetes Father     Kidney Disease Father     Hypertension Maternal Grandmother     Diabetes Maternal Grandmother     Hypertension Maternal Grandfather     Diabetes Maternal Grandfather     Hypertension Paternal Grandmother     Diabetes Paternal Grandmother     Hypertension Paternal Grandfather     Diabetes Paternal Grandfather     Hypertension Sister     Diabetes Sister     Diabetes Sister     Hypertension Sister     Diabetes Sister     Hypertension Sister      No current facility-administered medications for this encounter. Facility-Administered Medications Ordered in Other Encounters   Medication Dose Route Frequency    0.9% sodium chloride infusion   IntraVENous CONTINUOUS       PHYSICAL EXAM:    BP (!) 159/97   Pulse 80   Temp 98.4 °F (36.9 °C)   Resp 18   Ht 5' 4\" (1.626 m)   Wt 102.5 kg (225 lb 14.4 oz)   LMP 12/07/2020   SpO2 98%   Breastfeeding No   BMI 38.78 kg/m²      General: WD, WN. Alert, cooperative, no acute distress    HEENT: NC, Atraumatic. PERRLA, EOMI. Anicteric sclerae. Lungs:  CTA Bilaterally. No Wheezing/Rhonchi/Rales. Heart:  Regular  rhythm,  No murmur, No Rubs, No Gallops  Abdomen: Obese, Soft, Non distended, Non tender. +Bowel sounds, no HSM  Extremities: No c/c/e  Neurologic:  CN 2-12 gi, Alert and oriented X 3. No acute neurological distress   Psych:   Good insight. Not anxious nor agitated.        Assessment:   · Recent onset dysphagia with weight loss    Plan:   · Endoscopic procedure: EGD  · Anesthesia plan: Monitored Anesthesia Care

## 2020-12-21 NOTE — DISCHARGE INSTRUCTIONS
[address]  [address] [facility], [address] [place] [address]    EGD DISCHARGE INSTRUCTIONS    Dalila Geiger  [id number]  1980    Discomfort:  Sore throat- throat lozenges or warm salt water gargle  redness at IV site- apply warm compress to area; if redness or soreness persist- contact your physician  Gaseous discomfort- walking, belching will help relieve any discomfort    DIET  You may resume your regular diet - however -  remember your colon is empty and a heavy meal will produce gas. Avoid these foods:  vegetables, fried / greasy foods, carbonated drinks    ACTIVITY  You may resume your normal daily activities   Spend the remainder of the day resting -  avoid any strenuous activity. You may not operate a vehicle for 12 hours  You may not engage in an occupation involving machinery or appliances for rest of today  You may not drink alcoholic beverages for at least 12 hours  Avoid making any critical decisions for at least 24 hour    CALL M.D. ANY SIGN OF   Increasing pain, nausea, vomiting  Abdominal distension (swelling)  New increased bleeding (oral or rectal)  Fever (chills)  Pain in chest area  Bloody discharge from nose or mouth  Shortness of breath    Follow-up Instructions:   Call Dr. Margaret Nguyen for any questions or problems. Telephone # [phone]    ENDOSCOPY FINDINGS:    Impression:      -Normal exam without findings to explain symptoms  -Esophageal biopsies obtained. Recommendations:  -Await pathology.  -Follow symptoms.  -Try advancing diet to regular diet.  -Continue current medications.   -Schedule barium esophagram with barium tablet  -Follow up in office after above. May consider esophageal manometry and pH study based on above. Margaret Nguyen MD    Signed By: Margaret Nguyen MD     12/21/2020  5:17 PM         Learning About Coronavirus (COVID-19)  Coronavirus (COVID-19): Overview  What is coronavirus ([id number])?   The coronavirus disease (COVID-19) is caused by a virus. It is an illness that was first found in [place], [place], in December 2019. It has since spread worldwide. The virus can cause fever, cough, and trouble breathing. In severe cases, it can cause pneumonia and make it hard to breathe without help. It can cause death. Coronaviruses are a large group of viruses. They cause the common cold. They also cause more serious illnesses like Middle East respiratory syndrome (MERS) and severe acute respiratory syndrome (SARS). COVID-19 is caused by a novel coronavirus. That means it's a new type that has not been seen in people before. This virus spreads person-to-person through droplets from coughing and sneezing. It can also spread when you are close to someone who is infected. And it can spread when you touch something that has the virus on it, such as a doorknob or a tabletop. What can you do to protect yourself from coronavirus (COVID-19)? The best way to protect yourself from getting sick is to:  · Avoid areas where there is an outbreak. · Avoid contact with people who may be infected. · Wash your hands often with soap or alcohol-based hand sanitizers. · Avoid crowds and try to stay at least 6 feet away from other people. · Wash your hands often, especially after you cough or sneeze. Use soap and water, and scrub for at least 20 seconds. If soap and water aren't available, use an alcohol-based hand . · Avoid touching your mouth, nose, and eyes. What can you do to avoid spreading the virus to others? To help avoid spreading the virus to others:  · Cover your mouth with a tissue when you cough or sneeze. Then throw the tissue in the trash. · Use a disinfectant to clean things that you touch often. · Stay home if you are sick or have been exposed to the virus. Don't go to school, work, or public areas. And don't use public transportation. · If you are sick:  ? Leave your home only if you need to get medical care.  But call the doctor's office first so they know you're coming. And wear a face mask, if you have one.  ? If you have a face mask, wear it whenever you're around other people. It can help stop the spread of the virus when you cough or sneeze. ? Clean and disinfect your home every day. Use household  and disinfectant wipes or sprays. Take special care to clean things that you grab with your hands. These include doorknobs, remote controls, phones, and handles on your refrigerator and microwave. And don't forget countertops, tabletops, bathrooms, and computer keyboards. When to call for help  Call 911 anytime you think you may need emergency care. For example, call if:  · You have severe trouble breathing. (You can't talk at all.)  · You have constant chest pain or pressure. · You are severely dizzy or lightheaded. · You are confused or can't think clearly. · Your face and lips have a blue color. · You pass out (lose consciousness) or are very hard to wake up. Call your doctor now if you develop symptoms such as:  · Shortness of breath. · Fever. · Cough. If you need to get care, call ahead to the doctor's office for instructions before you go. Make sure you wear a face mask, if you have one, to prevent exposing other people to the virus. Where can you get the latest information? The following health organizations are tracking and studying this virus. Their websites contain the most up-to-date information. Krystle Harp also learn what to do if you think you may have been exposed to the virus. · U.S. Centers for Disease Control and Prevention (CDC): The CDC provides updated news about the disease and travel advice. The website also tells you how to prevent the spread of infection. www.cdc.gov  · World Health Organization [facility]): WHO offers information about the virus outbreaks. WHO also has travel advice. www.who.int  Current as of: April 1, 2020               Content Version: 12.4  © 9663-0831 [employer].    Care instructions adapted under license by your healthcare professional. If you have questions about a medical condition or this instruction, always ask your healthcare professional. [place] [address] any warranty or liability for your use of this information.

## 2020-12-21 NOTE — ANESTHESIA POSTPROCEDURE EVALUATION
Post-Anesthesia Evaluation and Assessment    Patient: Phil Freitas MRN: [id number]  SSN: [id number]    YOB: 1980  Age: 41 y.o. Sex: female      I have evaluated the patient and they are stable and ready for discharge from the PACU. Cardiovascular Function/Vital Signs  Visit Vitals  /85   Pulse 92   Temp 36.7 °C (98 °F)   Resp 18   Ht 5' 4\" (1.626 m)   Wt 102.5 kg (225 lb 14.4 oz)   SpO2 98%   Breastfeeding No   BMI 38.78 kg/m²       Patient is status post MAC anesthesia for Procedure(s):  ESOPHAGOGASTRODUODENOSCOPY (EGD)   :-  ESOPHAGOGASTRODUODENAL (EGD) BIOPSY. Nausea/Vomiting: None    Postoperative hydration reviewed and adequate. Pain:  Pain Scale 1: Numeric (0 - 10) (12/21/20 1719)  Pain Intensity 1: 0 (12/21/20 1719)   Managed    Neurological Status: At baseline    Mental Status, Level of Consciousness: Alert and  oriented to person, place, and time    Pulmonary Status:   O2 Device: Room air (12/21/20 1736)   Adequate oxygenation and airway patent    Complications related to anesthesia: None    Post-anesthesia assessment completed. No concerns    Signed By: Michael Aranda MD     December 21, 2020              Procedure(s):  ESOPHAGOGASTRODUODENOSCOPY (EGD)   :-  ESOPHAGOGASTRODUODENAL (EGD) BIOPSY.     MAC    <BSHSIANPOST>    INITIAL Post-op Vital signs:   Vitals Value Taken Time   /85 12/21/20 1736   Temp 36.7 °C (98 °F) 12/21/20 1720   Pulse 92 12/21/20 1736   Resp 18 12/21/20 1736   SpO2 98 % 12/21/20 1736

## 2020-12-21 NOTE — PROCEDURES
[address] [facility]  [address] [facility], [address] [facility]  [phone]           Endoscopic Gastroduodenoscopy Procedure Note    Jessica Carmona  1980  [id number]    Indication: Recent onset dysphagia     : Beverly Culver MD    Staff: Endoscopy Technician-1: Andrea Ulloa  Endoscopy RN-1: Ricarda Alas RN     Referring Provider:  Maurilio Melton MD      Anesthesia/Sedation:  MAC anesthesia      Procedure Details     After infomed consent was obtained for the procedure, with all risks and benefits of procedure explained the patient was taken to the endoscopy suite and placed in the left lateral decubitus position. Following sequential administration of sedation as per above, the endoscope was inserted into the mouth and advanced under direct vision to second portion of the duodenum. A careful inspection was made as the gastroscope was withdrawn, including a retroflexed view of the proximal stomach; findings and interventions are described below. Findings:   Esophagus:normal without obvious stricture, narrowing, inflammation or mass. No resistance passing upper endoscope. Patent GE junction. Hill grade 2 flap valve. GE junction, z-line and diaphragmatic hiatus noted at 40 cm from incisors. Normal appearing peristalsis noted. Stomach: normal. No obvious hiatal hernia. Duodenum/jejunum: normal    Therapies:  biopsy of distal and proximal esophagus    Specimens:   ID Type Source Tests Collected by Time Destination   1 : Distal Esophagus Biopsies Preservative   Lynne Persaud MD 12/21/2020 1712 Pathology   02 : Proximal Esophagus Biopsies Preservative   Lynne Persaud MD 12/21/2020 1712 Pathology              Complications:   None; patient tolerated the procedure well. EBL:  Minimal.           Impression:      -Normal exam without findings to explain symptoms  -Esophageal biopsies obtained.     Recommendations:  -Await pathology.  -Follow symptoms.  -Try advancing diet to regular diet.  -Continue current medications.   -Schedule barium esophagram with barium tablet  -Follow up in office after above. May consider esophageal manometry and pH study based on above.      Roger Morales MD  12/21/2020  5:13 PM

## 2020-12-22 ENCOUNTER — TRANSCRIBE ORDER (OUTPATIENT)
Dept: SCHEDULING | Age: 40
End: 2020-12-22

## 2020-12-22 DIAGNOSIS — R13.10 DYSPHAGIA: Primary | ICD-10-CM

## 2020-12-29 ENCOUNTER — VIRTUAL VISIT (OUTPATIENT)
Dept: SLEEP MEDICINE | Age: 40
End: 2020-12-29
Payer: MEDICAID

## 2020-12-29 DIAGNOSIS — G47.33 OSA (OBSTRUCTIVE SLEEP APNEA): Primary | ICD-10-CM

## 2020-12-29 PROCEDURE — 99214 OFFICE O/P EST MOD 30 MIN: CPT | Performed by: SPECIALIST

## 2020-12-29 NOTE — PROGRESS NOTES
[address] [place], [address]  Tel.  [phone]  Fax. [address] [facility] [address]  [place], [address] [facility]  Tel.  [phone]  Fax. [phone] [address] [address] [place]   Tel.  [phone]  Fax. [phone]     Jim Thakkar is a 41 y.o. female who was seen by synchronous (real-time) audio-video technology on 2020. Consent:  She and/or her healthcare decision maker is aware that this patient-initiated Telehealth encounter is a billable service, with coverage as determined by her insurance carrier. She is aware that she may receive a bill and has provided verbal consent to proceed: Yes    I was in the office while conducting this encounter. Chief Complaint       No chief complaint on file. HPI        Jim Thakkar is a 41 y.o. female seen for follow-up. Patient had been previously followed by Dr. Orpha Apgar. She reported a history of snoring and daytime fatigue. She was evaluated at [address] [place] [address] in 2019. Sleep study demonstrated severe sleep disordered breathing characterized by an overall AHI of 57/h associated with minimal SaO2 of 62%. Events were more prominent in rem sleep with a REMrelated AHI of 100.9/h. Titration study performed on 2019. That study demonstrated CPAP increased to 13 cm. At that level: 95.5 minutes recorded which 94.5 minutes spent asleep and 52.1 minutes in rem. Corresponding AHI 2.5/h. Minimal SaO2 93%.     She was started on CPAP. Patient was seen on 10/20/2020. At that time pressure noted to be 10/18 cm. Current compliance data suggests APAP 5-20 cm. Compliance data downloaded and reviewed in detail with the patient today. During the past 30 days, APAP used during 19 days with the average daily use of 7.1 hours. CMS compliance criteria 47%. AHI 7.4 per hour. She apparently has not been receiving supplies regularly.       Allergies   Allergen Reactions    Latex Hives     rash    Other Food Hives     Citrus Fruits    Flagyl [Metronidazole] Hives and Itching    Lisinopril-Hydrochlorothiazide Angioedema    Clindamycin Rash and Itching    Ciprofloxacin Rash and Itching    Citrate Hives    Cottonseed Oil Rash    Cymbalta [Duloxetine] Hives and Itching    Flexeril [Cyclobenzaprine] Angioedema    Lemon Hives    Pcn [Penicillins] Hives    Shrimp Itching    Sulfa (Sulfonamide Antibiotics) Shortness of Breath    Tomato Hives       Current Outpatient Medications   Medication Sig Dispense Refill    fluticasone propionate (FLONASE NA) by Nasal route.  loratadine-pseudoephedrine (Loratadine-D) 5-120 mg per tablet Take 1 Tab by mouth two (2) times a day. 20 Tab 0    nystatin-triamcinolone (MYCOLOG) 100,000-0.1 unit/gram-% ointment Apply  to affected area two (2) times a day. 30 g 1    montelukast (Singulair) 10 mg tablet Take 1 Tab by mouth daily. 30 Tab 0    albuterol (PROVENTIL HFA, VENTOLIN HFA, PROAIR HFA) 90 mcg/actuation inhaler Take 1-2 Puffs by inhalation every four (4) hours as needed for Wheezing. 1 Inhaler 1    ferrous sulfate (IRON) 325 mg (65 mg iron) EC tablet Take 1 Tab by mouth three (3) times daily (with meals). 90 Tab 0    aspirin 81 mg chewable tablet Take 81 mg by mouth daily.  esomeprazole (NEXIUM) 40 mg capsule Take 1 Cap by mouth daily. 20 Cap 0    metFORMIN (GLUCOPHAGE) 1,000 mg tablet Take 1,000 mg by mouth two (2) times daily (with meals). Indications: type 2 diabetes mellitus      gabapentin (NEURONTIN) 100 mg capsule Take 100 mg by mouth three (3) times daily. Indications: neuropathic pain      losartan (COZAAR) 25 mg tablet Take 50 mg by mouth daily. Indications: high blood pressure      metoclopramide HCl (REGLAN) 5 mg tablet Take 5 mg by mouth Before breakfast, lunch, dinner and at bedtime.           She  has a past medical history of Abnormal Pap smear, Asthma, Bipolar disorder ([address] Depression with anxiety (2010), Diabetes mellitus, GERD (gastroesophageal reflux disease), HTN (hypertension) (2010), Ill-defined condition, Mood disorder ([facility] [address] Other and unspecified hyperlipidemia (2010), Postpartum depression, Rhinitis (2012), Sleep apnea, and UTI (urinary tract infection). She  has a past surgical history that includes pr  delivery only; hx gyn; and hx  section. She family history includes Diabetes in her father, maternal grandfather, maternal grandmother, mother, paternal grandfather, paternal grandmother, sister, sister, and sister; Heart Disease in her mother; Hypertension in her father, maternal grandfather, maternal grandmother, mother, paternal grandfather, paternal grandmother, sister, sister, and sister; Kidney Disease in her father; Stroke in her father and mother. She  reports that she quit smoking about 5 years ago. She has never used smokeless tobacco. She reports previous alcohol use. She reports current drug use. Drug: Marijuana. Review of Systems:  Unchanged per patient    Due to this being a telemedicine evaluation, certain elements of the physical examination are unable to be assessed. Objective:     Weight: 250 lb  BMI: 43.3  General:   Conversant, cooperative   Eyes: ,no nystagmus                            Neuro:  Speech fluent, face symmetrical             Assessment:       ICD-10-CM ICD-9-CM    1. MIRYAM (obstructive sleep apnea)  G47.33 327.23 AMB SUPPLY ORDER     Patient has not been receiving supplies accounting for the elevated mask leak and subsequent reduced compliance. Her pressure will be set to the previous pressure of 10/18 cm. Once supplies have been updated follow-up appointment will be scheduled. She will contact the office for specific problems. Remote compliance download in 1 month.     Plan:     Orders Placed This Encounter    AMB SUPPLY ORDER     Diagnosis: Obstructive Sleep Apnea ICD-10 Code (G47.33)         CPAP mask and supplies/  Patient preference, headgear, heated tubing, and filter;  heated humidifier. Wireless modem. Remote monitoring enrollment.  Oral/Nasal Combo Mask 1 every 3 months.  Oral Cushion Combo Mask (Replace) 2 per month.  Nasal Pillows Combo Mask (Replace) 2 per month.  Full Face Mask 1 every 3 months.  Full Face Mask Cushion 1 per month.  Nasal Cushion (Replace) 2 per month.  Nasal Pillows (Replace) 2 per month.  Nasal Interface Mask 1 every 3 months.  Headgear 1 every 6 months.  Chinstrap 1 every 6 months.  Tubing 1 every 3 months.  Filter(s) Disposable 2 per month.  Filter(s) Non-Disposable 1 every 6 months.  Oral Interface 1 every 3 months. [address] for [employer] (Replace) 1 every 6 months.  Tubing with heating element 1 every 3 months.                 Adeline Gross MD, [facility]  Diplomate, American Board of Sleep Medicine  NPI [id number]  Electronically signed 12/29/20       * Patient has a history and examination consistent with the diagnosis of sleep apnea. * She was provided information on sleep apnea including corresponding risk factors and the importance of proper treatment. * Treatment options if indicated were reviewed today. * Potential benefit of weight reduction       Adeline Gross MD, [facility]  Electronically signed 12/29/20    Pursuant to the emergency declaration under the [facility] waiver authority and the [employer] Act, this Virtual  Visit was conducted, with patient's consent, to reduce the patient's risk of exposure to COVID-19 and provide continuity of care for an established patient. Services were provided through a video synchronous discussion virtually to substitute for in-person clinic visit.     Laci Croft MD       This note was created using voice recognition software. Despite editing, there may be syntax errors. This note will not be viewable in [address] Greater than 25 minutes was spent: In direct video patient care, planning and chart review.

## 2021-01-01 ENCOUNTER — HOSPITAL ENCOUNTER (EMERGENCY)
Age: 41
Discharge: HOME | End: 2021-01-01
Attending: EMERGENCY MEDICINE
Payer: MEDICARE

## 2021-01-01 VITALS
WEIGHT: 225 LBS | HEART RATE: 91 BPM | BODY MASS INDEX: 38.41 KG/M2 | SYSTOLIC BLOOD PRESSURE: 122 MMHG | OXYGEN SATURATION: 94 % | TEMPERATURE: 98.9 F | HEIGHT: 64 IN | DIASTOLIC BLOOD PRESSURE: 76 MMHG | RESPIRATION RATE: 18 BRPM

## 2021-01-01 DIAGNOSIS — J01.10 ACUTE FRONTAL SINUSITIS, RECURRENCE NOT SPECIFIED: Primary | ICD-10-CM

## 2021-01-01 PROCEDURE — 99284 EMERGENCY DEPT VISIT MOD MDM: CPT

## 2021-01-01 RX ORDER — ACETAMINOPHEN 500 MG
1000 TABLET ORAL ONCE
Status: DISCONTINUED | OUTPATIENT
Start: 2021-01-01 | End: 2021-01-01 | Stop reason: HOSPADM

## 2021-01-01 RX ORDER — CEFIXIME 400 MG/1
400 CAPSULE ORAL DAILY
Qty: 10 CAP | Refills: 0 | Status: SHIPPED | OUTPATIENT
Start: 2021-01-01 | End: 2021-01-11

## 2021-01-01 RX ORDER — IBUPROFEN 400 MG/1
800 TABLET ORAL
Status: DISCONTINUED | OUTPATIENT
Start: 2021-01-01 | End: 2021-01-01 | Stop reason: HOSPADM

## 2021-01-01 NOTE — ED NOTES
Pt presents to ED ambulatory complaining of sinusitis x \"months. \" Pt reporting pain to bilateral ears, throat, head, as well as nasal congestion. Pt reports she was seen by ENT at the beginning of the month and they prescribed her ciprofloxacin HCL. Pt reports she did not fill the prescription because of the many side affects. Pt also noted to have a ciprofloxacin allergy in her chart and when asked about it pt states \"I knew I had a bad feeling about that medication! \" Pt is alert and oriented x 4, RR even and unlabored, skin is warm and dry. Assessment completed and pt updated on plan of care. Call bell in reach. Emergency Department Nursing Plan of Care       The Nursing Plan of Care is developed from the Nursing assessment and Emergency Department Attending provider initial evaluation. The plan of care may be reviewed in the ED Provider note.     The Plan of Care was developed with the following considerations:   Patient / Family readiness to learn indicated by:verbalized understanding  Persons(s) to be included in education: patient  Barriers to Learning/Limitations:No    Signed     Kike Cordero    1/1/2021   1:52 AM

## 2021-01-01 NOTE — ED PROVIDER NOTES
40-year-old female presents to the ED with 9 out of 10 right-sided headache, sinus pressure, sensation that her ears are clogged. Headache is worse with leaning forward. She has chronic sinus issues and has seen ENT. She was recently prescribed ciprofloxacin for sinusitis, she is allergic to it. She states she is already had 2 negative Covid test this month for similar symptoms. Also states she completed a course of azithromycin last month for similar symptoms with little change. .  Denies fever, cough, shortness of breath, nausea, vomiting, vision changes, throat swelling. They gave her ciprofloxacin, but she is allergic to it. She is supposed to see ENT this coming week on the fourth the fifth, but states she cannot wait that long. She states she already took azithromycin last month and it did not help.            Past Medical History:   Diagnosis Date    Abnormal Pap smear     \"years ago\"     Asthma     bronchitis    B[facility] disorder ([facility] [address] [address]     Depression with anxiety 2010    Diabetes mellitus     type II diabetes mellitus since     GERD (gastroesophageal reflux disease)     HTN (hypertension) 2010    Ill-defined condition     High Cholesterol    Mood disorder ([facility]     Other and unspecified hyperlipidemia 2010    Postpartum depression     hospitalized after last delivery    Rhinitis 2012    Sleep apnea     uses cpap    UTI (urinary tract infection)        Past Surgical History:   Procedure Laterality Date    HX  SECTION      X 3    HX GYN      , tubal ligation    [place]  DELIVERY ONLY      cesearean section 08         Family History:   Problem Relation Age of Onset    Diabetes Mother     Hypertension Mother     Stroke Mother     Heart Disease Mother     Hypertension Father     Stroke Father     Diabetes Father     Kidney Disease Father     Hypertension Maternal Grandmother     Diabetes Maternal Grandmother     Hypertension Maternal Grandfather     Diabetes Maternal Grandfather     Hypertension Paternal Grandmother     Diabetes Paternal Grandmother     Hypertension Paternal Grandfather     Diabetes Paternal Grandfather     Hypertension Sister     Diabetes Sister     Diabetes Sister     Hypertension Sister     Diabetes Sister     Hypertension Sister        Social History     Socioeconomic History    Marital status: SINGLE     Spouse name: Not on file    Number of children: Not on file    Years of education: Not on file    Highest education level: Not on file   Occupational History    Not on file   Social Needs    Financial resource strain: Not on file    Food insecurity     Worry: Not on file     Inability: Not on file    Transportation needs     Medical: Not on file     Non-medical: Not on file   Tobacco Use    Smoking status: Former Smoker     Quit date: 10/16/2015     Years since quittin.2    Smokeless tobacco: Never Used    Tobacco comment: quit 17 days ago   Substance and Sexual Activity    Alcohol use: Not Currently     Frequency: Never     Comment: Occasionally    Drug use: Yes     Types: Marijuana    Sexual activity: Yes     Partners: Female     Birth control/protection: Surgical     Comment: Tubal ligation   Lifestyle    Physical activity     Days per week: Not on file     Minutes per session: Not on file    Stress: Not on file   Relationships    Social connections     Talks on phone: Not on file     Gets together: Not on file     Attends [employer] service: Not on file     Active member of club or organization: Not on file     Attends meetings of clubs or organizations: Not on file     Relationship status: Not on file    Intimate partner violence     Fear of current or ex partner: Not on file     Emotionally abused: Not on file     Physically abused: Not on file     Forced sexual activity: Not on file   Other Topics Concern    Not on file   Social History Narrative    ** Merged History Encounter **              ALLERGIES: Latex, Other food, Flagyl [metronidazole], Lisinopril-hydrochlorothiazide, Clindamycin, Ciprofloxacin, Citrate, Cottonseed oil, Cymbalta [duloxetine], Flexeril [cyclobenzaprine], Lemon, Pcn [penicillins], Shrimp, Sulfa (sulfonamide antibiotics), and Tomato    Review of Systems   Constitutional: Negative. Negative for chills, fever and unexpected weight change. HENT: Positive for ear pain, sinus pressure and sinus pain. Negative for congestion and trouble swallowing. Eyes: Negative for discharge. Respiratory: Negative. Negative for cough, chest tightness and shortness of breath. Cardiovascular: Negative. Negative for chest pain. Gastrointestinal: Negative. Negative for abdominal distention, abdominal pain, constipation, diarrhea and nausea. Endocrine: Negative. Genitourinary: Negative. Negative for difficulty urinating, dysuria, frequency and urgency. Musculoskeletal: Negative. Negative for arthralgias and myalgias. Skin: Negative. Negative for color change. Allergic/Immunologic: Negative. Neurological: Positive for headaches. Negative for dizziness and speech difficulty. Hematological: Negative. Psychiatric/Behavioral: Negative. Negative for agitation and confusion. All other systems reviewed and are negative. Vitals:    01/01/21 0147   BP: (!) 142/73   Pulse: 92   Resp: 18   Temp: 98.9 °F (37.2 °C)   SpO2: 96%   Weight: 102.1 kg (225 lb)   Height: 5' 4\" (1.626 m)            Physical Exam  Vitals signs and nursing note reviewed. Constitutional:       Appearance: She is well-developed. HENT:      Head: Normocephalic and atraumatic. Comments: TMs clear bilaterally. Right frontal maxillary sinus tenderness to palpation OP clear     Nose: Congestion present. Mouth/Throat:      Pharynx: No oropharyngeal exudate. Eyes:      Conjunctiva/sclera: Conjunctivae normal.   Neck:      Musculoskeletal: Neck supple. No neck rigidity. Cardiovascular:      Rate and Rhythm: Normal rate and regular rhythm. Pulmonary:      Effort: Pulmonary effort is normal. No respiratory distress. Abdominal:      Palpations: Abdomen is soft. Tenderness: There is no abdominal tenderness. Musculoskeletal: Normal range of motion. General: No deformity. Skin:     General: Skin is warm and dry. Neurological:      Mental Status: She is alert and oriented to person, place, and time. Psychiatric:         Behavior: Behavior normal.         Thought Content: Thought content normal.          MDM  Number of Diagnoses or Management Options  Acute frontal sinusitis, recurrence not specified  Diagnosis management comments: Patient is allergic to clindamycin, ciprofloxacin, Flagyl, penicillin, sulfa. Options for treating her sinusitis include doxycycline and third-generation cephalosporin. Will use cephalosporin so as not to expose her to potential allergic response or resistance to the Doxycycline so that if she should need MRSA coverage in the future she may have doxycycline. Procedures    LABORATORY TESTS:  No results found for this or any previous visit (from the past 12 hour(s)). IMAGING RESULTS:  No orders to display       MEDICATIONS GIVEN:  Medications - No data to display    IMPRESSION:  1. Acute frontal sinusitis, recurrence not specified        PLAN:  1. Discharge Medication List as of 1/1/2021  2:49 AM      START taking these medications    Details   cefixime (SUPRAX) 400 mg capsule Take 1 Cap by mouth daily for 10 days. , Normal, Disp-10 Cap, R-0         CONTINUE these medications which have NOT CHANGED    Details   fluticasone propionate (FLONASE NA) by Nasal route., Historical Med      loratadine-pseudoephedrine (Loratadine-D) 5-120 mg per tablet Take 1 Tab by mouth two (2) times a day., Normal, Disp-20 Tab,R-0      nystatin-triamcinolone (MYCOLOG) 100,000-0.1 unit/gram-% ointment Apply  to affected area two (2) times a day. , Normal, Disp-30 g,R-1      montelukast (Singulair) 10 mg tablet Take 1 Tab by mouth daily. , Normal, Disp-30 Tab,R-0      albuterol (PROVENTIL HFA, VENTOLIN HFA, PROAIR HFA) 90 mcg/actuation inhaler Take 1-2 Puffs by inhalation every four (4) hours as needed for Wheezing., Normal, Disp-1 Inhaler,R-1      ferrous sulfate (IRON) 325 mg (65 mg iron) EC tablet Take 1 Tab by mouth three (3) times daily (with meals). , Print, Disp-90 Tab,R-0      aspirin 81 mg chewable tablet Take 81 mg by mouth daily. , Historical Med      esomeprazole (NEXIUM) 40 mg capsule Take 1 Cap by mouth daily. , Print, Disp-20 Cap, R-0      metFORMIN (GLUCOPHAGE) 1,000 mg tablet Take 1,000 mg by mouth two (2) times daily (with meals). Indications: type 2 diabetes mellitus, Historical Med      gabapentin (NEURONTIN) 100 mg capsule Take 100 mg by mouth three (3) times daily. Indications: neuropathic pain, Historical Med      losartan (COZAAR) 25 mg tablet Take 50 mg by mouth daily. Indications: high blood pressure, Historical Med      metoclopramide HCl (REGLAN) 5 mg tablet Take 5 mg by mouth Before breakfast, lunch, dinner and at bedtime. , Historical Med           2. Follow-up Information     Follow up With Specialties Details Why Contact Info    Brody Jones MD Family Medicine Schedule an appointment as soon as possible for a visit   [address]  [phone]      Your ENT    Next week as scheduled.         Return to ED if worse

## 2021-01-01 NOTE — ED NOTES
Discharge instructions were given to the patient by Michelle Burciaga RN. The patient left the Emergency Department ambulatory, alert and oriented and in no acute distress with 1 prescription. The patient was encouraged to call or return to the ED for worsening issues or problems and was encouraged to schedule a follow up appointment for continuing care. The patient verbalized understanding of discharge instructions and prescriptions, all questions were answered. The patient has no further concerns at this time.

## 2021-01-04 ENCOUNTER — DOCUMENTATION ONLY (OUTPATIENT)
Dept: SLEEP MEDICINE | Age: 41
End: 2021-01-04

## 2021-01-05 ENCOUNTER — TRANSCRIBE ORDER (OUTPATIENT)
Dept: SCHEDULING | Age: 41
End: 2021-01-05

## 2021-01-05 DIAGNOSIS — J32.4 CHRONIC PANSINUSITIS: Primary | ICD-10-CM

## 2021-01-06 ENCOUNTER — HOSPITAL ENCOUNTER (OUTPATIENT)
Dept: GENERAL RADIOLOGY | Age: 41
Discharge: HOME | End: 2021-01-06
Attending: INTERNAL MEDICINE
Payer: MEDICARE

## 2021-01-06 DIAGNOSIS — R13.10 DYSPHAGIA: ICD-10-CM

## 2021-01-06 PROCEDURE — 74220 X-RAY XM ESOPHAGUS 1CNTRST: CPT

## 2021-01-13 ENCOUNTER — HOSPITAL ENCOUNTER (OUTPATIENT)
Dept: CT IMAGING | Age: 41
Discharge: HOME | End: 2021-01-13
Attending: OTOLARYNGOLOGY
Payer: MEDICARE

## 2021-01-13 DIAGNOSIS — J32.4 CHRONIC PANSINUSITIS: ICD-10-CM

## 2021-01-13 PROCEDURE — 70486 CT MAXILLOFACIAL W/O DYE: CPT

## 2021-03-30 ENCOUNTER — HOSPITAL ENCOUNTER (EMERGENCY)
Age: 41
Discharge: HOME | End: 2021-03-31
Attending: EMERGENCY MEDICINE
Payer: MEDICARE

## 2021-03-30 DIAGNOSIS — R11.0 NAUSEA WITHOUT VOMITING: Primary | ICD-10-CM

## 2021-03-30 DIAGNOSIS — R19.7 DIARRHEA OF PRESUMED INFECTIOUS ORIGIN: ICD-10-CM

## 2021-03-30 PROCEDURE — 96372 THER/PROPH/DIAG INJ SC/IM: CPT

## 2021-03-30 PROCEDURE — 99283 EMERGENCY DEPT VISIT LOW MDM: CPT

## 2021-03-31 VITALS
WEIGHT: 236 LBS | OXYGEN SATURATION: 100 % | DIASTOLIC BLOOD PRESSURE: 67 MMHG | BODY MASS INDEX: 40.29 KG/M2 | RESPIRATION RATE: 16 BRPM | HEIGHT: 64 IN | SYSTOLIC BLOOD PRESSURE: 120 MMHG | TEMPERATURE: 98 F | HEART RATE: 82 BPM

## 2021-03-31 PROCEDURE — 74011250637 HC RX REV CODE- 250/637: Performed by: EMERGENCY MEDICINE

## 2021-03-31 RX ORDER — ONDANSETRON 4 MG/1
8 TABLET, ORALLY DISINTEGRATING ORAL
Status: COMPLETED | OUTPATIENT
Start: 2021-03-31 | End: 2021-03-31

## 2021-03-31 RX ORDER — DICYCLOMINE HYDROCHLORIDE 20 MG/1
20 TABLET ORAL EVERY 6 HOURS
Qty: 21 TAB | Refills: 0 | Status: SHIPPED | OUTPATIENT
Start: 2021-03-31 | End: 2021-09-17

## 2021-03-31 RX ORDER — DICYCLOMINE HYDROCHLORIDE 10 MG/1
10 CAPSULE ORAL
Status: DISCONTINUED | OUTPATIENT
Start: 2021-03-31 | End: 2021-03-31 | Stop reason: HOSPADM

## 2021-03-31 RX ORDER — METOCLOPRAMIDE HYDROCHLORIDE 5 MG/ML
10 INJECTION INTRAMUSCULAR; INTRAVENOUS
Status: DISCONTINUED | OUTPATIENT
Start: 2021-03-31 | End: 2021-03-31 | Stop reason: HOSPADM

## 2021-03-31 RX ORDER — ONDANSETRON 4 MG/1
4 TABLET, ORALLY DISINTEGRATING ORAL
Qty: 20 TAB | Refills: 0 | Status: SHIPPED | OUTPATIENT
Start: 2021-03-31 | End: 2021-03-31 | Stop reason: SDUPTHER

## 2021-03-31 RX ORDER — ONDANSETRON 4 MG/1
4 TABLET, ORALLY DISINTEGRATING ORAL
Qty: 20 TAB | Refills: 0 | Status: SHIPPED | OUTPATIENT
Start: 2021-03-31 | End: 2021-09-17

## 2021-03-31 RX ADMIN — ONDANSETRON 8 MG: 4 TABLET, ORALLY DISINTEGRATING ORAL at 00:32

## 2021-03-31 NOTE — ED NOTES
Patient refusing Bentyl. Patient reports she is ready to go because her daughter has school in the AM. Provider aware.

## 2021-03-31 NOTE — ED PROVIDER NOTES
EMERGENCY DEPARTMENT HISTORY AND PHYSICAL EXAM      Date: 3/30/2021  Patient Name: Kenroy Williamson    History of Presenting Illness     Chief Complaint   Patient presents with    Nausea       History Provided By: Patient    HPI: Kenroy Williamson, 35 y.o. female presents to the ED with cc of nausea. 35 YOF with a PMH of gastroparesis presents with nausea and concern for food poisoning. Patient reports 4 days ago she ate fried chicken as well as seafood salad. Following this, patient reports multiple episodes of nonbloody watery diarrhea. She reports developed nausea around this time as well. Patient has a history of gastroparesis for which she takes Reglan daily. She reports she is felt nauseous, but no vomiting. Denies fevers or chills. Denies abdominal pain besides occasional crampling. Denies chest pain or shortness of breath. Patient reports yesterday the diarrhea has improved although she did have 1 loose stool prior to arrival.  She reports continued mild nausea prompting her to come to the emergency department. She tried baking soda without relief. She reports she did eat some fried chicken. There are no other complaints, changes, or physical findings at this time. PCP: None    No current facility-administered medications on file prior to encounter. Current Outpatient Medications on File Prior to Encounter   Medication Sig Dispense Refill    fluticasone propionate (FLONASE NA) by Nasal route.  albuterol (PROVENTIL HFA, VENTOLIN HFA, PROAIR HFA) 90 mcg/actuation inhaler Take 1-2 Puffs by inhalation every four (4) hours as needed for Wheezing. 1 Inhaler 1    ferrous sulfate (IRON) 325 mg (65 mg iron) EC tablet Take 1 Tab by mouth three (3) times daily (with meals). 90 Tab 0    aspirin 81 mg chewable tablet Take 81 mg by mouth daily.  esomeprazole (NEXIUM) 40 mg capsule Take 1 Cap by mouth daily.  20 Cap 0    metFORMIN (GLUCOPHAGE) 1,000 mg tablet Take 1,000 mg by mouth two (2) times daily (with meals). Indications: type 2 diabetes mellitus      losartan (COZAAR) 25 mg tablet Take 50 mg by mouth daily. Indications: high blood pressure      metoclopramide HCl (REGLAN) 5 mg tablet Take 5 mg by mouth Before breakfast, lunch, dinner and at bedtime.  loratadine-pseudoephedrine (Loratadine-D) 5-120 mg per tablet Take 1 Tab by mouth two (2) times a day. 20 Tab 0    nystatin-triamcinolone (MYCOLOG) 100,000-0.1 unit/gram-% ointment Apply  to affected area two (2) times a day. 30 g 1    montelukast (Singulair) 10 mg tablet Take 1 Tab by mouth daily. 30 Tab 0    gabapentin (NEURONTIN) 100 mg capsule Take 100 mg by mouth three (3) times daily.  Indications: neuropathic pain         Past History     Past Medical History:  Past Medical History:   Diagnosis Date    Abnormal Pap smear     \"years ago\"     Asthma     bronchitis    [facility] disorder [facility] [address]     Depression with anxiety 2010    Diabetes mellitus     type II diabetes mellitus since     GERD (gastroesophageal reflux disease)     HTN (hypertension) 2010    Ill-defined condition     High Cholesterol    Mood disorder ([facility] [address]     Other and unspecified hyperlipidemia 2010    Postpartum depression     hospitalized after last delivery    Rhinitis 2012    Sleep apnea     uses cpap    UTI (urinary tract infection)        Past Surgical History:  Past Surgical History:   Procedure Laterality Date    HX  SECTION      X 3    HX GYN      , tubal ligation    IA  DELIVERY ONLY      cesearean section 08       Family History:  Family History   Problem Relation Age of Onset    Diabetes Mother     Hypertension Mother     Stroke Mother     Heart Disease Mother     Hypertension Father     Stroke Father     Diabetes Father     Kidney Disease Father     Hypertension Maternal Grandmother     Diabetes Maternal Grandmother     Hypertension Maternal Grandfather     Diabetes Maternal Grandfather     Hypertension Paternal Grandmother     Diabetes Paternal Grandmother     Hypertension Paternal Grandfather     Diabetes Paternal Grandfather     Hypertension Sister     Diabetes Sister     Diabetes Sister     Hypertension Sister     Diabetes Sister     Hypertension Sister        Social History:  Social History     Tobacco Use    Smoking status: Former Smoker     Quit date: 10/16/2015     Years since quittin.4    Smokeless tobacco: Never Used    Tobacco comment: quit 17 days ago   Substance Use Topics    Alcohol use: Not Currently     Frequency: Never     Comment: Occasionally    Drug use: Yes     Types: Marijuana       Allergies: Allergies   Allergen Reactions    Latex Hives     rash    Other Food Hives     Citrus Fruits    Flagyl [Metronidazole] Hives and Itching    Lisinopril-Hydrochlorothiazide Angioedema    Clindamycin Rash and Itching    Ciprofloxacin Rash and Itching    Citrate Hives    Cottonseed Oil Rash    Cymbalta [Duloxetine] Hives and Itching    Flexeril [Cyclobenzaprine] Angioedema    Lemon Hives    Pcn [Penicillins] Hives    Shrimp Itching    Sulfa (Sulfonamide Antibiotics) Shortness of Breath    Tomato Hives         Review of Systems   Review of Systems   Constitutional: Negative for activity change, chills and fever. HENT: Negative for facial swelling and voice change. Eyes: Negative for redness. Respiratory: Negative for cough, shortness of breath and wheezing. Cardiovascular: Negative for chest pain and leg swelling. Gastrointestinal: Positive for diarrhea and nausea. Negative for abdominal pain, anal bleeding, blood in stool and vomiting. Genitourinary: Negative for decreased urine volume. Musculoskeletal: Negative for gait problem. Skin: Negative for pallor and rash. Neurological: Negative for tremors and facial asymmetry. Psychiatric/Behavioral: Negative for agitation.    All other systems reviewed and are negative. Physical Exam   Physical Exam  Vitals signs and nursing note reviewed. Constitutional:       Comments: 43-year-old female, resting bed, no acute distress, appears nontoxic   HENT:      Head: Normocephalic and atraumatic. Neck:      Musculoskeletal: Normal range of motion. Cardiovascular:      Rate and Rhythm: Normal rate and regular rhythm. Heart sounds: No murmur. No friction rub. No gallop. Pulmonary:      Effort: Pulmonary effort is normal.      Breath sounds: Normal breath sounds. Abdominal:      Palpations: Abdomen is soft. Tenderness: There is no abdominal tenderness. There is no guarding or rebound. Musculoskeletal: Normal range of motion. Skin:     General: Skin is warm. Capillary Refill: Capillary refill takes less than 2 seconds. Neurological:      General: No focal deficit present. Mental Status: She is alert. Psychiatric:         Mood and Affect: Mood normal.         Diagnostic Study Results     Labs -   No results found for this or any previous visit (from the past 12 hour(s)). Radiologic Studies -   No orders to display     CT Results  (Last 48 hours)    None        CXR Results  (Last 48 hours)    None          Medical Decision Making   I am the first provider for this patient. I reviewed the vital signs, available nursing notes, past medical history, past surgical history, family history and social history. Vital Signs-Reviewed the patient's vital signs. Patient Vitals for the past 12 hrs:   Temp Pulse Resp BP SpO2   03/31/21 0001 98 °F (36.7 °C) 82 16 120/67 100 %     Records Reviewed: Nursing Notes and Old Medical Records    Provider Notes (Medical Decision Making):     43-year-old female presents emergency department with a chief complaint of nausea. Vitals are stable, appears nontoxic. Abdominal exam is benign. Do not believe labs or imaging are necessary at this time.   Given food exposure with multiple episodes of diarrhea nausea, suspect foodborne illness, gastroenteritis. Clinically without tenderness, doubt appendicitis. Doubt bacterial causes of diarrhea given patient's reassuring vital signs. Will give Zofran and p.o. challenge. ED Course:   Initial assessment performed. The patients presenting problems have been discussed, and they are in agreement with the care plan formulated and outlined with them. I have encouraged them to ask questions as they arise throughout their visit. ED Course as of Mar 31 0450   Wed Mar 31, 2021   0145 Patient does report her continued nausea, but she did tolerate her home Reglan. Will discharge with Zofran and Bentyl for symptoms. Will discharge with return precautions. [MB]   0148 Repeat abdominal exam is benign. Patient reports cramping, offered Bentyl, but she declined. Will discharge home with Bentyl and Zofran as above. [MB]      ED Course User Index  [MB] MD Cherry Giordano MD      Disposition:    Discharged    DISCHARGE PLAN:  1. Discharge Medication List as of 3/31/2021  2:14 AM      START taking these medications    Details   dicyclomine (BENTYL) 20 mg tablet Take 1 Tab by mouth every six (6) hours. , Normal, Disp-21 Tab, R-0         CONTINUE these medications which have CHANGED    Details   !! ondansetron (Zofran ODT) 4 mg disintegrating tablet 1 Tab by SubLINGual route every eight (8) hours as needed for Nausea or Vomiting., Normal, Disp-20 Tab, R-0      !! ondansetron (Zofran ODT) 4 mg disintegrating tablet 1 Tab by SubLINGual route every eight (8) hours as needed for Nausea or Vomiting., Print, Disp-20 Tab, R-0       !! - Potential duplicate medications found. Please discuss with provider.       CONTINUE these medications which have NOT CHANGED    Details   fluticasone propionate (FLONASE NA) by Nasal route., Historical Med      albuterol (PROVENTIL HFA, VENTOLIN HFA, PROAIR HFA) 90 mcg/actuation inhaler Take 1-2 Puffs by inhalation every four (4) hours as needed for Wheezing., Normal, Disp-1 Inhaler,R-1      ferrous sulfate (IRON) 325 mg (65 mg iron) EC tablet Take 1 Tab by mouth three (3) times daily (with meals). , Print, Disp-90 Tab,R-0      aspirin 81 mg chewable tablet Take 81 mg by mouth daily. , Historical Med      esomeprazole (NEXIUM) 40 mg capsule Take 1 Cap by mouth daily. , Print, Disp-20 Cap, R-0      metFORMIN (GLUCOPHAGE) 1,000 mg tablet Take 1,000 mg by mouth two (2) times daily (with meals). Indications: type 2 diabetes mellitus, Historical Med      losartan (COZAAR) 25 mg tablet Take 50 mg by mouth daily. Indications: high blood pressure, Historical Med      metoclopramide HCl (REGLAN) 5 mg tablet Take 5 mg by mouth Before breakfast, lunch, dinner and at bedtime. , Historical Med      loratadine-pseudoephedrine (Loratadine-D) 5-120 mg per tablet Take 1 Tab by mouth two (2) times a day., Normal, Disp-20 Tab,R-0      nystatin-triamcinolone (MYCOLOG) 100,000-0.1 unit/gram-% ointment Apply  to affected area two (2) times a day., Normal, Disp-30 g,R-1      montelukast (Singulair) 10 mg tablet Take 1 Tab by mouth daily. , Normal, Disp-30 Tab,R-0      gabapentin (NEURONTIN) 100 mg capsule Take 100 mg by mouth three (3) times daily. Indications: neuropathic pain, Historical Med           2. Follow-up Information     Follow up With Specialties Details Why Contact Info    Lorena Serrano MD Family Medicine In 3 days  [address]  [phone]          3. Return to ED if worse     Diagnosis     Clinical Impression:   1. Nausea without vomiting    2. Diarrhea of presumed infectious origin        Attestations:    Bonita Balderrama MD    Please note that this dictation was completed with [employer], the [employer] voice recognition software. Quite often unanticipated grammatical, syntax, homophones, and other interpretive errors are inadvertently transcribed by the computer software. Please disregard these errors.   Please excuse any errors that have escaped final proofreading. Thank you.

## 2021-03-31 NOTE — ED TRIAGE NOTES
Pt arrives with c/o nausea, which started Friday. Pt states she ate seafood and chicken salad on Friday and then had diarrhea x 3 days, but nausea has remained. Denies abd pain.

## 2021-03-31 NOTE — ED NOTES
Patient reports to ED c/o Nausea and diarrhea since last Friday. Patient reports diarrhea has slightly improved. Patient in NAD. Emergency Department Nursing Plan of Care       The Nursing Plan of Care is developed from the Nursing assessment and Emergency Department Attending provider initial evaluation. The plan of care may be reviewed in the ED Provider note.     The Plan of Care was developed with the following considerations:   Patient / Family readiness to learn indicated by:verbalized understanding  Persons(s) to be included in education: patient  Barriers to Learning/Limitations:No    [address] Lopez, RN    3/31/2021   12:08 AM

## 2021-03-31 NOTE — DISCHARGE INSTRUCTIONS
Please eat a bland diet (BRAT) and use zofran for nausea. Please use bentyl for cramping. Return for worsening symptoms.     B-bananas  R-rice  A-applesauce  [facility] [employer]

## 2021-06-15 ENCOUNTER — APPOINTMENT (OUTPATIENT)
Dept: GENERAL RADIOLOGY | Age: 41
End: 2021-06-15
Attending: EMERGENCY MEDICINE
Payer: MEDICARE

## 2021-06-15 ENCOUNTER — HOSPITAL ENCOUNTER (EMERGENCY)
Age: 41
Discharge: HOME | End: 2021-06-15
Attending: EMERGENCY MEDICINE | Admitting: EMERGENCY MEDICINE
Payer: MEDICARE

## 2021-06-15 VITALS
RESPIRATION RATE: 18 BRPM | OXYGEN SATURATION: 100 % | BODY MASS INDEX: 39.27 KG/M2 | WEIGHT: 230 LBS | TEMPERATURE: 98.6 F | DIASTOLIC BLOOD PRESSURE: 85 MMHG | HEART RATE: 81 BPM | HEIGHT: 64 IN | SYSTOLIC BLOOD PRESSURE: 146 MMHG

## 2021-06-15 DIAGNOSIS — T88.7XXA MEDICATION SIDE EFFECT: ICD-10-CM

## 2021-06-15 DIAGNOSIS — T78.40XA ALLERGIC REACTION, INITIAL ENCOUNTER: Primary | ICD-10-CM

## 2021-06-15 DIAGNOSIS — R07.89 CHEST TIGHTNESS: ICD-10-CM

## 2021-06-15 DIAGNOSIS — M25.512 LEFT SHOULDER PAIN, UNSPECIFIED CHRONICITY: ICD-10-CM

## 2021-06-15 LAB
ATRIAL RATE: 86 BPM
CALCULATED P AXIS, ECG09: 39 DEGREES
CALCULATED R AXIS, ECG10: 37 DEGREES
CALCULATED T AXIS, ECG11: 45 DEGREES
DIAGNOSIS, 93000: NORMAL
P-R INTERVAL, ECG05: 160 MS
Q-T INTERVAL, ECG07: 378 MS
QRS DURATION, ECG06: 78 MS
QTC CALCULATION (BEZET), ECG08: 452 MS
VENTRICULAR RATE, ECG03: 86 BPM

## 2021-06-15 PROCEDURE — 93005 ELECTROCARDIOGRAM TRACING: CPT

## 2021-06-15 PROCEDURE — 74011250637 HC RX REV CODE- 250/637: Performed by: EMERGENCY MEDICINE

## 2021-06-15 PROCEDURE — 99284 EMERGENCY DEPT VISIT MOD MDM: CPT

## 2021-06-15 PROCEDURE — 96372 THER/PROPH/DIAG INJ SC/IM: CPT

## 2021-06-15 PROCEDURE — 73030 X-RAY EXAM OF SHOULDER: CPT

## 2021-06-15 PROCEDURE — 74011250636 HC RX REV CODE- 250/636: Performed by: EMERGENCY MEDICINE

## 2021-06-15 RX ORDER — NAPROXEN 500 MG/1
500 TABLET ORAL 2 TIMES DAILY WITH MEALS
Qty: 20 TABLET | Refills: 0 | Status: SHIPPED | OUTPATIENT
Start: 2021-06-15 | End: 2021-09-17

## 2021-06-15 RX ORDER — DIPHENHYDRAMINE HCL 25 MG
50 CAPSULE ORAL
Qty: 30 CAPSULE | Refills: 0 | Status: SHIPPED | OUTPATIENT
Start: 2021-06-15 | End: 2021-06-25

## 2021-06-15 RX ORDER — FAMOTIDINE 20 MG/1
20 TABLET, FILM COATED ORAL
Status: COMPLETED | OUTPATIENT
Start: 2021-06-15 | End: 2021-06-15

## 2021-06-15 RX ORDER — NAPROXEN 250 MG/1
500 TABLET ORAL ONCE
Status: COMPLETED | OUTPATIENT
Start: 2021-06-15 | End: 2021-06-15

## 2021-06-15 RX ORDER — DIPHENHYDRAMINE HYDROCHLORIDE 50 MG/ML
50 INJECTION, SOLUTION INTRAMUSCULAR; INTRAVENOUS ONCE
Status: COMPLETED | OUTPATIENT
Start: 2021-06-15 | End: 2021-06-15

## 2021-06-15 RX ORDER — LIDOCAINE 50 MG/G
PATCH TOPICAL
Qty: 5 EACH | Refills: 0 | Status: SHIPPED | OUTPATIENT
Start: 2021-06-15 | End: 2021-09-17

## 2021-06-15 RX ORDER — DEXAMETHASONE SODIUM PHOSPHATE 100 MG/10ML
10 INJECTION INTRAMUSCULAR; INTRAVENOUS
Status: COMPLETED | OUTPATIENT
Start: 2021-06-15 | End: 2021-06-15

## 2021-06-15 RX ORDER — FAMOTIDINE 20 MG/1
20 TABLET, FILM COATED ORAL 2 TIMES DAILY
Qty: 20 TABLET | Refills: 0 | Status: SHIPPED | OUTPATIENT
Start: 2021-06-15 | End: 2021-06-25

## 2021-06-15 RX ADMIN — NAPROXEN 500 MG: 250 TABLET ORAL at 04:11

## 2021-06-15 RX ADMIN — DIPHENHYDRAMINE HYDROCHLORIDE 50 MG: 50 INJECTION, SOLUTION INTRAMUSCULAR; INTRAVENOUS at 04:12

## 2021-06-15 RX ADMIN — FAMOTIDINE 20 MG: 20 TABLET ORAL at 04:09

## 2021-06-15 RX ADMIN — DEXAMETHASONE SODIUM PHOSPHATE 10 MG: 10 INJECTION INTRAMUSCULAR; INTRAVENOUS at 04:12

## 2021-06-15 NOTE — ED NOTES
Pt medicated per MD orders. Pt updated on plan of care, verbalizes understanding. Pt resting upright on the stretcher in a position of comfort and in no acute distress. Lung sounds bilaterally clear. No Stridor or swallowing difficulties noted. Denies any SOB. Pt speaking in full sentences w/o any difficulties. 100% on RA. Pt A and O x 4. RR even and unlabored. Skin warm and dry. Call bell in reach.

## 2021-06-15 NOTE — ED NOTES
Patient (s) given copy of dc instructions and 4 script(s). Patient (s) verbalized understanding of instructions and script (s). Patient given a current medication reconciliation form and verbalized understanding of their medications. Patient (s) verbalized understanding of the importance of discussing medications with  his or her physician or clinic they will be following up with. Patient alert and oriented and in no acute distress. Patient discharged home ambulatory.      Pt d/c by Dr. Winston Hernandez, papers provided by MD

## 2021-06-15 NOTE — ED PROVIDER NOTES
EMERGENCY DEPARTMENT HISTORY AND PHYSICAL EXAM      Please note that this dictation was completed with the assistance of \"Dragon\", the computer voice recognition software. Quite often unanticipated grammatical, syntax, homophones, and other interpretive errors are inadvertently transcribed by the computer software. Please disregard these errors and any errors that have escaped final proofreading. Thank you. Patient Name: Makayla Snowden  : 1980  MRN: [id number]  History of Presenting Illness     Chief Complaint   Patient presents with    Chest Pain    Allergic Reaction     History Provided By: Patient    HPI: Makayla Snowden, 35 y.o. female with past medical history as documented below presents to the ED with c/o of acute onset of diffuse chest wall pain with concerns for allergy. Patient reports rash is itchy. Patient reports taking iron pills and states that she tried a new formulary which resulted in likely allergic reaction. Patient reports mild throat swelling, itchy rash. Additionally, denies any falls or trauma. She denies any previous history of anaphylaxis. She reports a history of anxiety and stated that the symptoms made her anxiety act up. Currently denies any hallucinations. Denies any neck pain, rash or peripheral numbness or weakness. Pt denies any other alleviating or exacerbating factors. Additionally, pt specifically denies any recent fever, chills, headache, nausea, vomiting, abdominal pain, SOB, lightheadedness, dizziness, numbness, weakness, lower extremity swelling, heart palpitations, urinary sxs, diarrhea, constipation, melena, hematochezia, cough, or congestion. There are no other complaints, changes or physical findings pertinent to the HPI at this time.     PCP: None    Past History   Past Medical History:  Past Medical History:   Diagnosis Date    Abnormal Pap smear     \"years ago\"     Asthma     bronchitis    Bipolar disorder ([facility] [address]     Depression with anxiety 2010    Diabetes mellitus     type II diabetes mellitus since     GERD (gastroesophageal reflux disease)     HTN (hypertension) 2010    Ill-defined condition     High Cholesterol    Mood disorder ([address]     Other and unspecified hyperlipidemia 2010    Postpartum depression     hospitalized after last delivery    Rhinitis 2012    Sleep apnea     uses cpap    UTI (urinary tract infection)        Past Surgical History:  Past Surgical History:   Procedure Laterality Date    HX  SECTION      X 3    HX GYN      , tubal ligation    [place]  DELIVERY ONLY      cesearean section 08       Family History:  Family History   Problem Relation Age of Onset    Diabetes Mother     Hypertension Mother     Stroke Mother     Heart Disease Mother     Hypertension Father     Stroke Father     Diabetes Father     Kidney Disease Father     Hypertension Maternal Grandmother     Diabetes Maternal Grandmother     Hypertension Maternal Grandfather     Diabetes Maternal Grandfather     Hypertension Paternal Grandmother     Diabetes Paternal Grandmother     Hypertension Paternal Grandfather     Diabetes Paternal Grandfather     Hypertension Sister     Diabetes Sister     Diabetes Sister     Hypertension Sister    24 [facility]"" Diabetes Sister     Hypertension Sister        Social History:  Social History     Tobacco Use    Smoking status: Former Smoker     Quit date: 10/16/2015     Years since quittin.6    Smokeless tobacco: Never Used    Tobacco comment: quit 17 days ago   Substance Use Topics    Alcohol use: Not Currently     Comment: Occasionally    Drug use: Yes     Types: Marijuana       Allergies:   Allergies   Allergen Reactions    Latex Hives     rash    Other Food Hives     Citrus Fruits    Flagyl [Metronidazole] Hives and Itching    Lisinopril-Hydrochlorothiazide Angioedema    Clindamycin Rash and Itching    Ciprofloxacin Rash and Itching    Citrate Hives    Cottonseed Oil Rash    Cymbalta [Duloxetine] Hives and Itching    Flexeril [Cyclobenzaprine] Angioedema    Lemon Hives    Pcn [Penicillins] Hives    Shrimp Itching    Sulfa (Sulfonamide Antibiotics) Shortness of Breath    Tomato Hives       Current Medications:  No current facility-administered medications on file prior to encounter. Current Outpatient Medications on File Prior to Encounter   Medication Sig Dispense Refill    ondansetron (Zofran ODT) 4 mg disintegrating tablet 1 Tab by SubLINGual route every eight (8) hours as needed for Nausea or Vomiting. 20 Tab 0    dicyclomine (BENTYL) 20 mg tablet Take 1 Tab by mouth every six (6) hours. 21 Tab 0    ondansetron (Zofran ODT) 4 mg disintegrating tablet 1 Tab by SubLINGual route every eight (8) hours as needed for Nausea or Vomiting. 20 Tab 0    fluticasone propionate (FLONASE NA) by Nasal route.  loratadine-pseudoephedrine (Loratadine-D) 5-120 mg per tablet Take 1 Tab by mouth two (2) times a day. 20 Tab 0    nystatin-triamcinolone (MYCOLOG) 100,000-0.1 unit/gram-% ointment Apply  to affected area two (2) times a day. 30 g 1    montelukast (Singulair) 10 mg tablet Take 1 Tab by mouth daily. 30 Tab 0    albuterol (PROVENTIL HFA, VENTOLIN HFA, PROAIR HFA) 90 mcg/actuation inhaler Take 1-2 Puffs by inhalation every four (4) hours as needed for Wheezing. 1 Inhaler 1    ferrous sulfate (IRON) 325 mg (65 mg iron) EC tablet Take 1 Tab by mouth three (3) times daily (with meals). 90 Tab 0    aspirin 81 mg chewable tablet Take 81 mg by mouth daily.  esomeprazole (NEXIUM) 40 mg capsule Take 1 Cap by mouth daily. 20 Cap 0    metFORMIN (GLUCOPHAGE) 1,000 mg tablet Take 1,000 mg by mouth two (2) times daily (with meals). Indications: type 2 diabetes mellitus      gabapentin (NEURONTIN) 100 mg capsule Take 100 mg by mouth three (3) times daily.  Indications: neuropathic pain      losartan (COZAAR) 25 mg tablet Take 50 mg by mouth daily. Indications: high blood pressure      metoclopramide HCl (REGLAN) 5 mg tablet Take 5 mg by mouth Before breakfast, lunch, dinner and at bedtime. Review of Systems   Review of Systems   Constitutional: Negative. Negative for chills and fever. HENT: Negative. Negative for congestion and sore throat. Eyes: Negative. Respiratory: Positive for chest tightness. Negative for cough, shortness of breath and wheezing. Cardiovascular: Positive for chest pain. Negative for palpitations and leg swelling. Gastrointestinal: Negative. Negative for abdominal distention, abdominal pain, blood in stool, constipation, diarrhea, nausea and vomiting. Endocrine: Negative. Genitourinary: Negative. Negative for dysuria, flank pain, frequency, hematuria and urgency. Musculoskeletal: Positive for arthralgias. Negative for back pain and myalgias. Skin: Negative. Negative for color change and rash. Neurological: Negative. Negative for dizziness, syncope, speech difficulty, weakness, light-headedness, numbness and headaches. Hematological: Negative. Psychiatric/Behavioral: Negative. Negative for confusion and self-injury. The patient is not nervous/anxious. All other systems reviewed and are negative. Physical Exam   Physical Exam  Vitals and nursing note reviewed. Constitutional:       General: She is not in acute distress. Appearance: She is well-developed. She is not diaphoretic. HENT:      Head: Normocephalic and atraumatic. Mouth/Throat:      Pharynx: No oropharyngeal exudate. Eyes:      Conjunctiva/sclera: Conjunctivae normal.   Cardiovascular:      Rate and Rhythm: Normal rate and regular rhythm. Heart sounds: Normal heart sounds. Pulmonary:      Effort: Pulmonary effort is normal. No respiratory distress. Breath sounds: Normal breath sounds. No wheezing or rales. Chest:      Chest wall: No tenderness.    Abdominal:      General: Bowel sounds are normal. There is no distension. Palpations: Abdomen is soft. There is no mass. Tenderness: There is no abdominal tenderness. There is no guarding or rebound. Musculoskeletal:         General: Normal range of motion. Cervical back: Normal range of motion. Skin:     General: Skin is warm. Neurological:      Mental Status: She is alert and oriented to person, place, and time. Cranial Nerves: No cranial nerve deficit. Motor: No abnormal muscle tone. Diagnostic Study Results     Labs -   I have personally reviewed and interpreted all available laboratory results. Recent Results (from the past 24 hour(s))   EKG, 12 LEAD, INITIAL    Collection Time: 06/15/21  3:50 AM   Result Value Ref Range    Ventricular Rate 86 BPM    Atrial Rate 86 BPM    P-R Interval 160 ms    QRS Duration 78 ms    Q-T Interval 378 ms    QTC Calculation (Bezet) 452 ms    Calculated P Axis 39 degrees    Calculated R Axis 37 degrees    Calculated T Axis 45 degrees    Diagnosis       Normal sinus rhythm  Normal ECG  When compared with ECG of 30-OCT-2020 20:00,  No significant change was found         Radiologic Studies -   I have personally reviewed and interpreted all available imaging studies and agree with radiology interpretation and report. XR SHOULDER LT AP/LAT MIN 2 V   Final Result   No acute fracture or dislocation. CT Results  (Last 48 hours)    None        CXR Results  (Last 48 hours)    None          Medical Decision Making   I reviewed the vital signs, available nursing notes, past medical history, past surgical history, family history and social history. Vital Signs-Reviewed the patient's vital signs.   Patient Vitals for the past 24 hrs:   Temp Pulse Resp BP SpO2   06/15/21 0430  81 18 (!) 146/85 100 %   06/15/21 0408     100 %   06/15/21 0337 98.6 °F (37 °C) 89 20 (!) 148/76 100 %       Pulse Oximetry Analysis - 100% on RA    Cardiac Monitor:   Rate: 89 bpm  The cardiac monitor revealed the following rhythm as interpreted by me: Normal Sinus Rhythm     The cardiac monitor was ordered secondary to the patient's history of CP and to monitor the patient for dysrhythmia    ED EKG interpretation:  Rhythm: normal sinus rhythm; and regular . Rate (approx.): 86; Axis: normal; P wave: normal; QRS interval: normal ; ST/T wave: normal; Other findings: normal. This EKG was interpreted by Pablo Kessler MD    Records Reviewed: Nursing Notes, Old Medical Records, Previous electrocardiograms, Previous Radiology Studies and Previous Laboratory Studies    Provider Notes (Medical Decision Making):   DDx includes allergic reaction, medication side effect, STEMI, NSTEMI, Angina, PE, Aortic Pathology, Chest Wall Pain, Pleurisy, Pneumonia, GERD/esophagitis, Anxiety. No cough/fever or focal lung findings to suggest pneumonia. No tachycardia, hypoxia or pleuritic component to suggest PE. Pulses symmetric and no extremely elevated BP/asymmetry or classic tearing sensation to suggest Aortic Dissection. Also, no neuro findings. No wretching/forceful vomiting to suggest esophageal disaster. Denies IV drug abuse, has native valves, no fevers/murmurs or skin lesions to suggest endocarditis. Will evaluate with EKG, x-ray. Will provide pain control and reassess. Patient presents with rash; rash and clinical picture not worrisome for scabies, measles, meningococcemia, varicella, bullous disorder, Sanchez-Ben syndrome, Toxic epidermal necrolysis, staph scalded skin syndrome, toxic shock syndrome, secondary syphilis, or disseminated herpes. Stable vitals and without constitutional symptoms. No mucosal involvement. DDx: allergic reaction, contact dermatitis, viral exanthem, staph infection. Will treat with antihistamines, steroids. No indication for EpiPen at this time. Will refer to PCP and Allergist PRN. Instructions given to patient to use over the counter benadryl 50mg every 6 hours until the rash resolves.  Please also take Steroids as prescribed for next few days and Pepcid twice a day as prescribed. Call if symptoms persist or worsen. If you have shortness of breath or severe lip/tongue swelling, return to the ER immediately. ED Course:   I am the first provider for this patient's ED visit today. Initial assessment performed. I discussed presenting problems, concerns and my formulated plan for today's visit with the patient and any available family members at bedside. I encouraged them to ask questions as they arise throughout the visit. I reviewed our electronic medical record system for any past medical records that were available that may contribute to the patient's current condition, the nursing notes and vital signs from today's visit. ED Orders Placed :  Orders Placed This Encounter    XR SHOULDER LT AP/LAT MIN 2 V    CARDIAC/RESPIRATORY MONITORING    EKG 12 LEAD INITIAL    diphenhydrAMINE (BENADRYL) injection 50 mg    famotidine (PEPCID) tablet 20 mg    naproxen (NAPROSYN) tablet 500 mg    dexamethasone (DECADRON) 10 mg/mL Oral 10 mg       ED Medications Administered:  Medications   diphenhydrAMINE (BENADRYL) injection 50 mg (50 mg IntraMUSCular Given 6/15/21 0412)   famotidine (PEPCID) tablet 20 mg (20 mg Oral Given 6/15/21 0409)   naproxen (NAPROSYN) tablet 500 mg (500 mg Oral Given 6/15/21 0411)   dexamethasone (DECADRON) 10 mg/mL Oral 10 mg (10 mg Oral Given 6/15/21 0412)         Progress Note:  Patient has been reassessed and reports feeling better and symptoms have improved significantly after ED treatment. Kd Mir's final labs and imaging have been reviewed with her and available family and/or caregiver. They have been counseled regarding her diagnosis. She verbally conveys understanding and agreement of the signs, symptoms, diagnosis, treatment and prognosis and additionally agrees to follow up as recommended with Dr. None and/or specialist in 24 - 48 hours.  She also agrees with the care-plan we created together and conveys that all of her questions have been answered. I have also put together a packet of discharge instructions for her that include: 1) educational information regarding their diagnosis, 2) how to care for their diagnosis at home, as well a 3) list of reasons why they would want to return to the ED prior to their follow-up appointment should the patient's condition change or symptoms worsen. I have answered all questions to the patient's satisfaction. Strict return precautions given. She both understood and agreed with plan as discussed. Vital signs stable for discharge. Disposition:   DISCHARGE  The pt is ready for discharge. The pt's signs, symptoms, diagnosis, and discharge instructions have been discussed and pt has conveyed their understanding. The pt is to follow up as recommended or return to ER should their symptoms worsen. Plan has been discussed and pt is in agreement. Plan:  1. Return precautions as discussed with patient and available family and/or caregiver. 2.   Current Discharge Medication List      No current facility-administered medications for this encounter. Current Outpatient Medications:     diphenhydrAMINE (BenadryL) 25 mg capsule, Take 2 Capsules by mouth every six (6) hours as needed for Itching for up to 10 days. , Disp: 30 Capsule, Rfl: 0    famotidine (Pepcid) 20 mg tablet, Take 1 Tablet by mouth two (2) times a day for 10 days. , Disp: 20 Tablet, Rfl: 0    naproxen (NAPROSYN) 500 mg tablet, Take 1 Tablet by mouth two (2) times daily (with meals). , Disp: 20 Tablet, Rfl: 0    lidocaine (Lidoderm) 5 %, Apply patch to the affected area for 12 hours a day and remove for 12 hours a day., Disp: 5 Each, Rfl: 0    ondansetron (Zofran ODT) 4 mg disintegrating tablet, 1 Tab by SubLINGual route every eight (8) hours as needed for Nausea or Vomiting., Disp: 20 Tab, Rfl: 0    dicyclomine (BENTYL) 20 mg tablet, Take 1 Tab by mouth every six (6) hours., Disp: 21 Tab, Rfl: 0    ondansetron (Zofran ODT) 4 mg disintegrating tablet, 1 Tab by SubLINGual route every eight (8) hours as needed for Nausea or Vomiting., Disp: 20 Tab, Rfl: 0    fluticasone propionate (FLONASE NA), by Nasal route., Disp: , Rfl:     loratadine-pseudoephedrine (Loratadine-D) 5-120 mg per tablet, Take 1 Tab by mouth two (2) times a day., Disp: 20 Tab, Rfl: 0    nystatin-triamcinolone (MYCOLOG) 100,000-0.1 unit/gram-% ointment, Apply  to affected area two (2) times a day., Disp: 30 g, Rfl: 1    montelukast (Singulair) 10 mg tablet, Take 1 Tab by mouth daily. , Disp: 30 Tab, Rfl: 0    albuterol (PROVENTIL HFA, VENTOLIN HFA, PROAIR HFA) 90 mcg/actuation inhaler, Take 1-2 Puffs by inhalation every four (4) hours as needed for Wheezing., Disp: 1 Inhaler, Rfl: 1    ferrous sulfate (IRON) 325 mg (65 mg iron) EC tablet, Take 1 Tab by mouth three (3) times daily (with meals). , Disp: 90 Tab, Rfl: 0    aspirin 81 mg chewable tablet, Take 81 mg by mouth daily. , Disp: , Rfl:     esomeprazole (NEXIUM) 40 mg capsule, Take 1 Cap by mouth daily. , Disp: 20 Cap, Rfl: 0    metFORMIN (GLUCOPHAGE) 1,000 mg tablet, Take 1,000 mg by mouth two (2) times daily (with meals). Indications: type 2 diabetes mellitus, Disp: , Rfl:     gabapentin (NEURONTIN) 100 mg capsule, Take 100 mg by mouth three (3) times daily. Indications: neuropathic pain, Disp: , Rfl:     losartan (COZAAR) 25 mg tablet, Take 50 mg by mouth daily. Indications: high blood pressure, Disp: , Rfl:     metoclopramide HCl (REGLAN) 5 mg tablet, Take 5 mg by mouth Before breakfast, lunch, dinner and at bedtime. , Disp: , Rfl:     3. Follow-up Information     Follow up With Specialties Details Why [address] [place] [address] - [facility] [facility] Emergency Medicine  As needed, If symptoms worsen [address]          Instructed to return to ED if worse  Diagnosis   Clinical Impression:  1.  Allergic reaction, initial encounter    2. Left shoulder pain, unspecified chronicity    3. Chest tightness    4. Medication side effect        Attestation:  Gil Phillip MD, am the attending of record for this patient. I personally performed the services described in this documentation on this date, 6/15/2021 for patient, Madina Adam. I have reviewed the chart and verified that the record is accurate and complete.

## 2021-06-15 NOTE — ED TRIAGE NOTES
Patient presents to the ED with c/o having an allergy to iron pills and taking it tonight for anemia. Pt stated her doctor is prescribing different once to see if she has a reaction to all of them. Stated she took a cholesterol medication in October and her throat closed. Pt has anxiety that it is going to happen again. Pt stated once she took the iron pill tonight PTA she started to feel weird, her throat was itchy and she started having left sided chest pain radiating to her arm. Denies SOB. Pt itching at her arms and stated the arm band was making her itchy.  Pt anxious on arrival.

## 2021-07-26 ENCOUNTER — HOSPITAL ENCOUNTER (EMERGENCY)
Age: 41
Discharge: HOME | End: 2021-07-26
Attending: EMERGENCY MEDICINE
Payer: MEDICARE

## 2021-07-26 VITALS
RESPIRATION RATE: 16 BRPM | TEMPERATURE: 98.5 F | DIASTOLIC BLOOD PRESSURE: 77 MMHG | HEIGHT: 64 IN | OXYGEN SATURATION: 99 % | HEART RATE: 89 BPM | WEIGHT: 228 LBS | BODY MASS INDEX: 38.93 KG/M2 | SYSTOLIC BLOOD PRESSURE: 128 MMHG

## 2021-07-26 DIAGNOSIS — J01.10 ACUTE NON-RECURRENT FRONTAL SINUSITIS: Primary | ICD-10-CM

## 2021-07-26 DIAGNOSIS — K21.9 GASTROESOPHAGEAL REFLUX DISEASE, UNSPECIFIED WHETHER ESOPHAGITIS PRESENT: ICD-10-CM

## 2021-07-26 DIAGNOSIS — H65.91 FLUID LEVEL BEHIND TYMPANIC MEMBRANE OF RIGHT EAR: ICD-10-CM

## 2021-07-26 PROCEDURE — 99284 EMERGENCY DEPT VISIT MOD MDM: CPT

## 2021-07-26 RX ORDER — METHYLPREDNISOLONE 4 MG/1
TABLET ORAL
Qty: 1 DOSE PACK | Refills: 0 | Status: SHIPPED | OUTPATIENT
Start: 2021-07-26 | End: 2021-09-17

## 2021-07-26 RX ORDER — CEFIXIME 400 MG/1
400 CAPSULE ORAL DAILY
Qty: 10 CAPSULE | Refills: 0 | Status: SHIPPED | OUTPATIENT
Start: 2021-07-26 | End: 2021-08-05

## 2021-07-26 RX ORDER — FAMOTIDINE 20 MG/1
20 TABLET, FILM COATED ORAL 2 TIMES DAILY
Qty: 60 TABLET | Refills: 0 | Status: SHIPPED | OUTPATIENT
Start: 2021-07-26 | End: 2021-09-17

## 2021-07-26 RX ORDER — FLUTICASONE PROPIONATE 50 MCG
2 SPRAY, SUSPENSION (ML) NASAL DAILY
Qty: 1 BOTTLE | Refills: 0 | Status: SHIPPED | OUTPATIENT
Start: 2021-07-26 | End: 2021-09-17

## 2021-07-27 NOTE — ED PROVIDER NOTES
EMERGENCY DEPARTMENT HISTORY AND PHYSICAL EXAM      Date: 7/26/2021  Patient Name: Ekta Molina    History of Presenting Illness     Chief Complaint   Patient presents with    Cold Symptoms       History Provided By: Patient    HPI: Ekta Molina, 35 y.o. female with PMHx significant for medical problems listed below, presents to the ED with cc of sinus pressure for 1 week. Symptoms are gradually worsening. She has associated nasal congestion, postnasal drip, lightheadedness, sore throat, right ear pressure. She has tried using Flonase without relief. She also notes that she feels like pills are getting stuck when she swallows them, but this has been going on for some time and she has been seeing GI for it. She is also concerned that her GERD is not well controlled. She has been on omeprazole for years and does not feel it is working anymore. She thinks she may need to go back to an H2 blocker. She denies fevers, abdominal pain, vomiting. There are no other complaints, changes, or physical findings at this time. PCP: Other, MD Oziel    No current facility-administered medications on file prior to encounter. Current Outpatient Medications on File Prior to Encounter   Medication Sig Dispense Refill    naproxen (NAPROSYN) 500 mg tablet Take 1 Tablet by mouth two (2) times daily (with meals). 20 Tablet 0    lidocaine (Lidoderm) 5 % Apply patch to the affected area for 12 hours a day and remove for 12 hours a day. 5 Each 0    ondansetron (Zofran ODT) 4 mg disintegrating tablet 1 Tab by SubLINGual route every eight (8) hours as needed for Nausea or Vomiting. 20 Tab 0    dicyclomine (BENTYL) 20 mg tablet Take 1 Tab by mouth every six (6) hours. 21 Tab 0    ondansetron (Zofran ODT) 4 mg disintegrating tablet 1 Tab by SubLINGual route every eight (8) hours as needed for Nausea or Vomiting. 20 Tab 0    fluticasone propionate (FLONASE NA) by Nasal route.       loratadine-pseudoephedrine (Loratadine-D) 5-120 mg per tablet Take 1 Tab by mouth two (2) times a day. 20 Tab 0    nystatin-triamcinolone (MYCOLOG) 100,000-0.1 unit/gram-% ointment Apply  to affected area two (2) times a day. 30 g 1    montelukast (Singulair) 10 mg tablet Take 1 Tab by mouth daily. 30 Tab 0    albuterol (PROVENTIL HFA, VENTOLIN HFA, PROAIR HFA) 90 mcg/actuation inhaler Take 1-2 Puffs by inhalation every four (4) hours as needed for Wheezing. 1 Inhaler 1    ferrous sulfate (IRON) 325 mg (65 mg iron) EC tablet Take 1 Tab by mouth three (3) times daily (with meals). 90 Tab 0    aspirin 81 mg chewable tablet Take 81 mg by mouth daily.  esomeprazole (NEXIUM) 40 mg capsule Take 1 Cap by mouth daily. 20 Cap 0    metFORMIN (GLUCOPHAGE) 1,000 mg tablet Take 1,000 mg by mouth two (2) times daily (with meals). Indications: type 2 diabetes mellitus      gabapentin (NEURONTIN) 100 mg capsule Take 100 mg by mouth three (3) times daily. Indications: neuropathic pain      losartan (COZAAR) 25 mg tablet Take 50 mg by mouth daily. Indications: high blood pressure      metoclopramide HCl (REGLAN) 5 mg tablet Take 5 mg by mouth Before breakfast, lunch, dinner and at bedtime.          Past History     Past Medical History:  Past Medical History:   Diagnosis Date    Abnormal Pap smear     \"years ago\"     Asthma     bronchitis    Bipolar disorder ([facility]     Depression with anxiety 2010    Diabetes mellitus     type II diabetes mellitus since     GERD (gastroesophageal reflux disease)     HTN (hypertension) 2010    Ill-defined condition     High Cholesterol    Mood disorder ([address]     Other and unspecified hyperlipidemia 2010    Postpartum depression     hospitalized after last delivery    Rhinitis 2012    Sleep apnea     uses cpap    UTI (urinary tract infection)        Past Surgical History:  Past Surgical History:   Procedure Laterality Date    HX  SECTION      X 3    HX GYN      , tubal ligation    MO  DELIVERY ONLY      cesearean section 08       Family History:  Family History   Problem Relation Age of Onset    Diabetes Mother     Hypertension Mother     Stroke Mother     Heart Disease Mother     Hypertension Father     Stroke Father     Diabetes Father     Kidney Disease Father     Hypertension Maternal Grandmother     Diabetes Maternal Grandmother     Hypertension Maternal Grandfather     Diabetes Maternal Grandfather     Hypertension Paternal Grandmother     Diabetes Paternal Grandmother     Hypertension Paternal Grandfather     Diabetes Paternal Grandfather     Hypertension Sister     Diabetes Sister     Diabetes Sister     Hypertension Sister     Diabetes Sister     Hypertension Sister        Social History:  Social History     Tobacco Use    Smoking status: Former Smoker     Quit date: 10/16/2015     Years since quittin.7    Smokeless tobacco: Never Used   Substance Use Topics    Alcohol use: Not Currently     Comment: Occasionally    Drug use: Yes     Types: Marijuana       Allergies: Allergies   Allergen Reactions    Latex Hives     rash    Other Food Hives     Citrus Fruits    Flagyl [Metronidazole] Hives and Itching    Lisinopril-Hydrochlorothiazide Angioedema    Clindamycin Rash and Itching    Ciprofloxacin Rash and Itching    Citrate Hives    Cottonseed Oil Rash    Cymbalta [Duloxetine] Hives and Itching    Flexeril [Cyclobenzaprine] Angioedema    Lemon Hives    Pcn [Penicillins] Hives    Shrimp Itching    Sulfa (Sulfonamide Antibiotics) Shortness of Breath    Tomato Hives         Review of Systems   Review of Systems   Constitutional: Negative for chills and fever. HENT: Positive for congestion, ear pain, postnasal drip, rhinorrhea, sinus pressure, sinus pain and sore throat. Eyes: Negative for redness and visual disturbance. Respiratory: Negative for cough and shortness of breath.     Cardiovascular: Negative for chest pain and palpitations. Gastrointestinal: Negative for abdominal pain, nausea and vomiting. Genitourinary: Negative for dysuria and hematuria. Musculoskeletal: Negative for back pain and gait problem. Skin: Negative for rash and wound. Neurological: Negative for dizziness and headaches. Psychiatric/Behavioral: Negative for behavioral problems and confusion. All other systems reviewed and are negative. Physical Exam   Physical Exam  Constitutional:       Appearance: She is not toxic-appearing. HENT:      Head: Normocephalic and atraumatic. Right Ear: A middle ear effusion is present. Left Ear: Tympanic membrane normal.      Nose:      Right Turbinates: Swollen. Left Turbinates: Swollen. Right Sinus: Maxillary sinus tenderness present. No frontal sinus tenderness. Left Sinus: Maxillary sinus tenderness present. No frontal sinus tenderness. Mouth/Throat:      Mouth: Mucous membranes are moist.      Comments: Mild erythema of the posterior oropharynx. No tonsillar enlargement or exudates. No abscess. Patient is tolerating oral secretions without difficulty. Eyes:      Extraocular Movements: Extraocular movements intact. Pupils: Pupils are equal, round, and reactive to light. Cardiovascular:      Rate and Rhythm: Normal rate and regular rhythm. Pulmonary:      Effort: Pulmonary effort is normal. No respiratory distress. Abdominal:      Comments: Abdomen is soft. No tenderness to palpation. Musculoskeletal:         General: No deformity. Normal range of motion. Cervical back: Normal range of motion and neck supple. Skin:     General: Skin is warm and dry. Neurological:      General: No focal deficit present. Mental Status: She is alert and oriented to person, place, and time.    Psychiatric:         Behavior: Behavior normal.           Diagnostic Study Results     Labs -   No results found for this or any previous visit (from the past 12 hour(s)). Radiologic Studies -   No orders to display     CT Results  (Last 48 hours)    None        CXR Results  (Last 48 hours)    None            Medical Decision Making   I am the first provider for this patient. I reviewed the vital signs, available nursing notes, past medical history, past surgical history, family history and social history. Vital Signs-Reviewed the patient's vital signs. Patient Vitals for the past 12 hrs:   Temp Pulse Resp BP SpO2   07/26/21 2144  89   99 %   07/26/21 2025 98.5 °F (36.9 °C) (!) 110 16 128/77 100 %         Records Reviewed: Nursing Notes and Old Medical Records      Provider Notes (Medical Decision Making):   DDx: Bacterial versus viral versus allergic rhinosinusitis, upper respiratory infection, GERD, esophagitis    Patient presents with sinus pain and flareup of her GERD. Exam is consistent with sinusitis. Giving worsening symptoms over a week, plan to treat with antibiotic and steroid taper. We will also start her on H2 blocker given she does not feel her symptoms are being controlled with PPI. Advise follow-up with PCP and gastroenterologist for recheck. Discussed return precautions. ED Course:   Initial assessment performed. The patients presenting problems have been discussed, and they are in agreement with the care plan formulated and outlined with them. I have encouraged them to ask questions as they arise throughout their visit. Disposition:  9:26 PM  The patient has been re-evaluated and is ready for discharge. Reviewed available results with patient. Counseled patient on diagnosis and care plan. Patient has expressed understanding, and all questions have been answered. Patient agrees with plan and agrees to follow up as recommended, or to return to the ED if their symptoms worsen. Discharge instructions have been provided and explained to the patient, along with reasons to return to the ED. PLAN:  1.    Discharge Medication List as of 7/26/2021  9:26 PM      START taking these medications    Details   cefixime (SUPRAX) 400 mg capsule Take 1 Capsule by mouth daily for 10 days. , Normal, Disp-10 Capsule, R-0      methylPREDNISolone (Medrol, Bridger,) 4 mg tablet Follow instructions. , Normal, Disp-1 Dose Pack, R-0      famotidine (PEPCID) 20 mg tablet Take 1 Tablet by mouth two (2) times a day., Normal, Disp-60 Tablet, R-0      !! fluticasone propionate (FLONASE) 50 mcg/actuation nasal spray 2 Sprays by Both Nostrils route daily. , Normal, Disp-1 Bottle, R-0       !! - Potential duplicate medications found. Please discuss with provider. CONTINUE these medications which have NOT CHANGED    Details   naproxen (NAPROSYN) 500 mg tablet Take 1 Tablet by mouth two (2) times daily (with meals). , Normal, Disp-20 Tablet, R-0      lidocaine (Lidoderm) 5 % Apply patch to the affected area for 12 hours a day and remove for 12 hours a day., Normal, Disp-5 Each, R-0      !! ondansetron (Zofran ODT) 4 mg disintegrating tablet 1 Tab by SubLINGual route every eight (8) hours as needed for Nausea or Vomiting., Normal, Disp-20 Tab, R-0      dicyclomine (BENTYL) 20 mg tablet Take 1 Tab by mouth every six (6) hours. , Normal, Disp-21 Tab, R-0      !! ondansetron (Zofran ODT) 4 mg disintegrating tablet 1 Tab by SubLINGual route every eight (8) hours as needed for Nausea or Vomiting., Print, Disp-20 Tab, R-0      !! fluticasone propionate (FLONASE NA) by Nasal route., Historical Med      loratadine-pseudoephedrine (Loratadine-D) 5-120 mg per tablet Take 1 Tab by mouth two (2) times a day., Normal, Disp-20 Tab,R-0      nystatin-triamcinolone (MYCOLOG) 100,000-0.1 unit/gram-% ointment Apply  to affected area two (2) times a day., Normal, Disp-30 g,R-1      montelukast (Singulair) 10 mg tablet Take 1 Tab by mouth daily. , Normal, Disp-30 Tab,R-0      albuterol (PROVENTIL HFA, VENTOLIN HFA, PROAIR HFA) 90 mcg/actuation inhaler Take 1-2 Puffs by inhalation every four (4) hours as needed for Wheezing., Normal, Disp-1 Inhaler,R-1      ferrous sulfate (IRON) 325 mg (65 mg iron) EC tablet Take 1 Tab by mouth three (3) times daily (with meals). , Print, Disp-90 Tab,R-0      aspirin 81 mg chewable tablet Take 81 mg by mouth daily. , Historical Med      esomeprazole (NEXIUM) 40 mg capsule Take 1 Cap by mouth daily. , Print, Disp-20 Cap, R-0      metFORMIN (GLUCOPHAGE) 1,000 mg tablet Take 1,000 mg by mouth two (2) times daily (with meals). Indications: type 2 diabetes mellitus, Historical Med      gabapentin (NEURONTIN) 100 mg capsule Take 100 mg by mouth three (3) times daily. Indications: neuropathic pain, Historical Med      losartan (COZAAR) 25 mg tablet Take 50 mg by mouth daily. Indications: high blood pressure, Historical Med      metoclopramide HCl (REGLAN) 5 mg tablet Take 5 mg by mouth Before breakfast, lunch, dinner and at bedtime. , Historical Med       !! - Potential duplicate medications found. Please discuss with provider. 2.   Follow-up Information     Follow up With Specialties Details Why Contact Info    Your primary care provider  Schedule an appointment as soon as possible for a visit in 1 week for a recheck     [facility] EMERGENCY DEPT Emergency Medicine Go to  If symptoms worsen Mohan Alvarez  [phone]        Return to ED if worse     Diagnosis     Clinical Impression:   1. Acute non-recurrent frontal sinusitis    2. Fluid level behind tympanic membrane of right ear    3. Gastroesophageal reflux disease, unspecified whether esophagitis present            Rosalia Resendiz.  BERTIN Thorne

## 2021-07-27 NOTE — ED NOTES
Pt presents to ED ambulatory complaining of sinus pressure x 1 week. Pt also reports nasal congestion/drainage/post nasal drip, dizziness/lightheadedness, sore throat, painful swallowing, and right ear pain/popping. Pt reports taking antacids without relief of symptoms. Pt Pt is alert and oriented x 4, RR even and unlabored, skin is warm and dry. Assessment completed and pt updated on plan of care. Call bell in reach. Emergency Department Nursing Plan of Care       The Nursing Plan of Care is developed from the Nursing assessment and Emergency Department Attending provider initial evaluation. The plan of care may be reviewed in the ED Provider note.     The Plan of Care was developed with the following considerations:   Patient / Family readiness to learn indicated by:verbalized understanding  Persons(s) to be included in education: patient  Barriers to Learning/Limitations:No    Signed     Marina , MELANIA    7/26/2021   9:03 PM

## 2021-08-18 ENCOUNTER — TRANSCRIBE ORDER (OUTPATIENT)
Dept: SCHEDULING | Age: 41
End: 2021-08-18

## 2021-08-18 DIAGNOSIS — J01.41 ACUTE RECURRENT PANSINUSITIS: Primary | ICD-10-CM

## 2021-08-21 ENCOUNTER — APPOINTMENT (OUTPATIENT)
Dept: CT IMAGING | Age: 41
End: 2021-08-21
Attending: PHYSICIAN ASSISTANT
Payer: MEDICARE

## 2021-08-21 ENCOUNTER — HOSPITAL ENCOUNTER (EMERGENCY)
Age: 41
Discharge: HOME | End: 2021-08-21
Attending: EMERGENCY MEDICINE
Payer: MEDICARE

## 2021-08-21 VITALS
BODY MASS INDEX: 36.88 KG/M2 | TEMPERATURE: 98.5 F | HEART RATE: 105 BPM | SYSTOLIC BLOOD PRESSURE: 137 MMHG | RESPIRATION RATE: 20 BRPM | DIASTOLIC BLOOD PRESSURE: 101 MMHG | HEIGHT: 64 IN | WEIGHT: 216 LBS | OXYGEN SATURATION: 100 %

## 2021-08-21 DIAGNOSIS — R07.0 THROAT PAIN: Primary | ICD-10-CM

## 2021-08-21 DIAGNOSIS — R03.0 ELEVATED BLOOD PRESSURE READING: ICD-10-CM

## 2021-08-21 DIAGNOSIS — E04.1 THYROID NODULE: ICD-10-CM

## 2021-08-21 LAB
ALBUMIN SERPL-MCNC: 3.7 G/DL (ref 3.5–5)
ALBUMIN/GLOB SERPL: 1 {RATIO} (ref 1.1–2.2)
ALP SERPL-CCNC: 79 U/L (ref 45–117)
ALT SERPL-CCNC: 24 U/L (ref 12–78)
ANION GAP SERPL CALC-SCNC: 14 MMOL/L (ref 5–15)
AST SERPL-CCNC: 16 U/L (ref 15–37)
BASOPHILS # BLD: 0.1 K/UL (ref 0–0.1)
BASOPHILS NFR BLD: 1 % (ref 0–1)
BILIRUB SERPL-MCNC: 0.2 MG/DL (ref 0.2–1)
BUN SERPL-MCNC: 3 MG/DL (ref 6–20)
BUN/CREAT SERPL: 3 (ref 12–20)
CALCIUM SERPL-MCNC: 9.1 MG/DL (ref 8.5–10.1)
CHLORIDE SERPL-SCNC: 103 MMOL/L (ref 97–108)
CO2 SERPL-SCNC: 23 MMOL/L (ref 21–32)
CREAT SERPL-MCNC: 0.98 MG/DL (ref 0.55–1.02)
DEPRECATED S PYO AG THROAT QL EIA: NEGATIVE
DIFFERENTIAL METHOD BLD: ABNORMAL
EOSINOPHIL # BLD: 0.1 K/UL (ref 0–0.4)
EOSINOPHIL NFR BLD: 1 % (ref 0–7)
ERYTHROCYTE [DISTWIDTH] IN BLOOD BY AUTOMATED COUNT: 18.5 % (ref 11.5–14.5)
GLOBULIN SER CALC-MCNC: 3.7 G/DL (ref 2–4)
GLUCOSE SERPL-MCNC: 147 MG/DL (ref 65–100)
HCT VFR BLD AUTO: 30.5 % (ref 35–47)
HGB BLD-MCNC: 9.1 G/DL (ref 11.5–16)
IMM GRANULOCYTES # BLD AUTO: 0 K/UL (ref 0–0.04)
IMM GRANULOCYTES NFR BLD AUTO: 0 % (ref 0–0.5)
LYMPHOCYTES # BLD: 2.5 K/UL (ref 0.8–3.5)
LYMPHOCYTES NFR BLD: 36 % (ref 12–49)
MCH RBC QN AUTO: 19.9 PG (ref 26–34)
MCHC RBC AUTO-ENTMCNC: 29.8 G/DL (ref 30–36.5)
MCV RBC AUTO: 66.7 FL (ref 80–99)
MONOCYTES # BLD: 0.5 K/UL (ref 0–1)
MONOCYTES NFR BLD: 7 % (ref 5–13)
NEUTS SEG # BLD: 3.8 K/UL (ref 1.8–8)
NEUTS SEG NFR BLD: 55 % (ref 32–75)
NRBC # BLD: 0 K/UL (ref 0–0.01)
NRBC BLD-RTO: 0 PER 100 WBC
PLATELET # BLD AUTO: 264 K/UL (ref 150–400)
PMV BLD AUTO: 10.2 FL (ref 8.9–12.9)
POTASSIUM SERPL-SCNC: 3.7 MMOL/L (ref 3.5–5.1)
PROT SERPL-MCNC: 7.4 G/DL (ref 6.4–8.2)
RBC # BLD AUTO: 4.57 M/UL (ref 3.8–5.2)
RBC MORPH BLD: ABNORMAL
SODIUM SERPL-SCNC: 140 MMOL/L (ref 136–145)
WBC # BLD AUTO: 7 K/UL (ref 3.6–11)

## 2021-08-21 PROCEDURE — 80053 COMPREHEN METABOLIC PANEL: CPT

## 2021-08-21 PROCEDURE — 99283 EMERGENCY DEPT VISIT LOW MDM: CPT

## 2021-08-21 PROCEDURE — 70491 CT SOFT TISSUE NECK W/DYE: CPT

## 2021-08-21 PROCEDURE — 87880 STREP A ASSAY W/OPTIC: CPT

## 2021-08-21 PROCEDURE — 96374 THER/PROPH/DIAG INJ IV PUSH: CPT

## 2021-08-21 PROCEDURE — 74011250636 HC RX REV CODE- 250/636: Performed by: PHYSICIAN ASSISTANT

## 2021-08-21 PROCEDURE — 87070 CULTURE OTHR SPECIMN AEROBIC: CPT

## 2021-08-21 PROCEDURE — 36415 COLL VENOUS BLD VENIPUNCTURE: CPT

## 2021-08-21 PROCEDURE — 74011000636 HC RX REV CODE- 636: Performed by: EMERGENCY MEDICINE

## 2021-08-21 PROCEDURE — 96375 TX/PRO/DX INJ NEW DRUG ADDON: CPT

## 2021-08-21 PROCEDURE — 85025 COMPLETE CBC W/AUTO DIFF WBC: CPT

## 2021-08-21 RX ORDER — ONDANSETRON 2 MG/ML
4 INJECTION INTRAMUSCULAR; INTRAVENOUS
Status: COMPLETED | OUTPATIENT
Start: 2021-08-21 | End: 2021-08-21

## 2021-08-21 RX ORDER — DEXAMETHASONE SODIUM PHOSPHATE 100 MG/10ML
10 INJECTION INTRAMUSCULAR; INTRAVENOUS
Status: COMPLETED | OUTPATIENT
Start: 2021-08-21 | End: 2021-08-21

## 2021-08-21 RX ADMIN — IOPAMIDOL 100 ML: 612 INJECTION, SOLUTION INTRAVENOUS at 15:00

## 2021-08-21 RX ADMIN — ONDANSETRON 4 MG: 2 INJECTION INTRAMUSCULAR; INTRAVENOUS at 13:59

## 2021-08-21 RX ADMIN — DEXAMETHASONE SODIUM PHOSPHATE 10 MG: 10 INJECTION INTRAMUSCULAR; INTRAVENOUS at 13:59

## 2021-08-21 NOTE — ED PROVIDER NOTES
EMERGENCY DEPARTMENT HISTORY AND PHYSICAL EXAM      Date: 8/21/2021  Patient Name: Hiram Antunez    History of Presenting Illness     Chief Complaint   Patient presents with    Sore Throat       History Provided By: Patient    HPI: Hiram Antunez, 35 y.o. female presents ambulatory to the Emergency Dept with concern for throat pain. The patient states she has had the sensation of swelling around her throat. She is able to tolerate liquids but is having a harder time with solids. She denied fever/chills. No h/o recurrent strep. No h/o similar sx in the past. She denied ill contacts. She denied flu like symptoms. She has a h/o diabetes. She denied sinus congestion or sinus headache. She rates her discomfort an 8/10 on the pain scale and describes it as a soreness. Pt is o/w healthy without cough, congestion, shortness of breath, chest pain, N/V/D. There are no other complaints, changes, or physical findings at this time. PCP: Other, MD Oziel    Current Outpatient Medications   Medication Sig Dispense Refill    methylPREDNISolone (Medrol, Bridger,) 4 mg tablet Follow instructions. 1 Dose Pack 0    famotidine (PEPCID) 20 mg tablet Take 1 Tablet by mouth two (2) times a day. 60 Tablet 0    fluticasone propionate (FLONASE) 50 mcg/actuation nasal spray 2 Sprays by Both Nostrils route daily. 1 Bottle 0    naproxen (NAPROSYN) 500 mg tablet Take 1 Tablet by mouth two (2) times daily (with meals). 20 Tablet 0    lidocaine (Lidoderm) 5 % Apply patch to the affected area for 12 hours a day and remove for 12 hours a day. 5 Each 0    ondansetron (Zofran ODT) 4 mg disintegrating tablet 1 Tab by SubLINGual route every eight (8) hours as needed for Nausea or Vomiting. 20 Tab 0    dicyclomine (BENTYL) 20 mg tablet Take 1 Tab by mouth every six (6) hours. 21 Tab 0    ondansetron (Zofran ODT) 4 mg disintegrating tablet 1 Tab by SubLINGual route every eight (8) hours as needed for Nausea or Vomiting.  20 Tab 0    fluticasone propionate (FLONASE NA) by Nasal route.  loratadine-pseudoephedrine (Loratadine-D) 5-120 mg per tablet Take 1 Tab by mouth two (2) times a day. 20 Tab 0    nystatin-triamcinolone (MYCOLOG) 100,000-0.1 unit/gram-% ointment Apply  to affected area two (2) times a day. 30 g 1    montelukast (Singulair) 10 mg tablet Take 1 Tab by mouth daily. 30 Tab 0    albuterol (PROVENTIL HFA, VENTOLIN HFA, PROAIR HFA) 90 mcg/actuation inhaler Take 1-2 Puffs by inhalation every four (4) hours as needed for Wheezing. 1 Inhaler 1    ferrous sulfate (IRON) 325 mg (65 mg iron) EC tablet Take 1 Tab by mouth three (3) times daily (with meals). 90 Tab 0    aspirin 81 mg chewable tablet Take 81 mg by mouth daily.  esomeprazole (NEXIUM) 40 mg capsule Take 1 Cap by mouth daily. 20 Cap 0    metFORMIN (GLUCOPHAGE) 1,000 mg tablet Take 1,000 mg by mouth two (2) times daily (with meals). Indications: type 2 diabetes mellitus      gabapentin (NEURONTIN) 100 mg capsule Take 100 mg by mouth three (3) times daily. Indications: neuropathic pain      losartan (COZAAR) 25 mg tablet Take 50 mg by mouth daily. Indications: high blood pressure      metoclopramide HCl (REGLAN) 5 mg tablet Take 5 mg by mouth Before breakfast, lunch, dinner and at bedtime.          Past History     Past Medical History:  Past Medical History:   Diagnosis Date    Abnormal Pap smear     \"years ago\"     Asthma     bronchitis    Bipolar disorder ([facility] [address]     Depression with anxiety 9/22/2010    Diabetes mellitus     type II diabetes mellitus since 2004    GERD (gastroesophageal reflux disease)     HTN (hypertension) 9/22/2010    Ill-defined condition     High Cholesterol    Mood disorder ([address]     Other and unspecified hyperlipidemia 9/22/2010    Postpartum depression     hospitalized after last delivery    Rhinitis 6/8/2012    Sleep apnea     uses cpap    UTI (urinary tract infection)        Past Surgical History:  Past Surgical History: Procedure Laterality Date    HX  SECTION      X 3    HX GYN      , tubal ligation    MD  DELIVERY ONLY      cesearean section 08       Family History:  Family History   Problem Relation Age of Onset    Diabetes Mother     Hypertension Mother     Stroke Mother     Heart Disease Mother     Hypertension Father     Stroke Father     Diabetes Father     Kidney Disease Father     Hypertension Maternal Grandmother     Diabetes Maternal Grandmother     Hypertension Maternal Grandfather     Diabetes Maternal Grandfather     Hypertension Paternal Grandmother     Diabetes Paternal Grandmother     Hypertension Paternal Grandfather     Diabetes Paternal Grandfather     Hypertension Sister     Diabetes Sister     Diabetes Sister     Hypertension Sister     Diabetes Sister     Hypertension Sister        Social History:  Social History     Tobacco Use    Smoking status: Former Smoker     Quit date: 10/16/2015     Years since quittin.8    Smokeless tobacco: Never Used   Substance Use Topics    Alcohol use: Not Currently     Comment: Occasionally    Drug use: Not Currently     Types: Marijuana       Allergies: Allergies   Allergen Reactions    Latex Hives     rash    Other Food Hives     Citrus Fruits    Flagyl [Metronidazole] Hives and Itching    Lisinopril-Hydrochlorothiazide Angioedema    Clindamycin Rash and Itching    Ciprofloxacin Rash and Itching    Citrate Hives    Cottonseed Oil Rash    Cymbalta [Duloxetine] Hives and Itching    Flexeril [Cyclobenzaprine] Angioedema    Lemon Hives    Pcn [Penicillins] Hives    Shrimp Itching    Sulfa (Sulfonamide Antibiotics) Shortness of Breath    Tomato Hives         Review of Systems   Review of Systems   Constitutional: Negative for chills and fever. HENT: Positive for sore throat. Negative for congestion, drooling, rhinorrhea and trouble swallowing. Eyes: Negative for pain and redness.    Respiratory: Negative for cough and shortness of breath. Cardiovascular: Negative for chest pain and palpitations. Gastrointestinal: Negative for abdominal pain, diarrhea, nausea and vomiting. Endocrine: Negative for polydipsia, polyphagia and polyuria. Genitourinary: Negative for dysuria and hematuria. Musculoskeletal: Negative for neck pain and neck stiffness. Skin: Negative for color change, pallor, rash and wound. Allergic/Immunologic: Positive for environmental allergies and food allergies. Negative for immunocompromised state. Neurological: Negative for dizziness and headaches. Hematological: Negative for adenopathy. Does not bruise/bleed easily. Psychiatric/Behavioral: Negative for agitation and confusion. All other systems reviewed and are negative. Physical Exam   Physical Exam  Vitals and nursing note reviewed. Constitutional:       General: She is not in acute distress. Appearance: She is well-developed and normal weight. She is not ill-appearing, toxic-appearing or diaphoretic. HENT:      Head: Normocephalic and atraumatic. Right Ear: Tympanic membrane and ear canal normal. No drainage, swelling or tenderness. No middle ear effusion. Tympanic membrane is not erythematous. Left Ear: Tympanic membrane and ear canal normal. No drainage, swelling or tenderness. No middle ear effusion. Tympanic membrane is not erythematous. Nose: Nose normal. No congestion or rhinorrhea. Mouth/Throat:      Mouth: Mucous membranes are moist.      Pharynx: Oropharynx is clear. Posterior oropharyngeal erythema present. No oropharyngeal exudate or uvula swelling. Tonsils: No tonsillar exudate or tonsillar abscesses. Eyes:      General: No scleral icterus. Right eye: No discharge. Left eye: No discharge. Conjunctiva/sclera: Conjunctivae normal.   Neck:      Thyroid: No thyromegaly. Vascular: No JVD. Trachea: No tracheal deviation.    Cardiovascular: Rate and Rhythm: Normal rate and regular rhythm. Heart sounds: Normal heart sounds. Pulmonary:      Effort: Pulmonary effort is normal. No respiratory distress. Breath sounds: Normal breath sounds. No stridor. No wheezing, rhonchi or rales. Abdominal:      Palpations: Abdomen is soft. There is no mass. Tenderness: There is no abdominal tenderness. There is no guarding or rebound. Musculoskeletal:         General: Normal range of motion. Cervical back: Normal range of motion and neck supple. Lymphadenopathy:      Cervical: No cervical adenopathy. Skin:     General: Skin is warm and dry. Findings: No erythema. Neurological:      Mental Status: She is alert and oriented to person, place, and time. Motor: No abnormal muscle tone. Coordination: Coordination normal.   Psychiatric:         Mood and Affect: Mood normal.         Behavior: Behavior normal.         Judgment: Judgment normal.         Diagnostic Study Results     Labs -     Recent Results (from the past 12 hour(s))   CBC WITH AUTOMATED DIFF    Collection Time: 08/21/21  1:48 PM   Result Value Ref Range    WBC 7.0 3.6 - 11.0 K/uL    RBC 4.57 3.80 - 5.20 M/uL    HGB 9.1 (L) 11.5 - 16.0 g/dL    HCT 30.5 (L) 35.0 - 47.0 %    MCV 66.7 (L) 80.0 - 99.0 FL    MCH 19.9 (L) 26.0 - 34.0 PG    MCHC 29.8 (L) 30.0 - 36.5 g/dL    RDW 18.5 (H) 11.5 - 14.5 %    PLATELET 227 393 - 358 K/uL    MPV 10.2 8.9 - 12.9 FL    NRBC 0.0 0  WBC    ABSOLUTE NRBC 0.00 0.00 - 0.01 K/uL    NEUTROPHILS 55 32 - 75 %    LYMPHOCYTES 36 12 - 49 %    MONOCYTES 7 5 - 13 %    EOSINOPHILS 1 0 - 7 %    BASOPHILS 1 0 - 1 %    IMMATURE GRANULOCYTES 0 0.0 - 0.5 %    ABS. NEUTROPHILS 3.8 1.8 - 8.0 K/UL    ABS. LYMPHOCYTES 2.5 0.8 - 3.5 K/UL    ABS. MONOCYTES 0.5 0.0 - 1.0 K/UL    ABS. EOSINOPHILS 0.1 0.0 - 0.4 K/UL    ABS. BASOPHILS 0.1 0.0 - 0.1 K/UL    ABS. IMM.  GRANS. 0.0 0.00 - 0.04 K/UL    DF SMEAR SCANNED      RBC COMMENTS ANISOCYTOSIS  1+ METABOLIC PANEL, COMPREHENSIVE    Collection Time: 08/21/21  1:48 PM   Result Value Ref Range    Sodium 140 136 - 145 mmol/L    Potassium 3.7 3.5 - 5.1 mmol/L    Chloride 103 97 - 108 mmol/L    CO2 23 21 - 32 mmol/L    Anion gap 14 5 - 15 mmol/L    Glucose 147 (H) 65 - 100 mg/dL    BUN 3 (L) 6 - 20 MG/DL    Creatinine 0.98 0.55 - 1.02 MG/DL    BUN/Creatinine ratio 3 (L) 12 - 20      GFR est AA >60 >60 ml/min/1.73m2    GFR est non-AA >60 >60 ml/min/1.73m2    Calcium 9.1 8.5 - 10.1 MG/DL    Bilirubin, total 0.2 0.2 - 1.0 MG/DL    ALT (SGPT) 24 12 - 78 U/L    AST (SGOT) 16 15 - 37 U/L    Alk. phosphatase 79 45 - 117 U/L    Protein, total 7.4 6.4 - 8.2 g/dL    Albumin 3.7 3.5 - 5.0 g/dL    Globulin 3.7 2.0 - 4.0 g/dL    A-G Ratio 1.0 (L) 1.1 - 2.2     STREP AG SCREEN, GROUP A    Collection Time: 08/21/21  1:48 PM    Specimen: Swab; Throat   Result Value Ref Range    Group A Strep Ag ID Negative NEG         Radiologic Studies -   CT NECK SOFT TISSUE W CONT   Final Result   1. Normal neck soft tissue CT. 2.  6 mm left lobe thyroid nodule        CT Results  (Last 48 hours)               08/21/21 1500  CT NECK SOFT TISSUE W CONT Final result    Impression:  1. Normal neck soft tissue CT. 2.  6 mm left lobe thyroid nodule       Narrative:  INDICATION: Throat pain, unable to swallow       COMPARISON: None       TECHNIQUE:  Axial neck CT was performed after the uneventful administration of   IV contrast. Sagittal and coronal reformats were generated. CT dose reduction was achieved through use of a standardized protocol tailored   for this examination and automatic exposure control for dose modulation. CONTRAST: 100 mL Isovue 300       FINDINGS:       NASOPHARYNX: Normal.   SUPRAHYOID NECK: Normal oropharynx, oral cavity, parapharyngeal space, and   retropharyngeal space. INFRAHYOID NECK: Normal larynx, hypopharynx and supraglottis.    PAROTID GLANDS: Normal.   SUBMANDIBULAR GLANDS: Normal.   THYROID GLAND: 6 mm left lobe thyroid nodule. [employer] LYMPH NODES: None enlarged by CT size criteria . LUNG APICES: Clear. OSSEOUS STRUCTURES: No destructive bone lesion. ADDITIONAL COMMENTS: N/A. Medical Decision Making   I am the first provider for this patient. I reviewed the vital signs, available nursing notes, past medical history, past surgical history, family history and social history. Vital Signs-Reviewed the patient's vital signs. Patient Vitals for the past 12 hrs:   Temp Pulse Resp BP SpO2   08/21/21 1240 98.5 °F (36.9 °C) (!) 105 20 (!) 137/101 100 %           Records Reviewed: Nursing Notes, Old Medical Records, Previous Radiology Studies and Previous Laboratory Studies    Provider Notes (Medical Decision Making):   Strep, paratonsillar abscess, thyroid nodule, thyroid mass    ED Course:   Initial assessment performed. The patients presenting problems have been discussed, and they are in agreement with the care plan formulated and outlined with them. I have encouraged them to ask questions as they arise throughout their visit. Case d/w Dr. Gewndolyn Lopez who agreed with plan. Will refer to Endocrinology. HTN COUNSELING  Reviewed the risks of uncontrolled HTN to include stroke, heart attack, renal failure, aneurysm and death. They will take their medications daily and follow up with their PCP for recheck. DISCHARGE NOTE:  The care plan has been outline with the patient and/or family, who verbally conveyed understanding and agreement. Available results have been reviewed. Patient and/or family understand the follow up plan as outlined and discharge instructions. Should their condition deterioration at any time after discharge the patient agrees to return, follow up sooner than outlined or seek medical assistance at the closest Emergency Room as soon as possible. Questions have been answered.  Discharge instructions and educational information regarding the patient's diagnosis as well a list of reasons why the patient would want to seek immediate medical attention, should their condition change, were reviewed directly with the patient/family          PLAN:  1. Discharge Medication List as of 8/21/2021  3:23 PM        2. Follow-up Information     Follow up With Specialties Details Why Contact Info    Flor Santos MD Endocrinology   [address]  [phone]      [facility] EMERGENCY DEPT Emergency Medicine  If symptoms worsen [place] [facility]  [phone]        Return to ED if worse     Diagnosis     Clinical Impression:   1. Throat pain    2. Elevated blood pressure reading    3.  Thyroid nodule

## 2021-08-21 NOTE — ED NOTES
Pt presents to ED ambulatory complaining of sore throat with difficulty swallowing for about 1 month. Pt reports taking tylenol intermittently for the pain without relief. Pt reports nausea and sore throat so severe that she cannot eat and has been losing weight. Pt reports she had an appt with her PCP on 8/27 but states \"there's other things going on\" and almost became tearful. Pt does appear anxious and worried. Pt is alert and oriented x 4, RR even and unlabored, skin is warm and dry. Assessment completed and pt updated on plan of care. Call bell in reach. Emergency Department Nursing Plan of Care       The Nursing Plan of Care is developed from the Nursing assessment and Emergency Department Attending provider initial evaluation. The plan of care may be reviewed in the ED Provider note.     The Plan of Care was developed with the following considerations:   Patient / Family readiness to learn indicated by:verbalized understanding  Persons(s) to be included in education: patient  Barriers to Learning/Limitations:No    Signed     Jimbo Gonzalez RN    8/21/2021   1:22 PM

## 2021-08-21 NOTE — LETTER
[facility] EMERGENCY DEPT  [address] [facility] [address] [phone]    Work/School Note    Date: 8/21/2021    To Whom It May concern: Terra Garcia was seen and treated today in the emergency room. She may return to work in 2 to 3 days, as symptoms improve.           Sincerely,          Chuck Ni

## 2021-08-21 NOTE — ED TRIAGE NOTES
C/o sore throat x 3 weeks with some nausea because \" it feels like all the food is sitting in the back of the throat like a ball\".

## 2021-08-21 NOTE — DISCHARGE INSTRUCTIONS
Follow up with Endocrinologist at your earliest opportunity. Return to the Emergency Dept for any worsening pain, swelling, fever, difficulty swallowing/breathing.

## 2021-08-21 NOTE — PROGRESS NOTES
Pharmacy medication communication:    Please ensure patient is properly hydrated if taking metformin and advise patient to wait 48 hours after contrast administration to restart metformin

## 2021-08-23 LAB
BACTERIA SPEC CULT: NORMAL
SERVICE CMNT-IMP: NORMAL

## 2021-08-30 ENCOUNTER — TRANSCRIBE ORDER (OUTPATIENT)
Dept: SCHEDULING | Age: 41
End: 2021-08-30

## 2021-08-30 DIAGNOSIS — E04.1 THYROID NODULE: Primary | ICD-10-CM

## 2021-09-02 NOTE — PATIENT INSTRUCTIONS
Well Visit, Ages 25 to 48: Care Instructions Your Care Instructions Physical exams can help you stay healthy. Your doctor has checked your overall health and may have suggested ways to take good care of yourself. He or she also may have recommended tests. At home, you can help prevent illness with healthy eating, regular exercise, and other steps. Follow-up care is a key part of your treatment and safety. Be sure to make and go to all appointments, and call your doctor if you are having problems. It's also a good idea to know your test results and keep a list of the medicines you take. How can you care for yourself at home? · Reach and stay at a healthy weight. This will lower your risk for many problems, such as obesity, diabetes, heart disease, and high blood pressure. · Get at least 30 minutes of physical activity on most days of the week. Walking is a good choice. You also may want to do other activities, such as running, swimming, cycling, or playing tennis or team sports. Discuss any changes in your exercise program with your doctor. · Do not smoke or allow others to smoke around you. If you need help quitting, talk to your doctor about stop-smoking programs and medicines. These can increase your chances of quitting for good. · Talk to your doctor about whether you have any risk factors for sexually transmitted infections (STIs). Having one sex partner (who does not have STIs and does not have sex with anyone else) is a good way to avoid these infections. · Use birth control if you do not want to have children at this time. Talk with your doctor about the choices available and what might be best for you. · Protect your skin from too much sun. When you're outdoors from 10 a.m. to 4 p.m., stay in the shade or cover up with clothing and a hat with a wide brim. Wear sunglasses that block UV rays. Even when it's cloudy, put broad-spectrum sunscreen (SPF 30 or higher) on any exposed skin. · See a dentist one or two times a year for checkups and to have your teeth cleaned. · Wear a seat belt in the car. Follow your doctor's advice about when to have certain tests. These tests can spot problems early. For everyone · Cholesterol. Have the fat (cholesterol) in your blood tested after age 21. Your doctor will tell you how often to have this done based on your age, family history, or other things that can increase your risk for heart disease. · Blood pressure. Have your blood pressure checked during a routine doctor visit. Your doctor will tell you how often to check your blood pressure based on your age, your blood pressure results, and other factors. · Vision. Talk with your doctor about how often to have a glaucoma test. 
· Diabetes. Ask your doctor whether you should have tests for diabetes. · Colon cancer. Your risk for colorectal cancer gets higher as you get older. Some experts say that adults should start regular screening at age 48 and stop at age 76. Others say to start before age 48 or continue after age 76. Talk with your doctor about your risk and when to start and stop screening. For women · Breast exam and mammogram. Talk to your doctor about when you should have a clinical breast exam and a mammogram. Medical experts differ on whether and how often women under 50 should have these tests. Your doctor can help you decide what is right for you. · Cervical cancer screening test and pelvic exam. Begin with a Pap test at age 24. The test often is part of a pelvic exam. Starting at age 27, you may choose to have a Pap test, an HPV test, or both tests at the same time (called co-testing). Talk with your doctor about how often to have testing. · Tests for sexually transmitted infections (STIs). Ask whether you should have tests for STIs. You may be at risk if you have sex with more than one person, especially if your partners do not wear condoms. For men · Tests for sexually transmitted infections (STIs). Ask whether you should have tests for STIs. You may be at risk if you have sex with more than one person, especially if you do not wear a condom. · Testicular cancer exam. Ask your doctor whether you should check your testicles regularly. · Prostate exam. Talk to your doctor about whether you should have a blood test (called a PSA test) for prostate cancer. Experts differ on whether and when men should have this test. Some experts suggest it if you are older than 39 and are -American or have a father or brother who got prostate cancer when he was younger than 72. When should you call for help? Watch closely for changes in your health, and be sure to contact your doctor if you have any problems or symptoms that concern you. Where can you learn more? Go to http://www.shields.com/ Enter P072 in the search box to learn more about \"Well Visit, Ages 25 to 48: Care Instructions. \" Current as of: May 27, 2020               Content Version: 12.6 © 2006-2020 [employer]. Care instructions adapted under license by [employer] (which disclaims liability or warranty for this information). If you have questions about a medical condition or this instruction, always ask your healthcare professional. [address] any warranty or liability for your use of this information. Complex Repair And Flap Additional Text (Will Appearing After The Standard Complex Repair Text): The complex repair was not sufficient to completely close the primary defect. The remaining additional defect was repaired with the flap mentioned below.

## 2021-09-16 ENCOUNTER — HOSPITAL ENCOUNTER (OUTPATIENT)
Dept: ULTRASOUND IMAGING | Age: 41
Discharge: HOME | End: 2021-09-16
Attending: FAMILY MEDICINE
Payer: MEDICARE

## 2021-09-16 DIAGNOSIS — E04.1 THYROID NODULE: ICD-10-CM

## 2021-09-16 PROCEDURE — 76536 US EXAM OF HEAD AND NECK: CPT

## 2021-09-16 NOTE — PROGRESS NOTES
Sarahy Johnston is a 35 y.o. female here for new patient appt for anemia. Pt was in ER on 8/21/21 for sore throat. 1. Have you been to the ER, urgent care clinic since your last visit? Hospitalized since your last visit? New Pt    2. Have you seen or consulted any other health care providers outside of the [address] [place] [address] since your last visit? Include any pap smears or colon screening. New Pt    Pt states her throat is still hurting and she can barely eat. She is fatigued. Ultrasound done yesterday on thyroid. She has been dealing with thyroid issues now for about one year. Pt states she was 255 lbs when these problems started. She is now 206 lbs Says she only eats broth and chocolate pudding. She will see GI on 9/23/21. She had recent lab work done. Will see her doctor on the 21st for results. Pt states she has her menstrual cycles about 2 times per month and they are heavy. She had a colonoscopy and EGD done about 3-4 years ago. States she is allergic to oral iron.

## 2021-09-17 ENCOUNTER — OFFICE VISIT (OUTPATIENT)
Dept: ONCOLOGY | Age: 41
End: 2021-09-17
Payer: MEDICARE

## 2021-09-17 VITALS
SYSTOLIC BLOOD PRESSURE: 126 MMHG | HEART RATE: 102 BPM | OXYGEN SATURATION: 98 % | HEIGHT: 64 IN | WEIGHT: 206 LBS | TEMPERATURE: 98.4 F | DIASTOLIC BLOOD PRESSURE: 83 MMHG | BODY MASS INDEX: 35.17 KG/M2

## 2021-09-17 DIAGNOSIS — D50.0 IRON DEFICIENCY ANEMIA DUE TO CHRONIC BLOOD LOSS: Primary | ICD-10-CM

## 2021-09-17 PROCEDURE — 99204 OFFICE O/P NEW MOD 45 MIN: CPT | Performed by: INTERNAL MEDICINE

## 2021-09-17 PROCEDURE — G8417 CALC BMI ABV UP PARAM F/U: HCPCS | Performed by: INTERNAL MEDICINE

## 2021-09-17 PROCEDURE — G9717 DOC PT DX DEP/BP F/U NT REQ: HCPCS | Performed by: INTERNAL MEDICINE

## 2021-09-17 PROCEDURE — G9231 DOC ESRD DIA TRANS PREG: HCPCS | Performed by: INTERNAL MEDICINE

## 2021-09-17 PROCEDURE — G8427 DOCREV CUR MEDS BY ELIG CLIN: HCPCS | Performed by: INTERNAL MEDICINE

## 2021-10-28 ENCOUNTER — HOSPITAL ENCOUNTER (EMERGENCY)
Age: 41
Discharge: HOME | End: 2021-10-29
Attending: EMERGENCY MEDICINE
Payer: MEDICARE

## 2021-10-28 DIAGNOSIS — Z20.822 PERSON UNDER INVESTIGATION FOR COVID-19: Primary | ICD-10-CM

## 2021-10-28 DIAGNOSIS — Z76.0 MEDICATION REFILL: ICD-10-CM

## 2021-10-28 LAB
GLUCOSE BLD STRIP.AUTO-MCNC: 219 MG/DL (ref 65–117)
SERVICE CMNT-IMP: ABNORMAL

## 2021-10-28 PROCEDURE — 74011250636 HC RX REV CODE- 250/636: Performed by: PHYSICIAN ASSISTANT

## 2021-10-28 PROCEDURE — U0005 INFEC AGEN DETEC AMPLI PROBE: HCPCS

## 2021-10-28 PROCEDURE — 96372 THER/PROPH/DIAG INJ SC/IM: CPT

## 2021-10-28 PROCEDURE — 93005 ELECTROCARDIOGRAM TRACING: CPT

## 2021-10-28 PROCEDURE — 82962 GLUCOSE BLOOD TEST: CPT

## 2021-10-28 PROCEDURE — 99284 EMERGENCY DEPT VISIT MOD MDM: CPT

## 2021-10-28 RX ORDER — KETOROLAC TROMETHAMINE 30 MG/ML
30 INJECTION, SOLUTION INTRAMUSCULAR; INTRAVENOUS ONCE
Status: COMPLETED | OUTPATIENT
Start: 2021-10-28 | End: 2021-10-28

## 2021-10-28 RX ORDER — METOCLOPRAMIDE 5 MG/1
5 TABLET ORAL 4 TIMES DAILY
COMMUNITY

## 2021-10-28 RX ADMIN — KETOROLAC TROMETHAMINE 30 MG: 30 INJECTION, SOLUTION INTRAMUSCULAR; INTRAVENOUS at 23:38

## 2021-10-28 NOTE — Clinical Note
[address] EMERGENCY DEPT  [address] [facility] [id number] [phone]    Work/School Note    Date: 10/28/2021     To Whom It May concern: Bossman Healy was evaulated by the following provider(s):  Attending Provider: Xavi Haro [address] [place] [address] virus is suspected. Per the CDC guidelines we recommend home isolation until the following conditions are all met:    1. At least 10 days have passed since symptoms first appeared and  2. At least 24 hours have passed since last fever without the use of fever-reducing medications and  3.  Symptoms (e.g., cough, shortness of breath) have improved    Sincerely,          Finn Jeter MD

## 2021-10-29 VITALS
OXYGEN SATURATION: 100 % | HEIGHT: 64 IN | DIASTOLIC BLOOD PRESSURE: 85 MMHG | WEIGHT: 206 LBS | BODY MASS INDEX: 35.17 KG/M2 | SYSTOLIC BLOOD PRESSURE: 160 MMHG | RESPIRATION RATE: 16 BRPM | TEMPERATURE: 98.6 F | HEART RATE: 105 BPM

## 2021-10-29 LAB
ATRIAL RATE: 109 BPM
CALCULATED P AXIS, ECG09: 55 DEGREES
CALCULATED R AXIS, ECG10: 25 DEGREES
CALCULATED T AXIS, ECG11: 49 DEGREES
DIAGNOSIS, 93000: NORMAL
P-R INTERVAL, ECG05: 140 MS
Q-T INTERVAL, ECG07: 346 MS
QRS DURATION, ECG06: 72 MS
QTC CALCULATION (BEZET), ECG08: 465 MS
SARS-COV-2, XPLCVT: NOT DETECTED
SOURCE, COVRS: NORMAL
VENTRICULAR RATE, ECG03: 109 BPM

## 2021-10-29 RX ORDER — ALBUTEROL SULFATE 90 UG/1
1-2 AEROSOL, METERED RESPIRATORY (INHALATION)
Qty: 17 G | Refills: 0 | OUTPATIENT
Start: 2021-10-29 | End: 2022-03-23

## 2021-10-29 NOTE — ED TRIAGE NOTES
C/o concern for COVID19. Pt reporting generalized aches, chills, and chest pain.  Pt reports her whole household is sick and her daughter has a pending covid test.

## 2021-10-29 NOTE — ED NOTES
Emergency Department Nursing Plan of Care       The Nursing Plan of Care is developed from the Nursing assessment and Emergency Department Attending provider initial evaluation. The plan of care may be reviewed in the ED Provider note.     The Plan of Care was developed with the following considerations:   Patient / Family readiness to learn indicated by:verbalized understanding  Persons(s) to be included in education: patient  Barriers to Learning/Limitations:No    Signed     Rubi Mann RN    10/28/2021   11:37 PM

## 2021-10-29 NOTE — ED PROVIDER NOTES
EMERGENCY DEPARTMENT HISTORY AND PHYSICAL EXAM    Date: 10/28/2021  Patient Name: Guillaume Dominique    History of Presenting Illness     Chief Complaint   Patient presents with    Concern For [id number]-61 (Coronavirus)         History Provided By: Patient    HPI: Guillaume Dominique is a 35 y.o. female with a PMH of asthma, bronchitis, high cholesterol, diabetes, GERD, hypertension, bipolar, depression, anxiety, mood disorder, sleep apnea who presents with body aches, slight cough and chills. Patient states her  was sick last week her daughter got sick 2 days ago and now she is starting to have the same symptoms. Patient [place] [facility] [place]. Patient states there were too many people in there for her to be seen as well so she came here for evaluation. Patient denies any fevers, nausea, vomiting, diarrhea, shortness of breath. Patient did report some chest discomfort but states it feels like the inside of your lungs are burning. However patient only reports a slight cough with small amount of productive sputum. Patient rates pain 9 out of 10. Patient has not been vaccinated against COVID-19. PCP: Karen, MD Oziel    Current Outpatient Medications   Medication Sig Dispense Refill    albuterol (PROVENTIL HFA, VENTOLIN HFA, PROAIR HFA) 90 mcg/actuation inhaler Take 1-2 Puffs by inhalation every four (4) hours as needed for Wheezing. 17 g 0    metoclopramide HCl (Reglan) 5 mg tablet Reglan   5 mg 4 ties a day      aspirin 81 mg chewable tablet Take 81 mg by mouth daily.  esomeprazole (NEXIUM) 40 mg capsule Take 1 Cap by mouth daily. 20 Cap 0    metFORMIN (GLUCOPHAGE) 1,000 mg tablet Take 1,000 mg by mouth two (2) times daily (with meals). Indications: type 2 diabetes mellitus      losartan (COZAAR) 25 mg tablet Take 50 mg by mouth daily.  Indications: high blood pressure         Past History     Past Medical History:  Past Medical History:   Diagnosis Date  Abnormal Pap smear     \"years ago\"     Asthma     bronchitis    [facility]     Depression with anxiety 2010    Diabetes mellitus     type II diabetes mellitus since     GERD (gastroesophageal reflux disease)     HTN (hypertension) 2010    Ill-defined condition     High Cholesterol    [facility]     Other and unspecified hyperlipidemia 2010    Postpartum depression     hospitalized after last delivery    Rhinitis 2012    Sleep apnea     uses cpap    UTI (urinary tract infection)        Past Surgical History:  Past Surgical History:   Procedure Laterality Date    HX  SECTION      X 3    HX GYN      , tubal ligation    ND  DELIVERY ONLY      cesearean section 08       Family History:  Family History   Problem Relation Age of Onset    Diabetes Mother     Hypertension Mother     Stroke Mother     Heart Disease Mother     Hypertension Father     Stroke Father     Diabetes Father     Kidney Disease Father     Hypertension Maternal Grandmother     Diabetes Maternal Grandmother     Hypertension Maternal Grandfather     Diabetes Maternal Grandfather     Hypertension Paternal Grandmother     Diabetes Paternal Grandmother     Hypertension Paternal Grandfather     Diabetes Paternal Grandfather     Hypertension Sister     Diabetes Sister     Diabetes Sister     Hypertension Sister     Diabetes Sister     Hypertension Sister        Social History:  Social History     Tobacco Use    Smoking status: Former Smoker     Quit date: 2019     Years since quittin.3    Smokeless tobacco: Never Used   Substance Use Topics    Alcohol use: Not Currently    Drug use: Not Currently     Types: Marijuana       Allergies:   Allergies   Allergen Reactions    Latex Hives     rash    Other Food Hives     Citrus Fruits    Flagyl [Metronidazole] Hives and Itching    Lisinopril-Hydrochlorothiazide Angioedema    Clindamycin Rash and Itching    Ciprofloxacin Rash and Itching    Citrate Hives    Cottonseed Oil Rash    Cymbalta [Duloxetine] Hives and Itching    Flexeril [Cyclobenzaprine] Angioedema    Lemon Hives    Pcn [Penicillins] Hives    Shrimp Itching    Sulfa (Sulfonamide Antibiotics) Shortness of Breath    Tomato Hives         Review of Systems   Review of Systems   Constitutional: Positive for activity change and fatigue. Negative for appetite change, chills and fever. HENT: Negative for congestion and sore throat. Respiratory: Positive for cough. Negative for shortness of breath, wheezing and stridor. Cardiovascular: Negative for chest pain and leg swelling. Gastrointestinal: Negative for nausea and vomiting. Musculoskeletal: Positive for myalgias. Allergic/Immunologic: Negative for immunocompromised state. Neurological: Negative for speech difficulty and weakness. All other systems reviewed and are negative. Physical Exam     Vitals:    10/28/21 2212   BP: (!) 143/89   Pulse: (!) 110   Resp: 18   Temp: 98.5 °F (36.9 °C)   SpO2: 100%   Weight: 93.4 kg (206 lb)   Height: 5' 4\" (1.626 m)     Physical Exam  Vitals and nursing note reviewed. Constitutional:       General: She is not in acute distress. Appearance: She is well-developed. HENT:      Head: Normocephalic and atraumatic. Eyes:      Conjunctiva/sclera: Conjunctivae normal.   Cardiovascular:      Rate and Rhythm: Normal rate and regular rhythm. Heart sounds: Normal heart sounds. Pulmonary:      Effort: Pulmonary effort is normal. No respiratory distress. Breath sounds: Normal breath sounds. No wheezing or rales. Skin:     General: Skin is warm and dry. Neurological:      Mental Status: She is alert and oriented to person, place, and time. Psychiatric:         Behavior: Behavior normal.         Thought Content:  Thought content normal.         Judgment: Judgment normal.           Diagnostic Study Results     Labs -     Recent Results (from the past 12 hour(s))   EKG, 12 LEAD, INITIAL    Collection Time: 10/28/21 10:25 PM   Result Value Ref Range    Ventricular Rate 109 BPM    Atrial Rate 109 BPM    P-R Interval 140 ms    QRS Duration 72 ms    Q-T Interval 346 ms    QTC Calculation (Bezet) 465 ms    Calculated P Axis 55 degrees    Calculated R Axis 25 degrees    Calculated T Axis 49 degrees    Diagnosis       Sinus tachycardia  Otherwise normal ECG  When compared with ECG of 15-SANDIP-2021 03:50,  No significant change was found     GLUCOSE, POC    Collection Time: 10/28/21 11:44 PM   Result Value Ref Range    Glucose (POC) 219 (H) 65 - 117 mg/dL    Performed by Franco Moise RN        Radiologic Studies -   No orders to display     CT Results  (Last 48 hours)    None        CXR Results  (Last 48 hours)    None            Medical Decision Making   I am the first provider for this patient. I reviewed the vital signs, available nursing notes, past medical history, past surgical history, family history and social history. Vital Signs-Reviewed the patient's vital signs. Records Reviewed: Nursing Notes and Old Medical Records    Provider Notes (Medical Decision Making):   Patient presents with myalgias, fatigue, sick contacts and concern for Covid. Patient does not warrant any other labs at this time so we will do a Covid swab and follow-up with patient after discharge should it be positive. Patient has been given isolation precautions per CDC guidelines. ED Course as of Oct 29 0002   Thu Oct 28, 2021   2309 EKG per my interpretation sinus tachycardia, rate 109 bpm, leftward axis, no acute ischemic changes, no interval changes. [AK]      ED Course User Index  [AK] Epifanio Abernathy MD          Disposition:  Discharged    DISCHARGE NOTE:   11:59 PM      Care plan outlined and precautions discussed. Patient has no new complaints, changes, or physical findings. Results of labs were reviewed with the patient.  All medications were reviewed with the patient; will d/c home. All of pt's questions and concerns were addressed. Patient was instructed and agrees to follow up with PCP prn, as well as to return to the ED upon further deterioration. Patient is ready to go home. Follow-up Information     Follow up With Specialties Details Why Laly Batista  [place], [address] [facility] [address]    [employer]    [address] [facility] [address] [facility] [address]    [address] [facility] [address]    [address] [facility] EMERGENCY DEPT Emergency Medicine  If symptoms worsen [address] [facility]          Current Discharge Medication List      CONTINUE these medications which have CHANGED    Details   albuterol (PROVENTIL HFA, VENTOLIN HFA, PROAIR HFA) 90 mcg/actuation inhaler Take 1-2 Puffs by inhalation every four (4) hours as needed for Wheezing. Qty: 17 g, Refills: 0  Start date: 10/29/2021             Procedures:  Procedures    Please note that this dictation was completed with Dragon, computer voice recognition software. Quite often unanticipated grammatical, syntax, homophones, and other interpretive errors are inadvertently transcribed by the computer software. Please disregard these errors. Additionally, please excuse any errors that have escaped final proofreading. Diagnosis     Clinical Impression:   1. Person under investigation for COVID-19    2.  Medication refill

## 2021-11-29 NOTE — ED PROVIDER NOTES
49-year-old female with a history of GERD, hypertension, diabetes, and family history of early MI (mother  of MI at 55) presents with 9/10 throbbing neck cramp for months which is worse when she tries to move and left arm pain since yesterday which is also worse when she moves her neck. Complains of not being able to turn her neck secondary to pain and headache associated with the neck pain. Describes it as a \"tension\" and thinks she may have slept wrong before the symptoms started. On review of symptoms she admits to nausea. She denies chest pain (stating she just has her typical reflux pain), as of breath, diaphoresis, vomiting, left arm tingling, wrist pain, repetitive motion with her left arm, known left arm or neck injury, fever. She has tried heat, ice, Tylenol with minimal relief.             Past Medical History:   Diagnosis Date    Abnormal Pap smear     \"years ago\"     Asthma     bronchitis    [facility]     Depression with anxiety 2010    Diabetes mellitus     type II diabetes mellitus since     GERD (gastroesophageal reflux disease)     HTN (hypertension) 2010    Ill-defined condition     High Cholesterol    Mood disorder ([facility]     Other and unspecified hyperlipidemia 2010    Postpartum depression     hospitalized after last delivery    Rhinitis 2012    UTI (urinary tract infection)        Past Surgical History:   Procedure Laterality Date     DELIVERY ONLY      cesearean section 08    HX  SECTION      X 3    HX GYN      , tubal ligation         Family History:   Problem Relation Age of Onset    Diabetes Mother     Hypertension Mother     Stroke Mother     Heart Disease Mother     Hypertension Father     Stroke Father     Diabetes Father     Kidney Disease Father     Hypertension Maternal Grandmother     Diabetes Maternal Grandmother     Hypertension Maternal Grandfather     Diabetes Maternal Grandfather Marguerite Carvalho is 3year old [de-identified] old female who presents for 24 month well child visit. Patient presents with: Well Child: 1 YO Physical      INTERVAL PROBLEMS: none. In . On and off illnesses. Eczema with eating certain fruits.    Histor female anatomy. MUSCULOSKELETAL: good muscle tone, resolution of bowing of lower legs.   EXTREMITIES: no deformity, no swelling  NEURO: good tone, moves all four extremities well, follows objects to the midline with eyes    ASSESSMENT AND PLAN:  Vladislav Flor  Hypertension Paternal Grandmother     Diabetes Paternal Grandmother     Hypertension Paternal Grandfather     Diabetes Paternal Grandfather     Hypertension Sister     Diabetes Sister     Diabetes Sister     Hypertension Sister     Diabetes Sister     Hypertension Sister        Social History     Socioeconomic History    Marital status: SINGLE     Spouse name: Not on file    Number of children: Not on file    Years of education: Not on file    Highest education level: Not on file   Occupational History    Not on file   Social Needs    Financial resource strain: Not on file    Food insecurity     Worry: Not on file     Inability: Not on file    Transportation needs     Medical: Not on file     Non-medical: Not on file   Tobacco Use    Smoking status: Former Smoker     Last attempt to quit: 10/16/2015     Years since quittin.6    Smokeless tobacco: Never Used    Tobacco comment: quit 17 days ago   Substance and Sexual Activity    Alcohol use: Not Currently     Frequency: Never     Comment: Occasionally    Drug use: No    Sexual activity: Yes     Partners: Female     Birth control/protection: Surgical     Comment: Tubal ligation   Lifestyle    Physical activity     Days per week: Not on file     Minutes per session: Not on file    Stress: Not on file   Relationships    Social connections     Talks on phone: Not on file     Gets together: Not on file     Attends Islam service: Not on file     Active member of club or organization: Not on file     Attends meetings of clubs or organizations: Not on file     Relationship status: Not on file    Intimate partner violence     Fear of current or ex partner: Not on file     Emotionally abused: Not on file     Physically abused: Not on file     Forced sexual activity: Not on file   Other Topics Concern    Not on file   Social History Narrative    ** Merged History Encounter **              ALLERGIES: Latex;  Other food; Flagyl [metronidazole]; Lisinopril-hydrochlorothiazide; Clindamycin; Ciprofloxacin; Citrate; Cottonseed oil; Cymbalta [duloxetine]; Flexeril [cyclobenzaprine]; Lemon; Pcn [penicillins]; Sulfa (sulfonamide antibiotics); and Tomato    Review of Systems   Constitutional: Negative. Negative for chills, fever and unexpected weight change. HENT: Negative. Negative for congestion and trouble swallowing. Eyes: Negative for discharge. Respiratory: Negative. Negative for cough, chest tightness and shortness of breath. Cardiovascular: Negative. Negative for chest pain. Gastrointestinal: Negative. Negative for abdominal distention, abdominal pain, constipation, diarrhea and nausea. Endocrine: Negative. Genitourinary: Negative. Negative for difficulty urinating, dysuria, frequency and urgency. Musculoskeletal: Positive for arthralgias, myalgias, neck pain and neck stiffness. Skin: Negative. Negative for color change. Allergic/Immunologic: Negative. Neurological: Negative. Negative for dizziness, speech difficulty and headaches. Hematological: Negative. Psychiatric/Behavioral: Negative. Negative for agitation and confusion. All other systems reviewed and are negative. Vitals:    06/03/20 2353   BP: 154/88   Pulse: 93   Resp: 18   Temp: 97.5 °F (36.4 °C)   SpO2: 100%   Weight: 105.1 kg (231 lb 9.6 oz)   Height: 5' 4\" (1.626 m)            Physical Exam  Vitals signs reviewed. Constitutional:       Appearance: She is well-developed. HENT:      Head: Normocephalic and atraumatic. Eyes:      Conjunctiva/sclera: Conjunctivae normal.   Neck:      Musculoskeletal: Neck supple. Comments: Reproducible left neck pain. Cardiovascular:      Rate and Rhythm: Normal rate and regular rhythm. Pulmonary:      Effort: Pulmonary effort is normal. No respiratory distress. Abdominal:      Palpations: Abdomen is soft. Tenderness: There is no abdominal tenderness.    Musculoskeletal: Normal placed today:  Orders Placed This Encounter      Hepatitis A, Pediatric vaccine      Immunization Admin Counseling, 1st Component, <18 years     Medications filled today:  Requested Prescriptions      No prescriptions requested or ordered in this encounter range of motion. General: No deformity. Comments: Left-sided paraspinal cervical and trapezius muscle tenderness and palpable spasm. Left upper extremity MVI. No reproducible midline cervical vertebral tenderness. No vertebral deformity or step-off. Skin:     General: Skin is warm and dry. Neurological:      Mental Status: She is alert and oriented to person, place, and time. Psychiatric:         Behavior: Behavior normal.         Thought Content: Thought content normal.          MDM  Number of Diagnoses or Management Options  Diagnosis management comments: Sprain, strain, spasm, cervical radiculopathy. Will check EKG and one troponin given cardiac risk factors. ED Course as of Jun 04 0112   Thu Jun 04, 2020   0024 EKG done at 0 15: Normal sinus rhythm rate 83, LVH/J-point elevation, normal axis, normal intervals, no ectopy, no obvious ischemic ST change.    [SS]      ED Course User Index  [SS] Jose Armando Holland MD       Procedures      LABORATORY TESTS:  Recent Results (from the past 12 hour(s))   EKG, 12 LEAD, INITIAL    Collection Time: 06/04/20 12:15 AM   Result Value Ref Range    Ventricular Rate 83 BPM    Atrial Rate 83 BPM    P-R Interval 158 ms    QRS Duration 84 ms    Q-T Interval 372 ms    QTC Calculation (Bezet) 437 ms    Calculated P Axis 40 degrees    Calculated R Axis 16 degrees    Calculated T Axis 19 degrees    Diagnosis       Normal sinus rhythm  Moderate voltage criteria for LVH, may be normal variant  Borderline ECG  When compared with ECG of 19-JUL-2019 14:25,  No significant change was found     POC TROPONIN-I    Collection Time: 06/04/20 12:28 AM   Result Value Ref Range    Troponin-I (POC) <0.04 0.00 - 0.08 ng/mL       IMAGING RESULTS:  No orders to display       MEDICATIONS GIVEN:  Medications   lidocaine 4 % patch 1 Patch (1 Patch TransDERmal Apply Patch 6/4/20 0022)   ketorolac (TORADOL) injection 30 mg (30 mg IntraVENous Given 6/4/20 0022)       IMPRESSION:  1.  Left arm pain    2. Neck muscle spasm        PLAN:  1. Discharge Medication List as of 6/4/2020 12:49 AM      START taking these medications    Details   methocarbamoL (ROBAXIN) 750 mg tablet Take 1 Tab by mouth four (4) times daily. , Print, Disp-15 Tab, R-0      traMADoL (Ultram) 50 mg tablet Take 1 Tab by mouth every four (4) hours as needed for Pain for up to 3 days. Max Daily Amount: 300 mg., Print, Disp-12 Tab, R-0      lidocaine 4 % patch Apply patch to left neck . Apply patch to the affected area for 12 hours a day and remove for 12 hours a day., Normal, Disp-30 Patch, R-0         CONTINUE these medications which have NOT CHANGED    Details   aspirin 81 mg chewable tablet Take 81 mg by mouth daily. , Historical Med      albuterol (PROVENTIL HFA, VENTOLIN HFA, PROAIR HFA) 90 mcg/actuation inhaler Take 1-2 Puffs by inhalation every four (4) hours as needed for Wheezing., Normal, Disp-1 Inhaler, R-1      esomeprazole (NEXIUM) 40 mg capsule Take 1 Cap by mouth daily. , Print, Disp-20 Cap, R-0      metFORMIN (GLUCOPHAGE) 1,000 mg tablet Take 1,000 mg by mouth two (2) times daily (with meals). Indications: type 2 diabetes mellitus, Historical Med      losartan (COZAAR) 25 mg tablet Take 100 mg by mouth daily. , Historical Med      metoclopramide HCl (REGLAN) 5 mg tablet Take 5 mg by mouth Before breakfast, lunch, dinner and at bedtime. , Historical Med      promethazine (PHENERGAN) 25 mg tablet Take 1 Tab by mouth every six (6) hours as needed for Nausea., Normal, Disp-12 Tab, R-0      ibuprofen (MOTRIN) 800 mg tablet Take 1 Tab by mouth every eight (8) hours as needed for Pain., Normal, Disp-30 Tab, R-0      fluticasone propionate (FLONASE) 50 mcg/actuation nasal spray 2 Sprays by Both Nostrils route daily. , Normal, Disp-1 Bottle, R-0      loratadine-pseudoephedrine (CLARITIN-D 12 HOUR) 5-120 mg per tablet Take 1 Tab by mouth two (2) times a day., Normal, Disp-30 Tab, R-0      sucralfate (CARAFATE) 1 gram tablet Take 1 Tab by mouth four (4) times daily. , Normal, Disp-20 Tab, R-0      busPIRone (BUSPAR) 7.5 mg tablet Take 7.5 mg by mouth daily. , Historical Med      labetalol (NORMODYNE) 200 mg tablet Take 200 mg by mouth two (2) times a day., Historical Med      gabapentin (NEURONTIN) 400 mg capsule Take 400 mg by mouth three (3) times daily. Indications: NEUROPATHIC PAIN, Historical Med           2.    Follow-up Information     Follow up With Specialties Details Why Contact Info    Vangie Willis NP Nurse Practitioner Schedule an appointment as soon as possible for a visit  [address] [address] [facility] EMERGENCY DEPT Emergency Medicine  As needed, If symptoms worsen Mohan Alvarez  [phone]        Return to ED if worse

## 2022-01-09 ENCOUNTER — HOSPITAL ENCOUNTER (EMERGENCY)
Age: 42
Discharge: HOME | End: 2022-01-09
Attending: EMERGENCY MEDICINE
Payer: MEDICARE

## 2022-01-09 VITALS
RESPIRATION RATE: 18 BRPM | BODY MASS INDEX: 35.51 KG/M2 | HEART RATE: 108 BPM | DIASTOLIC BLOOD PRESSURE: 78 MMHG | OXYGEN SATURATION: 100 % | TEMPERATURE: 98.8 F | SYSTOLIC BLOOD PRESSURE: 142 MMHG | WEIGHT: 208 LBS | HEIGHT: 64 IN

## 2022-01-09 DIAGNOSIS — Z20.822 PERSON UNDER INVESTIGATION FOR COVID-19: ICD-10-CM

## 2022-01-09 DIAGNOSIS — J31.0 RHINOSINUSITIS: Primary | ICD-10-CM

## 2022-01-09 DIAGNOSIS — J32.9 RHINOSINUSITIS: Primary | ICD-10-CM

## 2022-01-09 PROCEDURE — 74011250636 HC RX REV CODE- 250/636: Performed by: EMERGENCY MEDICINE

## 2022-01-09 PROCEDURE — 96372 THER/PROPH/DIAG INJ SC/IM: CPT

## 2022-01-09 PROCEDURE — U0005 INFEC AGEN DETEC AMPLI PROBE: HCPCS

## 2022-01-09 PROCEDURE — 74011250637 HC RX REV CODE- 250/637: Performed by: EMERGENCY MEDICINE

## 2022-01-09 PROCEDURE — 99283 EMERGENCY DEPT VISIT LOW MDM: CPT

## 2022-01-09 RX ORDER — IBUPROFEN 400 MG/1
800 TABLET ORAL
Status: DISCONTINUED | OUTPATIENT
Start: 2022-01-09 | End: 2022-01-09

## 2022-01-09 RX ORDER — ACETAMINOPHEN 325 MG/1
650 TABLET ORAL
Qty: 20 TABLET | Refills: 0 | OUTPATIENT
Start: 2022-01-09 | End: 2022-03-23

## 2022-01-09 RX ORDER — KETOROLAC TROMETHAMINE 30 MG/ML
30 INJECTION, SOLUTION INTRAMUSCULAR; INTRAVENOUS
Status: COMPLETED | OUTPATIENT
Start: 2022-01-09 | End: 2022-01-09

## 2022-01-09 RX ORDER — IBUPROFEN 600 MG/1
600 TABLET ORAL
Qty: 20 TABLET | Refills: 0 | Status: SHIPPED | OUTPATIENT
Start: 2022-01-09 | End: 2022-01-09

## 2022-01-09 RX ORDER — ONDANSETRON 4 MG/1
4 TABLET, FILM COATED ORAL
Qty: 20 TABLET | Refills: 0 | Status: SHIPPED | OUTPATIENT
Start: 2022-01-09 | End: 2022-03-15

## 2022-01-09 RX ORDER — FLUTICASONE PROPIONATE 50 MCG
2 SPRAY, SUSPENSION (ML) NASAL DAILY
Qty: 16 G | Refills: 0 | Status: SHIPPED | OUTPATIENT
Start: 2022-01-09 | End: 2022-01-19

## 2022-01-09 RX ORDER — ONDANSETRON 4 MG/1
4 TABLET, ORALLY DISINTEGRATING ORAL
Status: COMPLETED | OUTPATIENT
Start: 2022-01-09 | End: 2022-01-09

## 2022-01-09 RX ADMIN — KETOROLAC TROMETHAMINE 30 MG: 30 INJECTION, SOLUTION INTRAMUSCULAR; INTRAVENOUS at 01:09

## 2022-01-09 RX ADMIN — ONDANSETRON 4 MG: 4 TABLET, ORALLY DISINTEGRATING ORAL at 00:40

## 2022-01-09 NOTE — ED NOTES
Patient c/o facial pain d/t sinus issues. Also c/o nausea because of post nasal drip. Patient states has hx of anxiety. Patient states she has been taking flonase to help with pain without relief. Patient states she does not have any money to purchase other medications OTC.

## 2022-01-09 NOTE — ED PROVIDER NOTES
EMERGENCY DEPARTMENT HISTORY AND PHYSICAL EXAM      Date: 1/9/2022  Patient Name: Candida Alvarado    History of Presenting Illness     Chief Complaint   Patient presents with    Sinus Pain       History Provided By: Patient    HPI: Candida Alvarado, 35 y.o. female with PMHx as noted below presents the emergency department chief complaint of runny nose, nausea and sinus pressure. Patient noted onset of symptoms 2 days ago. She describes the pain is a dull, aching pain in her frontal sinuses. Symptoms are constant and nonradiating. Pt denies any other alleviating or exacerbating factors. Additionally, pt specifically denies any chest pain, visual changes, headache, neurologic symptoms, severe dyspnea, AMS    PCP: Oziel Jones MD    Current Outpatient Medications   Medication Sig Dispense Refill    ondansetron hcl (Zofran) 4 mg tablet Take 1 Tablet by mouth every eight (8) hours as needed for Nausea. 20 Tablet 0    fluticasone propionate (FLONASE) 50 mcg/actuation nasal spray 2 Sprays by Both Nostrils route daily for 10 days. 16 g 0    acetaminophen (TYLENOL) 325 mg tablet Take 2 Tablets by mouth every four (4) hours as needed for Pain. 20 Tablet 0    albuterol (PROVENTIL HFA, VENTOLIN HFA, PROAIR HFA) 90 mcg/actuation inhaler Take 1-2 Puffs by inhalation every four (4) hours as needed for Wheezing. 17 g 0    metoclopramide HCl (Reglan) 5 mg tablet Reglan   5 mg 4 ties a day      aspirin 81 mg chewable tablet Take 81 mg by mouth daily.  esomeprazole (NEXIUM) 40 mg capsule Take 1 Cap by mouth daily. 20 Cap 0    metFORMIN (GLUCOPHAGE) 1,000 mg tablet Take 1,000 mg by mouth two (2) times daily (with meals). Indications: type 2 diabetes mellitus      losartan (COZAAR) 25 mg tablet Take 50 mg by mouth daily.  Indications: high blood pressure         Past History     Past Medical History:  Past Medical History:   Diagnosis Date    Abnormal Pap smear     \"years ago\"     Asthma     bronchitis    Bipolar disorder ([facility]     Depression with anxiety 2010    Diabetes mellitus     type II diabetes mellitus since 2004    GERD (gastroesophageal reflux disease)     HTN (hypertension) 2010    Ill-defined condition     High Cholesterol    Mood disorder ([facility]     Other and unspecified hyperlipidemia 2010    Postpartum depression     hospitalized after last delivery    Rhinitis 2012    Sleep apnea     uses cpap    UTI (urinary tract infection)        Past Surgical History:  Past Surgical History:   Procedure Laterality Date    HX  SECTION      X 3    HX GYN      , tubal ligation    [place]  DELIVERY ONLY      cesearean section 08       Family History:  Family History   Problem Relation Age of Onset    Diabetes Mother     Hypertension Mother     Stroke Mother     Heart Disease Mother     Hypertension Father     Stroke Father     Diabetes Father     Kidney Disease Father     Hypertension Maternal Grandmother     Diabetes Maternal Grandmother     Hypertension Maternal Grandfather     Diabetes Maternal Grandfather     Hypertension Paternal Grandmother     Diabetes Paternal Grandmother     Hypertension Paternal Grandfather     Diabetes Paternal Grandfather     Hypertension Sister     Diabetes Sister     Diabetes Sister     Hypertension Sister     Diabetes Sister     Hypertension Sister        Social History:  Social History     Tobacco Use    Smoking status: Former Smoker     Quit date: 2019     Years since quittin.5    Smokeless tobacco: Never Used   Substance Use Topics    Alcohol use: Not Currently    Drug use: Not Currently     Types: Marijuana       Allergies:   Allergies   Allergen Reactions    Latex Hives     rash    Other Food Hives     Citrus Fruits    Flagyl [Metronidazole] Hives and Itching    Lisinopril-Hydrochlorothiazide Angioedema    Clindamycin Rash and Itching    Ciprofloxacin Rash and Itching    Citrate Hives    Cottonseed Oil Rash    Cymbalta [Duloxetine] Hives and Itching    Flexeril [Cyclobenzaprine] Angioedema    Lemon Hives    Pcn [Penicillins] Hives    Shrimp Itching    Sulfa (Sulfonamide Antibiotics) Shortness of Breath    Tomato Hives         Review of Systems   Review of Systems  Constitutional: Negative fever. Positive for chills, and fatigue. HENT: positive for congestion. Negative sore throat, neck stiffness   Eyes: Negative for discharge and redness. Respiratory: Negative for  shortness of breath, wheezing   Cardiovascular: Negative for chest pain, palpitations   Gastrointestinal: Negative for nausea, vomiting, abdominal pain, constipation, diarrhea and blood in stool. Genitourinary: Negative for dysuria, hematuria, flank pain, decreased urine volume, discharge,   Musculoskeletal: positive for myalgias. Negative joint pain . Skin: Negative for rash or lesions . Neurological: Negative weakness, light-headedness, numbness and headaches. Physical Exam   Physical Exam    GENERAL: alert and oriented, no acute distress  EYES: PEERL, No injection, discharge or icterus. ENT: Mucous membranes pink and moist.  NECK: Supple  LUNGS: Airway patent. Non-labored respirations. Breath sounds clear with good air entry bilaterally. HEART: Regular rate and rhythm. No peripheral edema  ABDOMEN: Non-distended and non-tender, without guarding or rebound. SKIN:  warm, dry  MSK/EXTREMITIES: Without swelling, tenderness or deformity, symmetric with normal ROM  NEUROLOGICAL: Alert, oriented      Diagnostic Study Results     Labs -   No results found for this or any previous visit (from the past 12 hour(s)). Radiologic Studies -   No orders to display     CT Results  (Last 48 hours)    None        CXR Results  (Last 48 hours)    None            Medical Decision Making     Gio SMITH MD am the first provider for this patient and am the attending of record for this patient encounter.     I reviewed the vital signs, available nursing notes, past medical history, past surgical history, family history and social history. Vital Signs-Reviewed the patient's vital signs. Patient Vitals for the past 12 hrs:   Temp Pulse Resp BP SpO2   01/09/22 0019 98.8 °F (37.1 °C) (!) 108 18 (!) 142/78 100 %       Pulse Oximetry Analysis - 100% on RA      Records Reviewed: Nursing Notes and Old Medical Records    Provider Notes (Medical Decision Making): On presentation, the patient is well appearing, in no acute distress with reassuring vital signs-not tachycardic on my assessment. Based on my history and exam the differential diagnosis for this patient includes COVID 19, flu, URI, sinusitis, bronchitis. Nothing to suggest strep pharyngitis, PNA or bacterial sinusitis. Covid test sent and pending. Likely Covid versus viral sinusitis/URI. Patient given Toradol and Zofran with improvement in symptoms. Patient tolerating p.o. Patient may have COVID 19, however is breathing comfortably, maintaining adequate O2 sats on room air is tolerating PO and is overall well appearing so does not require admission at this time. Have recommended self quarantine per CDC guidelines. Symptomatic therapy suggested. Should return immediately to the emergency department develops shortness of breath, confusion, weakness or any othe new/worrisome symptoms       ED Course:   Initial assessment performed. The patients presenting problems have been discussed, and they are in agreement with the care plan formulated and outlined with them. I have encouraged them to ask questions as they arise throughout their visit. Medications   ondansetron (ZOFRAN ODT) tablet 4 mg (4 mg Oral Given 1/9/22 0040)   ketorolac (TORADOL) injection 30 mg (30 mg IntraMUSCular Given 1/9/22 0109)         PROGRESS  Sandra NAINA Mir's  results have been reviewed with her. She has been counseled regarding her diagnosis.   She verbally conveys understanding and agreement of the signs, symptoms, diagnosis, treatment and prognosis and additionally agrees to follow up as recommended. She also agrees with the care-plan and conveys that all of her questions have been answered. I have also put together some discharge instructions for her that include: 1) educational information regarding their diagnosis, 2) how to care for their diagnosis at home, as well a 3) list of reasons why they would want to return to the ED prior to their follow-up appointment, should their condition change. Disposition:  home    PLAN:  1. Discharge Medication List as of 1/9/2022  1:19 AM      START taking these medications    Details   ondansetron hcl (Zofran) 4 mg tablet Take 1 Tablet by mouth every eight (8) hours as needed for Nausea., Normal, Disp-20 Tablet, R-0      fluticasone propionate (FLONASE) 50 mcg/actuation nasal spray 2 Sprays by Both Nostrils route daily for 10 days. , Normal, Disp-16 g, R-0      acetaminophen (TYLENOL) 325 mg tablet Take 2 Tablets by mouth every four (4) hours as needed for Pain., Normal, Disp-20 Tablet, R-0         CONTINUE these medications which have NOT CHANGED    Details   albuterol (PROVENTIL HFA, VENTOLIN HFA, PROAIR HFA) 90 mcg/actuation inhaler Take 1-2 Puffs by inhalation every four (4) hours as needed for Wheezing., Normal, Disp-17 g, R-0      metoclopramide HCl (Reglan) 5 mg tablet Reglan   5 mg 4 ties a day, Historical Med      aspirin 81 mg chewable tablet Take 81 mg by mouth daily. , Historical Med      esomeprazole (NEXIUM) 40 mg capsule Take 1 Cap by mouth daily. , Print, Disp-20 Cap, R-0      metFORMIN (GLUCOPHAGE) 1,000 mg tablet Take 1,000 mg by mouth two (2) times daily (with meals). Indications: type 2 diabetes mellitus, Historical Med      losartan (COZAAR) 25 mg tablet Take 50 mg by mouth daily. Indications: high blood pressure, Historical Med           2.    Follow-up Information     Follow up With Specialties Details Why Contact Info [facility] EMERGENCY DEPT Emergency Medicine  If symptoms worsen Mohan Antonio  [phone]        Return to ED if worse     Diagnosis     Clinical Impression:   1. Rhinosinusitis    2. Person under investigation for COVID-19        Please note that this dictation was completed with Dragon, computer voice recognition software. Quite often unanticipated grammatical, syntax, homophones, and other interpretive errors are inadvertently transcribed by the computer software. Please disregard these errors. Additionally, please excuse any errors that have escaped final proofreading.

## 2022-01-10 LAB
SARS-COV-2, XPLCVT: NOT DETECTED
SOURCE, COVRS: NORMAL

## 2022-01-11 ENCOUNTER — HOSPITAL ENCOUNTER (EMERGENCY)
Age: 42
Discharge: HOME | End: 2022-01-11
Attending: EMERGENCY MEDICINE
Payer: MEDICARE

## 2022-01-11 VITALS
DIASTOLIC BLOOD PRESSURE: 68 MMHG | HEART RATE: 104 BPM | SYSTOLIC BLOOD PRESSURE: 132 MMHG | RESPIRATION RATE: 17 BRPM | TEMPERATURE: 98.2 F | OXYGEN SATURATION: 100 %

## 2022-01-11 DIAGNOSIS — R11.0 NAUSEA WITHOUT VOMITING: Primary | ICD-10-CM

## 2022-01-11 DIAGNOSIS — R79.89 ELEVATED PLATELET COUNT: ICD-10-CM

## 2022-01-11 DIAGNOSIS — R09.82 POST-NASAL DRIP: ICD-10-CM

## 2022-01-11 DIAGNOSIS — D64.9 ANEMIA, UNSPECIFIED TYPE: ICD-10-CM

## 2022-01-11 LAB
ALBUMIN SERPL-MCNC: 3.9 G/DL (ref 3.5–5)
ALBUMIN/GLOB SERPL: 1.1 {RATIO} (ref 1.1–2.2)
ALP SERPL-CCNC: 69 U/L (ref 45–117)
ALT SERPL-CCNC: 29 U/L (ref 12–78)
ANION GAP SERPL CALC-SCNC: 14 MMOL/L (ref 5–15)
APPEARANCE UR: ABNORMAL
AST SERPL-CCNC: 67 U/L (ref 15–37)
BACTERIA URNS QL MICRO: ABNORMAL /HPF
BASOPHILS # BLD: 0.1 K/UL (ref 0–0.1)
BASOPHILS NFR BLD: 1 % (ref 0–1)
BILIRUB SERPL-MCNC: 0.2 MG/DL (ref 0.2–1)
BILIRUB UR QL: NEGATIVE
BUN SERPL-MCNC: 9 MG/DL (ref 6–20)
BUN/CREAT SERPL: 11 (ref 12–20)
CALCIUM SERPL-MCNC: 9.5 MG/DL (ref 8.5–10.1)
CHLORIDE SERPL-SCNC: 99 MMOL/L (ref 97–108)
CO2 SERPL-SCNC: 24 MMOL/L (ref 21–32)
COLOR UR: ABNORMAL
CREAT SERPL-MCNC: 0.83 MG/DL (ref 0.55–1.02)
DIFFERENTIAL METHOD BLD: ABNORMAL
EOSINOPHIL # BLD: 0 K/UL (ref 0–0.4)
EOSINOPHIL NFR BLD: 0 % (ref 0–7)
EPITH CASTS URNS QL MICRO: ABNORMAL /LPF
ERYTHROCYTE [DISTWIDTH] IN BLOOD BY AUTOMATED COUNT: 19.3 % (ref 11.5–14.5)
GLOBULIN SER CALC-MCNC: 3.7 G/DL (ref 2–4)
GLUCOSE SERPL-MCNC: 115 MG/DL (ref 65–100)
GLUCOSE UR STRIP.AUTO-MCNC: NEGATIVE MG/DL
HCT VFR BLD AUTO: 26.5 % (ref 35–47)
HGB BLD-MCNC: 7.7 G/DL (ref 11.5–16)
HGB UR QL STRIP: ABNORMAL
IMM GRANULOCYTES # BLD AUTO: 0.1 K/UL (ref 0–0.04)
IMM GRANULOCYTES NFR BLD AUTO: 1 % (ref 0–0.5)
KETONES UR QL STRIP.AUTO: NEGATIVE MG/DL
LEUKOCYTE ESTERASE UR QL STRIP.AUTO: ABNORMAL
LIPASE SERPL-CCNC: 95 U/L (ref 73–393)
LYMPHOCYTES # BLD: 2 K/UL (ref 0.8–3.5)
LYMPHOCYTES NFR BLD: 21 % (ref 12–49)
MCH RBC QN AUTO: 18.8 PG (ref 26–34)
MCHC RBC AUTO-ENTMCNC: 29.1 G/DL (ref 30–36.5)
MCV RBC AUTO: 64.6 FL (ref 80–99)
MONOCYTES # BLD: 0.8 K/UL (ref 0–1)
MONOCYTES NFR BLD: 8 % (ref 5–13)
MUCOUS THREADS URNS QL MICRO: ABNORMAL /LPF
NEUTS SEG # BLD: 6.4 K/UL (ref 1.8–8)
NEUTS SEG NFR BLD: 69 % (ref 32–75)
NITRITE UR QL STRIP.AUTO: NEGATIVE
NRBC # BLD: 0 K/UL (ref 0–0.01)
NRBC BLD-RTO: 0 PER 100 WBC
PH UR STRIP: 5.5 [PH] (ref 5–8)
PLATELET # BLD AUTO: 689 K/UL (ref 150–400)
PLATELET COMMENTS,PCOM: ABNORMAL
PMV BLD AUTO: 10 FL (ref 8.9–12.9)
POTASSIUM SERPL-SCNC: 3.8 MMOL/L (ref 3.5–5.1)
PROT SERPL-MCNC: 7.6 G/DL (ref 6.4–8.2)
PROT UR STRIP-MCNC: NEGATIVE MG/DL
RBC # BLD AUTO: 4.1 M/UL (ref 3.8–5.2)
RBC #/AREA URNS HPF: ABNORMAL /HPF (ref 0–5)
RBC MORPH BLD: ABNORMAL
RBC MORPH BLD: ABNORMAL
SODIUM SERPL-SCNC: 137 MMOL/L (ref 136–145)
SP GR UR REFRACTOMETRY: 1.01 (ref 1–1.03)
UA: UC IF INDICATED,UAUC: ABNORMAL
UROBILINOGEN UR QL STRIP.AUTO: 0.2 EU/DL (ref 0.2–1)
WBC # BLD AUTO: 9.4 K/UL (ref 3.6–11)
WBC URNS QL MICRO: ABNORMAL /HPF (ref 0–4)

## 2022-01-11 PROCEDURE — 85025 COMPLETE CBC W/AUTO DIFF WBC: CPT

## 2022-01-11 PROCEDURE — 81001 URINALYSIS AUTO W/SCOPE: CPT

## 2022-01-11 PROCEDURE — 99284 EMERGENCY DEPT VISIT MOD MDM: CPT

## 2022-01-11 PROCEDURE — 74011250636 HC RX REV CODE- 250/636: Performed by: EMERGENCY MEDICINE

## 2022-01-11 PROCEDURE — 80053 COMPREHEN METABOLIC PANEL: CPT

## 2022-01-11 PROCEDURE — 83690 ASSAY OF LIPASE: CPT

## 2022-01-11 PROCEDURE — 96374 THER/PROPH/DIAG INJ IV PUSH: CPT

## 2022-01-11 PROCEDURE — 96375 TX/PRO/DX INJ NEW DRUG ADDON: CPT

## 2022-01-11 PROCEDURE — 36415 COLL VENOUS BLD VENIPUNCTURE: CPT

## 2022-01-11 RX ORDER — ONDANSETRON 2 MG/ML
4 INJECTION INTRAMUSCULAR; INTRAVENOUS
Status: COMPLETED | OUTPATIENT
Start: 2022-01-11 | End: 2022-01-11

## 2022-01-11 RX ORDER — ONDANSETRON 4 MG/1
4 TABLET, FILM COATED ORAL
Qty: 20 TABLET | Refills: 0 | Status: SHIPPED | OUTPATIENT
Start: 2022-01-11 | End: 2022-03-15

## 2022-01-11 RX ORDER — DEXAMETHASONE SODIUM PHOSPHATE 100 MG/10ML
10 INJECTION INTRAMUSCULAR; INTRAVENOUS
Status: COMPLETED | OUTPATIENT
Start: 2022-01-11 | End: 2022-01-11

## 2022-01-11 RX ORDER — DIPHENHYDRAMINE HCL 25 MG
25 CAPSULE ORAL
Qty: 20 CAPSULE | Refills: 0 | Status: SHIPPED | OUTPATIENT
Start: 2022-01-11 | End: 2022-01-21

## 2022-01-11 RX ADMIN — ONDANSETRON 4 MG: 2 INJECTION INTRAMUSCULAR; INTRAVENOUS at 21:05

## 2022-01-11 RX ADMIN — DEXAMETHASONE SODIUM PHOSPHATE 10 MG: 10 INJECTION INTRAMUSCULAR; INTRAVENOUS at 21:51

## 2022-01-12 NOTE — ED NOTES
Patient has c/o \"a feeling of my throat closing\" to MD. MD has ordered Decadron IV for this patient. Patient O2 sat is 100% and has been throughout the stay. No symptoms of resp distress seen. Patient has call light and does understand need to call if having more SOB symptoms.

## 2022-01-12 NOTE — ED TRIAGE NOTES
Return visit. Having continued nausea. Also concerned that may be having reaction to eating fish about one hour ago and her throat feels funny, has eaten before with no problems. Also reports constipation and no bowel movement for a couple of days. Patient with able to speak in complete sentences with no obvious signs of distress at this time.

## 2022-01-12 NOTE — ED NOTES
.  Emergency Department Nursing Plan of Care       The Nursing Plan of Care is developed from the Nursing assessment and Emergency Department Attending provider initial evaluation. The plan of care may be reviewed in the ED Provider note.     The Plan of Care was developed with the following considerations:   Patient / Family readiness to learn indicated by:verbalized understanding  Persons(s) to be included in education: patient  Barriers to Learning/Limitations:No    Signed     Anna Lechuga RN    1/11/2022   8:22 PM

## 2022-01-12 NOTE — ED PROVIDER NOTES
EMERGENCY DEPARTMENT HISTORY AND PHYSICAL EXAM      Date: 1/11/2022  Patient Name: Merlin Ohs    History of Presenting Illness     Chief Complaint   Patient presents with    Nausea    Allergic Reaction    Constipation       History Provided By: Patient    HPI: Merlin Ohs, 35 y.o. female with PMHx significant for bipolar disorder, asthma, hypertension, GERD, who presents with a chief complaint of nausea, postnasal drip, and a \"funny feeling\" in her throat after eating fish today. She also reports constipation for last 3 days but has not taken any medicines for this. She reports she was seen on 1/9 for similar symptoms. At that time she was tested for COVID and was negative. She has been taking antiemetic and states that it helps with the nausea returns. No vomiting. Also tells me she ate fish just prior to arriving in the emergency department and that her \"throat feels funny\" and she is concerned she is having a reaction. PCP: Oziel Jones MD    There are no other complaints, changes, or physical findings at this time. Current Outpatient Medications   Medication Sig Dispense Refill    ondansetron hcl (Zofran) 4 mg tablet Take 1 Tablet by mouth every eight (8) hours as needed for Nausea. 20 Tablet 0    diphenhydrAMINE (BenadryL) 25 mg capsule Take 1 Capsule by mouth every six (6) hours as needed for Itching or Congestion for up to 10 days. 20 Capsule 0    ondansetron hcl (Zofran) 4 mg tablet Take 1 Tablet by mouth every eight (8) hours as needed for Nausea. 20 Tablet 0    fluticasone propionate (FLONASE) 50 mcg/actuation nasal spray 2 Sprays by Both Nostrils route daily for 10 days. 16 g 0    acetaminophen (TYLENOL) 325 mg tablet Take 2 Tablets by mouth every four (4) hours as needed for Pain. 20 Tablet 0    albuterol (PROVENTIL HFA, VENTOLIN HFA, PROAIR HFA) 90 mcg/actuation inhaler Take 1-2 Puffs by inhalation every four (4) hours as needed for Wheezing.  17 g 0    metoclopramide HCl (Reglan) 5 mg tablet Reglan   5 mg 4 ties a day      aspirin 81 mg chewable tablet Take 81 mg by mouth daily.  esomeprazole (NEXIUM) 40 mg capsule Take 1 Cap by mouth daily. 20 Cap 0    metFORMIN (GLUCOPHAGE) 1,000 mg tablet Take 1,000 mg by mouth two (2) times daily (with meals). Indications: type 2 diabetes mellitus      losartan (COZAAR) 25 mg tablet Take 50 mg by mouth daily.  Indications: high blood pressure       Past History     Past Medical History:  Past Medical History:   Diagnosis Date    Abnormal Pap smear     \"years ago\"     Asthma     bronchitis    [facility] disorder [facility] [address]     Depression with anxiety 2010    Diabetes mellitus     type II diabetes mellitus since     GERD (gastroesophageal reflux disease)     HTN (hypertension) 2010    Ill-defined condition     High Cholesterol    Mood disorder ([facility] [address]     Other and unspecified hyperlipidemia 2010    Postpartum depression     hospitalized after last delivery    Rhinitis 2012    Sleep apnea     uses cpap    UTI (urinary tract infection)      Past Surgical History:  Past Surgical History:   Procedure Laterality Date    HX  SECTION      X 3    HX GYN      , tubal ligation    [place]  DEL[place]      cesearean section 08     Family History:  Family History   Problem Relation Age of Onset    Diabetes Mother     Hypertension Mother     Stroke Mother     Heart Disease Mother     Hypertension Father     Stroke Father     Diabetes Father     Kidney Disease Father     Hypertension Maternal Grandmother     Diabetes Maternal Grandmother     Hypertension Maternal Grandfather     Diabetes Maternal Grandfather     Hypertension Paternal Grandmother     Diabetes Paternal Grandmother     Hypertension Paternal Grandfather     Diabetes Paternal Grandfather     Hypertension Sister     Diabetes Sister     Diabetes Sister     Hypertension Sister     Diabetes Sister     Hypertension Sister      Social History:  Social History     Tobacco Use    Smoking status: Former Smoker     Quit date: 2019     Years since quittin.5    Smokeless tobacco: Never Used   Substance Use Topics    Alcohol use: Not Currently    Drug use: Not Currently     Types: Marijuana     Allergies: Allergies   Allergen Reactions    Latex Hives     rash    Other Food Hives     Citrus Fruits    Flagyl [Metronidazole] Hives and Itching    Lisinopril-Hydrochlorothiazide Angioedema    Clindamycin Rash and Itching    Ciprofloxacin Rash and Itching    Citrate Hives    Cottonseed Oil Rash    Cymbalta [Duloxetine] Hives and Itching    Flexeril [Cyclobenzaprine] Angioedema    Lemon Hives    Pcn [Penicillins] Hives    Shrimp Itching    Sulfa (Sulfonamide Antibiotics) Shortness of Breath    Tomato Hives     Review of Systems   Review of Systems  Physical Exam   Physical Exam  Vitals and nursing note reviewed. Constitutional:       General: She is not in acute distress. Appearance: She is well-developed. HENT:      Head: Normocephalic and atraumatic. Mouth/Throat:      Mouth: Mucous membranes are moist.      Pharynx: Uvula midline. No pharyngeal swelling or posterior oropharyngeal erythema. Eyes:      Conjunctiva/sclera: Conjunctivae normal.      Pupils: Pupils are equal, round, and reactive to light. Cardiovascular:      Rate and Rhythm: Regular rhythm. Tachycardia present. Pulmonary:      Effort: Pulmonary effort is normal. No respiratory distress. Breath sounds: Normal breath sounds. No stridor. Abdominal:      General: There is no distension. Palpations: Abdomen is soft. Tenderness: There is no abdominal tenderness. Musculoskeletal:         General: Normal range of motion. Cervical back: Normal range of motion. Skin:     General: Skin is warm and dry. Neurological:      Mental Status: She is alert and oriented to person, place, and time.        Diagnostic Study Results   Labs -     Recent Results (from the past 12 hour(s))   CBC WITH AUTOMATED DIFF    Collection Time: 01/11/22  8:42 PM   Result Value Ref Range    WBC 9.4 3.6 - 11.0 K/uL    RBC 4.10 3.80 - 5.20 M/uL    HGB 7.7 (L) 11.5 - 16.0 g/dL    HCT 26.5 (L) 35.0 - 47.0 %    MCV 64.6 (L) 80.0 - 99.0 FL    MCH 18.8 (L) 26.0 - 34.0 PG    MCHC 29.1 (L) 30.0 - 36.5 g/dL    RDW 19.3 (H) 11.5 - 14.5 %    PLATELET 497 (HH) 486 - 400 K/uL    MPV 10.0 8.9 - 12.9 FL    NRBC 0.0 0  WBC    ABSOLUTE NRBC 0.00 0.00 - 0.01 K/uL    NEUTROPHILS 69 32 - 75 %    LYMPHOCYTES 21 12 - 49 %    MONOCYTES 8 5 - 13 %    EOSINOPHILS 0 0 - 7 %    BASOPHILS 1 0 - 1 %    IMMATURE GRANULOCYTES 1 (H) 0.0 - 0.5 %    ABS. NEUTROPHILS 6.4 1.8 - 8.0 K/UL    ABS. LYMPHOCYTES 2.0 0.8 - 3.5 K/UL    ABS. MONOCYTES 0.8 0.0 - 1.0 K/UL    ABS. EOSINOPHILS 0.0 0.0 - 0.4 K/UL    ABS. BASOPHILS 0.1 0.0 - 0.1 K/UL    ABS. IMM. GRANS. 0.1 (H) 0.00 - 0.04 K/UL    DF SMEAR SCANNED      PLATELET COMMENTS RESULTS VERIFIED BY SMEAR      RBC COMMENTS ANISOCYTOSIS  2+        RBC COMMENTS HYPOCHROMIA  1+       METABOLIC PANEL, COMPREHENSIVE    Collection Time: 01/11/22  8:42 PM   Result Value Ref Range    Sodium 137 136 - 145 mmol/L    Potassium 3.8 3.5 - 5.1 mmol/L    Chloride 99 97 - 108 mmol/L    CO2 24 21 - 32 mmol/L    Anion gap 14 5 - 15 mmol/L    Glucose 115 (H) 65 - 100 mg/dL    BUN 9 6 - 20 MG/DL    Creatinine 0.83 0.55 - 1.02 MG/DL    BUN/Creatinine ratio 11 (L) 12 - 20      GFR est AA >60 >60 ml/min/1.73m2    GFR est non-AA >60 >60 ml/min/1.73m2    Calcium 9.5 8.5 - 10.1 MG/DL    Bilirubin, total 0.2 0.2 - 1.0 MG/DL    ALT (SGPT) 29 12 - 78 U/L    AST (SGOT) 67 (H) 15 - 37 U/L    Alk.  phosphatase 69 45 - 117 U/L    Protein, total 7.6 6.4 - 8.2 g/dL    Albumin 3.9 3.5 - 5.0 g/dL    Globulin 3.7 2.0 - 4.0 g/dL    A-G Ratio 1.1 1.1 - 2.2     LIPASE    Collection Time: 01/11/22  8:42 PM   Result Value Ref Range    Lipase 95 73 - 393 U/L   URINALYSIS W/ REFLEX CULTURE    Collection Time: 01/11/22  8:42 PM    Specimen: Urine   Result Value Ref Range    Color YELLOW/STRAW      Appearance CLOUDY (A) CLEAR      Specific gravity 1.015 1.003 - 1.030      pH (UA) 5.5 5.0 - 8.0      Protein Negative NEG mg/dL    Glucose Negative NEG mg/dL    Ketone Negative NEG mg/dL    Bilirubin Negative NEG      Blood TRACE (A) NEG      Urobilinogen 0.2 0.2 - 1.0 EU/dL    Nitrites Negative NEG      Leukocyte Esterase SMALL (A) NEG      WBC 0-4 0 - 4 /hpf    RBC 0-5 0 - 5 /hpf    Epithelial cells MODERATE (A) FEW /lpf    Bacteria 1+ (A) NEG /hpf    UA:UC IF INDICATED CULTURE NOT INDICATED BY UA RESULT CNI      Mucus 2+ (A) NEG /lpf       Radiologic Studies -   No orders to display     No results found. Medical Decision Making   I am the first provider for this patient. I reviewed the vital signs, available nursing notes, past medical history, past surgical history, family history and social history. Vital Signs-Reviewed the patient's vital signs. Patient Vitals for the past 12 hrs:   Temp Pulse Resp BP SpO2   01/11/22 2231 -- (!) 104 17 -- 100 %   01/11/22 2200 -- (!) 102 19 132/68 100 %   01/11/22 2159 -- 100 19 -- 100 %   01/11/22 2100 -- (!) 105 20 134/70 100 %   01/11/22 2043 -- (!) 106 14 129/68 100 %   01/11/22 2003 -- (!) 116 16 -- 100 %   01/11/22 1931 98.2 °F (36.8 °C) (!) 120 20 124/72 100 %       Pulse Oximetry Analysis - 100% on ra    Cardiac Monitor:   Rate: 120 bpm  Rhythm: Sinus Tachycardia        Records Reviewed: Nursing Notes and Old Medical Records    Provider Notes (Medical Decision Making):   Patient presents with nausea, constipation, postnasal drip and a \"funny feeling in her throat. \"  On presentation she is tachycardic but overall well-appearing. Will check basic lab work, urinalysis. Will give medications for nausea, IV fluids, reevaluated. She has no obvious signs or symptoms concerning for anaphylaxis. Will give a dose of steroids.     ED Course:   Initial assessment performed. The patients presenting problems have been discussed, and they are in agreement with the care plan formulated and outlined with them. I have encouraged them to ask questions as they arise throughout their visit. Tachycardia improved with IV fluids. Patient feeling better. Advised over-the-counter medications for constipation. Will represcribe antiemetics. Procedures:  Procedures    Critical Care:  none    Disposition:  Discharge Note:  The patient has been re-evaluated and is ready for discharge. Reviewed available results with patient. Counseled patient on diagnosis and care plan. Patient has expressed understanding, and all questions have been answered. Patient agrees with plan and agrees to follow up as recommended, or to return to the ED if their symptoms worsen. Discharge instructions have been provided and explained to the patient, along with reasons to return to the ED. PLAN:  1. Discharge Medication List as of 1/11/2022 10:02 PM      START taking these medications    Details   !! ondansetron hcl (Zofran) 4 mg tablet Take 1 Tablet by mouth every eight (8) hours as needed for Nausea., Normal, Disp-20 Tablet, R-0      diphenhydrAMINE (BenadryL) 25 mg capsule Take 1 Capsule by mouth every six (6) hours as needed for Itching or Congestion for up to 10 days. , Normal, Disp-20 Capsule, R-0       !! - Potential duplicate medications found. Please discuss with provider. CONTINUE these medications which have NOT CHANGED    Details   !! ondansetron hcl (Zofran) 4 mg tablet Take 1 Tablet by mouth every eight (8) hours as needed for Nausea., Normal, Disp-20 Tablet, R-0      fluticasone propionate (FLONASE) 50 mcg/actuation nasal spray 2 Sprays by Both Nostrils route daily for 10 days. , Normal, Disp-16 g, R-0      acetaminophen (TYLENOL) 325 mg tablet Take 2 Tablets by mouth every four (4) hours as needed for Pain., Normal, Disp-20 Tablet, R-0      albuterol (PROVENTIL HFA, VENTOLIN HFA, PROAIR HFA) 90 mcg/actuation inhaler Take 1-2 Puffs by inhalation every four (4) hours as needed for Wheezing., Normal, Disp-17 g, R-0      metoclopramide HCl (Reglan) 5 mg tablet Reglan   5 mg 4 ties a day, Historical Med      aspirin 81 mg chewable tablet Take 81 mg by mouth daily. , Historical Med      esomeprazole (NEXIUM) 40 mg capsule Take 1 Cap by mouth daily. , Print, Disp-20 Cap, R-0      metFORMIN (GLUCOPHAGE) 1,000 mg tablet Take 1,000 mg by mouth two (2) times daily (with meals). Indications: type 2 diabetes mellitus, Historical Med      losartan (COZAAR) 25 mg tablet Take 50 mg by mouth daily. Indications: high blood pressure, Historical Med       !! - Potential duplicate medications found. Please discuss with provider. 2.   Follow-up Information     Follow up With Specialties Details Why [facility] EMERGENCY DEPT Emergency Medicine  As needed, If symptoms worsen Antonia Hope    your PCP  Schedule an appointment as soon as possible for a visit           Return to ED if worse     Diagnosis     Clinical Impression:   1. Nausea without vomiting    2. Post-nasal drip    3. Anemia, unspecified type    4. Elevated platelet count            Please note that this dictation was completed with MicroJob, the computer voice recognition software. Quite often unanticipated grammatical, syntax, homophones, and other interpretive errors are inadvertently transcribed by the computer software. Please disregard these errors.   Please excuse any errors that have escaped final proofreading

## 2022-01-13 ENCOUNTER — TELEPHONE (OUTPATIENT)
Dept: ONCOLOGY | Age: 42
End: 2022-01-13

## 2022-01-13 DIAGNOSIS — D50.0 IRON DEFICIENCY ANEMIA DUE TO CHRONIC BLOOD LOSS: Primary | ICD-10-CM

## 2022-01-13 PROBLEM — D50.9 IRON (FE) DEFICIENCY ANEMIA: Status: ACTIVE | Noted: 2022-01-13

## 2022-01-13 RX ORDER — DIPHENHYDRAMINE HYDROCHLORIDE 50 MG/ML
25 INJECTION, SOLUTION INTRAMUSCULAR; INTRAVENOUS AS NEEDED
Status: CANCELLED
Start: 2022-01-21

## 2022-01-13 RX ORDER — ACETAMINOPHEN 325 MG/1
650 TABLET ORAL AS NEEDED
Status: CANCELLED
Start: 2022-01-21

## 2022-01-13 RX ORDER — DIPHENHYDRAMINE HYDROCHLORIDE 50 MG/ML
50 INJECTION, SOLUTION INTRAMUSCULAR; INTRAVENOUS AS NEEDED
Status: CANCELLED
Start: 2022-01-28

## 2022-01-13 RX ORDER — ONDANSETRON 2 MG/ML
8 INJECTION INTRAMUSCULAR; INTRAVENOUS AS NEEDED
Status: CANCELLED | OUTPATIENT
Start: 2022-01-28

## 2022-01-13 RX ORDER — SODIUM CHLORIDE 0.9 % (FLUSH) 0.9 %
10 SYRINGE (ML) INJECTION AS NEEDED
Status: CANCELLED | OUTPATIENT
Start: 2022-01-21 | End: 2022-01-22

## 2022-01-13 RX ORDER — HYDROCORTISONE SODIUM SUCCINATE 100 MG/2ML
100 INJECTION, POWDER, FOR SOLUTION INTRAMUSCULAR; INTRAVENOUS AS NEEDED
Status: CANCELLED | OUTPATIENT
Start: 2022-01-28

## 2022-01-13 RX ORDER — DIPHENHYDRAMINE HYDROCHLORIDE 50 MG/ML
50 INJECTION, SOLUTION INTRAMUSCULAR; INTRAVENOUS AS NEEDED
Status: CANCELLED
Start: 2022-01-21

## 2022-01-13 RX ORDER — EPINEPHRINE 1 MG/ML
0.3 INJECTION, SOLUTION, CONCENTRATE INTRAVENOUS AS NEEDED
Status: CANCELLED | OUTPATIENT
Start: 2022-01-28

## 2022-01-13 RX ORDER — ALBUTEROL SULFATE 0.83 MG/ML
2.5 SOLUTION RESPIRATORY (INHALATION) AS NEEDED
Status: CANCELLED
Start: 2022-01-21

## 2022-01-13 RX ORDER — ALBUTEROL SULFATE 0.83 MG/ML
2.5 SOLUTION RESPIRATORY (INHALATION) AS NEEDED
Status: CANCELLED
Start: 2022-01-28

## 2022-01-13 RX ORDER — SODIUM CHLORIDE 9 MG/ML
10 INJECTION INTRAMUSCULAR; INTRAVENOUS; SUBCUTANEOUS AS NEEDED
Status: CANCELLED | OUTPATIENT
Start: 2022-01-28

## 2022-01-13 RX ORDER — DIPHENHYDRAMINE HYDROCHLORIDE 50 MG/ML
25 INJECTION, SOLUTION INTRAMUSCULAR; INTRAVENOUS AS NEEDED
Status: CANCELLED
Start: 2022-01-28

## 2022-01-13 RX ORDER — HEPARIN 100 UNIT/ML
300-500 SYRINGE INTRAVENOUS AS NEEDED
Status: CANCELLED
Start: 2022-01-28

## 2022-01-13 RX ORDER — SODIUM CHLORIDE 9 MG/ML
25 INJECTION, SOLUTION INTRAVENOUS CONTINUOUS
Status: CANCELLED | OUTPATIENT
Start: 2022-01-21 | End: 2022-01-22

## 2022-01-13 RX ORDER — ONDANSETRON 2 MG/ML
8 INJECTION INTRAMUSCULAR; INTRAVENOUS AS NEEDED
Status: CANCELLED | OUTPATIENT
Start: 2022-01-21

## 2022-01-13 RX ORDER — SODIUM CHLORIDE 0.9 % (FLUSH) 0.9 %
10 SYRINGE (ML) INJECTION AS NEEDED
Status: CANCELLED | OUTPATIENT
Start: 2022-01-28 | End: 2022-01-29

## 2022-01-13 RX ORDER — SODIUM CHLORIDE 9 MG/ML
25 INJECTION, SOLUTION INTRAVENOUS CONTINUOUS
Status: CANCELLED | OUTPATIENT
Start: 2022-01-28 | End: 2022-01-29

## 2022-01-13 RX ORDER — EPINEPHRINE 1 MG/ML
0.3 INJECTION, SOLUTION, CONCENTRATE INTRAVENOUS AS NEEDED
Status: CANCELLED | OUTPATIENT
Start: 2022-01-21

## 2022-01-13 RX ORDER — ACETAMINOPHEN 325 MG/1
650 TABLET ORAL AS NEEDED
Status: CANCELLED
Start: 2022-01-28

## 2022-01-13 RX ORDER — SODIUM CHLORIDE 9 MG/ML
10 INJECTION INTRAMUSCULAR; INTRAVENOUS; SUBCUTANEOUS AS NEEDED
Status: CANCELLED | OUTPATIENT
Start: 2022-01-21

## 2022-01-13 RX ORDER — HYDROCORTISONE SODIUM SUCCINATE 100 MG/2ML
100 INJECTION, POWDER, FOR SOLUTION INTRAMUSCULAR; INTRAVENOUS AS NEEDED
Status: CANCELLED | OUTPATIENT
Start: 2022-01-21

## 2022-01-13 RX ORDER — HEPARIN 100 UNIT/ML
300-500 SYRINGE INTRAVENOUS AS NEEDED
Status: CANCELLED
Start: 2022-01-21

## 2022-01-13 NOTE — TELEPHONE ENCOUNTER
Patient called regarding  [facility] ED visit 1/11/22. Patient states provider told her to reach out due to low iron levels. Labs are in the system.

## 2022-01-17 NOTE — TELEPHONE ENCOUNTER
Received a call from insurance auth department asking for iron levels in order for the injectafer to be approved. I do not see any recent levels in the system. Left pt detailed VM to get labs drawn ASAP. VORB FROM DR Aileen Krause IRON PROFILE FERRITIN.

## 2022-01-18 ENCOUNTER — DOCUMENTATION ONLY (OUTPATIENT)
Dept: ONCOLOGY | Age: 42
End: 2022-01-18

## 2022-01-19 ENCOUNTER — TELEPHONE (OUTPATIENT)
Dept: ONCOLOGY | Age: 42
End: 2022-01-19

## 2022-01-19 LAB
FERRITIN SERPL-MCNC: 10 NG/ML (ref 15–150)
IRON SATN MFR SERPL: 5 % (ref 15–55)
IRON SERPL-MCNC: 22 UG/DL (ref 27–159)
TIBC SERPL-MCNC: 426 UG/DL (ref 250–450)
UIBC SERPL-MCNC: 404 UG/DL (ref 131–425)

## 2022-01-19 RX ORDER — HYDROCORTISONE SODIUM SUCCINATE 100 MG/2ML
100 INJECTION, POWDER, FOR SOLUTION INTRAMUSCULAR; INTRAVENOUS AS NEEDED
Status: CANCELLED | OUTPATIENT
Start: 2022-02-11

## 2022-01-19 RX ORDER — HYDROCORTISONE SODIUM SUCCINATE 100 MG/2ML
100 INJECTION, POWDER, FOR SOLUTION INTRAMUSCULAR; INTRAVENOUS AS NEEDED
Status: CANCELLED | OUTPATIENT
Start: 2022-02-25

## 2022-01-19 RX ORDER — ONDANSETRON 2 MG/ML
8 INJECTION INTRAMUSCULAR; INTRAVENOUS AS NEEDED
Status: CANCELLED | OUTPATIENT
Start: 2022-02-25

## 2022-01-19 RX ORDER — DIPHENHYDRAMINE HYDROCHLORIDE 50 MG/ML
50 INJECTION, SOLUTION INTRAMUSCULAR; INTRAVENOUS AS NEEDED
Status: CANCELLED
Start: 2022-02-11

## 2022-01-19 RX ORDER — ALBUTEROL SULFATE 0.83 MG/ML
2.5 SOLUTION RESPIRATORY (INHALATION) AS NEEDED
Status: CANCELLED
Start: 2022-02-18

## 2022-01-19 RX ORDER — ACETAMINOPHEN 325 MG/1
650 TABLET ORAL AS NEEDED
Status: CANCELLED
Start: 2022-02-11

## 2022-01-19 RX ORDER — HEPARIN 100 UNIT/ML
300-500 SYRINGE INTRAVENOUS AS NEEDED
Status: CANCELLED
Start: 2022-02-04

## 2022-01-19 RX ORDER — SODIUM CHLORIDE 9 MG/ML
10 INJECTION INTRAMUSCULAR; INTRAVENOUS; SUBCUTANEOUS AS NEEDED
Status: CANCELLED | OUTPATIENT
Start: 2022-02-04

## 2022-01-19 RX ORDER — ONDANSETRON 2 MG/ML
8 INJECTION INTRAMUSCULAR; INTRAVENOUS AS NEEDED
Status: CANCELLED | OUTPATIENT
Start: 2022-02-11

## 2022-01-19 RX ORDER — EPINEPHRINE 1 MG/ML
0.3 INJECTION, SOLUTION, CONCENTRATE INTRAVENOUS AS NEEDED
Status: CANCELLED | OUTPATIENT
Start: 2022-01-28

## 2022-01-19 RX ORDER — HEPARIN 100 UNIT/ML
300-500 SYRINGE INTRAVENOUS AS NEEDED
Status: CANCELLED
Start: 2022-02-25

## 2022-01-19 RX ORDER — ACETAMINOPHEN 325 MG/1
650 TABLET ORAL AS NEEDED
Status: CANCELLED
Start: 2022-01-28

## 2022-01-19 RX ORDER — SODIUM CHLORIDE 9 MG/ML
25 INJECTION, SOLUTION INTRAVENOUS CONTINUOUS
Status: CANCELLED | OUTPATIENT
Start: 2022-02-25

## 2022-01-19 RX ORDER — DIPHENHYDRAMINE HYDROCHLORIDE 50 MG/ML
25 INJECTION, SOLUTION INTRAMUSCULAR; INTRAVENOUS AS NEEDED
Status: CANCELLED
Start: 2022-02-11

## 2022-01-19 RX ORDER — HEPARIN 100 UNIT/ML
300-500 SYRINGE INTRAVENOUS AS NEEDED
Status: CANCELLED
Start: 2022-02-11

## 2022-01-19 RX ORDER — HYDROCORTISONE SODIUM SUCCINATE 100 MG/2ML
100 INJECTION, POWDER, FOR SOLUTION INTRAMUSCULAR; INTRAVENOUS AS NEEDED
Status: CANCELLED | OUTPATIENT
Start: 2022-02-18

## 2022-01-19 RX ORDER — EPINEPHRINE 1 MG/ML
0.3 INJECTION, SOLUTION, CONCENTRATE INTRAVENOUS AS NEEDED
Status: CANCELLED | OUTPATIENT
Start: 2022-02-25

## 2022-01-19 RX ORDER — DIPHENHYDRAMINE HYDROCHLORIDE 50 MG/ML
25 INJECTION, SOLUTION INTRAMUSCULAR; INTRAVENOUS AS NEEDED
Status: CANCELLED
Start: 2022-02-25

## 2022-01-19 RX ORDER — EPINEPHRINE 1 MG/ML
0.3 INJECTION, SOLUTION, CONCENTRATE INTRAVENOUS AS NEEDED
Status: CANCELLED | OUTPATIENT
Start: 2022-02-11

## 2022-01-19 RX ORDER — ALBUTEROL SULFATE 0.83 MG/ML
2.5 SOLUTION RESPIRATORY (INHALATION) AS NEEDED
Status: CANCELLED
Start: 2022-01-28

## 2022-01-19 RX ORDER — SODIUM CHLORIDE 0.9 % (FLUSH) 0.9 %
10 SYRINGE (ML) INJECTION AS NEEDED
Status: CANCELLED | OUTPATIENT
Start: 2022-02-04

## 2022-01-19 RX ORDER — DIPHENHYDRAMINE HYDROCHLORIDE 50 MG/ML
25 INJECTION, SOLUTION INTRAMUSCULAR; INTRAVENOUS AS NEEDED
Status: CANCELLED
Start: 2022-01-28

## 2022-01-19 RX ORDER — ALBUTEROL SULFATE 0.83 MG/ML
2.5 SOLUTION RESPIRATORY (INHALATION) AS NEEDED
Status: CANCELLED
Start: 2022-02-25

## 2022-01-19 RX ORDER — DIPHENHYDRAMINE HYDROCHLORIDE 50 MG/ML
25 INJECTION, SOLUTION INTRAMUSCULAR; INTRAVENOUS AS NEEDED
Status: CANCELLED
Start: 2022-02-04

## 2022-01-19 RX ORDER — SODIUM CHLORIDE 9 MG/ML
10 INJECTION INTRAMUSCULAR; INTRAVENOUS; SUBCUTANEOUS AS NEEDED
Status: CANCELLED | OUTPATIENT
Start: 2022-02-18

## 2022-01-19 RX ORDER — SODIUM CHLORIDE 9 MG/ML
10 INJECTION INTRAMUSCULAR; INTRAVENOUS; SUBCUTANEOUS AS NEEDED
Status: CANCELLED | OUTPATIENT
Start: 2022-01-28

## 2022-01-19 RX ORDER — SODIUM CHLORIDE 0.9 % (FLUSH) 0.9 %
10 SYRINGE (ML) INJECTION AS NEEDED
Status: CANCELLED | OUTPATIENT
Start: 2022-02-25

## 2022-01-19 RX ORDER — SODIUM CHLORIDE 9 MG/ML
25 INJECTION, SOLUTION INTRAVENOUS CONTINUOUS
Status: CANCELLED | OUTPATIENT
Start: 2022-02-11

## 2022-01-19 RX ORDER — EPINEPHRINE 1 MG/ML
0.3 INJECTION, SOLUTION, CONCENTRATE INTRAVENOUS AS NEEDED
Status: CANCELLED | OUTPATIENT
Start: 2022-02-04

## 2022-01-19 RX ORDER — SODIUM CHLORIDE 9 MG/ML
10 INJECTION INTRAMUSCULAR; INTRAVENOUS; SUBCUTANEOUS AS NEEDED
Status: CANCELLED | OUTPATIENT
Start: 2022-02-25

## 2022-01-19 RX ORDER — SODIUM CHLORIDE 9 MG/ML
25 INJECTION, SOLUTION INTRAVENOUS CONTINUOUS
Status: CANCELLED | OUTPATIENT
Start: 2022-02-04

## 2022-01-19 RX ORDER — DIPHENHYDRAMINE HYDROCHLORIDE 50 MG/ML
50 INJECTION, SOLUTION INTRAMUSCULAR; INTRAVENOUS AS NEEDED
Status: CANCELLED
Start: 2022-02-18

## 2022-01-19 RX ORDER — SODIUM CHLORIDE 9 MG/ML
10 INJECTION INTRAMUSCULAR; INTRAVENOUS; SUBCUTANEOUS AS NEEDED
Status: CANCELLED | OUTPATIENT
Start: 2022-02-11

## 2022-01-19 RX ORDER — ACETAMINOPHEN 325 MG/1
650 TABLET ORAL AS NEEDED
Status: CANCELLED
Start: 2022-02-25

## 2022-01-19 RX ORDER — SODIUM CHLORIDE 9 MG/ML
25 INJECTION, SOLUTION INTRAVENOUS CONTINUOUS
Status: CANCELLED | OUTPATIENT
Start: 2022-01-28

## 2022-01-19 RX ORDER — ONDANSETRON 2 MG/ML
8 INJECTION INTRAMUSCULAR; INTRAVENOUS AS NEEDED
Status: CANCELLED | OUTPATIENT
Start: 2022-01-28

## 2022-01-19 RX ORDER — ALBUTEROL SULFATE 0.83 MG/ML
2.5 SOLUTION RESPIRATORY (INHALATION) AS NEEDED
Status: CANCELLED
Start: 2022-02-11

## 2022-01-19 RX ORDER — ONDANSETRON 2 MG/ML
8 INJECTION INTRAMUSCULAR; INTRAVENOUS AS NEEDED
Status: CANCELLED | OUTPATIENT
Start: 2022-02-04

## 2022-01-19 RX ORDER — ONDANSETRON 2 MG/ML
8 INJECTION INTRAMUSCULAR; INTRAVENOUS AS NEEDED
Status: CANCELLED | OUTPATIENT
Start: 2022-02-18

## 2022-01-19 RX ORDER — HYDROCORTISONE SODIUM SUCCINATE 100 MG/2ML
100 INJECTION, POWDER, FOR SOLUTION INTRAMUSCULAR; INTRAVENOUS AS NEEDED
Status: CANCELLED | OUTPATIENT
Start: 2022-01-28

## 2022-01-19 RX ORDER — HEPARIN 100 UNIT/ML
300-500 SYRINGE INTRAVENOUS AS NEEDED
Status: CANCELLED
Start: 2022-02-18

## 2022-01-19 RX ORDER — SODIUM CHLORIDE 0.9 % (FLUSH) 0.9 %
10 SYRINGE (ML) INJECTION AS NEEDED
Status: CANCELLED | OUTPATIENT
Start: 2022-02-11

## 2022-01-19 RX ORDER — DIPHENHYDRAMINE HYDROCHLORIDE 50 MG/ML
50 INJECTION, SOLUTION INTRAMUSCULAR; INTRAVENOUS AS NEEDED
Status: CANCELLED
Start: 2022-01-28

## 2022-01-19 RX ORDER — SODIUM CHLORIDE 0.9 % (FLUSH) 0.9 %
10 SYRINGE (ML) INJECTION AS NEEDED
Status: CANCELLED | OUTPATIENT
Start: 2022-02-18

## 2022-01-19 RX ORDER — ACETAMINOPHEN 325 MG/1
650 TABLET ORAL AS NEEDED
Status: CANCELLED
Start: 2022-02-18

## 2022-01-19 RX ORDER — DIPHENHYDRAMINE HYDROCHLORIDE 50 MG/ML
50 INJECTION, SOLUTION INTRAMUSCULAR; INTRAVENOUS AS NEEDED
Status: CANCELLED
Start: 2022-02-25

## 2022-01-19 RX ORDER — DIPHENHYDRAMINE HYDROCHLORIDE 50 MG/ML
25 INJECTION, SOLUTION INTRAMUSCULAR; INTRAVENOUS AS NEEDED
Status: CANCELLED
Start: 2022-02-18

## 2022-01-19 RX ORDER — HEPARIN 100 UNIT/ML
300-500 SYRINGE INTRAVENOUS AS NEEDED
Status: CANCELLED
Start: 2022-01-28

## 2022-01-19 RX ORDER — ACETAMINOPHEN 325 MG/1
650 TABLET ORAL AS NEEDED
Status: CANCELLED
Start: 2022-02-04

## 2022-01-19 RX ORDER — HYDROCORTISONE SODIUM SUCCINATE 100 MG/2ML
100 INJECTION, POWDER, FOR SOLUTION INTRAMUSCULAR; INTRAVENOUS AS NEEDED
Status: CANCELLED | OUTPATIENT
Start: 2022-02-04

## 2022-01-19 RX ORDER — SODIUM CHLORIDE 0.9 % (FLUSH) 0.9 %
10 SYRINGE (ML) INJECTION AS NEEDED
Status: CANCELLED | OUTPATIENT
Start: 2022-01-28

## 2022-01-19 RX ORDER — ALBUTEROL SULFATE 0.83 MG/ML
2.5 SOLUTION RESPIRATORY (INHALATION) AS NEEDED
Status: CANCELLED
Start: 2022-02-04

## 2022-01-19 RX ORDER — DIPHENHYDRAMINE HYDROCHLORIDE 50 MG/ML
50 INJECTION, SOLUTION INTRAMUSCULAR; INTRAVENOUS AS NEEDED
Status: CANCELLED
Start: 2022-02-04

## 2022-01-19 RX ORDER — SODIUM CHLORIDE 9 MG/ML
25 INJECTION, SOLUTION INTRAVENOUS CONTINUOUS
Status: CANCELLED | OUTPATIENT
Start: 2022-02-18

## 2022-01-19 RX ORDER — EPINEPHRINE 1 MG/ML
0.3 INJECTION, SOLUTION, CONCENTRATE INTRAVENOUS AS NEEDED
Status: CANCELLED | OUTPATIENT
Start: 2022-02-18

## 2022-01-19 NOTE — TELEPHONE ENCOUNTER
Caller states P2P is needed due to medication denial by calling [phone].   Please follow up with the caller at:  [phone]

## 2022-01-19 NOTE — TELEPHONE ENCOUNTER
Returned call. NICO tried to submit the lab results but they denied the case and need p2p. They did not wait for the lab results. After all of this. They won't approve it. Try infed, ferrlecit, or venofer first.    Please advise what we should change her to.   She hasn't had any in the past.

## 2022-01-19 NOTE — TELEPHONE ENCOUNTER
Pt aware of the change but she did not know anything about her appointment set up for Friday. She can't come. Please call her.

## 2022-01-21 ENCOUNTER — HOSPITAL ENCOUNTER (OUTPATIENT)
Dept: INFUSION THERAPY | Age: 42
End: 2022-01-21

## 2022-01-28 ENCOUNTER — HOSPITAL ENCOUNTER (OUTPATIENT)
Dept: INFUSION THERAPY | Age: 42
Discharge: HOME | End: 2022-01-28
Payer: MEDICARE

## 2022-01-28 ENCOUNTER — HOSPITAL ENCOUNTER (OUTPATIENT)
Dept: INFUSION THERAPY | Age: 42
End: 2022-01-28

## 2022-01-28 VITALS
HEART RATE: 105 BPM | RESPIRATION RATE: 18 BRPM | WEIGHT: 219.9 LBS | BODY MASS INDEX: 37.75 KG/M2 | DIASTOLIC BLOOD PRESSURE: 68 MMHG | OXYGEN SATURATION: 100 % | TEMPERATURE: 97.5 F | SYSTOLIC BLOOD PRESSURE: 145 MMHG

## 2022-01-28 DIAGNOSIS — D50.9 IRON DEFICIENCY ANEMIA, UNSPECIFIED IRON DEFICIENCY ANEMIA TYPE: Primary | ICD-10-CM

## 2022-01-28 PROCEDURE — 74011250636 HC RX REV CODE- 250/636: Performed by: INTERNAL MEDICINE

## 2022-01-28 PROCEDURE — 74011000250 HC RX REV CODE- 250: Performed by: INTERNAL MEDICINE

## 2022-01-28 PROCEDURE — 96374 THER/PROPH/DIAG INJ IV PUSH: CPT

## 2022-01-28 RX ORDER — DIPHENHYDRAMINE HYDROCHLORIDE 50 MG/ML
25 INJECTION, SOLUTION INTRAMUSCULAR; INTRAVENOUS AS NEEDED
Status: DISPENSED | OUTPATIENT
Start: 2022-01-28 | End: 2022-01-28

## 2022-01-28 RX ORDER — SODIUM CHLORIDE 0.9 % (FLUSH) 0.9 %
10 SYRINGE (ML) INJECTION AS NEEDED
Status: DISPENSED | OUTPATIENT
Start: 2022-01-28 | End: 2022-01-28

## 2022-01-28 RX ORDER — SODIUM CHLORIDE 9 MG/ML
25 INJECTION, SOLUTION INTRAVENOUS CONTINUOUS
Status: DISPENSED | OUTPATIENT
Start: 2022-01-28 | End: 2022-01-28

## 2022-01-28 RX ADMIN — SODIUM CHLORIDE, PRESERVATIVE FREE 10 ML: 5 INJECTION INTRAVENOUS at 10:25

## 2022-01-28 RX ADMIN — SODIUM CHLORIDE, PRESERVATIVE FREE 10 ML: 5 INJECTION INTRAVENOUS at 10:11

## 2022-01-28 RX ADMIN — IRON SUCROSE 200 MG: 20 INJECTION, SOLUTION INTRAVENOUS at 10:13

## 2022-01-28 NOTE — DISCHARGE INSTRUCTIONS
Patient Education   Iron Sucrose (By injection)   Iron Sucrose (EYE-urn OLY-krose)  Treats anemia (lack of iron). Brand Name(s): PremierPro RX Venofer, Venofer   There may be other brand names for this medicine. When This Medicine Should Not Be Used: This medicine is not right for everyone. You should not receive it if you had an allergic reaction to iron sucrose. How to Use This Medicine:   Injectable  · Your doctor will prescribe your dose and schedule. This medicine is given through a needle placed in a vein. · A nurse or other health provider will give you this medicine. Drugs and Foods to Avoid:      Ask your doctor or pharmacist before using any other medicine, including over-the-counter medicines, vitamins, and herbal products. Warnings While Using This Medicine:   · Tell your doctor if you are pregnant or breastfeeding, or if you have low blood pressure. · This medicine could lower your blood pressure too much and cause you to feel dizzy or lightheaded. Stand or sit up slowly if you are dizzy. · This medicine could raise your iron levels too high. Your doctor will do lab tests at regular visits to check on the effects of this medicine. Keep all appointments. Possible Side Effects While Using This Medicine:   Call your doctor right away if you notice any of these side effects:  · Allergic reaction: Itching or hives, swelling in your face or hands, swelling or tingling in your mouth or throat, chest tightness, trouble breathing  · Chest pain  · Lightheadedness, dizziness, or fainting  If you notice these less serious side effects, talk with your doctor:   · Diarrhea, nausea, vomiting  · Headache  · Muscle cramps  · Pain in your back, arms, or legs  · Pain, itching, burning, swelling, or a lump under your skin where the needle is placed  If you notice other side effects that you think are caused by this medicine, tell your doctor. Call your doctor for medical advice about side effects.  You may report side effects to FDA at 9-104-FDA-3020  © 2017 [facility] Information is for End User's use only and may not be sold, redistributed or otherwise used for commercial purposes. The above information is an  only. It is not intended as medical advice for individual conditions or treatments. Talk to your doctor, nurse or pharmacist before following any medical regimen to see if it is safe and effective for you.

## 2022-01-28 NOTE — PROGRESS NOTES
OPIC Short Note                       Date: 2022    Name: Marta Esparza    MRN: [id number]         : 1980    Treatment: Venofer     [facility] COVID-19 SCREENING      The patient was asked the following questions and answered as documented below:    1. Do you have any symptoms of COVID-19? SOB, coughing, fever, or generally not feeling well? NO  2. Have you been exposed to COVID-19 recently? NO  3. Have you had any recent contact with family/friend that has a pending COVID test? NO      Follow Up: Proceed with treatment    Ms. Rhoda Singleton was assessed and education was provided. Problem: Knowledge Deficit  Goal: *Verbalizes understanding of procedures and medications  Outcome: Progressing Towards Goal     Problem: Patient Education:  Go to Education Activity  Goal: Patient/Family Education  Outcome: Progressing Towards Goal    Lines:   Peripheral IV 22 Right Antecubital (Active)   Site Assessment Clean, dry, & intact 22 09   Phlebitis Assessment 0 22 09   Infiltration Assessment 0 22 09   Dressing Status Clean, dry, & intact;New;Occlusive 22 09   Dressing Type Transparent 22   Hub Color/Line Status Yellow; Flushed;Patent 22 09        Ms. Drummond vitals were reviewed prior to and after treatment. Patient Vitals for the past 12 hrs:   Temp Pulse Resp BP SpO2   22 1048 -- (!) 105 -- (!) 145/68 --   22 1027 -- (!) 110 -- (!) 140/68 --   22 0955 97.5 °F (36.4 °C) (!) 108 18 (!) 149/77 100 %       Medications given:  Medications Administered       iron sucrose (VENOFER) injection 200 mg       Admin Date  2022 Action  Given Dose  200 mg Route  IntraVENous Administered By  Rogerio Reaves RN              sodium chloride (NS) flush 10 mL       Admin Date  2022 Action  Given Dose  10 mL Route  IntraVENous Administered By  Rogerio Reaves, MELANIA                    Ms. Rhoda Singleton tolerated the treatment without complaints. Patient observed for 30 mins post infusion. Ms. Stas Juárez was discharged from [place] [address] in stable condition at 1055.       Patient provided with AVS , which includes future appointment and written educational material.     Future Appointments   Date Time Provider Janes Greene   2/4/2022 10:00 AM [address] [place] [address] 2 [address] [place] [address]   2/11/2022  1:00 PM [address] [place] [address] 1 Lonnie LUNDBERG [address] [place] [address]   2/18/2022 10:00 AM [address] [place] [address] 1 [facility]   2/25/2022 10:00 AM [address] [place] [address] [facility]   3/15/2022 10:50 AM Lore Aranda MD RDE [facility] [address] KIMBERLY Irizarry RN  January 28, 2022  10:22 AM

## 2022-02-04 ENCOUNTER — APPOINTMENT (OUTPATIENT)
Dept: INFUSION THERAPY | Age: 42
End: 2022-02-04

## 2022-02-04 ENCOUNTER — HOSPITAL ENCOUNTER (OUTPATIENT)
Dept: INFUSION THERAPY | Age: 42
Discharge: HOME | End: 2022-02-04
Payer: MEDICARE

## 2022-02-04 VITALS
WEIGHT: 225 LBS | DIASTOLIC BLOOD PRESSURE: 79 MMHG | RESPIRATION RATE: 16 BRPM | TEMPERATURE: 97.5 F | BODY MASS INDEX: 38.62 KG/M2 | HEART RATE: 74 BPM | SYSTOLIC BLOOD PRESSURE: 147 MMHG | OXYGEN SATURATION: 99 %

## 2022-02-04 DIAGNOSIS — D50.9 IRON DEFICIENCY ANEMIA, UNSPECIFIED IRON DEFICIENCY ANEMIA TYPE: Primary | ICD-10-CM

## 2022-02-04 PROCEDURE — 74011250636 HC RX REV CODE- 250/636: Performed by: INTERNAL MEDICINE

## 2022-02-04 PROCEDURE — 96374 THER/PROPH/DIAG INJ IV PUSH: CPT

## 2022-02-04 RX ORDER — SODIUM CHLORIDE 9 MG/ML
25 INJECTION, SOLUTION INTRAVENOUS CONTINUOUS
Status: DISPENSED | OUTPATIENT
Start: 2022-02-04 | End: 2022-02-04

## 2022-02-04 RX ADMIN — IRON SUCROSE 200 MG: 20 INJECTION, SOLUTION INTRAVENOUS at 10:00

## 2022-02-04 RX ADMIN — SODIUM CHLORIDE 25 ML/HR: 9 INJECTION, SOLUTION INTRAVENOUS at 09:54

## 2022-02-04 NOTE — PROGRESS NOTES
Outpatient Infusion Center Short Visit Progress Note    0945 Patient admitted to [facility] for Venofer 2/5 ambulatory in stable condition. Assessment completed. No new concerns voiced. Covid Screening      1. Do you have any symptoms of COVID-19? SOB, coughing, fever, or generally not feeling well ? NO  2. Have you been exposed to COVID-19 recently? NO  3. Have you had any recent contact with family/friend that has a pending COVID test? NO    Vital Signs:  Patient Vitals for the past 12 hrs:   Temp Pulse Resp BP SpO2   02/04/22 1039 -- 74 -- (!) 147/79 --   02/04/22 0946 97.5 °F (36.4 °C) 77 16 (!) 150/75 99 %       Peripheral IV 02/04/22 Anterior;Proximal;Right Forearm (Active)   Site Assessment Clean, dry, & intact 02/04/22 0945   Phlebitis Assessment 0 02/04/22 0945   Infiltration Assessment 0 02/04/22 0945   Dressing Status New 02/04/22 0945   Dressing Type Transparent 02/04/22 0945   Hub Color/Line Status Yellow; Flushed; Infusing 02/04/22 0945   Action Taken Open ports on tubing capped 02/04/22 0945   Alcohol Cap Used Yes 02/04/22 0945       Medications:  Medications Administered     0.9% sodium chloride infusion     Admin Date  02/04/2022 Action  New Bag Dose  25 mL/hr Rate  25 mL/hr Route  IntraVENous Administered By  Bethanie TRIANA          iron sucrose (VENOFER) injection 200 mg     Admin Date  02/04/2022 Action  Given Dose  200 mg Route  IntraVENous Administered By  Bethanie TRIANA              Patient monitored for 30 mins post infusion; no adverse reactions noted. Patient PIV flushed and removed, bandage placed over site. Patient tolerated treatment well. Patient discharged from [place] [address] ambulatory in no distress at 1045. Patient aware of next appointment.     Future Appointments   Date Time Provider Janes Greene   2/11/2022  1:00 PM [facility] INFUSION NURSE 1 [facility]   2/18/2022 10:00 AM [facility] INFUSION NURSE 1 [facility]   2/25/2022 10:00 AM [facility] INFUSION NURSE 1 [facility]   3/15/2022 10:50 AM Jarred Pruett MD RDE LAUR[facility] [address] AMB

## 2022-02-08 ENCOUNTER — HOSPITAL ENCOUNTER (EMERGENCY)
Age: 42
Discharge: HOME | End: 2022-02-08
Attending: EMERGENCY MEDICINE | Admitting: EMERGENCY MEDICINE
Payer: MEDICARE

## 2022-02-08 VITALS
RESPIRATION RATE: 18 BRPM | BODY MASS INDEX: 38.41 KG/M2 | TEMPERATURE: 98.2 F | OXYGEN SATURATION: 100 % | SYSTOLIC BLOOD PRESSURE: 197 MMHG | WEIGHT: 225 LBS | DIASTOLIC BLOOD PRESSURE: 104 MMHG | HEIGHT: 64 IN | HEART RATE: 79 BPM

## 2022-02-08 DIAGNOSIS — T78.40XA ALLERGIC REACTION, INITIAL ENCOUNTER: Primary | ICD-10-CM

## 2022-02-08 DIAGNOSIS — I10 ACCELERATED HYPERTENSION: ICD-10-CM

## 2022-02-08 DIAGNOSIS — R13.10 DIFFICULTY SWALLOWING SOLIDS: ICD-10-CM

## 2022-02-08 DIAGNOSIS — R13.12 OROPHARYNGEAL DYSPHAGIA: ICD-10-CM

## 2022-02-08 PROCEDURE — 99283 EMERGENCY DEPT VISIT LOW MDM: CPT

## 2022-02-08 PROCEDURE — 74011250637 HC RX REV CODE- 250/637: Performed by: EMERGENCY MEDICINE

## 2022-02-08 PROCEDURE — 74011000250 HC RX REV CODE- 250: Performed by: EMERGENCY MEDICINE

## 2022-02-08 RX ORDER — FAMOTIDINE 20 MG/1
20 TABLET, FILM COATED ORAL 2 TIMES DAILY
Qty: 20 TABLET | Refills: 0 | Status: SHIPPED | OUTPATIENT
Start: 2022-02-08 | End: 2022-02-08

## 2022-02-08 RX ORDER — LIDOCAINE HYDROCHLORIDE 20 MG/ML
15 SOLUTION OROPHARYNGEAL AS NEEDED
Qty: 1 EACH | Refills: 0 | Status: SHIPPED | OUTPATIENT
Start: 2022-02-08 | End: 2022-03-15

## 2022-02-08 RX ORDER — LIDOCAINE HYDROCHLORIDE 20 MG/ML
15 SOLUTION OROPHARYNGEAL
Status: COMPLETED | OUTPATIENT
Start: 2022-02-08 | End: 2022-02-08

## 2022-02-08 RX ORDER — PREDNISONE 50 MG/1
50 TABLET ORAL DAILY
Qty: 3 TABLET | Refills: 0 | Status: SHIPPED | OUTPATIENT
Start: 2022-02-08 | End: 2022-02-08

## 2022-02-08 RX ORDER — FAMOTIDINE 20 MG/1
20 TABLET, FILM COATED ORAL
Status: DISCONTINUED | OUTPATIENT
Start: 2022-02-08 | End: 2022-02-08

## 2022-02-08 RX ORDER — PREDNISOLONE 15 MG/5ML
30 SOLUTION ORAL DAILY
Qty: 50 ML | Refills: 0 | Status: SHIPPED | OUTPATIENT
Start: 2022-02-08 | End: 2022-02-13

## 2022-02-08 RX ORDER — CLONIDINE HYDROCHLORIDE 0.1 MG/1
0.2 TABLET ORAL
Status: DISCONTINUED | OUTPATIENT
Start: 2022-02-08 | End: 2022-02-08

## 2022-02-08 RX ORDER — DIPHENHYDRAMINE HCL 12.5MG/5ML
50 LIQUID (ML) ORAL
Qty: 472 ML | Refills: 0 | OUTPATIENT
Start: 2022-02-08 | End: 2022-03-23

## 2022-02-08 RX ORDER — FAMOTIDINE 40 MG/5ML
20 POWDER, FOR SUSPENSION ORAL ONCE
Status: DISCONTINUED | OUTPATIENT
Start: 2022-02-08 | End: 2022-02-08

## 2022-02-08 RX ORDER — DEXAMETHASONE SODIUM PHOSPHATE 100 MG/10ML
10 INJECTION INTRAMUSCULAR; INTRAVENOUS
Status: COMPLETED | OUTPATIENT
Start: 2022-02-08 | End: 2022-02-08

## 2022-02-08 RX ORDER — FAMOTIDINE 40 MG/5ML
40 POWDER, FOR SUSPENSION ORAL 2 TIMES DAILY
Qty: 100 ML | Refills: 0 | Status: SHIPPED | OUTPATIENT
Start: 2022-02-08 | End: 2022-02-18

## 2022-02-08 RX ORDER — DIPHENHYDRAMINE HCL 25 MG
50 CAPSULE ORAL
Qty: 20 CAPSULE | Refills: 0 | Status: SHIPPED | OUTPATIENT
Start: 2022-02-08 | End: 2022-02-08

## 2022-02-08 RX ADMIN — DEXAMETHASONE SODIUM PHOSPHATE 10 MG: 10 INJECTION INTRAMUSCULAR; INTRAVENOUS at 01:18

## 2022-02-08 RX ADMIN — LIDOCAINE HYDROCHLORIDE 15 ML: 20 SOLUTION ORAL; TOPICAL at 01:18

## 2022-02-08 NOTE — Clinical Note
[address]  [facility] EMERGENCY DEPT  [address] [facility] [address] [phone]    Work/School Note    Date: 2/8/2022    To Whom It May concern: Hipolito Yousif was seen and treated today in the emergency room by the following provider(s):  Attending Provider: Tawana Barajas MD.      Hipolito Yousif is excused from work/school on 02/08/22 and 02/09/22. She is medically clear to return to work/school on 2/10/2022.        Sincerely,          Waqar Lobato MD

## 2022-02-08 NOTE — Clinical Note
[address] EMERGENCY DEPT  [address] [facility] [address] [phone]    Work/School Note    Date: 2/8/2022    To Whom It May concern: Ronit Horn was seen and treated today in the emergency room by the following provider(s):  Attending Provider: Christina Estevez MD.      Ronit Horn is excused from work/school on 02/08/22 and 02/09/22. She is medically clear to return to work/school on 2/10/2022.        Sincerely,          Leila Manjarrez MD Outgoing call to the patient. She questions if she needs to complete a colon prep at home prior to her procedure. She is informed that she will be given a fleets enema in pre-op prior to the procedure. Patient verbalized understanding. She questions if she can start a stool softener prior to the procedure. Advised that is ok. Discussed keeping the stool soft/avoiding constipation after the procedure. Patient verbalized understanding. She questions if she will go home on an antibiotic. Patient informed that she will be given an antibiotic while she is here but will not go home on one. Patient verbalized understanding. Encouraged to call if she has any other questions or concerns.

## 2022-02-08 NOTE — DISCHARGE INSTRUCTIONS
Thank You! It was a pleasure taking care of you in our Emergency Department today. We know that when you come to [address] [place] [address], you are entrusting us with your health, comfort, and safety. Our physicians and nurses honor that trust, and truly appreciate the opportunity to care for you and your loved ones. We also value your feedback. If you receive a survey about your Emergency Department experience today, please fill it out. We care about our patients' feedback, and we listen to what you have to say. Thank you. Dr. Elias Lake M.D.      ____________________________________________________________________  I have included a copy of your lab results and/or radiologic studies from today's visit so you can have them easily available at your follow-up visit. We hope you feel better and please do not hesitate to contact the ED if you have any questions at all! No results found for this or any previous visit (from the past 12 hour(s)). No orders to display     CT Results  (Last 48 hours)      None          The exam and treatment you received in the Emergency Department were for an urgent problem and are not intended as complete care. It is important that you follow up with a doctor, nurse practitioner, or physician assistant for ongoing care. If your symptoms become worse or you do not improve as expected and you are unable to reach your usual health care provider, you should return to the Emergency Department. We are available 24 hours a day. Please take your discharge instructions with you when you go to your follow-up appointment. If a prescription has been provided, please have it filled as soon as possible to prevent a delay in treatment. Read the entire medication instruction sheet provided to you by the pharmacy.  If you have any questions or reservations about taking the medication due to side effects or interactions with other medications, please call your primary care physician or contact the ER to speak with the charge nurse. Please make an appointment with your family doctor or the physician you were referred to for follow-up of this visit as instructed on your discharge paperwork. Return to the ER if you are unable to be seen or if you are unable to be seen in a timely manner. If you have any problem arranging the follow-up visit, contact the Emergency Department immediately.

## 2022-02-08 NOTE — Clinical Note
[facility] EMERGENCY DEPT  [address] [address] [phone]    Work/School Note    Date: 2/8/2022    To Whom It May concern: Je Burt was seen and treated today in the emergency room by the following provider(s):  Attending Provider: Jose Beckham MD.      Je Burt is excused from work/school on 02/08/22 and 02/09/22. She is medically clear to return to work/school on 2/10/2022.        Sincerely,          Sung Chou MD

## 2022-02-08 NOTE — ED PROVIDER NOTES
EMERGENCY DEPARTMENT HISTORY AND PHYSICAL EXAM      Please note that this dictation was completed with the assistance of \"Dragon\", the computer voice recognition software. Quite often unanticipated grammatical, syntax, homophones, and other interpretive errors are inadvertently transcribed by the computer software. Please disregard these errors and any errors that have escaped final proofreading. Thank you. Patient: Rob Ruiz  DOS: 22  : 1980  MRN: [id number]  History of Presenting Illness     Chief Complaint   Patient presents with    Dysphagia     patient states ate fish earlier and feels like it is still stuck in her throat; has taken zofran and benadryl for it     History Provided By: Patient/family/EMS (if applicable)    HPI: Rob Ruiz, 35 y.o. female with past medical history as documented below presents to the ED with c/o of difficulty swallowing, globus sensation and sore throat. Patient states that she ate some fish earlier today and feels like he may have gotten stuck in her throat. She denies any nausea or vomiting. Denies any voice changes. She states that she has dealt with dysphagia to solids for several months now. She also expresses possible allergic reaction. She did take Benadryl and Zofran prior to arrival.  Denies any prior history of anaphylaxis. Denies any hematemesis, hemoptysis or weight loss. Pt denies any other exacerbating or ameliorating factors. Additionally, pt specifically denies any recent fever, chills, headache, nausea, vomiting, abdominal pain, CP, SOB, lightheadedness, dizziness, numbness, weakness, lower extremity swelling, heart palpitations, urinary sxs, diarrhea, constipation, melena, hematochezia, cough, or congestion. There are no other complaints, changes or physical findings pertinent to the HPI at this time.     PCP: Karen, MD Oziel  Past History   Past Medical History:  Past Medical History:   Diagnosis Date    Abnormal Pap smear \"years ago\"     Asthma     bronchitis    [facility]     Depression with anxiety 2010    Diabetes mellitus     type II diabetes mellitus since     GERD (gastroesophageal reflux disease)     HTN (hypertension) 2010    Ill-defined condition     High Cholesterol    Mood disorder ([facility]     Other and unspecified hyperlipidemia 2010    Postpartum depression     hospitalized after last delivery    Rhinitis 2012    Sleep apnea     uses cpap    UTI (urinary tract infection)        Past Surgical History:  Past Surgical History:   Procedure Laterality Date    HX  SECTION      X 3    HX GYN      , tubal ligation    MA  DELIVERY ONLY      cesearean section 08       Family History:   Family history reviewed and was non-contributory, unless specified below:  Family History   Problem Relation Age of Onset    Diabetes Mother     Hypertension Mother     Stroke Mother     Heart Disease Mother     Hypertension Father     Stroke Father     Diabetes Father     Kidney Disease Father     Hypertension Maternal Grandmother     Diabetes Maternal Grandmother     Hypertension Maternal Grandfather     Diabetes Maternal Grandfather     Hypertension Paternal Grandmother     Diabetes Paternal Grandmother     Hypertension Paternal Grandfather     Diabetes Paternal Grandfather     Hypertension Sister     Diabetes Sister     Diabetes Sister     Hypertension Sister     Diabetes Sister     Hypertension Sister        Social History:  Social History     Tobacco Use    Smoking status: Former Smoker     Quit date: 2019     Years since quittin.6    Smokeless tobacco: Never Used   Substance Use Topics    Alcohol use: Not Currently    Drug use: Not Currently     Types: Marijuana       Allergies:   Allergies   Allergen Reactions    Latex Hives     rash    Other Food Hives     Citrus Fruits    Flagyl [Metronidazole] Hives and Itching    Lisinopril-Hydrochlorothiazide Angioedema    Clindamycin Rash and Itching    Ciprofloxacin Rash and Itching    Citrate Hives    Cottonseed Oil Rash    Cymbalta [Duloxetine] Hives and Itching    Flexeril [Cyclobenzaprine] Angioedema    Lemon Hives    Pcn [Penicillins] Hives    Shrimp Itching    Sulfa (Sulfonamide Antibiotics) Shortness of Breath    Tomato Hives       Current Medications:  No current facility-administered medications on file prior to encounter. Current Outpatient Medications on File Prior to Encounter   Medication Sig Dispense Refill    ondansetron hcl (Zofran) 4 mg tablet Take 1 Tablet by mouth every eight (8) hours as needed for Nausea. 20 Tablet 0    ondansetron hcl (Zofran) 4 mg tablet Take 1 Tablet by mouth every eight (8) hours as needed for Nausea. 20 Tablet 0    acetaminophen (TYLENOL) 325 mg tablet Take 2 Tablets by mouth every four (4) hours as needed for Pain. 20 Tablet 0    albuterol (PROVENTIL HFA, VENTOLIN HFA, PROAIR HFA) 90 mcg/actuation inhaler Take 1-2 Puffs by inhalation every four (4) hours as needed for Wheezing. 17 g 0    metoclopramide HCl (Reglan) 5 mg tablet Reglan   5 mg 4 ties a day      aspirin 81 mg chewable tablet Take 81 mg by mouth daily.  esomeprazole (NEXIUM) 40 mg capsule Take 1 Cap by mouth daily. 20 Cap 0    metFORMIN (GLUCOPHAGE) 1,000 mg tablet Take 1,000 mg by mouth two (2) times daily (with meals). Indications: type 2 diabetes mellitus      losartan (COZAAR) 25 mg tablet Take 50 mg by mouth daily. Indications: high blood pressure       Review of Systems   A complete ROS was reviewed by me today and all other systems negative, unless otherwise specified below:  Review of Systems   Constitutional: Negative. Negative for chills and fever. HENT: Positive for sore throat and trouble swallowing. Negative for congestion. Eyes: Negative. Respiratory: Negative. Negative for cough, chest tightness, shortness of breath and wheezing. Cardiovascular: Negative. Negative for chest pain, palpitations and leg swelling. Gastrointestinal: Negative. Negative for abdominal distention, abdominal pain, blood in stool, constipation, diarrhea, nausea and vomiting. Endocrine: Negative. Genitourinary: Negative. Negative for difficulty urinating, dysuria, flank pain, frequency, hematuria and urgency. Musculoskeletal: Negative. Negative for back pain and neck stiffness. Skin: Negative. Negative for rash. Allergic/Immunologic: Negative. Neurological: Negative. Negative for dizziness, syncope, weakness, light-headedness, numbness and headaches. Hematological: Negative. Psychiatric/Behavioral: Negative. Negative for confusion and self-injury. Physical Exam   Physical Exam  Vitals and nursing note reviewed. Constitutional:       General: She is not in acute distress. Appearance: She is well-developed. She is not diaphoretic. HENT:      Head: Normocephalic and atraumatic. Mouth/Throat:      Pharynx: No oropharyngeal exudate. Comments: No posterior pharyngeal swelling or erythema, no foreign body visualized, tolerating secretions well, no trismus noted  Eyes:      Conjunctiva/sclera: Conjunctivae normal.   Cardiovascular:      Rate and Rhythm: Normal rate and regular rhythm. Heart sounds: Normal heart sounds. Pulmonary:      Effort: Pulmonary effort is normal. No respiratory distress. Breath sounds: Normal breath sounds. No wheezing or rales. Chest:      Chest wall: No tenderness. Abdominal:      General: Bowel sounds are normal. There is no distension. Palpations: Abdomen is soft. There is no mass. Tenderness: There is no abdominal tenderness. There is no guarding or rebound. Musculoskeletal:         General: Normal range of motion. Cervical back: Normal range of motion. Skin:     General: Skin is warm.    Neurological:      Mental Status: She is alert and oriented to person, place, and time. Cranial Nerves: No cranial nerve deficit. Motor: No abnormal muscle tone. Diagnostic Study Results     Laboratory Data  I have personally reviewed and interpreted all available laboratory results. No results found for this or any previous visit (from the past 24 hour(s)). Radiologic Studies   I have personally reviewed and interpreted all available imaging studies and agree with radiology interpretation. No orders to display     CT Results  (Last 48 hours)    None        CXR Results  (Last 48 hours)    None        Medical Decision Making   I am the first and primary ED physician for this patient's ED visit today. I reviewed our electronic medical record system for any past medical records that may contribute to the patient's current condition, including their past medical history, surgical history, social and family history. This also includes their most recent ED visits, previous hospitalizations and prior diagnostic data. I have reviewed and summarized the most pertinent findings in my HPI and MDM. Vital Signs Reviewed:  Patient Vitals for the past 24 hrs:   Temp Pulse Resp BP SpO2   02/08/22 0129 -- -- -- (!) 197/104 --   02/08/22 0053 98.2 °F (36.8 °C) 79 18 (!) 214/109 100 %     Pulse Oximetry Analysis: 100% on RA    Cardiac Monitor:   Rate: 79 bpm  The cardiac monitor revealed the following rhythm as interpreted by me: Normal Sinus Rhythm  Cardiac monitoring was ordered to monitor patient for signs of cardiac dysrhythmia, which they are at risk for based on their history and/or risk for cardiovascular disease and/or metabolic abnormalities. Records Reviewed: Nursing Notes, Old Medical Records, Previous electrocardiograms, Previous Radiology Studies and Previous Laboratory Studies, EMS reports    Provider Notes (Medical Decision Making):   Patient resents with solid dysphagia. Pt also expresses concern for possible allergic reaction.   Stable vitals without any significant airway compromise. No red flags such as hemoptysis, hematemesis or weight loss. Will try steroids, benadryl, pepcid and continue to monitor here for exacerbation. May need intervention for airway protection, TXA or FFP. Reassess. Will refer to GI for outpatient EGD and evaluation. No further labs or imaging current indicated. On clinical exam, the patient has no peritonsillar abscess, voice chances or uvula deviation to suggest peritonsillar abscess  There is no drooling, tripodding, voice changes, dysphagia/odynophagia, or difficulty breathing to suggest epiglottitis  There are no voices changes, buldge to back of throat, dysphagia/odynophagia, difficulty with flexing/extending neck to suggest retreophayngeal abscess  There is no induration to the sumental area to suggest Ludwigs angina. Furthermore, dentition is not poor    ED Course:   Initial assessment performed. I discussed presenting problems and concerns, and my formulated plan for today's visit with the patient and any available family members. I have encouraged them to ask questions as they arise throughout the visit.    Social History     Tobacco Use    Smoking status: Former Smoker     Quit date: 2019     Years since quittin.6    Smokeless tobacco: Never Used   Substance Use Topics    Alcohol use: Not Currently    Drug use: Not Currently     Types: Marijuana       ED Orders Placed:  Orders Placed This Encounter    lidocaine (XYLOCAINE) 2 % viscous solution 15 mL    dexamethasone (DECADRON) 10 mg/mL Oral 10 mg    DISCONTD: famotidine (PEPCID) tablet 20 mg    DISCONTD: cloNIDine HCL (CATAPRES) tablet 0.2 mg    DISCONTD: predniSONE (DELTASONE) 50 mg tablet    DISCONTD: famotidine (Pepcid) 20 mg tablet    DISCONTD: diphenhydrAMINE (BenadryL) 25 mg capsule    lidocaine (Lidocaine Viscous) 2 % solution    DISCONTD: famotidine (PEPCID) 40 mg/5 mL (8 mg/mL) oral suspension 20 mg    diphenhydrAMINE (Benadryl Allergy) 12.5 mg/5 mL oral liquid    famotidine (PEPCID) 40 mg/5 mL (8 mg/mL) suspension    prednisoLONE (PRELONE) 15 mg/5 mL syrup       ED Medications Administered During ED Course:  Medications   lidocaine (XYLOCAINE) 2 % viscous solution 15 mL (15 mL Mouth/Throat Given 2/8/22 0118)   dexamethasone (DECADRON) 10 mg/mL Oral 10 mg (10 mg Oral Given 2/8/22 0118)        Progress Note:  I have just re-evaluated the patient. Patient reports improvement of sx's. I have reviewed Her vital signs and determined there is currently no worsening in their condition or physical exam. Results have been reviewed with them and their questions have been answered. We will continue to review further results as they come available. Progress Note:  I have re-examined the patient. Pt states she feels much better and symptoms improved. Tolerating oral intake. Abdomen is soft and without guarding, rebound or other peritoneal signs. I have discussed with patient the importance of close f/u and to return to the ED if symptoms don't improve or worsen. Progress Note:  Pt reassessed and symptoms noted to have improved significantly after ED treatment. Pt is clinically stable for discharge. Pedro Mir's labs and imaging have been reviewed with her and available family. She verbally conveys understanding and agreement of the signs, symptoms, diagnosis, treatment and prognosis and additionally agrees to follow up as recommended with Dr. Marin Barrera MD and/or specialist as instructed. She agrees with the care plan we have created and conveys that all of her questions have been answered. Additionally, I have put together a packet of discharge instructions for her that include: 1) educational information regarding their diagnosis, 2) how to care for their diagnosis at home, as well a 3) list of reasons why they would want to return to the ED prior to their follow-up appointment should the patient's condition change or symptoms worsen.      I have answered all questions to the patient's satisfaction. Strict return precautions given. She conveyed understanding and agreement with care plan. Vital signs stable for discharge. Disposition:  DISCHARGE  The pt is ready for discharge. The pt's signs, symptoms, diagnosis, and discharge instructions have been discussed and pt has conveyed their understanding. The pt is to follow up as recommended or return to ER should their symptoms worsen. Plan has been discussed and pt is in agreement. Plan:  1. Return precautions as discussed with patient and available family/caregiver. 2.   Discharge Medication List as of 2/8/2022  1:29 AM      START taking these medications    Details   lidocaine (Lidocaine Viscous) 2 % solution Take 15 mL by mouth as needed for Pain., Normal, Disp-1 Each, R-0      diphenhydrAMINE (Benadryl Allergy) 12.5 mg/5 mL oral liquid Take 20 mL by mouth four (4) times daily as needed for Allergic Response., Normal, Disp-472 mL, R-0      famotidine (PEPCID) 40 mg/5 mL (8 mg/mL) suspension Take 5 mL by mouth two (2) times a day for 10 days. , Normal, Disp-100 mL, R-0      prednisoLONE (PRELONE) 15 mg/5 mL syrup Take 10 mL by mouth daily for 5 days. , Normal, Disp-50 mL, R-0         CONTINUE these medications which have NOT CHANGED    Details   !! ondansetron hcl (Zofran) 4 mg tablet Take 1 Tablet by mouth every eight (8) hours as needed for Nausea., Normal, Disp-20 Tablet, R-0      !! ondansetron hcl (Zofran) 4 mg tablet Take 1 Tablet by mouth every eight (8) hours as needed for Nausea., Normal, Disp-20 Tablet, R-0      acetaminophen (TYLENOL) 325 mg tablet Take 2 Tablets by mouth every four (4) hours as needed for Pain., Normal, Disp-20 Tablet, R-0      albuterol (PROVENTIL HFA, VENTOLIN HFA, PROAIR HFA) 90 mcg/actuation inhaler Take 1-2 Puffs by inhalation every four (4) hours as needed for Wheezing., Normal, Disp-17 g, R-0      metoclopramide HCl (Reglan) 5 mg tablet Reglan   5 mg 4 ties a day, Historical Med      aspirin 81 mg chewable tablet Take 81 mg by mouth daily. , Historical Med      esomeprazole (NEXIUM) 40 mg capsule Take 1 Cap by mouth daily. , Print, Disp-20 Cap, R-0      metFORMIN (GLUCOPHAGE) 1,000 mg tablet Take 1,000 mg by mouth two (2) times daily (with meals). Indications: type 2 diabetes mellitus, Historical Med      losartan (COZAAR) 25 mg tablet Take 50 mg by mouth daily. Indications: high blood pressure, Historical Med       !! - Potential duplicate medications found. Please discuss with provider. 3.   Follow-up Information     Follow up With Specialties Details Why [address] [facility] EMERGENCY DEPT Emergency Medicine  As needed, If symptoms worsen [address] [facility] [phone]        Instructed to return to ED if worse  Diagnosis   Clinical Impression:  1. Allergic reaction, initial encounter    2. Oropharyngeal dysphagia    3. Accelerated hypertension    4. Difficulty swallowing solids      Attestation:  Yokasta Blackburn MD, am the attending of record for this patient. I personally performed the services described in this documentation on this date, 2/8/2022 for patient, Chauncey Reynolds. I have reviewed the chart and verified that the record is accurate and complete.

## 2022-02-11 ENCOUNTER — HOSPITAL ENCOUNTER (OUTPATIENT)
Dept: INFUSION THERAPY | Age: 42
Discharge: HOME | End: 2022-02-11
Payer: MEDICARE

## 2022-02-11 ENCOUNTER — APPOINTMENT (OUTPATIENT)
Dept: INFUSION THERAPY | Age: 42
End: 2022-02-11

## 2022-02-11 VITALS
TEMPERATURE: 97.9 F | DIASTOLIC BLOOD PRESSURE: 77 MMHG | SYSTOLIC BLOOD PRESSURE: 146 MMHG | HEART RATE: 77 BPM | RESPIRATION RATE: 18 BRPM | OXYGEN SATURATION: 99 %

## 2022-02-11 DIAGNOSIS — D50.9 IRON DEFICIENCY ANEMIA, UNSPECIFIED IRON DEFICIENCY ANEMIA TYPE: Primary | ICD-10-CM

## 2022-02-11 PROCEDURE — 96374 THER/PROPH/DIAG INJ IV PUSH: CPT

## 2022-02-11 PROCEDURE — 74011250636 HC RX REV CODE- 250/636: Performed by: INTERNAL MEDICINE

## 2022-02-11 PROCEDURE — 74011000250 HC RX REV CODE- 250: Performed by: INTERNAL MEDICINE

## 2022-02-11 RX ORDER — SODIUM CHLORIDE 0.9 % (FLUSH) 0.9 %
10 SYRINGE (ML) INJECTION AS NEEDED
Status: DISCONTINUED | OUTPATIENT
Start: 2022-02-11 | End: 2022-02-12 | Stop reason: HOSPADM

## 2022-02-11 RX ORDER — SODIUM CHLORIDE 9 MG/ML
25 INJECTION, SOLUTION INTRAVENOUS CONTINUOUS
Status: DISCONTINUED | OUTPATIENT
Start: 2022-02-11 | End: 2022-02-12 | Stop reason: HOSPADM

## 2022-02-11 RX ADMIN — IRON SUCROSE 200 MG: 20 INJECTION, SOLUTION INTRAVENOUS at 13:14

## 2022-02-11 RX ADMIN — SODIUM CHLORIDE, PRESERVATIVE FREE 10 ML: 5 INJECTION INTRAVENOUS at 13:19

## 2022-02-11 RX ADMIN — SODIUM CHLORIDE, PRESERVATIVE FREE 10 ML: 5 INJECTION INTRAVENOUS at 13:12

## 2022-02-11 NOTE — PROGRESS NOTES
[facility] Short Note                       Date: 2022    Name: Tolu Angel    MRN: [id number]         : 1980    Treatment: Venofer 3/5    [facility] COVID-19 SCREENING      The patient was asked the following questions and answered as documented below:    1. Do you have any symptoms of COVID-19? SOB, coughing, fever, or generally not feeling well? NO  2. Have you been exposed to COVID-19 recently? NO  3. Have you had any recent contact with family/friend that has a pending COVID test? NO      Follow Up: Proceed with treatment    Ms. Devorah Rodriges was assessed and education was provided. Problem: Knowledge Deficit  Goal: *Verbalizes understanding of procedures and medications  Description: Patient here for Venofer   Outcome: Progressing Towards Goal     Problem: Patient Education:  Go to Education Activity  Goal: Patient/Family Education  Outcome: Progressing Towards Goal    Lines: 24 gauge IV placed to the RAC without difficulty. Peripheral IV 22 Right Antecubital (Active)   Site Assessment Clean, dry, & intact 22   Phlebitis Assessment 0 22   Infiltration Assessment 0 22   Dressing Status Clean, dry, & intact;New;Occlusive 22   Dressing Type Transparent 22   Hub Color/Line Status Yellow; Flushed;Patent 22        Ms. Drummond vitals were reviewed prior to and after treatment.    Patient Vitals for the past 12 hrs:   Temp Pulse Resp BP SpO2   22 1349 -- 77 -- (!) 146/77 --   22 1305 97.9 °F (36.6 °C) 76 18 (!) 166/79 99 %       Medications given:  Medications Administered       iron sucrose (VENOFER) injection 200 mg       Admin Date  2022 Action  Given Dose  200 mg Route  IntraVENous Administered By  Cyril Wakefield RN              sodium chloride (NS) flush 10 mL       Admin Date  2022 Action  Given Dose  10 mL Route  IntraVENous Administered By  Cyril Wakefield RN               Admin Date  2022 Action  Given Dose  10 mL Route  IntraVENous Administered By  Rogerio Reaves RN                    Ms. Rhoda Singleton tolerated the treatment without complaints. Patient observed for 30 mins, then IV flushed and removed. Ms. Rhoda Singleton was discharged from [place] [address] in stable condition at 1350.       Patient provided with AVS , which includes future appointment and written educational material.     Future Appointments   Date Time Provider Janes Greene   2/18/2022 10:00 AM [address] [place] [address] [address] [place] [address]   2/25/2022 10:00 AM [address] [place] [address] [place]  [facility]. [facility]   3/15/2022 10:50 AM Deborah Sloan MD RDE LAURIE [address] BS AMB       Dimple Davies RN  February 11, 2022  1:45 PM

## 2022-02-18 ENCOUNTER — HOSPITAL ENCOUNTER (OUTPATIENT)
Dept: INFUSION THERAPY | Age: 42
Discharge: HOME | End: 2022-02-18
Payer: MEDICARE

## 2022-02-18 ENCOUNTER — APPOINTMENT (OUTPATIENT)
Dept: INFUSION THERAPY | Age: 42
End: 2022-02-18

## 2022-02-18 VITALS
OXYGEN SATURATION: 100 % | DIASTOLIC BLOOD PRESSURE: 86 MMHG | SYSTOLIC BLOOD PRESSURE: 143 MMHG | TEMPERATURE: 98.2 F | RESPIRATION RATE: 18 BRPM | HEART RATE: 81 BPM

## 2022-02-18 DIAGNOSIS — D50.9 IRON DEFICIENCY ANEMIA, UNSPECIFIED IRON DEFICIENCY ANEMIA TYPE: Primary | ICD-10-CM

## 2022-02-18 PROCEDURE — 96374 THER/PROPH/DIAG INJ IV PUSH: CPT

## 2022-02-18 PROCEDURE — 74011000250 HC RX REV CODE- 250: Performed by: INTERNAL MEDICINE

## 2022-02-18 PROCEDURE — 74011250636 HC RX REV CODE- 250/636: Performed by: INTERNAL MEDICINE

## 2022-02-18 RX ORDER — SODIUM CHLORIDE 0.9 % (FLUSH) 0.9 %
10 SYRINGE (ML) INJECTION AS NEEDED
Status: DISPENSED | OUTPATIENT
Start: 2022-02-18 | End: 2022-02-18

## 2022-02-18 RX ADMIN — SODIUM CHLORIDE, PRESERVATIVE FREE 10 ML: 5 INJECTION INTRAVENOUS at 09:55

## 2022-02-18 RX ADMIN — SODIUM CHLORIDE, PRESERVATIVE FREE 10 ML: 5 INJECTION INTRAVENOUS at 09:59

## 2022-02-18 RX ADMIN — IRON SUCROSE 200 MG: 20 INJECTION, SOLUTION INTRAVENOUS at 09:55

## 2022-02-18 NOTE — PROGRESS NOTES
[facility] Short Note                       Date: 2022    Name: Dahlia Kamara    MRN: [id number]         : 1980    Treatment: Venofer     [facility] COVID-19 SCREENING      The patient was asked the following questions and answered as documented below:    1. Do you have any symptoms of COVID-19? SOB, coughing, fever, or generally not feeling well? NO  2. Have you been exposed to COVID-19 recently? NO  3. Have you had any recent contact with family/friend that has a pending COVID test? NO      Follow Up: Proceed with treatment    Ms. Richy Aranda was assessed and education was provided. Problem: Knowledge Deficit  Goal: *Verbalizes understanding of procedures and medications  Description: Patient here for Venofer   Outcome: Progressing Towards Goal     Problem: Patient Education:  Go to Education Activity  Goal: Patient/Family Education  Outcome: Progressing Towards Goal    Lines: 24 gauge IV placed to the RAC without difficulty. Peripheral IV 22 Right Antecubital (Active)   Site Assessment Clean, dry, & intact 22 0954   Phlebitis Assessment 0 22 0954   Infiltration Assessment 0 22 0954   Dressing Status Clean, dry, & intact;New;Occlusive 22 09   Dressing Type Transparent 22   Hub Color/Line Status Yellow; Flushed;Patent 22 09        Ms. Drummond vitals were reviewed prior to and after treatment.    Patient Vitals for the past 12 hrs:   Temp Pulse Resp BP SpO2   22 1030 -- 81 -- (!) 143/86 --   22 0950 98.2 °F (36.8 °C) 82 18 (!) 157/89 100 %         Medications given:  Medications Administered       iron sucrose (VENOFER) injection 200 mg       Admin Date  2022 Action  Given Dose  200 mg Route  IntraVENous Administered By  Juan C Bahkta RN              sodium chloride (NS) flush 10 mL       Admin Date  2022 Action  Given Dose  10 mL Route  IntraVENous Administered By  Juan C Bhakta RN               Admin Date  2022 Action  Given Dose  10 mL Route  IntraVENous Administered By  aSsha Beck RN                    Ms. Volodymyr Ruiz tolerated the treatment without complaints. IV flushed and removed. Ms. Volodymyr Ruiz was discharged from [place] [address] in stable condition at 1035.       Patient refused AVS    Future Appointments   Date Time Provider Janes Salgadoi   2/25/2022 10:00  [address]   3/15/2022 10:50 AM Lars Morrell MD RDE [facility] [address] [facility] AMB       Gemini Bains RN  February 18, 2022  10:10 AM

## 2022-02-25 ENCOUNTER — APPOINTMENT (OUTPATIENT)
Dept: INFUSION THERAPY | Age: 42
End: 2022-02-25

## 2022-02-25 ENCOUNTER — HOSPITAL ENCOUNTER (OUTPATIENT)
Dept: INFUSION THERAPY | Age: 42
Discharge: HOME | End: 2022-02-25
Payer: MEDICARE

## 2022-02-25 VITALS
SYSTOLIC BLOOD PRESSURE: 138 MMHG | RESPIRATION RATE: 18 BRPM | OXYGEN SATURATION: 100 % | HEART RATE: 91 BPM | DIASTOLIC BLOOD PRESSURE: 78 MMHG | TEMPERATURE: 98.1 F

## 2022-02-25 DIAGNOSIS — D50.9 IRON DEFICIENCY ANEMIA, UNSPECIFIED IRON DEFICIENCY ANEMIA TYPE: Primary | ICD-10-CM

## 2022-02-25 PROCEDURE — 74011000250 HC RX REV CODE- 250: Performed by: INTERNAL MEDICINE

## 2022-02-25 PROCEDURE — 96374 THER/PROPH/DIAG INJ IV PUSH: CPT

## 2022-02-25 PROCEDURE — 74011250636 HC RX REV CODE- 250/636: Performed by: INTERNAL MEDICINE

## 2022-02-25 RX ORDER — SODIUM CHLORIDE 0.9 % (FLUSH) 0.9 %
10 SYRINGE (ML) INJECTION AS NEEDED
Status: DISPENSED | OUTPATIENT
Start: 2022-02-25 | End: 2022-02-25

## 2022-02-25 RX ADMIN — IRON SUCROSE 200 MG: 20 INJECTION, SOLUTION INTRAVENOUS at 10:00

## 2022-02-25 RX ADMIN — SODIUM CHLORIDE, PRESERVATIVE FREE 10 ML: 5 INJECTION INTRAVENOUS at 10:08

## 2022-02-25 RX ADMIN — SODIUM CHLORIDE, PRESERVATIVE FREE 10 ML: 5 INJECTION INTRAVENOUS at 09:52

## 2022-02-25 NOTE — PROGRESS NOTES
[facility] Short Note                       Date: 2022    Name: Valeria Edwards    MRN: [id number]         : 1980    Treatment: Cedrick Walls    [facility] COVID-19 SCREENING      The patient was asked the following questions and answered as documented below:    1. Do you have any symptoms of COVID-19? SOB, coughing, fever, or generally not feeling well? NO  2. Have you been exposed to COVID-19 recently? NO  3. Have you had any recent contact with family/friend that has a pending COVID test? NO      Follow Up: Proceed with treatment    Ms. Volodymyr Ruiz was assessed and education was provided. Problem: Knowledge Deficit  Goal: *Verbalizes understanding of procedures and medications  Description: Patient here for Venofer   Outcome: Progressing Towards Goal     Problem: Patient Education:  Go to Education Activity  Goal: Patient/Family Education  Outcome: Progressing Towards Goal    Lines: 24 gauge IV placed to the RAC without difficulty  Peripheral IV 22 Right Antecubital (Active)   Site Assessment Clean, dry, & intact 22 09   Phlebitis Assessment 0 22   Infiltration Assessment 0 22 09   Dressing Status Clean, dry, & intact;New;Occlusive 22   Dressing Type Transparent 22   Hub Color/Line Status Yellow; Flushed;Patent 22        Ms. Drummond vitals were reviewed prior to and after treatment.    Patient Vitals for the past 12 hrs:   Temp Pulse Resp BP SpO2   22 1033 -- 91 -- 138/78 --   22 0952 98.1 °F (36.7 °C) 85 18 (!) 145/82 100 %       Medications given:  Medications Administered       iron sucrose (VENOFER) injection 200 mg       Admin Date  2022 Action  Given Dose  200 mg Route  IntraVENous Administered By  Sasha Beck RN              sodium chloride (NS) flush 10 mL       Admin Date  2022 Action  Given Dose  10 mL Route  IntraVENous Administered By  Sasha Beck RN               Admin Date  2022 Action  Given Dose  10 mL Route  IntraVENous Administered By  Cyril Wakefield RN                    Ms. Devorah Rodriges tolerated the treatment without complaints. IV flushed and removed. Ms. Devorah Rodriges was discharged from [place] [address] in stable condition at 1035.       Patient provided with AVS , which includes future appointment and written educational material.     Future Appointments   Date Time Provider Janes Salgadoi   3/15/2022 10:50 AM Ailyn Alcantara MD RDE [facility] [address] KIMBERLY Fragoso RN  February 25, 2022  10:30 AM

## 2022-03-15 ENCOUNTER — OFFICE VISIT (OUTPATIENT)
Dept: ENDOCRINOLOGY | Age: 42
End: 2022-03-15
Payer: MEDICARE

## 2022-03-15 VITALS
BODY MASS INDEX: 34.25 KG/M2 | HEART RATE: 95 BPM | DIASTOLIC BLOOD PRESSURE: 71 MMHG | SYSTOLIC BLOOD PRESSURE: 128 MMHG | HEIGHT: 64 IN | WEIGHT: 200.6 LBS

## 2022-03-15 DIAGNOSIS — E04.9 GOITER, NON-TOXIC: Primary | ICD-10-CM

## 2022-03-15 PROCEDURE — G8417 CALC BMI ABV UP PARAM F/U: HCPCS | Performed by: INTERNAL MEDICINE

## 2022-03-15 PROCEDURE — G9231 DOC ESRD DIA TRANS PREG: HCPCS | Performed by: INTERNAL MEDICINE

## 2022-03-15 PROCEDURE — 99205 OFFICE O/P NEW HI 60 MIN: CPT | Performed by: INTERNAL MEDICINE

## 2022-03-15 PROCEDURE — G9717 DOC PT DX DEP/BP F/U NT REQ: HCPCS | Performed by: INTERNAL MEDICINE

## 2022-03-15 PROCEDURE — G8427 DOCREV CUR MEDS BY ELIG CLIN: HCPCS | Performed by: INTERNAL MEDICINE

## 2022-03-15 RX ORDER — OMEPRAZOLE 40 MG/1
CAPSULE, DELAYED RELEASE ORAL
COMMUNITY
Start: 2021-12-17 | End: 2022-03-15

## 2022-03-15 RX ORDER — OMEPRAZOLE 40 MG/1
40 CAPSULE, DELAYED RELEASE ORAL DAILY
COMMUNITY

## 2022-03-15 RX ORDER — LOSARTAN POTASSIUM 50 MG/1
TABLET ORAL
COMMUNITY
Start: 2022-01-25

## 2022-03-15 RX ORDER — LURASIDONE HYDROCHLORIDE 20 MG/1
TABLET, FILM COATED ORAL
COMMUNITY
Start: 2022-03-02

## 2022-03-15 RX ORDER — BLOOD SUGAR DIAGNOSTIC
STRIP MISCELLANEOUS
COMMUNITY
Start: 2022-02-15 | End: 2022-03-23

## 2022-03-15 RX ORDER — BUSPIRONE HYDROCHLORIDE 15 MG/1
TABLET ORAL
COMMUNITY
Start: 2022-03-02

## 2022-03-15 NOTE — PROGRESS NOTES
Chief Complaint   Patient presents with    New Patient     PCP and Pharmacy verified    Thyroid Problem     History of Present Illness: Essie Pond is a 38 y.o. female who is a new patient for thyroid. She has been struggling with dysphagia for solid foods and pills for the past 2 years but no trouble with liquids and so far nobody has been able to help her determine the cause of this symptom and she is tearful in the office discussing this as she just wants to be able to eat more than mashed potatoes. Her weight is recorded in our system at 250 lbs in 2019 and is down to 200 lbs today. It appears she had an EGD with Dr. Mateusz Olson in 12/20 and this did not show a cause of her symptoms. She then had an esophagram in 1/21 and this showed \"The esophagus is normal in course and caliber without evidence of mass lesion or stricture. There was mild reflux to the level of the thoracic inlet. Normal primary and secondary peristalsis is noted. No hiatal hernia. \"  She had an ER visit in 8/21 and a CT of her neck was ordered that showed a 6 mm left lobe nodule but otherwise no other findings. She then had a dedicated thyroid ultrasound in 9/21 that showed \"Left lobe contains a cyst measuring 1.0 x 0.7 x 0.6 cm. Right lobe contains a cyst measuring 0.3 cm. There is no thyroid nodule. Right lobe measures approximately 6.0 x 2.3 x 1.8 cm. Left lobe measures approximately 4.8 x 1.6 x 1.5 cm. \" She had a TSH of 0.51 in 9/21 and 0.59 in 2/22 and TG ab < 1.0. She has been taking omeprazole 40 mg daily but still has the sensation that food is not going down. She does have some constipation. I don't see that she has had a gastric emptying study. She is due to f/u with Dr. Mateusz Olson on 3/21/22 and I told her I will discuss her symptoms with him personally.       Past Medical History:   Diagnosis Date    Abnormal Pap smear     \"years ago\"     Bipolar disorder ([address]     Bronchitis     Depression with anxiety 9/22/2010    Diabetes mellitus     type II diabetes mellitus since 2004    GERD (gastroesophageal reflux disease)     High cholesterol     HTN (hypertension) 2010    Postpartum depression     hospitalized after last delivery    Rhinitis 2012    Sleep apnea     uses cpap    UTI (urinary tract infection)      Past Surgical History:   Procedure Laterality Date    HX  SECTION      X 3    HX TUBAL LIGATION       Current Outpatient Medications   Medication Sig    Accu-Chek Guide test strips strip USE TO TEST BLOOD GLUCOSE BEFORE AND AFTER MEALS ALTERNATING AS DIRECTED    busPIRone (BUSPAR) 15 mg tablet TAKE 1 TABLET BY MOUTH TWICE A DAY AS DIRECTED    Latuda 20 mg tab tablet TAKE 1 TABLET BY MOUTH EVERY EVENING WITH AT LEAST 350 CALORIES AS DIRECTED    losartan (COZAAR) 50 mg tablet TAKE 1 TABLET BY MOUTH ONCE DAILY FOR 90 DAYS    omeprazole (PRILOSEC) 40 mg capsule Take 40 mg by mouth daily.  diphenhydrAMINE (Benadryl Allergy) 12.5 mg/5 mL oral liquid Take 20 mL by mouth four (4) times daily as needed for Allergic Response.  acetaminophen (TYLENOL) 325 mg tablet Take 2 Tablets by mouth every four (4) hours as needed for Pain.  albuterol (PROVENTIL HFA, VENTOLIN HFA, PROAIR HFA) 90 mcg/actuation inhaler Take 1-2 Puffs by inhalation every four (4) hours as needed for Wheezing.  metoclopramide HCl (Reglan) 5 mg tablet Take 5 mg by mouth four (4) times daily.  aspirin 81 mg chewable tablet Take 81 mg by mouth daily.  metFORMIN (GLUCOPHAGE) 1,000 mg tablet Take 1,000 mg by mouth two (2) times daily (with meals). Indications: type 2 diabetes mellitus     No current facility-administered medications for this visit.      Allergies   Allergen Reactions    Latex Hives     rash    Other Food Hives     Citrus Fruits    Flagyl [Metronidazole] Hives and Itching    Lisinopril-Hydrochlorothiazide Angioedema    Rosuvastatin Anaphylaxis    Clindamycin Rash and Itching    Ciprofloxacin Rash and Itching    Citrate Hives    Cottonseed Oil Rash    Cymbalta [Duloxetine] Hives and Itching    Flexeril [Cyclobenzaprine] Angioedema    Lemon Hives    Pcn [Penicillins] Hives    Shrimp Itching    Sulfa (Sulfonamide Antibiotics) Shortness of Breath    Tomato Hives     Family History   Problem Relation Age of Onset    Diabetes Mother     Hypertension Mother     Stroke Mother     Heart Disease Mother     Hypertension Father     Diabetes Father     Kidney Disease Father     Hypertension Maternal Grandmother     Diabetes Maternal Grandmother     Hypertension Maternal Grandfather     Diabetes Maternal Grandfather     Hypertension Paternal [de-identified]     Diabetes Paternal [de-identified]     Hypertension Paternal Grandfather     Diabetes Paternal Grandfather     Hypertension Sister     Diabetes Sister     Diabetes Sister     Hypertension Sister     Diabetes Sister     Hypertension Sister     Thyroid Disease Neg Hx      Social History     Socioeconomic History    Marital status: SINGLE     Spouse name: Not on file    Number of children: Not on file    Years of education: Not on file    Highest education level: Not on file   Occupational History    Not on file   Tobacco Use    Smoking status: Former Smoker     Packs/day: 0.50     Quit date: 2019     Years since quittin.7    Smokeless tobacco: Never Used   Substance and Sexual Activity    Alcohol use: Not Currently    Drug use: Not Currently     Types: Marijuana    Sexual activity: Yes     Partners: Female     Birth control/protection: Surgical     Comment: Tubal ligation   Other Topics Concern    Not on file   Social History Narrative    [place] in the [place] with her 7 yo daughter and her daughter's father. On disability for depression.        Social Determinants of Health     Financial Resource Strain:     Difficulty of Paying Living Expenses: Not on file   Food Insecurity:     Worried About Running Out of Food in the Last Year: Not on file    [address] in the Last Year: Not on file   Transportation Needs:     Lack of Transportation (Medical): Not on file    Lack of Transportation (Non-Medical): Not on file   Physical Activity:     Days of Exercise per Week: Not on file    Minutes of Exercise per Session: Not on file   Stress:     Feeling of Stress : Not on file   Social Connections:     Frequency of Communication with Friends and Family: Not on file    Frequency of Social Gatherings with Friends and Family: Not on file    Attends Christian Services: Not on file    Active Member of [address] [place] [address] or Organizations: Not on file    Attends Club or Organization Meetings: Not on file    Marital Status: Not on file   Intimate Partner Violence:     Fear of Current or Ex-Partner: Not on file    Emotionally Abused: Not on file    Physically Abused: Not on file    Sexually Abused: Not on file   Housing Stability:     Unable to Pay for Housing in the Last Year: Not on file    Number of [place] in the Last Year: Not on file    Unstable Housing in the Last Year: Not on file     Review of Systems: per HPI    Physical Examination:  Blood pressure 128/71, pulse 95, height 5' 4\" (1.626 m), weight 200 lb 9.6 oz (91 kg).   - General: pleasant, no distress, good eye contact, tearful at times  - HEENT: no exopthalmos, no periorbital edema, no scleral/conjunctival injection, EOMI, no lid lag or stare  - Neck: supple, (+) fullness of right lobe but don't feel any discrete nodule, no masses, lymph nodes, or carotid bruits, no supraclavicular or dorsocervical fat pads  - Cardiovascular: regular, normal rate, normal S1 and S2, no murmurs/rubs/gallops   - Respiratory: clear to auscultation bilaterally  - Gastrointestinal: soft, nontender, nondistended, no masses, no hepatosplenomegaly  - Musculoskeletal: no proximal muscle weakness in upper or lower extremities  - Integumentary: no acanthosis nigricans, no rashes, no edema  - Neurological: reflexes 2+ at biceps, no tremor  - Psychiatric: appears anxious    Data Reviewed:   - labs as above  - all radiology reports as above    Assessment/Plan:   1. Goiter, non-toxic: She has been struggling with dysphagia for solid foods and pills for the past 2 years but no trouble with liquids and so far nobody has been able to help her determine the cause of this symptom and she is tearful in the office discussing this as she just wants to be able to eat more than mashed potatoes. Her weight is recorded in our system at 250 lbs in 2019 and is down to 200 lbs today. It appears she had an EGD with Dr. Griselda Semen in 12/20 and this did not show a cause of her symptoms. She then had an esophagram in 1/21 and this showed \"The esophagus is normal in course and caliber without evidence of mass lesion or stricture. There was mild reflux to the level of the thoracic inlet. Normal primary and secondary peristalsis is noted. No hiatal hernia. \"  She had an ER visit in 8/21 and a CT of her neck was ordered that showed a 6 mm left lobe nodule but otherwise no other findings. She then had a dedicated thyroid ultrasound in 9/21 that showed \"Left lobe contains a cyst measuring 1.0 x 0.7 x 0.6 cm. Right lobe contains a cyst measuring 0.3 cm. There is no thyroid nodule. Right lobe measures approximately 6.0 x 2.3 x 1.8 cm. Left lobe measures approximately 4.8 x 1.6 x 1.5 cm. \" She had a TSH of 0.51 in 9/21 and 0.59 in 2/22 and TG ab < 1.0. She has been taking omeprazole 40 mg daily but still has the sensation that food is not going down. She does have some constipation. I don't see that she has had a gastric emptying study. She is due to f/u with Dr. Griselda Semen on 3/21/22 and I told her I will discuss her symptoms with him personally. I'm not convinced that her dysphagia is from her thyroid gland as her gland is not that enlarged and there is no evidence on CT scan nor esophagram that her esophagus is being compressed by her gland.   I told her that I don't want her to have an unnecessary surgery to remove her thyroid and then still have dysphagia after the surgery. With her underlying stress and anxiety, I do wonder if the dysphagia could possibly have a somatic component and with better treatment of her mental health, may this symptom improved but I want this to be a diagnosis of exclusion and feel she needs to have a gastric emptying study +/- a repeat EGD and manometry to completely reassure her that there is no underlying GI pathology as the cause of her symptoms. - check TSH annually     Patient Instructions   1) I will call Dr. Mami Chou to ask about his plan for how best to evaluate your swallowing symptoms. I don't think that the thyroid is to blame as your ultrasound in 9/21 did not show your thyroid gland to be very large and your esophagram (swallowing study) in 1/21 did not show that the thyroid was pushing on your esophagus to cause trouble swallowing. I wonder whether your acid reflux is just more severe and affecting your swallowing or whether you could have gastroparesis (slowed emptying of the stomach). 2) TSH is a thyroid test.  Your level is normal so you don't have any problem with your thyroid function at this time that needs further evaluation or treatment. 3) Once I hear back from him, I'll reach out to see if anything else needs to be done on my end. Follow-up and Dispositions    · Return if symptoms worsen or fail to improve. I spent a total of 65 minutes in both face-to-face and in non-face-to-face activities for the visit on the date of this encounter. Copy sent to:  Yenny Palomino MD as PCP - General (Family Medicine)  Lesa Macias MD (Gastroenterology)    Addendum: 03/17/22    I spoke with Dr. Mami Chou today and we reviewed her case.   I told him that I'm not convinced her thyroid has anything to do with her difficulty swallowing especially since her gland is not that enlarged on ultrasound and her esophagram did not show compression of her thyroid on her esophagus. He agrees that the thyroid is unlikely to be the cause and wondered whether her diabetes could be affecting her stomach and worsening her acid reflux and I told him that I did not evaluate her diabetes and given she has lost 50 lbs over the past year and her most recent glucose was 115 in January, it's unlikely that her diabetes is uncontrolled at this time with the amount of weight she has lost.  He plans on evaluating her acid reflux in more detail to find out the reason she is still having difficulty swallowing and agrees she does not need to come back and see me as her thyroid is not to blame for her symptoms. Will have Flakito call her with this message.

## 2022-03-15 NOTE — PATIENT INSTRUCTIONS
1) I will call Dr. Maria D Forrester to ask about his plan for how best to evaluate your swallowing symptoms. I don't think that the thyroid is to blame as your ultrasound in 9/21 did not show your thyroid gland to be very large and your esophagram (swallowing study) in 1/21 did not show that the thyroid was pushing on your esophagus to cause trouble swallowing. I wonder whether your acid reflux is just more severe and affecting your swallowing or whether you could have gastroparesis (slowed emptying of the stomach). 2) TSH is a thyroid test.  Your level is normal so you don't have any problem with your thyroid function at this time that needs further evaluation or treatment. 3) Once I hear back from him, I'll reach out to see if anything else needs to be done on my end.

## 2022-03-17 ENCOUNTER — TELEPHONE (OUTPATIENT)
Dept: ENDOCRINOLOGY | Age: 42
End: 2022-03-17

## 2022-03-17 NOTE — TELEPHONE ENCOUNTER
Please let her know:    I spoke with Dr. Rodrigo Cosme (her GI doctor) today and we reviewed her case. He agrees that the thyroid is unlikely to be the cause of her difficulty swallowing and wondered whether her diabetes could be affecting her stomach and worsening her acid reflux and I told him that I did not evaluate her diabetes and given she has lost 50 lbs over the past year and her most recent glucose was 115 in January, it's unlikely that her diabetes is uncontrolled at this time with the amount of weight she has lost.  He plans on evaluating her acid reflux in more detail to find out the reason she is still having difficulty swallowing and agrees she does not need to come back and see me as her thyroid is not to blame for her symptoms.

## 2022-03-20 PROBLEM — D50.9 IRON (FE) DEFICIENCY ANEMIA: Status: ACTIVE | Noted: 2022-01-13

## 2022-03-20 PROBLEM — F32.A DEPRESSION: Status: ACTIVE | Noted: 2018-04-23

## 2022-03-23 ENCOUNTER — HOSPITAL ENCOUNTER (EMERGENCY)
Age: 42
Discharge: HOME | End: 2022-03-23
Attending: EMERGENCY MEDICINE
Payer: MEDICARE

## 2022-03-23 ENCOUNTER — APPOINTMENT (OUTPATIENT)
Dept: ULTRASOUND IMAGING | Age: 42
End: 2022-03-23
Attending: PHYSICIAN ASSISTANT
Payer: MEDICARE

## 2022-03-23 ENCOUNTER — APPOINTMENT (OUTPATIENT)
Dept: GENERAL RADIOLOGY | Age: 42
End: 2022-03-23
Attending: PHYSICIAN ASSISTANT
Payer: MEDICARE

## 2022-03-23 VITALS
DIASTOLIC BLOOD PRESSURE: 88 MMHG | BODY MASS INDEX: 34.15 KG/M2 | SYSTOLIC BLOOD PRESSURE: 135 MMHG | HEART RATE: 86 BPM | TEMPERATURE: 98 F | HEIGHT: 64 IN | WEIGHT: 200 LBS | OXYGEN SATURATION: 98 % | RESPIRATION RATE: 16 BRPM

## 2022-03-23 DIAGNOSIS — N83.201 OVARIAN CYST, RIGHT: Primary | ICD-10-CM

## 2022-03-23 DIAGNOSIS — K59.00 CONSTIPATION, UNSPECIFIED CONSTIPATION TYPE: ICD-10-CM

## 2022-03-23 LAB
APPEARANCE UR: ABNORMAL
BACTERIA URNS QL MICRO: ABNORMAL /HPF
BILIRUB UR QL: NEGATIVE
COLOR UR: ABNORMAL
EPITH CASTS URNS QL MICRO: ABNORMAL /LPF
GLUCOSE UR STRIP.AUTO-MCNC: NEGATIVE MG/DL
HCG UR QL: NEGATIVE
HGB UR QL STRIP: NEGATIVE
KETONES UR QL STRIP.AUTO: NEGATIVE MG/DL
LEUKOCYTE ESTERASE UR QL STRIP.AUTO: ABNORMAL
MUCOUS THREADS URNS QL MICRO: ABNORMAL /LPF
NITRITE UR QL STRIP.AUTO: NEGATIVE
PH UR STRIP: 5.5 [PH] (ref 5–8)
PROT UR STRIP-MCNC: NEGATIVE MG/DL
RBC #/AREA URNS HPF: ABNORMAL /HPF (ref 0–5)
SP GR UR REFRACTOMETRY: 1.01 (ref 1–1.03)
UA: UC IF INDICATED,UAUC: ABNORMAL
UROBILINOGEN UR QL STRIP.AUTO: 0.2 EU/DL (ref 0.2–1)
WBC URNS QL MICRO: ABNORMAL /HPF (ref 0–4)

## 2022-03-23 PROCEDURE — 99284 EMERGENCY DEPT VISIT MOD MDM: CPT

## 2022-03-23 PROCEDURE — 76830 TRANSVAGINAL US NON-OB: CPT

## 2022-03-23 PROCEDURE — 81025 URINE PREGNANCY TEST: CPT

## 2022-03-23 PROCEDURE — 81001 URINALYSIS AUTO W/SCOPE: CPT

## 2022-03-23 PROCEDURE — 76856 US EXAM PELVIC COMPLETE: CPT

## 2022-03-23 PROCEDURE — 74018 RADEX ABDOMEN 1 VIEW: CPT

## 2022-03-23 RX ORDER — TRIPROLIDINE/PSEUDOEPHEDRINE 2.5MG-60MG
400 TABLET ORAL
Qty: 400 ML | Refills: 0 | Status: SHIPPED | OUTPATIENT
Start: 2022-03-23 | End: 2022-03-28

## 2022-03-23 RX ORDER — POLYETHYLENE GLYCOL 3350 17 G/17G
17 POWDER, FOR SOLUTION ORAL 2 TIMES DAILY
Qty: 116 G | Refills: 0 | Status: SHIPPED | OUTPATIENT
Start: 2022-03-23 | End: 2022-03-26

## 2022-03-23 NOTE — ED PROVIDER NOTES
EMERGENCY DEPARTMENT HISTORY AND PHYSICAL EXAM      Date: 3/23/2022  Patient Name: Abelardo Hurtado    History of Presenting Illness     Chief Complaint   Patient presents with    Abdominal Pain       History Provided By: Patient    HPI: Abelardo Hurtado, 38 y.o. female presents ambulatory to the emergency department with complaint of lower pelvic pain over the last couple days. She has had a history of urinary tract infections and is suspicious for same. No nausea vomiting. She does admit she has not been moving her bowels well. She denied any vaginal discharge or bleeding. She is not concerned for STD. Denied concern for pregnancy. Has not seen an OB/GYN in some time. She has had no rash or lesion. She denied personal or family history of kidney stones. She is making her urine stream without difficulty and has no burning associated with urination but a pressure in her lower pelvis. She has been eating and drinking well. She is a former smoker. She rates her pain a 7 out of 10 on the pain scale and describes it as a sharp stabbing sensation. Pt is o/w healthy without fever, chills, cough, congestion, ST, shortness of breath, chest pain, rash/lesion. There are no other complaints, changes, or physical findings at this time. PCP: Clark Baugh MD    Current Outpatient Medications   Medication Sig Dispense Refill    busPIRone (BUSPAR) 15 mg tablet TAKE 1 TABLET BY MOUTH TWICE A DAY AS DIRECTED      Latuda 20 mg tab tablet TAKE 1 TABLET BY MOUTH EVERY EVENING WITH AT LEAST 350 CALORIES AS DIRECTED      losartan (COZAAR) 50 mg tablet TAKE 1 TABLET BY MOUTH ONCE DAILY FOR 90 DAYS      omeprazole (PRILOSEC) 40 mg capsule Take 40 mg by mouth daily.  metoclopramide HCl (Reglan) 5 mg tablet Take 5 mg by mouth four (4) times daily.  aspirin 81 mg chewable tablet Take 81 mg by mouth daily.       metFORMIN (GLUCOPHAGE) 1,000 mg tablet Take 1,000 mg by mouth two (2) times daily (with meals). Indications: type 2 diabetes mellitus      Accu-Chek Guide test strips strip USE TO TEST BLOOD GLUCOSE BEFORE AND AFTER MEALS ALTERNATING AS DIRECTED (Patient not taking: Reported on 3/23/2022)      diphenhydrAMINE (Benadryl Allergy) 12.5 mg/5 mL oral liquid Take 20 mL by mouth four (4) times daily as needed for Allergic Response. (Patient not taking: Reported on 3/23/2022) 472 mL 0    acetaminophen (TYLENOL) 325 mg tablet Take 2 Tablets by mouth every four (4) hours as needed for Pain. (Patient not taking: Reported on 3/23/2022) 20 Tablet 0    albuterol (PROVENTIL HFA, VENTOLIN HFA, PROAIR HFA) 90 mcg/actuation inhaler Take 1-2 Puffs by inhalation every four (4) hours as needed for Wheezing.  (Patient not taking: Reported on 3/23/2022) 17 g 0       Past History     Past Medical History:  Past Medical History:   Diagnosis Date    Abnormal Pap smear     \"years ago\"     Bipolar disorder ([facility] [address]     Bronchitis     Depression with anxiety 2010    Diabetes mellitus     type II diabetes mellitus since     GERD (gastroesophageal reflux disease)     High cholesterol     HTN (hypertension) 2010    Postpartum depression     hospitalized after last delivery    Rhinitis 2012    Sleep apnea     uses cpap    UTI (urinary tract infection)        Past Surgical History:  Past Surgical History:   Procedure Laterality Date    HX  SECTION      X 3    HX TUBAL LIGATION         Family History:  Family History   Problem Relation Age of Onset    Diabetes Mother     Hypertension Mother     Stroke Mother     Heart Disease Mother     Hypertension Father     Diabetes Father     Kidney Disease Father     Hypertension Maternal Grandmother     Diabetes Maternal Grandmother     Hypertension Maternal Grandfather     Diabetes Maternal Grandfather     Hypertension Paternal Grandmother     Diabetes Paternal Grandmother     Hypertension Paternal Grandfather     Diabetes Paternal Grandfather     Hypertension Sister     Diabetes Sister     Diabetes Sister     Hypertension Sister     Diabetes Sister     Hypertension Sister     Thyroid Disease Neg Hx        Social History:  Social History     Tobacco Use    Smoking status: Former Smoker     Packs/day: 0.50     Quit date: 2019     Years since quittin.7    Smokeless tobacco: Never Used   Substance Use Topics    Alcohol use: Not Currently    Drug use: Not Currently     Types: Marijuana       Allergies: Allergies   Allergen Reactions    Latex Hives     rash    Other Food Hives     Citrus Fruits    Flagyl [Metronidazole] Hives and Itching    Lisinopril-Hydrochlorothiazide Angioedema    Rosuvastatin Anaphylaxis    Clindamycin Rash and Itching    Ciprofloxacin Rash and Itching    Citrate Hives    Cottonseed Oil Rash    Cymbalta [Duloxetine] Hives and Itching    Flexeril [Cyclobenzaprine] Angioedema    Lemon Hives    Pcn [Penicillins] Hives    Shrimp Itching    Sulfa (Sulfonamide Antibiotics) Shortness of Breath    Tomato Hives         Review of Systems   Review of Systems   Constitutional: Negative for chills and fever. HENT: Negative for congestion, rhinorrhea and sore throat. Respiratory: Negative for cough and shortness of breath. Cardiovascular: Negative for chest pain and palpitations. Gastrointestinal: Positive for abdominal pain and constipation. Negative for blood in stool, diarrhea, nausea and vomiting. Endocrine: Negative for polydipsia, polyphagia and polyuria. Genitourinary: Positive for pelvic pain. Negative for decreased urine volume, difficulty urinating, dysuria, flank pain, genital sores, hematuria, urgency, vaginal bleeding, vaginal discharge and vaginal pain. Musculoskeletal: Negative for neck pain and neck stiffness. Skin: Negative for rash and wound. Allergic/Immunologic: Negative for food allergies and immunocompromised state.    Neurological: Negative for dizziness and headaches. Hematological: Negative for adenopathy. Does not bruise/bleed easily. Psychiatric/Behavioral: Negative for agitation and confusion. All other systems reviewed and are negative. Physical Exam   Physical Exam  Vitals and nursing note reviewed. Constitutional:       General: She is not in acute distress. Appearance: She is well-developed and normal weight. She is not ill-appearing, toxic-appearing or diaphoretic. HENT:      Head: Normocephalic and atraumatic. Nose: Nose normal.      Mouth/Throat:      Mouth: Mucous membranes are moist.      Pharynx: No pharyngeal swelling or oropharyngeal exudate. Eyes:      General: No scleral icterus. Right eye: No discharge. Left eye: No discharge. Conjunctiva/sclera: Conjunctivae normal.   Neck:      Thyroid: No thyromegaly. Vascular: No JVD. Trachea: No tracheal deviation. Cardiovascular:      Rate and Rhythm: Normal rate and regular rhythm. Heart sounds: Normal heart sounds. Pulmonary:      Effort: Pulmonary effort is normal. No respiratory distress. Breath sounds: Normal breath sounds. No stridor. No wheezing or rhonchi. Abdominal:      General: Abdomen is flat. Bowel sounds are normal. There is no distension. Palpations: Abdomen is soft. Tenderness: There is abdominal tenderness in the suprapubic area. There is no right CVA tenderness, left CVA tenderness, guarding or rebound. Hernia: No hernia is present. Musculoskeletal:         General: Normal range of motion. Cervical back: Normal range of motion and neck supple. Lymphadenopathy:      Cervical: No cervical adenopathy. Skin:     General: Skin is warm and dry. Coloration: Skin is not jaundiced or pale. Findings: No rash. Neurological:      Mental Status: She is alert and oriented to person, place, and time. Motor: No abnormal muscle tone.       Coordination: Coordination normal.   Psychiatric: Mood and Affect: Mood normal.         Behavior: Behavior normal.         Judgment: Judgment normal.         Diagnostic Study Results     Labs -   No results found for this or any previous visit (from the past 12 hour(s)). Radiologic Studies -   XR ABD (KUB)   Final Result   Mild fecal stasis. US TRANSVAGINAL   Final Result   2.8 cm right ovarian cyst            US PELV NON OBS   Final Result   2.8 cm right ovarian cyst                  Medical Decision Making   I am the first provider for this patient. I reviewed the vital signs, available nursing notes, past medical history, past surgical history, family history and social history. Vital Signs-Reviewed the patient's vital signs. Patient Vitals for the past 12 hrs:   Temp Pulse Resp BP SpO2   03/23/22 1852 98 °F (36.7 °C) 95 16 129/77 97 %           Records Reviewed: Nursing Notes, Old Medical Records, Previous Radiology Studies and Previous Laboratory Studies    Provider Notes (Medical Decision Making):   Constipation, UTI, ovarian cyst, pelvic mass    ED Course:   Initial assessment performed. The patients presenting problems have been discussed, and they are in agreement with the care plan formulated and outlined with them. I have encouraged them to ask questions as they arise throughout their visit. Pt has declined any medications at present and requests that anything necessary be dispensed as a liquid as she is intolerant of pills/capsules due to her enlarged thyroid. DISCHARGE NOTE:  The care plan has been outline with the patient and/or family, who verbally conveyed understanding and agreement. Available results have been reviewed. Patient and/or family understand the follow up plan as outlined and discharge instructions. Should their condition deterioration at any time after discharge the patient agrees to return, follow up sooner than outlined or seek medical assistance at the closest Emergency Room as soon as possible.  Questions have been answered. Discharge instructions and educational information regarding the patient's diagnosis as well a list of reasons why the patient would want to seek immediate medical attention, should their condition change, were reviewed directly with the patient/family          PLAN:  1. Discharge Medication List as of 3/23/2022  9:13 PM      START taking these medications    Details   polyethylene glycol (Miralax) 17 gram/dose powder Take 17 g by mouth two (2) times a day for 3 days. 1 tablespoon with 8 oz of water daily, Normal, Disp-116 g, R-0      ibuprofen (ADVIL;MOTRIN) 100 mg/5 mL suspension Take 20 mL by mouth every six (6) hours as needed (pain) for up to 5 days. , Normal, Disp-400 mL, R-0         CONTINUE these medications which have NOT CHANGED    Details   busPIRone (BUSPAR) 15 mg tablet TAKE 1 TABLET BY MOUTH TWICE A DAY AS DIRECTED, Historical Med      Latuda 20 mg tab tablet TAKE 1 TABLET BY MOUTH EVERY EVENING WITH AT LEAST 350 CALORIES AS DIRECTED, Historical Med, BUNNY      losartan (COZAAR) 50 mg tablet TAKE 1 TABLET BY MOUTH ONCE DAILY FOR 90 DAYS, Historical Med      omeprazole (PRILOSEC) 40 mg capsule Take 40 mg by mouth daily. , Historical Med      metoclopramide HCl (Reglan) 5 mg tablet Take 5 mg by mouth four (4) times daily. , Historical Med      aspirin 81 mg chewable tablet Take 81 mg by mouth daily. , Historical Med      metFORMIN (GLUCOPHAGE) 1,000 mg tablet Take 1,000 mg by mouth two (2) times daily (with meals). Indications: type 2 diabetes mellitus, Historical Med           2. Follow-up Information     Follow up With Specialties Details Why Contact Info    chely Wilkes, [address] [address] [phone] [address]    [address] [address]  [phone]    [facility] EMERGENCY DEPT Emergency Medicine  If symptoms worsen [place]  [phone]        Return to ED if worse Diagnosis     Clinical Impression:   1. Ovarian cyst, right    2.  Constipation, unspecified constipation type

## 2022-03-23 NOTE — ED TRIAGE NOTES
C/o lower abdominal pain for a couple days. Pt thinks it could be a UTI but denies dysuria, frequency.  Denies n/v/d

## 2022-03-24 ENCOUNTER — TRANSCRIBE ORDER (OUTPATIENT)
Dept: SCHEDULING | Age: 42
End: 2022-03-24

## 2022-03-24 DIAGNOSIS — K31.84 GASTROPARESIS: ICD-10-CM

## 2022-03-24 DIAGNOSIS — K59.04 CHRONIC IDIOPATHIC CONSTIPATION: ICD-10-CM

## 2022-03-24 DIAGNOSIS — R13.10 DYSPHAGIA: Primary | ICD-10-CM

## 2022-03-24 NOTE — ED NOTES
Discharge instructions were given to the patient by Aydee Kelley RN. The patient left the Emergency Department ambulatory, alert and oriented and in no acute distress with 2 prescriptions. The patient was encouraged to call or return to the ED for worsening issues or problems and was encouraged to schedule a follow up appointment for continuing care. The patient verbalized understanding of discharge instructions and prescriptions, all questions were answered. The patient has no further concerns at this time.

## 2022-03-24 NOTE — DISCHARGE INSTRUCTIONS
Rest, push fluids, increase fiber in your diet. Follow up with your Ob/Gyn for recheck. Return to the Emergency Dept for any worsening pain, vomiting, fever, decreased oral intake/urine output.

## 2022-03-24 NOTE — ED NOTES
Pt presents to ED ambulatory complaining of lower abdominal pain x \"a couple of days\". Pt reports lower abdominal pain and constipation. Pt denies N/V. Pt believes she has a UTI. Pt denies dysuria or frequency. Pt is alert and oriented x 4, RR even and unlabored, skin is warm and dry. Assessment completed and pt updated on plan of care. Call bell in reach. Emergency Department Nursing Plan of Care       The Nursing Plan of Care is developed from the Nursing assessment and Emergency Department Attending provider initial evaluation. The plan of care may be reviewed in the ED Provider note.     The Plan of Care was developed with the following considerations:   Patient / Family readiness to learn indicated by:verbalized understanding  Persons(s) to be included in education: patient  Barriers to Learning/Limitations:No    Signed     Cristian Jones RN    3/23/2022   8:56 PM

## 2022-04-15 ENCOUNTER — HOSPITAL ENCOUNTER (EMERGENCY)
Age: 42
Discharge: HOME | End: 2022-04-16
Attending: EMERGENCY MEDICINE
Payer: MEDICARE

## 2022-04-15 ENCOUNTER — APPOINTMENT (OUTPATIENT)
Dept: CT IMAGING | Age: 42
End: 2022-04-15
Attending: EMERGENCY MEDICINE
Payer: MEDICARE

## 2022-04-15 DIAGNOSIS — R13.10 DYSPHAGIA, UNSPECIFIED TYPE: Primary | ICD-10-CM

## 2022-04-15 DIAGNOSIS — E87.1 HYPONATREMIA: ICD-10-CM

## 2022-04-15 LAB
ALBUMIN SERPL-MCNC: 3.8 G/DL (ref 3.5–5)
ALBUMIN/GLOB SERPL: 1.1 {RATIO} (ref 1.1–2.2)
ALP SERPL-CCNC: 70 U/L (ref 45–117)
ALT SERPL-CCNC: 14 U/L (ref 12–78)
ANION GAP SERPL CALC-SCNC: 7 MMOL/L (ref 5–15)
AST SERPL-CCNC: 9 U/L (ref 15–37)
BASOPHILS # BLD: 0.1 K/UL (ref 0–0.1)
BASOPHILS NFR BLD: 1 % (ref 0–1)
BILIRUB SERPL-MCNC: 0.2 MG/DL (ref 0.2–1)
BUN SERPL-MCNC: 4 MG/DL (ref 6–20)
BUN/CREAT SERPL: 4 (ref 12–20)
CALCIUM SERPL-MCNC: 9.1 MG/DL (ref 8.5–10.1)
CHLORIDE SERPL-SCNC: 99 MMOL/L (ref 97–108)
CO2 SERPL-SCNC: 26 MMOL/L (ref 21–32)
CREAT SERPL-MCNC: 0.91 MG/DL (ref 0.55–1.02)
DIFFERENTIAL METHOD BLD: ABNORMAL
EOSINOPHIL # BLD: 0 K/UL (ref 0–0.4)
EOSINOPHIL NFR BLD: 0 % (ref 0–7)
ERYTHROCYTE [DISTWIDTH] IN BLOOD BY AUTOMATED COUNT: 19.8 % (ref 11.5–14.5)
GLOBULIN SER CALC-MCNC: 3.6 G/DL (ref 2–4)
GLUCOSE SERPL-MCNC: 141 MG/DL (ref 65–100)
HCT VFR BLD AUTO: 38.9 % (ref 35–47)
HGB BLD-MCNC: 12.2 G/DL (ref 11.5–16)
IMM GRANULOCYTES # BLD AUTO: 0 K/UL (ref 0–0.04)
IMM GRANULOCYTES NFR BLD AUTO: 0 % (ref 0–0.5)
LYMPHOCYTES # BLD: 1.6 K/UL (ref 0.8–3.5)
LYMPHOCYTES NFR BLD: 20 % (ref 12–49)
MAGNESIUM SERPL-MCNC: 2.2 MG/DL (ref 1.6–2.4)
MCH RBC QN AUTO: 23.5 PG (ref 26–34)
MCHC RBC AUTO-ENTMCNC: 31.4 G/DL (ref 30–36.5)
MCV RBC AUTO: 75 FL (ref 80–99)
MONOCYTES # BLD: 0.4 K/UL (ref 0–1)
MONOCYTES NFR BLD: 5 % (ref 5–13)
NEUTS SEG # BLD: 5.8 K/UL (ref 1.8–8)
NEUTS SEG NFR BLD: 74 % (ref 32–75)
NRBC # BLD: 0 K/UL (ref 0–0.01)
NRBC BLD-RTO: 0 PER 100 WBC
PLATELET # BLD AUTO: 338 K/UL (ref 150–400)
PMV BLD AUTO: 9.6 FL (ref 8.9–12.9)
POTASSIUM SERPL-SCNC: 4.2 MMOL/L (ref 3.5–5.1)
PROT SERPL-MCNC: 7.4 G/DL (ref 6.4–8.2)
RBC # BLD AUTO: 5.19 M/UL (ref 3.8–5.2)
SODIUM SERPL-SCNC: 132 MMOL/L (ref 136–145)
TSH SERPL DL<=0.05 MIU/L-ACNC: 0.6 UIU/ML (ref 0.36–3.74)
WBC # BLD AUTO: 7.8 K/UL (ref 3.6–11)

## 2022-04-15 PROCEDURE — 80053 COMPREHEN METABOLIC PANEL: CPT

## 2022-04-15 PROCEDURE — 99285 EMERGENCY DEPT VISIT HI MDM: CPT

## 2022-04-15 PROCEDURE — 85025 COMPLETE CBC W/AUTO DIFF WBC: CPT

## 2022-04-15 PROCEDURE — 74011250636 HC RX REV CODE- 250/636: Performed by: EMERGENCY MEDICINE

## 2022-04-15 PROCEDURE — 74011000636 HC RX REV CODE- 636: Performed by: EMERGENCY MEDICINE

## 2022-04-15 PROCEDURE — 83735 ASSAY OF MAGNESIUM: CPT

## 2022-04-15 PROCEDURE — 84443 ASSAY THYROID STIM HORMONE: CPT

## 2022-04-15 PROCEDURE — 70491 CT SOFT TISSUE NECK W/DYE: CPT

## 2022-04-15 PROCEDURE — 96374 THER/PROPH/DIAG INJ IV PUSH: CPT

## 2022-04-15 RX ORDER — DEXAMETHASONE SODIUM PHOSPHATE 100 MG/10ML
10 INJECTION INTRAMUSCULAR; INTRAVENOUS
Status: COMPLETED | OUTPATIENT
Start: 2022-04-15 | End: 2022-04-15

## 2022-04-15 RX ORDER — FLUTICASONE PROPIONATE 50 MCG
2 SPRAY, SUSPENSION (ML) NASAL DAILY
COMMUNITY

## 2022-04-15 RX ADMIN — DEXAMETHASONE SODIUM PHOSPHATE 10 MG: 10 INJECTION INTRAMUSCULAR; INTRAVENOUS at 22:09

## 2022-04-15 RX ADMIN — IOPAMIDOL 100 ML: 612 INJECTION, SOLUTION INTRAVENOUS at 23:36

## 2022-04-16 VITALS
HEIGHT: 64 IN | SYSTOLIC BLOOD PRESSURE: 126 MMHG | OXYGEN SATURATION: 99 % | TEMPERATURE: 98.3 F | BODY MASS INDEX: 33.8 KG/M2 | RESPIRATION RATE: 18 BRPM | DIASTOLIC BLOOD PRESSURE: 80 MMHG | HEART RATE: 78 BPM | WEIGHT: 198 LBS

## 2022-04-16 NOTE — ED NOTES
Emergency Department Nursing Plan of Care       The Nursing Plan of Care is developed from the Nursing assessment and Emergency Department Attending provider initial evaluation. The plan of care may be reviewed in the ED Provider note.     The Plan of Care was developed with the following considerations:   Patient / Family readiness to learn indicated by:verbalized understanding  Persons(s) to be included in education: patient  Barriers to Learning/Limitations:No    Signed     Heber Fuller RN    4/15/2022   9:42 PM

## 2022-04-16 NOTE — ED PROVIDER NOTES
EMERGENCY DEPARTMENT HISTORY AND PHYSICAL EXAM      Date: 4/15/2022  Patient Name: Kathy Slater    History of Presenting Illness     Chief Complaint   Patient presents with    Sore Throat     ED visit d/t throat pain / \" it feels funny \" - onset of sxs, \" within the past hour \" - self medicated with benadryl - unable to eat at this time - reports having \" thyroid problems \", decreased in appetite for the last \" couple of months \" - Denies fevers / coughing;;        History Provided By: Patient    HPI: Kathy Slater, 38 y.o. female with PMHx as noted below presents the emergency department with chief complaint of swallowing difficulty and fatigue. Patient states it has been going on now for the last 1.5 years. Patient states that she has constant issues with swallowing and decreased appetite and fatigue. Patient states has been seen multiple times by gastroenterology and also has been seen for possible thyroid dysfunction and states no one has ever been able to figure out what is wrong and why she has difficulty swallowing. Patient states that tonight she developed a \"tingling\" in her throat after eating and became very anxious that she was possibly having an allergic reaction so self medicated with \"some steroids\" and Benadryl. Since symptoms have improved. Denies any shortness of breath, rash, vomiting, stomach upset. Pt denies any other alleviating or exacerbating factors. Additionally, pt specifically denies any recent fever, chills, headache, nausea, vomiting, abdominal pain, CP, SOB, lightheadedness, dizziness, numbness, weakness, BLE swelling, heart palpitations, urinary sxs, diarrhea, constipation, melena, hematochezia, cough, or congestion. PCP: Thuy Porter MD    Current Outpatient Medications   Medication Sig Dispense Refill    fluticasone propionate (FLONASE) 50 mcg/actuation nasal spray 2 Sprays by Both Nostrils route daily.       busPIRone (BUSPAR) 15 mg tablet TAKE 1 TABLET BY MOUTH TWICE A DAY AS DIRECTED      Latuda 20 mg tab tablet TAKE 1 TABLET BY MOUTH EVERY EVENING WITH AT LEAST 350 CALORIES AS DIRECTED      losartan (COZAAR) 50 mg tablet TAKE 1 TABLET BY MOUTH ONCE DAILY FOR 90 DAYS      omeprazole (PRILOSEC) 40 mg capsule Take 40 mg by mouth daily.  metoclopramide HCl (Reglan) 5 mg tablet Take 5 mg by mouth four (4) times daily.  aspirin 81 mg chewable tablet Take 81 mg by mouth daily.  metFORMIN (GLUCOPHAGE) 1,000 mg tablet Take 1,000 mg by mouth two (2) times daily (with meals).  Indications: type 2 diabetes mellitus         Past History     Past Medical History:  Past Medical History:   Diagnosis Date    Abnormal Pap smear     \"years ago\"     Bipolar disorder ([facility] [address]     Bronchitis     Depression with anxiety 2010    Diabetes mellitus     type II diabetes mellitus since     GERD (gastroesophageal reflux disease)     High cholesterol     HTN (hypertension) 2010    Ill-defined condition     bronchitis    Postpartum depression     hospitalized after last delivery    Rhinitis 2012    Sleep apnea     uses cpap    UTI (urinary tract infection)        Past Surgical History:  Past Surgical History:   Procedure Laterality Date    HX  SECTION      X 3    HX TUBAL LIGATION         Family History:  Family History   Problem Relation Age of Onset    Diabetes Mother     Hypertension Mother     Stroke Mother     Heart Disease Mother     Hypertension Father     Diabetes Father     Kidney Disease Father     Hypertension Maternal Grandmother     Diabetes Maternal Grandmother     Hypertension Maternal Grandfather     Diabetes Maternal Grandfather     Hypertension Paternal Grandmother     Diabetes Paternal Grandmother     Hypertension Paternal Grandfather     Diabetes Paternal Grandfather     Hypertension Sister     Diabetes Sister     Diabetes Sister     Hypertension Sister     Diabetes Sister     Hypertension Sister  Thyroid Disease Neg Hx        Social History:  Social History     Tobacco Use    Smoking status: Former Smoker     Packs/day: 0.50     Quit date: 2019     Years since quittin.8    Smokeless tobacco: Former User   Vaping Use    Vaping Use: Never used   Substance Use Topics    Alcohol use: Not Currently    Drug use: Not Currently     Types: Marijuana       Allergies: Allergies   Allergen Reactions    Latex Hives     rash    Other Food Hives     Citrus Fruits    Flagyl [Metronidazole] Hives and Itching    Lisinopril-Hydrochlorothiazide Angioedema    Rosuvastatin Anaphylaxis    Clindamycin Rash and Itching    Ciprofloxacin Rash and Itching    Citrate Hives    Cottonseed Oil Rash    Cymbalta [Duloxetine] Hives and Itching    Flexeril [Cyclobenzaprine] Angioedema    Lemon Hives    Pcn [Penicillins] Hives    Shrimp Itching    Sulfa (Sulfonamide Antibiotics) Shortness of Breath    Tomato Hives         Review of Systems   Review of Systems  Constitutional: Negative for fever, chills. Positive fatigue. HENT: Negative for congestion, sore throat, rhinorrhea, sneezing and neck stiffness   Eyes: Negative for discharge and redness. Respiratory: Negative for  shortness of breath, wheezing   Cardiovascular: Negative for chest pain, palpitations   Gastrointestinal: Negative for nausea, vomiting, abdominal pain, constipation, diarrhea and blood in stool. Genitourinary: Negative for dysuria, hematuria, flank pain, decreased urine volume, discharge,   Musculoskeletal: Negative for myalgias or joint pain. Skin: Negative for rash or lesions . Neurological: Negative weakness, light-headedness, numbness and headaches. Physical Exam   Physical Exam    GENERAL: alert and oriented, no acute distress  EYES: PEERL, No injection, discharge or icterus.   ENT: Mucous membranes pink and moist.  There is no swelling of the lips, tongue or oropharynx, tonsils are slightly enlarged, no peritonsillar bulge or uvular deviation. No exudates noted, no stridor  NECK: Supple, full range of motion, no large masses palpated  LUNGS: Airway patent. Non-labored respirations. Breath sounds clear with good air entry bilaterally. HEART: Regular rate and rhythm. No peripheral edema  ABDOMEN: Non-distended and non-tender, without guarding or rebound. SKIN:  warm, dry  MSK/EXTREMITIES: Without swelling, tenderness or deformity, symmetric with normal ROM  NEUROLOGICAL: Alert, oriented      Diagnostic Study Results     Labs -     Recent Results (from the past 12 hour(s))   CBC WITH AUTOMATED DIFF    Collection Time: 04/15/22 10:08 PM   Result Value Ref Range    WBC 7.8 3.6 - 11.0 K/uL    RBC 5.19 3.80 - 5.20 M/uL    HGB 12.2 11.5 - 16.0 g/dL    HCT 38.9 35.0 - 47.0 %    MCV 75.0 (L) 80.0 - 99.0 FL    MCH 23.5 (L) 26.0 - 34.0 PG    MCHC 31.4 30.0 - 36.5 g/dL    RDW 19.8 (H) 11.5 - 14.5 %    PLATELET 458 445 - 790 K/uL    MPV 9.6 8.9 - 12.9 FL    NRBC 0.0 0  WBC    ABSOLUTE NRBC 0.00 0.00 - 0.01 K/uL    NEUTROPHILS 74 32 - 75 %    LYMPHOCYTES 20 12 - 49 %    MONOCYTES 5 5 - 13 %    EOSINOPHILS 0 0 - 7 %    BASOPHILS 1 0 - 1 %    IMMATURE GRANULOCYTES 0 0.0 - 0.5 %    ABS. NEUTROPHILS 5.8 1.8 - 8.0 K/UL    ABS. LYMPHOCYTES 1.6 0.8 - 3.5 K/UL    ABS. MONOCYTES 0.4 0.0 - 1.0 K/UL    ABS. EOSINOPHILS 0.0 0.0 - 0.4 K/UL    ABS. BASOPHILS 0.1 0.0 - 0.1 K/UL    ABS. IMM.  GRANS. 0.0 0.00 - 0.04 K/UL    DF AUTOMATED     METABOLIC PANEL, COMPREHENSIVE    Collection Time: 04/15/22 10:08 PM   Result Value Ref Range    Sodium 132 (L) 136 - 145 mmol/L    Potassium 4.2 3.5 - 5.1 mmol/L    Chloride 99 97 - 108 mmol/L    CO2 26 21 - 32 mmol/L    Anion gap 7 5 - 15 mmol/L    Glucose 141 (H) 65 - 100 mg/dL    BUN 4 (L) 6 - 20 MG/DL    Creatinine 0.91 0.55 - 1.02 MG/DL    BUN/Creatinine ratio 4 (L) 12 - 20      GFR est AA >60 >60 ml/min/1.73m2    GFR est non-AA >60 >60 ml/min/1.73m2    Calcium 9.1 8.5 - 10.1 MG/DL    Bilirubin, total 0.2 0.2 - 1.0 MG/DL    ALT (SGPT) 14 12 - 78 U/L    AST (SGOT) 9 (L) 15 - 37 U/L    Alk. phosphatase 70 45 - 117 U/L    Protein, total 7.4 6.4 - 8.2 g/dL    Albumin 3.8 3.5 - 5.0 g/dL    Globulin 3.6 2.0 - 4.0 g/dL    A-G Ratio 1.1 1.1 - 2.2     MAGNESIUM    Collection Time: 04/15/22 10:08 PM   Result Value Ref Range    Magnesium 2.2 1.6 - 2.4 mg/dL   TSH 3RD GENERATION    Collection Time: 04/15/22 10:08 PM   Result Value Ref Range    TSH 0.60 0.36 - 3.74 uIU/mL       Radiologic Studies -   CT NECK SOFT TISSUE W CONT   Final Result   1. Fullness in the nasopharyngeal and oropharyngeal lymphoid tissue without   evidence of discrete mass possibly reactive. 2.  Nonvisualization of left piriform sinus which may be developmental.      3.  No acute pathology is identified in the neck. .        CT Results  (Last 48 hours)               04/15/22 2335  CT NECK SOFT TISSUE W CONT Final result    Impression:  1. Fullness in the nasopharyngeal and oropharyngeal lymphoid tissue without   evidence of discrete mass possibly reactive. 2.  Nonvisualization of left piriform sinus which may be developmental.       3.  No acute pathology is identified in the neck. .       Narrative:  INDICATION: swelling, difficulty sweallowing       COMPARISON: None       TECHNIQUE:  Axial neck CT was performed after the uneventful administration of   IV contrast. Sagittal and coronal reformats were generated. CT dose reduction was achieved through use of a standardized protocol tailored   for this examination and automatic exposure control for dose modulation. CONTRAST: 100 mL Isovue 370       FINDINGS:       NASOPHARYNX: Fullness in the nasopharyngeal lymphoid tissue. SUPRAHYOID NECK: Fullness in the oropharyngeal lymphoid tissue without evidence   of discrete lesion. .   INFRAHYOID NECK: Asymmetry with nonvisualization of the left piriform sinus   possibly developmental..    PAROTID GLANDS: Normal.   SUBMANDIBULAR GLANDS: Normal. THYROID GLAND: Small hypodensity left thyroid gland too small to characterize. Rometta Favre LYMPH NODES: None enlarged by CT size criteria . LUNG APICES: Clear. OSSEOUS STRUCTURES: No destructive bone lesion. ADDITIONAL COMMENTS: N/A. CXR Results  (Last 48 hours)    None            Medical Decision Making     Greg SMITH MD am the first provider for this patient and am the attending of record for this patient encounter. I reviewed the vital signs, available nursing notes, past medical history, past surgical history, family history and social history. Vital Signs-Reviewed the patient's vital signs. Patient Vitals for the past 12 hrs:   Temp Pulse Resp BP SpO2   04/16/22 0024 -- 78 -- -- --   04/15/22 2049 98.3 °F (36.8 °C) (!) 110 18 126/80 99 %         Records Reviewed: Nursing Notes and Old Medical Records    Provider Notes (Medical Decision Making): On presentation, the patient is well appearing, in no acute distress with normal vital signs. Based on my history and exam the differential diagnosis for this patient includes allergic reaction, dysphagia, stroke, esophageal stricture, diverticula, dehydration, electrolyte abnormality, abscess, angioedema. Patient symptoms been present now for 1.5 years, labs not indicative of severe dehydration, notable only for mild hyponatremia. CT scan of the neck showed some nonspecific lymphoid inflammation but no acute findings of emergent concern. Patient having no difficulty tolerating secretions or liquids in the ED so this is felt stable for discharge and follow-up as an outpatient. Have referred to ENT. ED Course:   Initial assessment performed. The patients presenting problems have been discussed, and they are in agreement with the care plan formulated and outlined with them. I have encouraged them to ask questions as they arise throughout their visit.         Medications   dexamethasone (DECADRON) 10 mg/mL injection 10 mg (10 mg IntraVENous Given 4/15/22 2209)   iopamidoL (ISOVUE 300) 61 % contrast injection 100 mL (100 mL IntraVENous Given 4/15/22 2336)         PROGRESS  Sandra Mir's  results have been reviewed with her. She has been counseled regarding her diagnosis. She verbally conveys understanding and agreement of the signs, symptoms, diagnosis, treatment and prognosis and additionally agrees to follow up as recommended with Dr. Kevin Mariano MD in 24 - 48 hours. She also agrees with the care-plan and conveys that all of her questions have been answered. I have also put together some discharge instructions for her that include: 1) educational information regarding their diagnosis, 2) how to care for their diagnosis at home, as well a 3) list of reasons why they would want to return to the ED prior to their follow-up appointment, should their condition change. Disposition:  home    PLAN:  1. Discharge Medication List as of 4/16/2022 12:48 AM        2. Follow-up Information     Follow up With Specialties Details Why Contact Info    chely Bentley MD Family Medicine Schedule an appointment as soon as possible for a visit in 2 days  [address] [place] [address] [address]      [facility] EMERGENCY DEPT Emergency Medicine  If symptoms worsen [address] [facility]  [phone]    [facility]   [id number] [facility]  [phone]        Return to ED if worse     Diagnosis     Clinical Impression:   1. Dysphagia, unspecified type    2. Hyponatremia        Please note that this dictation was completed with Dragon, computer voice recognition software. Quite often unanticipated grammatical, syntax, homophones, and other interpretive errors are inadvertently transcribed by the computer software. Please disregard these errors. Additionally, please excuse any errors that have escaped final proofreading.

## 2022-04-16 NOTE — ED NOTES
In addition to triage note, pt reports to this RN that she has been having trouble with swallowing food in the last year. Pt reports when she eats she feels as though the food stays in her throat and takes \"hours to go down. \" Pt also states that sometimes she chokes or gags on her food when swallowing. Pt reports losing 60+ lbs in the last year. Pt states she has seen a GI doctor and that no one has been able to tell her what is wrong with her throat. MD made aware.

## 2022-04-16 NOTE — ED NOTES
Pt reports having some pasta with tomato sauce and believes she is having an allergic reaction to it since her throat feels strange. Pt reports taking benadryl and prednisone. Pt reports also that she has had an increase amount of weight loss in the last year since she has had a decrease PO intake.

## 2022-06-21 ENCOUNTER — HOSPITAL ENCOUNTER (EMERGENCY)
Age: 42
Discharge: HOME | End: 2022-06-21
Attending: EMERGENCY MEDICINE
Payer: MEDICARE

## 2022-06-21 ENCOUNTER — APPOINTMENT (OUTPATIENT)
Dept: CT IMAGING | Age: 42
End: 2022-06-21
Attending: EMERGENCY MEDICINE
Payer: MEDICARE

## 2022-06-21 VITALS
RESPIRATION RATE: 18 BRPM | OXYGEN SATURATION: 100 % | DIASTOLIC BLOOD PRESSURE: 89 MMHG | SYSTOLIC BLOOD PRESSURE: 169 MMHG | BODY MASS INDEX: 33.99 KG/M2 | TEMPERATURE: 98.6 F | HEIGHT: 64 IN | HEART RATE: 83 BPM

## 2022-06-21 DIAGNOSIS — G44.209 ACUTE NON INTRACTABLE TENSION-TYPE HEADACHE: ICD-10-CM

## 2022-06-21 DIAGNOSIS — I16.0 HYPERTENSIVE URGENCY: Primary | ICD-10-CM

## 2022-06-21 DIAGNOSIS — R63.8: ICD-10-CM

## 2022-06-21 DIAGNOSIS — I10 ACCELERATED HYPERTENSION: ICD-10-CM

## 2022-06-21 DIAGNOSIS — F43.21 GRIEF REACTION: ICD-10-CM

## 2022-06-21 PROCEDURE — 70450 CT HEAD/BRAIN W/O DYE: CPT

## 2022-06-21 PROCEDURE — 74011250636 HC RX REV CODE- 250/636: Performed by: EMERGENCY MEDICINE

## 2022-06-21 PROCEDURE — 99284 EMERGENCY DEPT VISIT MOD MDM: CPT

## 2022-06-21 PROCEDURE — 74011250637 HC RX REV CODE- 250/637: Performed by: EMERGENCY MEDICINE

## 2022-06-21 PROCEDURE — 96374 THER/PROPH/DIAG INJ IV PUSH: CPT

## 2022-06-21 PROCEDURE — 96375 TX/PRO/DX INJ NEW DRUG ADDON: CPT

## 2022-06-21 RX ORDER — DIPHENHYDRAMINE HYDROCHLORIDE 50 MG/ML
50 INJECTION, SOLUTION INTRAMUSCULAR; INTRAVENOUS
Status: COMPLETED | OUTPATIENT
Start: 2022-06-21 | End: 2022-06-21

## 2022-06-21 RX ORDER — BUTALBITAL, ACETAMINOPHEN AND CAFFEINE 300; 40; 50 MG/1; MG/1; MG/1
1 CAPSULE ORAL
Qty: 20 CAPSULE | Refills: 0 | Status: SHIPPED | OUTPATIENT
Start: 2022-06-21

## 2022-06-21 RX ORDER — PROCHLORPERAZINE EDISYLATE 5 MG/ML
10 INJECTION INTRAMUSCULAR; INTRAVENOUS ONCE
Status: COMPLETED | OUTPATIENT
Start: 2022-06-21 | End: 2022-06-21

## 2022-06-21 RX ORDER — KETOROLAC TROMETHAMINE 30 MG/ML
30 INJECTION, SOLUTION INTRAMUSCULAR; INTRAVENOUS
Status: COMPLETED | OUTPATIENT
Start: 2022-06-21 | End: 2022-06-21

## 2022-06-21 RX ORDER — CLONIDINE HYDROCHLORIDE 0.1 MG/1
0.2 TABLET ORAL
Status: COMPLETED | OUTPATIENT
Start: 2022-06-21 | End: 2022-06-21

## 2022-06-21 RX ORDER — KETOROLAC TROMETHAMINE 10 MG/1
10 TABLET, FILM COATED ORAL
Qty: 15 TABLET | Refills: 0 | Status: SHIPPED | OUTPATIENT
Start: 2022-06-21

## 2022-06-21 RX ORDER — BUTALBITAL, ACETAMINOPHEN AND CAFFEINE 50; 325; 40 MG/1; MG/1; MG/1
2 TABLET ORAL
Status: COMPLETED | OUTPATIENT
Start: 2022-06-21 | End: 2022-06-21

## 2022-06-21 RX ADMIN — KETOROLAC TROMETHAMINE 30 MG: 30 INJECTION, SOLUTION INTRAMUSCULAR; INTRAVENOUS at 02:32

## 2022-06-21 RX ADMIN — SODIUM CHLORIDE 1000 ML: 9 INJECTION, SOLUTION INTRAVENOUS at 02:44

## 2022-06-21 RX ADMIN — BUTALBITAL, ACETAMINOPHEN, AND CAFFEINE 2 TABLET: 50; 325; 40 TABLET ORAL at 03:25

## 2022-06-21 RX ADMIN — CLONIDINE HYDROCHLORIDE 0.2 MG: 0.1 TABLET ORAL at 02:30

## 2022-06-21 RX ADMIN — PROCHLORPERAZINE EDISYLATE 10 MG: 5 INJECTION, SOLUTION INTRAMUSCULAR; INTRAVENOUS at 02:34

## 2022-06-21 RX ADMIN — DIPHENHYDRAMINE HYDROCHLORIDE 50 MG: 50 INJECTION, SOLUTION INTRAMUSCULAR; INTRAVENOUS at 02:33

## 2022-06-21 NOTE — ED PROVIDER NOTES
EMERGENCY DEPARTMENT HISTORY AND PHYSICAL EXAM      Please note that this dictation was completed with the assistance of \"Dragon\", the computer voice recognition software. Quite often unanticipated grammatical, syntax, homophones, and other interpretive errors are inadvertently transcribed by the computer software. Please disregard these errors and any errors that have escaped final proofreading. Thank you. Date: 06/21/22  Patient: Naomi Dias  Patient Age and Sex: 38 y.o. female   MRN: [id number]  CSN: [id number]    History of Presenting Illness     Chief Complaint   Patient presents with    Headache     beginning a few hours ago     History Provided By: Patient/family/EMS (if applicable)    HPI: Naomi Dias, 38 y.o. female with past medical history as documented below presents to the ED with c/o of 2 to 3 hours of moderate intensity frontal headache. Patient reports that she does have a history of hypertension and did take her blood pressure medications today. Patient also took 81 mg aspirin prior to arrival without significant relief of pain. Patient admits to increased salt intake the past couple days. Patient is also grieving the death of her child's father who passed away 2 months ago. Denies any SI or HI. Denies any blurry vision, speech disturbances, focal numbness or weakness. Pt denies any other exacerbating or ameliorating factors. Additionally, pt specifically denies any recent fever, chills, nausea, vomiting, abdominal pain, CP, SOB, lightheadedness, dizziness, numbness, weakness, lower extremity swelling, heart palpitations, urinary sxs, diarrhea, constipation, melena, hematochezia, cough, or congestion. There are no other complaints, changes or physical findings pertinent to the HPI at this time.     PCP: Susan Jacob MD  Past History   Past Medical History:  Past Medical History:   Diagnosis Date    Abnormal Pap smear     \"years ago\"     Bipolar disorder ([facility] [address]     Bronchitis     Depression with anxiety 2010    Diabetes mellitus     type II diabetes mellitus since 2004    GERD (gastroesophageal reflux disease)     High cholesterol     HTN (hypertension) 2010    Ill-defined condition     bronchitis    Postpartum depression     hospitalized after last delivery    Rhinitis 2012    Sleep apnea     uses cpap    UTI (urinary tract infection)        Past Surgical History:  Past Surgical History:   Procedure Laterality Date    HX  SECTION      X 3    HX TUBAL LIGATION         Family History:   Family history reviewed and was non-contributory, unless specified below:  Family History   Problem Relation Age of Onset    Diabetes Mother     Hypertension Mother     Stroke Mother     Heart Disease Mother     Hypertension Father     Diabetes Father     Kidney Disease Father     Hypertension Maternal Grandmother     Diabetes Maternal Grandmother     Hypertension Maternal Grandfather     Diabetes Maternal Grandfather     Hypertension Paternal Grandmother     Diabetes Paternal [address]     Hypertension Paternal Grandfather     Diabetes Paternal Grandfather     Hypertension Sister     Diabetes Sister     Diabetes Sister     Hypertension Sister     Diabetes Sister     Hypertension Sister     Thyroid Disease Neg Hx        Social History:  Social History     Tobacco Use    Smoking status: Former Smoker     Packs/day: 0.50     Quit date: 2019     Years since quitting: 3.0    Smokeless tobacco: Former User   Vaping Use    Vaping Use: Never used   Substance Use Topics    Alcohol use: Not Currently    Drug use: Not Currently     Types: Marijuana       Allergies:   Allergies   Allergen Reactions    Latex Hives     rash    Other Food Hives     Citrus Fruits    Flagyl [Metronidazole] Hives and Itching    Lisinopril-Hydrochlorothiazide Angioedema    Rosuvastatin Anaphylaxis    Clindamycin Rash and Itching    Ciprofloxacin Rash and Itching    Citrate Hives    Cottonseed Oil Rash    Cymbalta [Duloxetine] Hives and Itching    Flexeril [Cyclobenzaprine] Angioedema    Lemon Hives    Pcn [Penicillins] Hives    Shrimp Itching    Sulfa (Sulfonamide Antibiotics) Shortness of Breath    Tomato Hives       Current Medications:  No current facility-administered medications on file prior to encounter. Current Outpatient Medications on File Prior to Encounter   Medication Sig Dispense Refill    fluticasone propionate (FLONASE) 50 mcg/actuation nasal spray 2 Sprays by Both Nostrils route daily.  busPIRone (BUSPAR) 15 mg tablet TAKE 1 TABLET BY MOUTH TWICE A DAY AS DIRECTED      Latuda 20 mg tab tablet TAKE 1 TABLET BY MOUTH EVERY EVENING WITH AT LEAST 350 CALORIES AS DIRECTED      losartan (COZAAR) 50 mg tablet TAKE 1 TABLET BY MOUTH ONCE DAILY FOR 90 DAYS      omeprazole (PRILOSEC) 40 mg capsule Take 40 mg by mouth daily.  metoclopramide HCl (Reglan) 5 mg tablet Take 5 mg by mouth four (4) times daily.  aspirin 81 mg chewable tablet Take 81 mg by mouth daily.  metFORMIN (GLUCOPHAGE) 1,000 mg tablet Take 1,000 mg by mouth two (2) times daily (with meals). Indications: type 2 diabetes mellitus       Review of Systems   A complete ROS was reviewed by me today and all other systems negative, unless otherwise specified below:  Review of Systems   Constitutional: Negative. Negative for chills and fever. HENT: Negative. Negative for congestion and sore throat. Eyes: Negative. Respiratory: Negative. Negative for cough, chest tightness, shortness of breath and wheezing. Cardiovascular: Negative. Negative for chest pain, palpitations and leg swelling. Gastrointestinal: Negative. Negative for abdominal distention, abdominal pain, blood in stool, constipation, diarrhea, nausea and vomiting. Endocrine: Negative. Genitourinary: Negative.   Negative for difficulty urinating, dysuria, flank pain, frequency, hematuria and urgency. Musculoskeletal: Negative. Negative for back pain and neck stiffness. Skin: Negative. Negative for rash. Allergic/Immunologic: Negative. Neurological: Positive for headaches. Negative for dizziness, syncope, weakness, light-headedness and numbness. Hematological: Negative. Psychiatric/Behavioral: Negative. Negative for confusion and self-injury. Physical Exam   Physical Exam  Vitals and nursing note reviewed. Constitutional:       General: She is in acute distress (Secondary to pain). Appearance: She is well-developed. She is not diaphoretic. HENT:      Head: Normocephalic and atraumatic. Mouth/Throat:      Pharynx: No oropharyngeal exudate. Eyes:      Conjunctiva/sclera: Conjunctivae normal.      Pupils: Pupils are equal, round, and reactive to light. Cardiovascular:      Rate and Rhythm: Normal rate and regular rhythm. Heart sounds: Normal heart sounds. No murmur heard. No friction rub. No gallop. Pulmonary:      Effort: Pulmonary effort is normal. No respiratory distress. Breath sounds: Normal breath sounds. No wheezing or rales. Chest:      Chest wall: No tenderness. Abdominal:      General: Bowel sounds are normal. There is no distension. Palpations: Abdomen is soft. There is no mass. Tenderness: There is no abdominal tenderness. There is no guarding or rebound. Musculoskeletal:         General: No tenderness or deformity. Normal range of motion. Cervical back: Normal range of motion. Skin:     General: Skin is warm. Findings: No rash. Neurological:      Mental Status: She is alert and oriented to person, place, and time. Cranial Nerves: No cranial nerve deficit. Motor: No abnormal muscle tone. Coordination: Coordination normal.      Deep Tendon Reflexes: Reflexes normal.      Comments: CN II - XII grossly intact with no focal deficits.  Strength 5/5 and normal in biceps, triceps and hand  bilaterally. Strength 5/5 in plantar and dorsiflexion bilaterally. Normal sensation in arms and legs bilaterally to light touch. Negative pronator drift. Finger to nose intact. Heel to shin intact bilaterally. No visual field deficits. No nystagmus. Negative Romberg. Gait is normal as observed in room. Diagnostic Study Results     Laboratory Data  I have personally reviewed and interpreted all available laboratory results. No results found for this or any previous visit (from the past 24 hour(s)). Radiologic Studies   I have personally reviewed and interpreted all available imaging studies and agree with radiology interpretation. CT HEAD WO CONT   Final Result      No acute process. CT Results  (Last 48 hours)               06/21/22 0224  CT HEAD WO CONT Final result    Impression:      No acute process. Narrative:  INDICATION: acute headache/severe, hypertension       EXAM:  HEAD CT WITHOUT CONTRAST       COMPARISON: None       TECHNIQUE:  Routine noncontrast axial head CT was performed. Sagittal and   coronal reconstructions were generated. CT dose reduction was achieved through use of a standardized protocol tailored   for this examination and automatic exposure control for dose modulation. FINDINGS:       Ventricles: Midline, no hydrocephalus. Intracranial Hemorrhage: None. Brain Parenchyma/Brainstem: Normal for age. Basal Cisterns: Normal.   Paranasal Sinuses: Visualized sinuses are clear. Additional Comments: N/A. CXR Results  (Last 48 hours)    None        Medical Decision Making   I am the first and primary ED physician for this patient's ED visit today. I reviewed our electronic medical record system for any past medical records that may contribute to the patient's current condition, including their past medical history, surgical history, social and family history.  This also includes their most recent ED visits, previous hospitalizations and prior diagnostic data. I have reviewed and summarized the most pertinent findings in my HPI and MDM. Vital Signs Reviewed:  Patient Vitals for the past 24 hrs:   Temp Pulse Resp BP SpO2   06/21/22 0430 -- -- -- (!) 169/89 --   06/21/22 0400 -- -- -- (!) 151/75 100 %   06/21/22 0345 -- -- -- (!) 162/95 100 %   06/21/22 0330 -- -- -- (!) 183/110 100 %   06/21/22 0315 -- -- -- (!) 154/90 100 %   06/21/22 0300 -- -- -- (!) 192/101 99 %   06/21/22 0200 -- -- -- (!) 212/122 100 %   06/21/22 0149 -- -- -- (!) 222/125 --   06/21/22 0146 98.6 °F (37 °C) 83 18 (!) 217/123 100 %     Pulse Oximetry Analysis: 100% on RA    Cardiac Monitor:   Rate: 83 bpm  The cardiac monitor revealed the following rhythm as interpreted by me: Normal Sinus Rhythm  Cardiac monitoring was ordered to monitor patient for signs of cardiac dysrhythmia, which they are at risk for based on their history and/or risk for cardiovascular disease and/or metabolic abnormalities. Records Reviewed: Nursing Notes, Old Medical Records, Previous electrocardiograms, Previous Radiology Studies and Previous Laboratory Studies, EMS reports    Provider Notes (Medical Decision Making):   Pt presents with acute headache; afebrile with stable vitals; exam is without focal deficits. DDx: migraine, tension headache, cluster HA, stress, hypertensive urgency, dehydration. HA was gradual onset, pt neuro intact, no nausea/vomiting/focal weakness/sensory change to suggest CVA or ICH. Also pt is not immunocompromised, no fever, no focal weakness to suggest brain abscess. Therefore, no CT head. No neck stiffness, fever, AMS, photophobia to suggest meningitis. Neg kernig and Brudzinski. Therefore no LP. No jaw claudication, visual changes or temporal pain to suggest temporal arteritis, therefore no ESR/CRP. No eye pain, PERRL, no red eye to suggest glaucoma. No risk factors for CO. Will treat pain and reassess.     ED Course:   Initial assessment performed. I discussed presenting problems and concerns, and my formulated plan for today's visit with the patient and any available family members. I have encouraged them to ask questions as they arise throughout the visit. Social History     Tobacco Use    Smoking status: Former Smoker     Packs/day: 0.50     Quit date: 6/19/2019     Years since quitting: 3.0    Smokeless tobacco: Former User   Vaping Use    Vaping Use: Never used   Substance Use Topics    Alcohol use: Not Currently    Drug use: Not Currently     Types: Marijuana       ED Orders Placed:  Orders Placed This Encounter    CT HEAD WO CONT    B/P    ketorolac (TORADOL) injection 30 mg    prochlorperazine (COMPAZINE) injection 10 mg    diphenhydrAMINE (BENADRYL) injection 50 mg    cloNIDine HCL (CATAPRES) tablet 0.2 mg    sodium chloride 0.9 % bolus infusion 1,000 mL    butalbital-acetaminophen-caffeine (FIORICET, ESGIC) -40 mg per tablet 2 Tablet    butalbital-acetaminophen-caff (Fioricet) -40 mg per capsule    ketorolac (TORADOL) 10 mg tablet       ED Medications Administered During ED Course:  Medications   ketorolac (TORADOL) injection 30 mg (30 mg IntraVENous Given 6/21/22 0232)   prochlorperazine (COMPAZINE) injection 10 mg (10 mg IntraVENous Given 6/21/22 0234)   diphenhydrAMINE (BENADRYL) injection 50 mg (50 mg IntraVENous Given 6/21/22 0233)   cloNIDine HCL (CATAPRES) tablet 0.2 mg (0.2 mg Oral Given 6/21/22 0230)   sodium chloride 0.9 % bolus infusion 1,000 mL (0 mL IntraVENous IV Completed 6/21/22 0432)   butalbital-acetaminophen-caffeine (FIORICET, ESGIC) -40 mg per tablet 2 Tablet (2 Tablets Oral Given 6/21/22 0325)         HYPERTENSION COUNSELING  For 10 minutes, education was provided to the patient today regarding their hypertension. Patient is made aware of their elevated blood pressure and is instructed to follow up this week with their Primary Care for a recheck.  Patient is counseled regarding consequences of chronic, uncontrolled hypertension including kidney disease, heart disease, stroke or even death. Patient states their understanding and agrees to follow up this week. Additionally, during their visit, I discussed sodium restriction, maintaining ideal body weight and regular exercise program as physiologic means to achieve blood pressure control. The patient will strive towards this. Progress Note:  I have just re-evaluated the patient. Patient reports improvement of sx's. I have reviewed Her vital signs and determined there is currently no worsening in their condition or physical exam. Results have been reviewed with them and their questions have been answered. We will continue to review further results as they come available. CRITICAL CARE NOTE :  IMPENDING DETERIORATION -Cardiovascular and CNS  ASSOCIATED RISK FACTORS - Hypotension and CNS Decompensation  MANAGEMENT- Bedside Assessment and Supervision of Care  INTERPRETATION -  CT Scan, Blood Pressure and Cardiac Output Measures   INTERVENTIONS - hemodynamic mngmt and Neurologic interventions   CASE REVIEW - Nursing and Family  TREATMENT RESPONSE -Improved  PERFORMED BY - Self    NOTES   :  I personally spent 35 minutes of critical care time with this patient. This is time spent at this critically ill patient's bedside actively involved in patient care as well as the coordination of care and discussions with the patient's family. This includes time involved in ordering and reviewing of laboratory studies, pulse oximetry, re-evaluation of patient's condition, examination of patient, evaluation of patient's response to treatment, ordering and performing treatments and interventions, review of old charts, consultations with specialist, discussions with family regarding pertinent collateral history and plan of care, bedside attention and documentation. During this entire length of time I was immediately available to the patient.  This does not include time spent on separately reported billable procedures. Critical Care: The reason for providing this level of medical care for this critically-ill patient was due to a critical illness that impaired one or more vital organ systems, such that there was a high probability of imminent or life-threatening deterioration in the patient's condition. This care involved the highest level of preparedness to intervene urgently. This care involved high complexity decision making to assess, manipulate, and support vital system functions, to treat this degree of vital organ system failure, and to prevent further life threatening deterioration of the patients condition requiring frequent assessments and interventions. Manohar Spann MD    Progress Note:  Pt reassessed and symptoms noted to have improved significantly after ED treatment. Pt is clinically stable for discharge. Jailyn Mir's labs and imaging have been reviewed with her and available family. She verbally conveys understanding and agreement of the signs, symptoms, diagnosis, treatment and prognosis and additionally agrees to follow up as recommended with Dr. Molly Hylton, Robin Smith MD and/or specialist as instructed. She agrees with the care plan we have created and conveys that all of her questions have been answered. Additionally, I have put together a packet of discharge instructions for her that include: 1) educational information regarding their diagnosis, 2) how to care for their diagnosis at home, as well a 3) list of reasons why they would want to return to the ED prior to their follow-up appointment should the patient's condition change or symptoms worsen. I have answered all questions to the patient's satisfaction. Strict return precautions given. She conveyed understanding and agreement with care plan. Vital signs stable for discharge. Disposition:  DISCHARGE  The pt is ready for discharge.  The pt's signs, symptoms, diagnosis, and discharge instructions have been discussed and pt has conveyed their understanding. The pt is to follow up as recommended or return to ER should their symptoms worsen. Plan has been discussed and pt is in agreement. Plan:  1. Return precautions as discussed with patient and available family/caregiver. 2.   Discharge Medication List as of 6/21/2022  3:21 AM      START taking these medications    Details   butalbital-acetaminophen-caff (Fioricet) -40 mg per capsule Take 1 Capsule by mouth every four (4) hours as needed for Headache. Indications: tension headache, Normal, Disp-20 Capsule, R-0      ketorolac (TORADOL) 10 mg tablet Take 1 Tablet by mouth every six (6) hours as needed for Pain., Normal, Disp-15 Tablet, R-0         CONTINUE these medications which have NOT CHANGED    Details   fluticasone propionate (FLONASE) 50 mcg/actuation nasal spray 2 Sprays by Both Nostrils route daily. , Historical Med      busPIRone (BUSPAR) 15 mg tablet TAKE 1 TABLET BY MOUTH TWICE A DAY AS DIRECTED, Historical Med      Latuda 20 mg tab tablet TAKE 1 TABLET BY MOUTH EVERY EVENING WITH AT LEAST 350 CALORIES AS DIRECTED, Historical Med, BUNNY      losartan (COZAAR) 50 mg tablet TAKE 1 TABLET BY MOUTH ONCE DAILY FOR 90 DAYS, Historical Med      omeprazole (PRILOSEC) 40 mg capsule Take 40 mg by mouth daily. , Historical Med      metoclopramide HCl (Reglan) 5 mg tablet Take 5 mg by mouth four (4) times daily. , Historical Med      aspirin 81 mg chewable tablet Take 81 mg by mouth daily. , Historical Med      metFORMIN (GLUCOPHAGE) 1,000 mg tablet Take 1,000 mg by mouth two (2) times daily (with meals). Indications: type 2 diabetes mellitus, Historical Med           3.    Follow-up Information     Follow up With Specialties Details Why [facility] EMERGENCY DEPT Emergency Medicine  As needed, If symptoms worsen [address] [facility] Family [facility]  As needed, If symptoms worsen [address]      [address]  [address]   [address] [address] [phone]        Instructed to return to ED if worse  Diagnosis   Clinical Impression:  1. Hypertensive urgency    2. Accelerated hypertension    3. Acute non intractable tension-type headache    4. Excessive chloride salt intake    5. Grief reaction      Attestation:  Adonis Malin MD, am the attending of record for this patient. I personally performed the services described in this documentation on this date, 6/21/2022 for patient, Marcin Gaston. I have reviewed the chart and verified that the record is accurate and complete.

## 2022-06-21 NOTE — ED TRIAGE NOTES
Pt presents to ED with c/o headache x2 hours. Pt reports hx HTN, took BP (losartan potassium) med at 1600  Pt endorses taking 81 mg aspirin approx 2 hour ago.

## 2022-06-21 NOTE — ED NOTES
Pt presents to ED ambulatory complaining of HA. Pt reports her leonora father passed away 2 months ago. States she has not really been eating or drinking water. Pt is alert and oriented x 4, RR even and unlabored, skin is warm and dry. Assessment completed and pt updated on plan of care. Call bell in reach. Emergency Department Nursing Plan of Care       The Nursing Plan of Care is developed from the Nursing assessment and Emergency Department Attending provider initial evaluation. The plan of care may be reviewed in the ED Provider note.     The Plan of Care was developed with the following considerations:   Patient / Family readiness to learn indicated by:verbalized understanding  Persons(s) to be included in education: patient  Barriers to Learning/Limitations:No    Signed

## 2022-06-21 NOTE — ED NOTES
Discharge instructions were given to the patient by Darcus Libman, RN. The patient left the Emergency Department ambulatory, alert and oriented and in no acute distress with 2 prescriptions. The patient was encouraged to call or return to the ED for worsening issues or problems and was encouraged to schedule a follow up appointment for continuing care. The patient verbalized understanding of discharge instructions and prescriptions, all questions were answered. The patient has no further concerns at this time.

## 2022-06-21 NOTE — DISCHARGE INSTRUCTIONS
Thank You! It was a pleasure taking care of you in our Emergency Department today. We know that when you come to [address] [place] [address], you are entrusting us with your health, comfort, and safety. Our physicians and nurses honor that trust, and truly appreciate the opportunity to care for you and your loved ones. We also value your feedback. If you receive a survey about your Emergency Department experience today, please fill it out. We care about our patients' feedback, and we listen to what you have to say. Thank you. Dr. Hemal Cartwright M.D.      ____________________________________________________________________  I have included a copy of your lab results and/or radiologic studies from today's visit so you can have them easily available at your follow-up visit. We hope you feel better and please do not hesitate to contact the ED if you have any questions at all! No results found for this or any previous visit (from the past 12 hour(s)). CT HEAD WO CONT   Final Result      No acute process. CT Results  (Last 48 hours)                 06/21/22 0224  CT HEAD WO CONT Final result    Impression:      No acute process. Narrative:  INDICATION: acute headache/severe, hypertension       EXAM:  HEAD CT WITHOUT CONTRAST       COMPARISON: None       TECHNIQUE:  Routine noncontrast axial head CT was performed. Sagittal and   coronal reconstructions were generated. CT dose reduction was achieved through use of a standardized protocol tailored   for this examination and automatic exposure control for dose modulation. FINDINGS:       Ventricles: Midline, no hydrocephalus. Intracranial Hemorrhage: None. Brain Parenchyma/Brainstem: Normal for age. Basal Cisterns: Normal.   Paranasal Sinuses: Visualized sinuses are clear. Additional Comments: N/A.                  The exam and treatment you received in the Emergency Department were for an urgent problem and are not intended as complete care. It is important that you follow up with a doctor, nurse practitioner, or physician assistant for ongoing care. If your symptoms become worse or you do not improve as expected and you are unable to reach your usual health care provider, you should return to the Emergency Department. We are available 24 hours a day. Please take your discharge instructions with you when you go to your follow-up appointment. If a prescription has been provided, please have it filled as soon as possible to prevent a delay in treatment. Read the entire medication instruction sheet provided to you by the pharmacy. If you have any questions or reservations about taking the medication due to side effects or interactions with other medications, please call your primary care physician or contact the ER to speak with the charge nurse. Please make an appointment with your family doctor or the physician you were referred to for follow-up of this visit as instructed on your discharge paperwork. Return to the ER if you are unable to be seen or if you are unable to be seen in a timely manner. If you have any problem arranging the follow-up visit, contact the Emergency Department immediately.

## 2022-07-12 ENCOUNTER — HOSPITAL ENCOUNTER (EMERGENCY)
Age: 42
Discharge: HOME | End: 2022-07-12
Attending: EMERGENCY MEDICINE
Payer: MEDICARE

## 2022-07-12 VITALS
DIASTOLIC BLOOD PRESSURE: 115 MMHG | TEMPERATURE: 99.1 F | SYSTOLIC BLOOD PRESSURE: 173 MMHG | HEIGHT: 64 IN | WEIGHT: 198 LBS | RESPIRATION RATE: 16 BRPM | HEART RATE: 90 BPM | OXYGEN SATURATION: 100 % | BODY MASS INDEX: 33.8 KG/M2

## 2022-07-12 DIAGNOSIS — I10 HYPERTENSION, UNSPECIFIED TYPE: ICD-10-CM

## 2022-07-12 DIAGNOSIS — G44.209 TENSION HEADACHE: Primary | ICD-10-CM

## 2022-07-12 PROCEDURE — 99284 EMERGENCY DEPT VISIT MOD MDM: CPT

## 2022-07-12 PROCEDURE — 74011250636 HC RX REV CODE- 250/636: Performed by: PHYSICIAN ASSISTANT

## 2022-07-12 PROCEDURE — 96374 THER/PROPH/DIAG INJ IV PUSH: CPT

## 2022-07-12 PROCEDURE — 96375 TX/PRO/DX INJ NEW DRUG ADDON: CPT

## 2022-07-12 PROCEDURE — 74011250637 HC RX REV CODE- 250/637: Performed by: PHYSICIAN ASSISTANT

## 2022-07-12 RX ORDER — LOSARTAN POTASSIUM 25 MG/1
50 TABLET ORAL
Status: COMPLETED | OUTPATIENT
Start: 2022-07-12 | End: 2022-07-12

## 2022-07-12 RX ORDER — KETOROLAC TROMETHAMINE 30 MG/ML
30 INJECTION, SOLUTION INTRAMUSCULAR; INTRAVENOUS
Status: COMPLETED | OUTPATIENT
Start: 2022-07-12 | End: 2022-07-12

## 2022-07-12 RX ORDER — PROCHLORPERAZINE EDISYLATE 5 MG/ML
5 INJECTION INTRAMUSCULAR; INTRAVENOUS
Status: COMPLETED | OUTPATIENT
Start: 2022-07-12 | End: 2022-07-12

## 2022-07-12 RX ORDER — ACETAMINOPHEN 500 MG
1000 TABLET ORAL
Status: DISCONTINUED | OUTPATIENT
Start: 2022-07-12 | End: 2022-07-12 | Stop reason: HOSPADM

## 2022-07-12 RX ORDER — DEXAMETHASONE SODIUM PHOSPHATE 100 MG/10ML
10 INJECTION INTRAMUSCULAR; INTRAVENOUS
Status: COMPLETED | OUTPATIENT
Start: 2022-07-12 | End: 2022-07-12

## 2022-07-12 RX ORDER — BUTALBITAL, ACETAMINOPHEN AND CAFFEINE 50; 325; 40 MG/1; MG/1; MG/1
1 TABLET ORAL
Qty: 20 TABLET | Refills: 0 | Status: SHIPPED | OUTPATIENT
Start: 2022-07-12

## 2022-07-12 RX ADMIN — KETOROLAC TROMETHAMINE 30 MG: 30 INJECTION, SOLUTION INTRAMUSCULAR; INTRAVENOUS at 10:40

## 2022-07-12 RX ADMIN — DEXAMETHASONE SODIUM PHOSPHATE 10 MG: 10 INJECTION INTRAMUSCULAR; INTRAVENOUS at 11:53

## 2022-07-12 RX ADMIN — PROCHLORPERAZINE EDISYLATE 5 MG: 5 INJECTION, SOLUTION INTRAMUSCULAR; INTRAVENOUS at 10:40

## 2022-07-12 RX ADMIN — LOSARTAN POTASSIUM 50 MG: 25 TABLET, FILM COATED ORAL at 11:13

## 2022-07-12 NOTE — ED PROVIDER NOTES
EMERGENCY DEPARTMENT HISTORY AND PHYSICAL EXAM      Date: 7/12/2022  Patient Name: Clark Fsih    History of Presenting Illness     Chief Complaint   Patient presents with    Headache    Hypertension       History Provided By: Patient    HPI: Clark Fish, 38 y.o. female with PMHx significant for diabetes, hypertension, GERD, hypercholesterolemia, presents to the ED with cc of headache onset last night. She describes as an aching pain to her bilateral forehead and face. Pain is gradually worsened throughout the night and is now severe. She has not tried any over-the-counter medications for her pain and did not take her blood pressure medication this morning due to her headache. She reports she has had a headache like this before in the past due to too much salt intake. She denies fever, nasal congestion, head injury, visual changes, extremity numbness or weakness, nausea, vomiting. There are no other complaints, changes, or physical findings at this time. PCP: Mumtaz Cooney MD    No current facility-administered medications on file prior to encounter. Current Outpatient Medications on File Prior to Encounter   Medication Sig Dispense Refill    butalbital-acetaminophen-caff (Fioricet) -40 mg per capsule Take 1 Capsule by mouth every four (4) hours as needed for Headache. Indications: tension headache 20 Capsule 0    ketorolac (TORADOL) 10 mg tablet Take 1 Tablet by mouth every six (6) hours as needed for Pain. 15 Tablet 0    fluticasone propionate (FLONASE) 50 mcg/actuation nasal spray 2 Sprays by Both Nostrils route daily.  busPIRone (BUSPAR) 15 mg tablet TAKE 1 TABLET BY MOUTH TWICE A DAY AS DIRECTED      Latuda 20 mg tab tablet TAKE 1 TABLET BY MOUTH EVERY EVENING WITH AT LEAST 350 CALORIES AS DIRECTED      losartan (COZAAR) 50 mg tablet TAKE 1 TABLET BY MOUTH ONCE DAILY FOR 90 DAYS      omeprazole (PRILOSEC) 40 mg capsule Take 40 mg by mouth daily.       metoclopramide HCl (Reglan) 5 mg tablet Take 5 mg by mouth four (4) times daily.  aspirin 81 mg chewable tablet Take 81 mg by mouth daily.  metFORMIN (GLUCOPHAGE) 1,000 mg tablet Take 1,000 mg by mouth two (2) times daily (with meals).  Indications: type 2 diabetes mellitus         Past History     Past Medical History:  Past Medical History:   Diagnosis Date    Abnormal Pap smear     \"years ago\"     Bipolar disorder ([facility] [address]     Bronchitis     Depression with anxiety 2010    Diabetes mellitus     type II diabetes mellitus since     GERD (gastroesophageal reflux disease)     High cholesterol     HTN (hypertension) 2010    Ill-defined condition     bronchitis    Postpartum depression     hospitalized after last delivery    Rhinitis 2012    Sleep apnea     uses cpap    UTI (urinary tract infection)        Past Surgical History:  Past Surgical History:   Procedure Laterality Date    HX  SECTION      X 3    HX TUBAL LIGATION         Family History:  Family History   Problem Relation Age of Onset    Diabetes Mother     Hypertension Mother     Stroke Mother     Heart Disease Mother     Hypertension Father     Diabetes Father     Kidney Disease Father     Hypertension Maternal Grandmother     Diabetes Maternal Grandmother     Hypertension Maternal Grandfather     Diabetes Maternal Grandfather     Hypertension Paternal Grandmother     Diabetes Paternal [de-identified]     Hypertension Paternal Grandfather     Diabetes Paternal Grandfather     Hypertension Sister     Diabetes Sister     Diabetes Sister     Hypertension Sister     Diabetes Sister     Hypertension Sister     Thyroid Disease Neg Hx        Social History:  Social History     Tobacco Use    Smoking status: Former Smoker     Packs/day: 0.50     Quit date: 2019     Years since quitting: 3.0    Smokeless tobacco: Former User   Vaping Use    Vaping Use: Never used   Substance Use Topics    Alcohol use: Not Currently    Drug use: Not Currently     Types: Marijuana       Allergies: Allergies   Allergen Reactions    Latex Hives     rash    Other Food Hives     Citrus Fruits    Flagyl [Metronidazole] Hives and Itching    Lisinopril-Hydrochlorothiazide Angioedema    Rosuvastatin Anaphylaxis    Clindamycin Rash and Itching    Ciprofloxacin Rash and Itching    Citrate Hives    Cottonseed Oil Rash    Cymbalta [Duloxetine] Hives and Itching    Flexeril [Cyclobenzaprine] Angioedema    Lemon Hives    Pcn [Penicillins] Hives    Shrimp Itching    Sulfa (Sulfonamide Antibiotics) Shortness of Breath    Tomato Hives         Review of Systems   Review of Systems   Constitutional: Negative for chills and fever. HENT: Positive for sinus pressure and sinus pain. Negative for ear pain and sore throat. Eyes: Negative for redness and visual disturbance. Respiratory: Negative for cough and shortness of breath. Cardiovascular: Negative for chest pain and palpitations. Gastrointestinal: Negative for abdominal pain, nausea and vomiting. Genitourinary: Negative for dysuria and hematuria. Musculoskeletal: Negative for back pain and gait problem. Skin: Negative for rash and wound. Neurological: Positive for headaches. Negative for dizziness, weakness and numbness. Psychiatric/Behavioral: Negative for behavioral problems and confusion. All other systems reviewed and are negative. Physical Exam   Physical Exam  Constitutional:       Appearance: She is not toxic-appearing. HENT:      Head: Normocephalic and atraumatic. Nose:      Comments: Tenderness over the bilateral maxillary sinuses. Mouth/Throat:      Mouth: Mucous membranes are moist.   Eyes:      Extraocular Movements: Extraocular movements intact. Pupils: Pupils are equal, round, and reactive to light. Cardiovascular:      Rate and Rhythm: Normal rate and regular rhythm.    Pulmonary:      Effort: Pulmonary effort is normal. No respiratory distress. Breath sounds: Normal breath sounds. No wheezing. Musculoskeletal:         General: No deformity. Normal range of motion. Cervical back: Normal range of motion and neck supple. Skin:     General: Skin is warm and dry. Neurological:      General: No focal deficit present. Mental Status: She is alert and oriented to person, place, and time. Comments: Strength 5 out of 5 in bilateral upper and lower extremities. Distal sensation intact bilaterally. No facial asymmetry. No nystagmus. Psychiatric:         Behavior: Behavior normal.           Diagnostic Study Results     Labs -   No results found for this or any previous visit (from the past 12 hour(s)). Radiologic Studies -   No orders to display     CT Results  (Last 48 hours)    None        CXR Results  (Last 48 hours)    None            Medical Decision Making   I am the first provider for this patient. I reviewed the vital signs, available nursing notes, past medical history, past surgical history, family history and social history. Vital Signs-Reviewed the patient's vital signs. Patient Vitals for the past 12 hrs:   Temp Pulse Resp BP SpO2   07/12/22 1220 -- 90 16 (!) 173/115 100 %   07/12/22 1044 -- 95 16 (!) 170/109 100 %   07/12/22 0940 99.1 °F (37.3 °C) 98 18 (!) 182/126 100 %         Records Reviewed: Nursing Notes and Old Medical Records      Provider Notes (Medical Decision Making):   DDx: Tension headache, sinus headache, migraine headache, medication noncompliance    Patient presents with headache. She was hypertensive on arrival but admits to not taking her blood pressure medication this morning. She has no signs or symptoms of endorgan dysfunction. She has had no head trauma and has no neurologic deficits on exam.  She had a CT of her head when she was here a few weeks ago that shows no acute process, will defer repeat imaging today. No fever or meningeal signs.   She does have sinus pain and pressure but denies nasal congestion and symptoms only started last night, do not suspect bacterial sinusitis. Plan to treat symptomatically for headache and give home dose of antihypertensive. ED Course:   Initial assessment performed. The patients presenting problems have been discussed, and they are in agreement with the care plan formulated and outlined with them. I have encouraged them to ask questions as they arise throughout their visit. Patient was reassessed multiple times during her stay. She did have some improvement in her headache. Her blood pressure started to improve after her placement with losartan. Discussed importance of medication compliance. Advise follow-up with PCP for recheck and discussed return precautions. Disposition:  12:25 PM  The patient has been re-evaluated and is ready for discharge. Reviewed available results with patient. Counseled patient on diagnosis and care plan. Patient has expressed understanding, and all questions have been answered. Patient agrees with plan and agrees to follow up as recommended, or to return to the ED if their symptoms worsen. Discharge instructions have been provided and explained to the patient, along with reasons to return to the ED. PLAN:  1. Discharge Medication List as of 7/12/2022 12:25 PM      START taking these medications    Details   butalbital-acetaminophen-caffeine (FIORICET, ESGIC) -40 mg per tablet Take 1 Tablet by mouth every four (4) hours as needed for Headache., Normal, Disp-20 Tablet, R-0         CONTINUE these medications which have NOT CHANGED    Details   butalbital-acetaminophen-caff (Fioricet) -40 mg per capsule Take 1 Capsule by mouth every four (4) hours as needed for Headache.  Indications: tension headache, Normal, Disp-20 Capsule, R-0      ketorolac (TORADOL) 10 mg tablet Take 1 Tablet by mouth every six (6) hours as needed for Pain., Normal, Disp-15 Tablet, R-0      fluticasone propionate (FLONASE) 50 mcg/actuation nasal spray 2 Sprays by Both Nostrils route daily. , Historical Med      busPIRone (BUSPAR) 15 mg tablet TAKE 1 TABLET BY MOUTH TWICE A DAY AS DIRECTED, Historical Med      Latuda 20 mg tab tablet TAKE 1 TABLET BY MOUTH EVERY EVENING WITH AT LEAST 350 CALORIES AS DIRECTED, Historical Med, BUNNY      losartan (COZAAR) 50 mg tablet TAKE 1 TABLET BY MOUTH ONCE DAILY FOR 90 DAYS, Historical Med      omeprazole (PRILOSEC) 40 mg capsule Take 40 mg by mouth daily. , Historical Med      metoclopramide HCl (Reglan) 5 mg tablet Take 5 mg by mouth four (4) times daily. , Historical Med      aspirin 81 mg chewable tablet Take 81 mg by mouth daily. , Historical Med      metFORMIN (GLUCOPHAGE) 1,000 mg tablet Take 1,000 mg by mouth two (2) times daily (with meals). Indications: type 2 diabetes mellitus, Historical Med           2. Follow-up Information     Follow up With Specialties Details Why Contact Info    chely Sanders MD Family Medicine Schedule an appointment as soon as possible for a visit   [address]      [facility] EMERGENCY DEPT Emergency Medicine Go to  If symptoms worsen Mohan Alvarez  [phone]        Return to ED if worse     Diagnosis     Clinical Impression:   1. Tension headache    2. Hypertension, unspecified type            Boni Thorne PA-C

## 2022-07-12 NOTE — ED NOTES
Pt refused Tylenol ordered ED provider stating: \"I ain't gonna take no pills by mouth. \" ED provider notified. 12:20 - Pt states: \"remove this thing out my arm, I'm ready to go. \" ED provider notified. IV access removed.

## 2022-07-12 NOTE — ED TRIAGE NOTES
Pt reports headache and facial pain x1 day.  Pt denies n/v. Pt reports htn and has hx of htn did not take bp meds due to headache

## 2022-07-12 NOTE — ED NOTES
Pt arrives to the ED AAOX4 with a c/c of anterior HA and sinus pain onset yesterday. Pt denies any dizziness. Pt is noted hypertensive, she states she has not taken her morning dose because she is having a HA. Pt is noted in stable condition, now in ED room with side rail up, bed to lowest position, and call light within reach. Will continue to monitor and wait for ED provider evaluation. Emergency Department Nursing Plan of Care       The Nursing Plan of Care is developed from the Nursing assessment and Emergency Department Attending provider initial evaluation. The plan of care may be reviewed in the ED Provider note.     The Plan of Care was developed with the following considerations:   Patient / Family readiness to learn indicated by:verbalized understanding  Persons(s) to be included in education: patient  Barriers to Learning/Limitations:No    Signed     Bora Prim    7/12/2022   10:19 AM

## 2022-07-12 NOTE — ED NOTES
Discharge instructions were given to the patient by Regis Zepeda. The patient left the Emergency Department ambulatory, alert and oriented and in no acute distress with 1 prescriptions. The patient was encouraged to call or return to the ED for worsening issues or problems and was encouraged to schedule a follow up appointment for continuing care. The patient verbalized understanding of discharge instructions and prescriptions, all questions were answered. The patient has no further concerns at this time.

## 2022-11-14 ENCOUNTER — TRANSCRIBE ORDER (OUTPATIENT)
Dept: SCHEDULING | Age: 42
End: 2022-11-14

## 2022-11-14 DIAGNOSIS — Z12.31 SCREENING MAMMOGRAM FOR HIGH-RISK PATIENT: Primary | ICD-10-CM

## 2022-11-23 ENCOUNTER — HOSPITAL ENCOUNTER (OUTPATIENT)
Dept: MAMMOGRAPHY | Age: 42
Discharge: HOME | End: 2022-11-23
Attending: FAMILY MEDICINE
Payer: MEDICARE

## 2022-11-23 DIAGNOSIS — Z12.31 SCREENING MAMMOGRAM FOR HIGH-RISK PATIENT: ICD-10-CM

## 2022-11-23 PROCEDURE — 77063 BREAST TOMOSYNTHESIS BI: CPT

## 2023-02-26 ENCOUNTER — HOSPITAL ENCOUNTER (EMERGENCY)
Age: 43
Discharge: HOME | End: 2023-02-26
Attending: EMERGENCY MEDICINE
Payer: MEDICARE

## 2023-02-26 VITALS
DIASTOLIC BLOOD PRESSURE: 76 MMHG | WEIGHT: 231.04 LBS | HEART RATE: 91 BPM | RESPIRATION RATE: 14 BRPM | SYSTOLIC BLOOD PRESSURE: 138 MMHG | BODY MASS INDEX: 40.94 KG/M2 | OXYGEN SATURATION: 99 % | HEIGHT: 63 IN | TEMPERATURE: 98.8 F

## 2023-02-26 DIAGNOSIS — R59.0 SUBMENTAL LYMPHADENOPATHY: Primary | ICD-10-CM

## 2023-02-26 PROCEDURE — 99283 EMERGENCY DEPT VISIT LOW MDM: CPT

## 2023-02-26 RX ORDER — CEPHALEXIN 500 MG/1
500 CAPSULE ORAL 3 TIMES DAILY
Qty: 21 CAPSULE | Refills: 0 | Status: SHIPPED | OUTPATIENT
Start: 2023-02-26 | End: 2023-03-05

## 2023-02-26 NOTE — DISCHARGE INSTRUCTIONS
Thank You! It was a pleasure taking care of you in our Emergency Department today. We know that when you come to [facility] you are entrusting us with your health, comfort, and safety. Our clinicians honor that trust, and truly appreciate the opportunity to care for you and your loved ones. We also value your feedback. If you receive a survey about your Emergency Department experience today, please fill it out. We care about our patients' feedback, and we listen to what you have to say. Thank you.     Katelyn Booker PA-C
Romy Rossi

## 2023-02-26 NOTE — ED PROVIDER NOTES
[facility] EMERGENCY DEPT  EMERGENCY DEPARTMENT ENCOUNTER       Pt Name: Colin Higuera  MRN: [id number]  [place] 1980  Date of evaluation: 2/26/2023  Provider: NATHANIEL Dewey   PCP: Dustin Samson MD  Note Started: 6:03 PM 2/26/23     CHIEF COMPLAINT       Chief Complaint   Patient presents with    Sore Throat     Noticed swelling just below chin, underneath neck at her throat and it is painful today. HISTORY OF PRESENT ILLNESS: 1 or more elements      History From: Patient  HPI Limitations : None     Colin Higuera is a 37 y.o. female who presents to the ED for evaluation of a tender, swollen bump under her chin since today. States she did break out with an acne rash to  her chin recently. Despite CC, denies any sore throat. Denies dental pain. Denies any changes in voice, jaw pain, difficulty opening mouth. Tolerating solids and liquids well. Denies fevers, neck pain or stiffness, headache, CP, SOB, wheezing, abdominal pain, N/V/D. No symptomatic management techniques prior to arrival       Nursing Notes were all reviewed and agreed with or any disagreements were addressed in the HPI. REVIEW OF SYSTEMS      Review of Systems   Constitutional:  Negative for appetite change, chills and fever. HENT:  Negative for congestion, dental problem, drooling, mouth sores, trouble swallowing and voice change. Swollen, sore bump under chin   Eyes:  Negative for pain. Respiratory:  Negative for cough and shortness of breath. Cardiovascular:  Negative for chest pain. Gastrointestinal:  Negative for abdominal pain, constipation, diarrhea, nausea and vomiting. Genitourinary:  Negative for difficulty urinating, dysuria and frequency. Musculoskeletal:  Negative for neck pain and neck stiffness. Skin:  Negative for rash. Neurological:  Negative for syncope and headaches. All other systems reviewed and are negative. Positives and Pertinent negatives as per HPI.     PAST HISTORY Past Medical History:  Past Medical History:   Diagnosis Date    Abnormal Pap smear     \"years ago\"     Bipolar disorder ([facility] [address]     Bronchitis     Depression with anxiety 2010    Diabetes mellitus     type II diabetes mellitus since     GERD (gastroesophageal reflux disease)     High cholesterol     HTN (hypertension) 2010    Ill-defined condition     bronchitis    Postpartum depression     hospitalized after last delivery    Rhinitis 2012    Sleep apnea     uses cpap    UTI (urinary tract infection)        Past Surgical History:  Past Surgical History:   Procedure Laterality Date    HX  SECTION      X 3    HX TUBAL LIGATION         Family History:  Family History   Problem Relation Age of Onset    Diabetes Mother     Hypertension Mother     Stroke Mother     Heart Disease Mother     Hypertension Father     Diabetes Father     Kidney Disease Father     Hypertension Maternal Grandmother     Diabetes Maternal Grandmother     Hypertension Maternal Grandfather     Diabetes Maternal Grandfather     Hypertension Paternal Grandmother     Diabetes Paternal Grandmother     Hypertension Paternal Grandfather     Diabetes Paternal Grandfather     Hypertension Sister     Diabetes Sister     Diabetes Sister     Hypertension Sister     Diabetes Sister     Hypertension Sister     Thyroid Disease Neg Hx        Social History:  Social History     Tobacco Use    Smoking status: Former     Packs/day: 0.50     Types: Cigarettes     Quit date: 2019     Years since quitting: 3.6    Smokeless tobacco: Former   Vaping Use    Vaping Use: Never used   Substance Use Topics    Alcohol use: Not Currently    Drug use: Not Currently     Types: Marijuana       Allergies:   Allergies   Allergen Reactions    Latex Hives     rash    Other Food Hives     Citrus Fruits    Flagyl [Metronidazole] Hives and Itching    Lisinopril-Hydrochlorothiazide Angioedema    Rosuvastatin Anaphylaxis    Clindamycin Rash and Itching Ciprofloxacin Rash and Itching    Citrate Hives    Cottonseed Oil Rash    Cymbalta [Duloxetine] Hives and Itching    Flexeril [Cyclobenzaprine] Angioedema    Lemon Hives    Pcn [Penicillins] Hives    Shrimp Itching    Sulfa (Sulfonamide Antibiotics) Shortness of Breath    Tomato Hives       CURRENT MEDICATIONS      Discharge Medication List as of 2/26/2023  6:34 PM        CONTINUE these medications which have NOT CHANGED    Details   butalbital-acetaminophen-caffeine (FIORICET, ESGIC) -40 mg per tablet Take 1 Tablet by mouth every four (4) hours as needed for Headache., Normal, Disp-20 Tablet, R-0      butalbital-acetaminophen-caff (Fioricet) -40 mg per capsule Take 1 Capsule by mouth every four (4) hours as needed for Headache. Indications: tension headache, Normal, Disp-20 Capsule, R-0      ketorolac (TORADOL) 10 mg tablet Take 1 Tablet by mouth every six (6) hours as needed for Pain., Normal, Disp-15 Tablet, R-0      fluticasone propionate (FLONASE) 50 mcg/actuation nasal spray 2 Sprays by Both Nostrils route daily. , Historical Med      busPIRone (BUSPAR) 15 mg tablet TAKE 1 TABLET BY MOUTH TWICE A DAY AS DIRECTED, Historical Med      Latuda 20 mg tab tablet TAKE 1 TABLET BY MOUTH EVERY EVENING WITH AT LEAST 350 CALORIES AS DIRECTED, Historical Med, BUNNY      losartan (COZAAR) 50 mg tablet TAKE 1 TABLET BY MOUTH ONCE DAILY FOR 90 DAYS, Historical Med      omeprazole (PRILOSEC) 40 mg capsule Take 40 mg by mouth daily. , Historical Med      metoclopramide HCl (Reglan) 5 mg tablet Take 5 mg by mouth four (4) times daily. , Historical Med      aspirin 81 mg chewable tablet Take 81 mg by mouth daily. , Historical Med      metFORMIN (GLUCOPHAGE) 1,000 mg tablet Take 1,000 mg by mouth two (2) times daily (with meals).  Indications: type 2 diabetes mellitus, Historical Med             PHYSICAL EXAM      ED Triage Vitals [02/26/23 1623]   ED Encounter Vitals Group      /76      Pulse (Heart Rate) 91      Resp Rate 14      Temp 98.8 °F (37.1 °C)      Temp src       O2 Sat (%) 99 %      Weight 231 lb 0.7 oz      Height 5' 3\"        Physical Exam  Constitutional:       General: She is not in acute distress. Appearance: Normal appearance. She is not toxic-appearing. HENT:      Head: Normocephalic and atraumatic. Right Ear: External ear normal.      Left Ear: External ear normal.      Nose: Nose normal.      Mouth/Throat:      Mouth: Mucous membranes are moist.      Comments: Oropharynx without posterior erythema, no tonsillar hypertrophy or exudate, uvula midline, tolerating secretions well, normal phonation, no trismus, no Ludwigs angina, clear and coherent speech. Eyes:      Conjunctiva/sclera: Conjunctivae normal.   Neck:      Thyroid: No thyromegaly. Trachea: Trachea and phonation normal.        Comments: Mild TTP to submental region consistent with lymphadenopathy   Cardiovascular:      Rate and Rhythm: Normal rate and regular rhythm. Pulses: Normal pulses. Heart sounds: Normal heart sounds. Pulmonary:      Effort: Pulmonary effort is normal.      Breath sounds: Normal breath sounds. Abdominal:      General: There is no distension. Musculoskeletal:         General: Normal range of motion. Cervical back: Normal range of motion. Lymphadenopathy:      Cervical:      Right cervical: No posterior cervical adenopathy. Left cervical: No posterior cervical adenopathy. Skin:     General: Skin is warm and dry. Neurological:      General: No focal deficit present. Mental Status: She is alert and oriented to person, place, and time. Psychiatric:         Mood and Affect: Mood normal.         Behavior: Behavior normal.        DIAGNOSTIC RESULTS   LABS:     No results found for this or any previous visit (from the past 12 hour(s)). EKG:  When ordered, EKG's are interpreted by the Emergency Department Physician in the absence of a cardiologist.  Please see their note for interpretation of EKG. RADIOLOGY:  Non-plain film images such as CT, Ultrasound and MRI are read by the radiologist. Plain radiographic images are visualized and preliminarily interpreted by the ED Provider with the below findings:     N/A     Interpretation per the Radiologist below, if available at the time of this note:     No results found. PROCEDURES   Unless otherwise noted below, none  Procedures     CRITICAL CARE TIME   N/A  EMERGENCY DEPARTMENT COURSE and DIFFERENTIAL DIAGNOSIS/MDM   Vitals:    Vitals:    02/26/23 1623 02/26/23 1838   BP: 138/76    Pulse: 91    Resp: 14    Temp: 98.8 °F (37.1 °C)    SpO2: 99% 99%   Weight: 104.8 kg (231 lb 0.7 oz)    Height: 5' 3\" (1.6 m)         Patient was given the following medications:  Medications - No data to display    CONSULTS: (Who and What was discussed)  None    Chronic Conditions: HTN, diabetes, bipolar disorder, additional history as noted above     Social Determinants affecting Dx or Tx: None    Records Reviewed (source and summary of external records): Prior medical records and Nursing notes    MDM (CC/HPI Summary, DDx, ED Course, Reassessment, Disposition Considerations -Tests not done, Shared Decision Making, Pt Expectation of Test or Tx.):   Patient is a pleasant well-appearing 70-year-old female presents ED for evaluation of a tender swollen nodule underneath her chin that began today. States she did have some rash/acne breakout a few days ago. Despite chief complaint, she denies any sore throat, difficulty swallowing or breathing. Denies any dental pain. No recent dental procedures. Airway widely patent. No evidence of Gerardo's angina, epiglottitis, meningitis. Suspect submental lymphadenopathy secondary to chin acne/rash. Will place on Keflex for lymphadenopathy (patient has extensive allergy list). Shared decision-making form and care plan created together, discussed diagnosis and treatment plan.   Counseled symptomatic management techniques. PCP follow-up. ENT follow-up as needed. Patient verbalizes understanding and agreement of current plan of care. FINAL IMPRESSION     1. Submental lymphadenopathy          DISPOSITION/PLAN   Discharged    Discharge Note: The patient is stable for discharge home. The signs, symptoms, diagnosis, and discharge instructions have been discussed, understanding conveyed, and agreed upon. The patient is to follow up as recommended or return to ER should their symptoms worsen. PATIENT REFERRED TO:  Follow-up Information       Follow up With Specialties Details Why Contact Info    [facility] EMERGENCY DEPT Emergency Medicine  As needed, If symptoms worsen [address] [place] [address]  [phone]    Beth Luong MD Family Medicine In 1 week  [address]      [address] [facility]  In 1 week As needed [address]  [address]  [facility] [address]   [address] [address] [phone]              DISCHARGE MEDICATIONS:  Discharge Medication List as of 2/26/2023  6:34 PM        START taking these medications    Details   cephALEXin (Keflex) 500 mg capsule Take 1 Capsule by mouth three (3) times daily for 7 days. , Normal, Disp-21 Capsule, R-0           CONTINUE these medications which have NOT CHANGED    Details   butalbital-acetaminophen-caffeine (FIORICET, ESGIC) -40 mg per tablet Take 1 Tablet by mouth every four (4) hours as needed for Headache., Normal, Disp-20 Tablet, R-0      butalbital-acetaminophen-caff (Fioricet) -40 mg per capsule Take 1 Capsule by mouth every four (4) hours as needed for Headache. Indications: tension headache, Normal, Disp-20 Capsule, R-0      ketorolac (TORADOL) 10 mg tablet Take 1 Tablet by mouth every six (6) hours as needed for Pain., Normal, Disp-15 Tablet, R-0      fluticasone propionate (FLONASE) 50 mcg/actuation nasal spray 2 Sprays by Both Nostrils route daily. , Historical Med      busPIRone (BUSPAR) 15 mg tablet TAKE 1 TABLET BY MOUTH TWICE A DAY AS DIRECTED, Historical Med      Latuda 20 mg tab tablet TAKE 1 TABLET BY MOUTH EVERY EVENING WITH AT LEAST 350 CALORIES AS DIRECTED, Historical Med, BUNNY      losartan (COZAAR) 50 mg tablet TAKE 1 TABLET BY MOUTH ONCE DAILY FOR 90 DAYS, Historical Med      omeprazole (PRILOSEC) 40 mg capsule Take 40 mg by mouth daily. , Historical Med      metoclopramide HCl (Reglan) 5 mg tablet Take 5 mg by mouth four (4) times daily. , Historical Med      aspirin 81 mg chewable tablet Take 81 mg by mouth daily. , Historical Med      metFORMIN (GLUCOPHAGE) 1,000 mg tablet Take 1,000 mg by mouth two (2) times daily (with meals). Indications: type 2 diabetes mellitus, Historical Med               DISCONTINUED MEDICATIONS:  Discharge Medication List as of 2/26/2023  6:34 PM          ED Attending Involvement : I have seen and evaluated the patient. My supervision physician was available for consultation. I am the Primary Clinician of Record. NATHANIEL Giordano (electronically signed)    (Please note that parts of this dictation were completed with voice recognition software. Quite often unanticipated grammatical, syntax, homophones, and other interpretive errors are inadvertently transcribed by the computer software. Please disregards these errors.  Please excuse any errors that have escaped final proofreading.)

## 2023-03-02 ENCOUNTER — HOSPITAL ENCOUNTER (EMERGENCY)
Age: 43
Discharge: HOME | End: 2023-03-02
Attending: EMERGENCY MEDICINE
Payer: MEDICARE

## 2023-03-02 VITALS
BODY MASS INDEX: 40.75 KG/M2 | TEMPERATURE: 98.5 F | WEIGHT: 230 LBS | HEART RATE: 89 BPM | SYSTOLIC BLOOD PRESSURE: 130 MMHG | HEIGHT: 63 IN | RESPIRATION RATE: 18 BRPM | DIASTOLIC BLOOD PRESSURE: 71 MMHG | OXYGEN SATURATION: 98 %

## 2023-03-02 DIAGNOSIS — L70.8 ACNEIFORM RASH: Primary | ICD-10-CM

## 2023-03-02 DIAGNOSIS — Z76.0 MEDICATION REFILL: ICD-10-CM

## 2023-03-02 DIAGNOSIS — L21.9 SEBORRHEIC DERMATITIS: ICD-10-CM

## 2023-03-02 DIAGNOSIS — R59.0 SUBMENTAL ADENOPATHY: ICD-10-CM

## 2023-03-02 PROCEDURE — 99283 EMERGENCY DEPT VISIT LOW MDM: CPT

## 2023-03-02 RX ORDER — ONDANSETRON 4 MG/1
4 TABLET, ORALLY DISINTEGRATING ORAL
Qty: 12 TABLET | Refills: 0 | Status: SHIPPED | OUTPATIENT
Start: 2023-03-02

## 2023-03-02 RX ORDER — TRIAMCINOLONE ACETONIDE 1 MG/G
CREAM TOPICAL 2 TIMES DAILY
Qty: 15 G | Refills: 0 | Status: SHIPPED | OUTPATIENT
Start: 2023-03-02

## 2023-03-02 RX ORDER — DOXYCYCLINE HYCLATE 100 MG
100 TABLET ORAL 2 TIMES DAILY
Qty: 14 TABLET | Refills: 0 | Status: SHIPPED | OUTPATIENT
Start: 2023-03-02 | End: 2023-03-09

## 2023-03-02 RX ORDER — ALBUTEROL SULFATE 90 UG/1
2 AEROSOL, METERED RESPIRATORY (INHALATION)
Qty: 1 EACH | Refills: 1 | Status: SHIPPED | OUTPATIENT
Start: 2023-03-02

## 2023-03-02 RX ORDER — PREDNISONE 10 MG/1
TABLET ORAL
Qty: 21 TABLET | Refills: 0 | Status: SHIPPED | OUTPATIENT
Start: 2023-03-02

## 2023-03-02 RX ORDER — FLUCONAZOLE 150 MG/1
150 TABLET ORAL
Qty: 1 TABLET | Refills: 0 | Status: SHIPPED | OUTPATIENT
Start: 2023-03-02 | End: 2023-03-02

## 2023-03-02 NOTE — ED PROVIDER NOTES
[facility] EMERGENCY DEPT  EMERGENCY DEPARTMENT ENCOUNTER       Pt Name: Sharee Goins  MRN: [id number]  [place] 1980  Date of evaluation: 3/2/2023  Provider: NATHANIEL Sanchez   PCP: Beverly Arguelles MD  Note Started: 2:06 PM 3/2/23     CHIEF COMPLAINT       Chief Complaint   Patient presents with    Sore Throat    Rash        HISTORY OF PRESENT ILLNESS: 1 or more elements      History From: Patient  HPI Limitations : None     Sharee Goins is a 37 y.o. female who presents ambulatory with 4 days of 9 out of 10 constant, achy sore throat that is worse with swallowing. In spite of pain with swallowing, there is no difficulty swallowing. There has been no cough. There has been no fever. She tells me she also has a rash on her face. She tells me she suffers from anxiety and she has had a rash and sore throat and is causing her anxiety. Medical records suggest and patient endorses she was seen days ago at [facility] ED. Was seen for the very same problem and prescribed Keflex. She shows me her prescription for the cephalexin but tells me she is very worried about taking the medication because she does not know if she has a medication allergy. I make her aware that cephalexin is not in her list of allergies. She tells me she is very concerned and wonders if there is not another medication may we can try. She also endorses some occasional nausea and there has been no vomiting. There has been no diarrhea. She denies abdominal pain. She asks if I can refill her albuterol inhaler. She denies any history of asthma. There has been no cough, chest pain or shortness of breath. She also asks for prescription for Diflucan. She tells me sometimes oral antibiotics cause yeast infection. She tells me she does have follow-up scheduled with her primary care on March 6. Nursing Notes were all reviewed and agreed with or any disagreements were addressed in the HPI.      REVIEW OF SYSTEMS      Review of Systems Constitutional:  Negative for fever. HENT:  Positive for sore throat. Negative for trouble swallowing. Respiratory:  Negative for cough. Skin:  Positive for rash. Positives and Pertinent negatives as per HPI.     PAST HISTORY     Past Medical History:  Past Medical History:   Diagnosis Date    Abnormal Pap smear     \"years ago\"     Bipolar disorder ([address]     Bronchitis     Depression with anxiety 2010    Diabetes mellitus     type II diabetes mellitus since     GERD (gastroesophageal reflux disease)     High cholesterol     HTN (hypertension) 2010    Ill-defined condition     bronchitis    Postpartum depression     hospitalized after last delivery    Rhinitis 2012    Sleep apnea     uses cpap    UTI (urinary tract infection)        Past Surgical History:  Past Surgical History:   Procedure Laterality Date    HX  SECTION      X 3    HX TUBAL LIGATION         Family History:  Family History   Problem Relation Age of Onset    Diabetes Mother     Hypertension Mother     Stroke Mother     Heart Disease Mother     Hypertension Father     Diabetes Father     Kidney Disease Father     Hypertension Maternal Grandmother     Diabetes Maternal Grandmother     Hypertension Maternal Grandfather     Diabetes Maternal Grandfather     Hypertension Paternal Grandmother     Diabetes Paternal Grandmother     Hypertension Paternal Grandfather     Diabetes Paternal Grandfather     Hypertension Sister     Diabetes Sister     Diabetes Sister     Hypertension Sister     Diabetes Sister     Hypertension Sister     Thyroid Disease Neg Hx        Social History:  Social History     Tobacco Use    Smoking status: Former     Packs/day: 0.50     Types: Cigarettes     Quit date: 2019     Years since quitting: 3.7    Smokeless tobacco: Former   Vaping Use    Vaping Use: Never used   Substance Use Topics    Alcohol use: Not Currently    Drug use: Not Currently     Types: Marijuana Allergies: Allergies   Allergen Reactions    Latex Hives     rash    Other Food Hives     Citrus Fruits    Flagyl [Metronidazole] Hives and Itching    Lisinopril-Hydrochlorothiazide Angioedema    Rosuvastatin Anaphylaxis    Clindamycin Rash and Itching    Ciprofloxacin Rash and Itching    Citrate Hives    Cottonseed Oil Rash    Cymbalta [Duloxetine] Hives and Itching    Flexeril [Cyclobenzaprine] Angioedema    Lemon Hives    Pcn [Penicillins] Hives    Shrimp Itching    Sulfa (Sulfonamide Antibiotics) Shortness of Breath    Tomato Hives       CURRENT MEDICATIONS      Discharge Medication List as of 3/2/2023  2:23 PM        CONTINUE these medications which have NOT CHANGED    Details   cephALEXin (Keflex) 500 mg capsule Take 1 Capsule by mouth three (3) times daily for 7 days. , Normal, Disp-21 Capsule, R-0      butalbital-acetaminophen-caffeine (FIORICET, ESGIC) -40 mg per tablet Take 1 Tablet by mouth every four (4) hours as needed for Headache., Normal, Disp-20 Tablet, R-0      butalbital-acetaminophen-caff (Fioricet) -40 mg per capsule Take 1 Capsule by mouth every four (4) hours as needed for Headache. Indications: tension headache, Normal, Disp-20 Capsule, R-0      ketorolac (TORADOL) 10 mg tablet Take 1 Tablet by mouth every six (6) hours as needed for Pain., Normal, Disp-15 Tablet, R-0      fluticasone propionate (FLONASE) 50 mcg/actuation nasal spray 2 Sprays by Both Nostrils route daily. , Historical Med      busPIRone (BUSPAR) 15 mg tablet TAKE 1 TABLET BY MOUTH TWICE A DAY AS DIRECTED, Historical Med      Latuda 20 mg tab tablet TAKE 1 TABLET BY MOUTH EVERY EVENING WITH AT LEAST 350 CALORIES AS DIRECTED, Historical Med, BUNNY      losartan (COZAAR) 50 mg tablet TAKE 1 TABLET BY MOUTH ONCE DAILY FOR 90 DAYS, Historical Med      omeprazole (PRILOSEC) 40 mg capsule Take 40 mg by mouth daily. , Historical Med      metoclopramide HCl (Reglan) 5 mg tablet Take 5 mg by mouth four (4) times daily. , Historical Med      aspirin 81 mg chewable tablet Take 81 mg by mouth daily. , Historical Med      metFORMIN (GLUCOPHAGE) 1,000 mg tablet Take 1,000 mg by mouth two (2) times daily (with meals). Indications: type 2 diabetes mellitus, Historical Med             PHYSICAL EXAM      ED Triage Vitals [03/02/23 1401]   ED Encounter Vitals Group      /71      Pulse (Heart Rate) 89      Resp Rate 18      Temp 98.5 °F (36.9 °C)      Temp src       O2 Sat (%) 98 %      Weight 230 lb      Height 5' 3\"        Physical Exam  Vitals and nursing note reviewed. Constitutional:       General: She is not in acute distress. Appearance: She is obese. HENT:      Head: Normocephalic and atraumatic. Comments:   Patient does have acneiform rash of the skin of the chin. There is scarring of the face consistent with the cystic acne. She also has a greasy, red rash of the nasolabial folds and of the upper lip with some scaling. Right Ear: External ear normal.      Left Ear: External ear normal.      Nose: Nose normal.      Mouth/Throat:      Comments:   No facial swelling  No trismus  No posterior oropharyngeal erythema, edema or exudate  There is no sublingual edema  Eyes:      General: Vision grossly intact. Conjunctiva/sclera: Conjunctivae normal.   Cardiovascular:      Rate and Rhythm: Normal rate. Pulmonary:      Effort: Pulmonary effort is normal.   Musculoskeletal:         General: Normal range of motion. Lymphadenopathy:      Head:      Right side of head: Submental adenopathy present. Left side of head: Submental adenopathy present. Comments:   Mild, shotty, submental adenopathy. There is no significant submandibular adenopathy. There is no overlying redness of the skin. There is no firmness, induration or fluctuance. Skin:     Findings: No rash. Neurological:      Mental Status: She is oriented to person, place, and time. She is not disoriented.    Psychiatric:         Speech: Speech normal.        DIAGNOSTIC RESULTS   LABS:     No results found for this or any previous visit (from the past 12 hour(s)). EKG: When ordered, EKG's are interpreted by the Emergency Department Physician in the absence of a cardiologist.  Please see their note for interpretation of EKG. RADIOLOGY:  Non-plain film images such as CT, Ultrasound and MRI are read by the radiologist. Plain radiographic images are visualized and preliminarily interpreted by the ED Provider with the below findings:     Interpretation per the Radiologist below, if available at the time of this note:     No results found. PROCEDURES   Unless otherwise noted below, none  Procedures     CRITICAL CARE TIME   None    EMERGENCY DEPARTMENT COURSE and DIFFERENTIAL DIAGNOSIS/MDM   Vitals:    Vitals:    03/02/23 1401   BP: 130/71   Pulse: 89   Resp: 18   Temp: 98.5 °F (36.9 °C)   SpO2: 98%   Weight: 104.3 kg (230 lb)   Height: 5' 3\" (1.6 m)        Patient was given the following medications:  Medications - No data to display    CONSULTS: (Who and What was discussed)  None    Chronic Conditions: Bipolar disorder, depression anxiety, DM, high cholesterol, HTN, rhinitis and others as above    Social Determinants affecting Dx or Tx: None    Records Reviewed (source and summary of external records): Prior medical records    CC/HPI Summary, DDx, ED Course, and Reassessment: DDx: Acneiform rash, seborrheic dermatitis, submental adenopathy; presentation is not consistent with streptococcal pharyngitis, Ludewig's angina, deep space infection    Afebrile and well-appearing. Patient presents with sore throat and facial rash over the past 4 days or longer. There has been no fever. There has been no cough. She was seen on Sunday for these very same symptoms and prescribed cephalexin.   She tells me she is very concerned about taking the antibiotic because she does not know if she has a medication allergy and she has not started that medicine though she picked it up. On exam, she does have an acneiform rash primarily of her chin. She does have a red, greasy rash of the nasolabial folds and upper lip which is consistent with a seborrheic dermatitis. Skin of her face demonstrates scarring which is consistent with a nodulocystic acne. There is no facial swelling and there is no trismus. There is no posterior oropharyngeal erythema, edema or exudate. There is no sublingual adenopathy. She does have mild, shotty submental adenopathy. Given the patient's concern regarding Keflex, I do believe antibiotics are appropriate for her acneiform rash. I will prescribe doxycycline. The patient is certain she has had that medication before and knows she can take it safely. She does request a prescription for Diflucan because oral antibiotics often cause yeast infection. She tells me she no longer takes medications for diabetes and does have a follow-up appointment with her primary care on the sixth. I tell her I will place her on a course of oral steroids regarding the rash and swollen lymph node. I also prescribed topical triamcinolone for the nasolabial rash. Patient asks for a refill of her albuterol. She denies any coughing or shortness of breath. On exam, her breathing is unlabored and lungs are clear and oxygen saturation approaches 100%. I sent a prescription for the albuterol inhaler to the pharmacy. Patient is encouraged to follow-up with her primary care at the next scheduled appointment. Return precautions for any concerns. Disposition Considerations (Tests not done, Shared Decision Making, Pt Expectation of Test or Tx.):      FINAL IMPRESSION     1. Acneiform rash    2. Seborrheic dermatitis    3. Submental adenopathy    4.  Medication refill          DISPOSITION/PLAN   Discharged     PATIENT REFERRED TO:  Follow-up Information       Follow up With Specialties Details Why Contact Asif Avila MD Family Medicine Call PRIMARY CARE: call to schedule follow up [id number]  [phone]                DISCHARGE MEDICATIONS:  Discharge Medication List as of 3/2/2023  2:23 PM        START taking these medications    Details   doxycycline (VIBRA-TABS) 100 mg tablet Take 1 Tablet by mouth two (2) times a day for 7 days. , Normal, Disp-14 Tablet, R-0      predniSONE (STERAPRED DS) 10 mg dose pack Per Dose Pack instructions, Normal, Disp-21 Tablet, R-0      triamcinolone acetonide (KENALOG) 0.1 % topical cream Apply  to affected area two (2) times a day. use thin layer, Normal, Disp-15 g, R-0      ondansetron (ZOFRAN ODT) 4 mg disintegrating tablet Take 1 Tablet by mouth every eight (8) hours as needed for Nausea or Vomiting., Normal, Disp-12 Tablet, R-0      fluconazole (Diflucan) 150 mg tablet Take 1 Tablet by mouth now for 1 dose. FDA advises cautious prescribing of oral fluconazole in pregnancy. , Normal, Disp-1 Tablet, R-0           CONTINUE these medications which have NOT CHANGED    Details   cephALEXin (Keflex) 500 mg capsule Take 1 Capsule by mouth three (3) times daily for 7 days. , Normal, Disp-21 Capsule, R-0      butalbital-acetaminophen-caffeine (FIORICET, ESGIC) -40 mg per tablet Take 1 Tablet by mouth every four (4) hours as needed for Headache., Normal, Disp-20 Tablet, R-0      butalbital-acetaminophen-caff (Fioricet) -40 mg per capsule Take 1 Capsule by mouth every four (4) hours as needed for Headache. Indications: tension headache, Normal, Disp-20 Capsule, R-0      ketorolac (TORADOL) 10 mg tablet Take 1 Tablet by mouth every six (6) hours as needed for Pain., Normal, Disp-15 Tablet, R-0      fluticasone propionate (FLONASE) 50 mcg/actuation nasal spray 2 Sprays by Both Nostrils route daily. , Historical Med      busPIRone (BUSPAR) 15 mg tablet TAKE 1 TABLET BY MOUTH TWICE A DAY AS DIRECTED, Historical Med      Latuda 20 mg tab tablet TAKE 1 TABLET BY MOUTH EVERY EVENING WITH AT LEAST 350 CALORIES AS DIRECTED, Historical Med, BUNNY      losartan (COZAAR) 50 mg tablet TAKE 1 TABLET BY MOUTH ONCE DAILY FOR 90 DAYS, Historical Med      omeprazole (PRILOSEC) 40 mg capsule Take 40 mg by mouth daily. , Historical Med      metoclopramide HCl (Reglan) 5 mg tablet Take 5 mg by mouth four (4) times daily. , Historical Med      aspirin 81 mg chewable tablet Take 81 mg by mouth daily. , Historical Med      metFORMIN (GLUCOPHAGE) 1,000 mg tablet Take 1,000 mg by mouth two (2) times daily (with meals). Indications: type 2 diabetes mellitus, Historical Med               DISCONTINUED MEDICATIONS:  Discharge Medication List as of 3/2/2023  2:23 PM        ED Attending Involvement : I have seen and evaluated the patient. My supervision physician was available for consultation. I am the Primary Clinician of Record. NATHANIEL Nolan (electronically signed)    (Please note that parts of this dictation were completed with voice recognition software. Quite often unanticipated grammatical, syntax, homophones, and other interpretive errors are inadvertently transcribed by the computer software. Please disregards these errors.  Please excuse any errors that have escaped final proofreading.)

## 2023-03-02 NOTE — ED TRIAGE NOTES
Patient presents to the ED with c/o sore throat x3 days. Pt reports a rash to her face that is itchy. Pt is speaking in full sentences and is in no distress at this time.

## 2023-03-02 NOTE — ED NOTES
Emergency Department Nursing Plan of Care       The Nursing Plan of Care is developed from the Nursing assessment and Emergency Department Attending provider initial evaluation. The plan of care may be reviewed in the ED Provider note.     The Plan of Care was developed with the following considerations:   Patient / Family readiness to learn indicated by:verbalized understanding  Persons(s) to be included in education: patient  Barriers to Learning/Limitations:No    Signed     Corinne Cage RN    3/2/2023   2:11 PM

## 2023-03-24 ENCOUNTER — HOSPITAL ENCOUNTER (EMERGENCY)
Age: 43
Discharge: HOME | End: 2023-03-24
Attending: EMERGENCY MEDICINE
Payer: MEDICARE

## 2023-03-24 VITALS
WEIGHT: 230 LBS | HEIGHT: 63 IN | BODY MASS INDEX: 40.75 KG/M2 | SYSTOLIC BLOOD PRESSURE: 125 MMHG | OXYGEN SATURATION: 97 % | RESPIRATION RATE: 18 BRPM | TEMPERATURE: 98.6 F | HEART RATE: 94 BPM | DIASTOLIC BLOOD PRESSURE: 78 MMHG

## 2023-03-24 DIAGNOSIS — R11.0 NAUSEA WITHOUT VOMITING: Primary | ICD-10-CM

## 2023-03-24 DIAGNOSIS — R13.19 ESOPHAGEAL DYSPHAGIA: ICD-10-CM

## 2023-03-24 DIAGNOSIS — K21.9 GASTROESOPHAGEAL REFLUX DISEASE WITHOUT ESOPHAGITIS: ICD-10-CM

## 2023-03-24 LAB
ATRIAL RATE: 102 BPM
CALCULATED P AXIS, ECG09: 67 DEGREES
CALCULATED R AXIS, ECG10: 64 DEGREES
CALCULATED T AXIS, ECG11: 30 DEGREES
DIAGNOSIS, 93000: NORMAL
P-R INTERVAL, ECG05: 136 MS
Q-T INTERVAL, ECG07: 340 MS
QRS DURATION, ECG06: 76 MS
QTC CALCULATION (BEZET), ECG08: 443 MS
VENTRICULAR RATE, ECG03: 102 BPM

## 2023-03-24 PROCEDURE — 99283 EMERGENCY DEPT VISIT LOW MDM: CPT

## 2023-03-24 PROCEDURE — 74011250636 HC RX REV CODE- 250/636: Performed by: EMERGENCY MEDICINE

## 2023-03-24 PROCEDURE — 74011250637 HC RX REV CODE- 250/637: Performed by: EMERGENCY MEDICINE

## 2023-03-24 RX ORDER — ONDANSETRON 4 MG/1
8 TABLET, ORALLY DISINTEGRATING ORAL
Status: COMPLETED | OUTPATIENT
Start: 2023-03-24 | End: 2023-03-24

## 2023-03-24 RX ORDER — FAMOTIDINE 20 MG/1
20 TABLET, FILM COATED ORAL
Status: COMPLETED | OUTPATIENT
Start: 2023-03-24 | End: 2023-03-24

## 2023-03-24 RX ORDER — ONDANSETRON 4 MG/1
4 TABLET, FILM COATED ORAL
Qty: 20 TABLET | Refills: 0 | Status: SHIPPED | OUTPATIENT
Start: 2023-03-24

## 2023-03-24 RX ORDER — FAMOTIDINE 20 MG/1
20 TABLET, FILM COATED ORAL 2 TIMES DAILY
Qty: 20 TABLET | Refills: 0 | Status: SHIPPED | OUTPATIENT
Start: 2023-03-24 | End: 2023-04-03

## 2023-03-24 RX ADMIN — FAMOTIDINE 20 MG: 20 TABLET, FILM COATED ORAL at 22:27

## 2023-03-24 RX ADMIN — ONDANSETRON 8 MG: 4 TABLET, ORALLY DISINTEGRATING ORAL at 22:27

## 2023-03-24 RX ADMIN — ALUMINUM HYDROXIDE AND MAGNESIUM HYDROXIDE 30 ML: 200; 200 SUSPENSION ORAL at 22:27

## 2023-03-25 NOTE — ED TRIAGE NOTES
Pt reporting acid reflux and nausea x 1 mo. Denies abd pain, vomiting, diarrhea, constipation. Currently on her menstrual cycle.

## 2023-03-25 NOTE — ED NOTES
Patient medicated as per MAR, no further complains/ concerns at this time. Emergency Department Nursing Plan of Care       The Nursing Plan of Care is developed from the Nursing assessment and Emergency Department Attending provider initial evaluation. The plan of care may be reviewed in the ED Provider note.     The Plan of Care was developed with the following considerations:   Patient / Family readiness to learn indicated by:verbalized understanding  Persons(s) to be included in education: patient  Barriers to Learning/Limitations:No    Signed     Johnnie Posey RN    3/24/2023   10:32 PM

## 2023-03-25 NOTE — ED NOTES
Discharge instructions were given to the patient by Belinda Daly RN. The patient left the Emergency Department ambulatory, alert and oriented and in no acute distress with 3 prescriptions. The patient was encouraged to call or return to the ED for worsening issues or problems and was encouraged to schedule a follow up appointment for continuing care. The patient verbalized understanding of discharge instructions and prescriptions, all questions were answered. The patient has no further concerns at this time.

## 2023-03-25 NOTE — ED PROVIDER NOTES
EMERGENCY DEPARTMENT HISTORY AND PHYSICAL EXAM            Please note that this dictation was completed with the assistance of \"Dragon\", the computer voice recognition software. Quite often unanticipated grammatical, syntax, homophones, and other interpretive errors are inadvertently transcribed by the computer software. Please disregard these errors and any errors that have escaped final proofreading. Thank you. Date of Evaluation: 03/25/23  Patient: Adrienne Perez  Patient Age and Sex: 37 y.o. female   MRN: [id number]  CSN: [id number]  PCP: Nichol Santiago MD    History of Present Illness     Chief Complaint   Patient presents with    Nausea    Epigastric Pain     History Provided By: Patient/family/EMS (if available)    HPI Limitations : None    HPI: Adrienne Perez, 37 y.o. female with past medical history as documented below presents to the ED with c/o of acute on chronic nausea and burning reflux pain. Patient notes symptoms have been ongoing for the past month or so. Reports nausea but no emesis. Reports ongoing history with reflux but denies taking any medications for it. Denies any chest pain or shortness of breath. No diaphoresis. No radiation of pain. . Pt denies any other exacerbating or ameliorating factors. There are no other complaints, changes or physical findings pertinent to the HPI at this time. Nursing Notes were all reviewed and agreed with or any disagreements were addressed in the HPI.     Past History   Past Medical History:  Past Medical History:   Diagnosis Date    Abnormal Pap smear     \"years ago\"     Bipolar disorder ([facility] [address]     Bronchitis     Depression with anxiety 9/22/2010    Diabetes mellitus     type II diabetes mellitus since 2004    GERD (gastroesophageal reflux disease)     High cholesterol     HTN (hypertension) 9/22/2010    Ill-defined condition     bronchitis    Postpartum depression     hospitalized after last delivery    Rhinitis 6/8/2012    Sleep apnea uses cpap    UTI (urinary tract infection)        Past Surgical History:  Past Surgical History:   Procedure Laterality Date    HX  SECTION      X 3    HX TUBAL LIGATION         Family History:   Family history reviewed and was non-contributory, unless specified below:  Family History   Problem Relation Age of Onset    Diabetes Mother     Hypertension Mother     Stroke Mother     Heart Disease Mother     Hypertension Father     Diabetes Father     Kidney Disease Father     Hypertension Maternal Grandmother     Diabetes Maternal Grandmother     Hypertension Maternal Grandfather     Diabetes Maternal Grandfather     Hypertension Paternal Grandmother     Diabetes Paternal Grandmother     Hypertension Paternal Grandfather     Diabetes Paternal Grandfather     Hypertension Sister     Diabetes Sister     Diabetes Sister     Hypertension Sister     Diabetes Sister     Hypertension Sister     Thyroid Disease Neg Hx        Social History:  Social History     Tobacco Use    Smoking status: Former     Packs/day: 0.50     Types: Cigarettes     Quit date: 2019     Years since quitting: 3.7    Smokeless tobacco: Former   Vaping Use    Vaping Use: Never used   Substance Use Topics    Alcohol use: Not Currently    Drug use: Not Currently     Types: Marijuana       Allergies: Allergies   Allergen Reactions    Latex Hives     rash    Other Food Hives     Citrus Fruits    Flagyl [Metronidazole] Hives and Itching    Lisinopril-Hydrochlorothiazide Angioedema    Rosuvastatin Anaphylaxis    Clindamycin Rash and Itching    Ciprofloxacin Rash and Itching    Citrate Hives    Cottonseed Oil Rash    Cymbalta [Duloxetine] Hives and Itching    Flexeril [Cyclobenzaprine] Angioedema    Lemon Hives    Pcn [Penicillins] Hives    Shrimp Itching    Sulfa (Sulfonamide Antibiotics) Shortness of Breath    Tomato Hives       Current Medications:  No current facility-administered medications on file prior to encounter.      Current Outpatient Medications on File Prior to Encounter   Medication Sig Dispense Refill    predniSONE (STERAPRED DS) 10 mg dose pack Per Dose Pack instructions 21 Tablet 0    triamcinolone acetonide (KENALOG) 0.1 % topical cream Apply  to affected area two (2) times a day. use thin layer 15 g 0    ondansetron (ZOFRAN ODT) 4 mg disintegrating tablet Take 1 Tablet by mouth every eight (8) hours as needed for Nausea or Vomiting. 12 Tablet 0    albuterol (PROVENTIL HFA, VENTOLIN HFA, PROAIR HFA) 90 mcg/actuation inhaler Take 2 Puffs by inhalation every four (4) hours as needed for Wheezing or Cough. 1 Each 1    butalbital-acetaminophen-caffeine (FIORICET, ESGIC) -40 mg per tablet Take 1 Tablet by mouth every four (4) hours as needed for Headache. 20 Tablet 0    butalbital-acetaminophen-caff (Fioricet) -40 mg per capsule Take 1 Capsule by mouth every four (4) hours as needed for Headache. Indications: tension headache 20 Capsule 0    ketorolac (TORADOL) 10 mg tablet Take 1 Tablet by mouth every six (6) hours as needed for Pain. 15 Tablet 0    fluticasone propionate (FLONASE) 50 mcg/actuation nasal spray 2 Sprays by Both Nostrils route daily. busPIRone (BUSPAR) 15 mg tablet TAKE 1 TABLET BY MOUTH TWICE A DAY AS DIRECTED      Latuda 20 mg tab tablet TAKE 1 TABLET BY MOUTH EVERY EVENING WITH AT LEAST 350 CALORIES AS DIRECTED      losartan (COZAAR) 50 mg tablet TAKE 1 TABLET BY MOUTH ONCE DAILY FOR 90 DAYS      omeprazole (PRILOSEC) 40 mg capsule Take 40 mg by mouth daily. metoclopramide HCl (Reglan) 5 mg tablet Take 5 mg by mouth four (4) times daily. aspirin 81 mg chewable tablet Take 81 mg by mouth daily. metFORMIN (GLUCOPHAGE) 1,000 mg tablet Take 1,000 mg by mouth two (2) times daily (with meals).  Indications: type 2 diabetes mellitus         Review of Systems   A complete ROS was reviewed by me today and all other systems negative, unless otherwise specified below:  Review of Systems   Constitutional: Negative. Negative for chills and fever. HENT: Negative. Negative for congestion and sore throat. Eyes: Negative. Respiratory: Negative. Negative for cough, chest tightness, shortness of breath and wheezing. Cardiovascular: Negative. Negative for chest pain, palpitations and leg swelling. Gastrointestinal:  Positive for nausea. Negative for abdominal distention, abdominal pain, blood in stool, constipation, diarrhea and vomiting. Endocrine: Negative. Genitourinary: Negative. Negative for difficulty urinating, dysuria, flank pain, frequency, hematuria and urgency. Musculoskeletal: Negative. Negative for back pain and neck stiffness. Skin: Negative. Negative for rash. Allergic/Immunologic: Negative. Neurological: Negative. Negative for dizziness, syncope, weakness, light-headedness, numbness and headaches. Hematological: Negative. Psychiatric/Behavioral: Negative. Negative for confusion and self-injury. Physical Exam   Patient Vitals for the past 24 hrs:   Temp Pulse Resp BP SpO2   03/24/23 2315 -- 94 -- 125/78 97 %   03/24/23 2212 98.6 °F (37 °C) (!) 102 18 120/62 95 %       Physical Exam  Vitals and nursing note reviewed. Constitutional:       General: She is not in acute distress. Appearance: She is well-developed. She is not diaphoretic. HENT:      Head: Normocephalic and atraumatic. Mouth/Throat:      Pharynx: No oropharyngeal exudate. Eyes:      Conjunctiva/sclera: Conjunctivae normal.      Pupils: Pupils are equal, round, and reactive to light. Cardiovascular:      Rate and Rhythm: Normal rate and regular rhythm. Heart sounds: Normal heart sounds. No murmur heard. No friction rub. No gallop. Pulmonary:      Effort: Pulmonary effort is normal. No respiratory distress. Breath sounds: Normal breath sounds. No wheezing or rales. Chest:      Chest wall: No tenderness.    Abdominal:      General: Bowel sounds are normal. There is no distension. Palpations: Abdomen is soft. There is no mass. Tenderness: There is no abdominal tenderness. There is no guarding or rebound. Comments: No abdominal pulsatile mass. No epigastric tenderness, no rebound or guarding. Musculoskeletal:         General: No tenderness or deformity. Normal range of motion. Cervical back: Normal range of motion. Skin:     General: Skin is warm. Findings: No rash. Neurological:      Mental Status: She is alert and oriented to person, place, and time. Cranial Nerves: No cranial nerve deficit. Motor: No abnormal muscle tone. Coordination: Coordination normal.      Deep Tendon Reflexes: Reflexes normal.     Diagnostic Studies     LABORATORY RESULTS:  I have personally reviewed and interpreted all available laboratory results. Recent Results (from the past 24 hour(s))   EKG, 12 LEAD, INITIAL    Collection Time: 03/24/23 10:30 PM   Result Value Ref Range    Ventricular Rate 102 BPM    Atrial Rate 102 BPM    P-R Interval 136 ms    QRS Duration 76 ms    Q-T Interval 340 ms    QTC Calculation (Bezet) 443 ms    Calculated P Axis 67 degrees    Calculated R Axis 64 degrees    Calculated T Axis 30 degrees    Diagnosis       Sinus tachycardia  Otherwise normal ECG  When compared with ECG of 28-OCT-2021 22:25,  No significant change was found         RADIOLOGY RESULTS:  I have personally reviewed and interpreted all available imaging studies and agree with radiology interpretation. No orders to display     CT Results  (Last 48 hours)      None          CXR Results  (Last 48 hours)      None          No orders to display          [facility] ED COURSE   I am the first and primary ED physician for this patient's ED visit today. I reviewed our EMR for any past records that may contribute to the patient's current condition, including their past medical, surgical, social and family history.  This also includes their most recent ED visits, previous hospitalizations and prior diagnostic data. I have reviewed and summarized the most pertinent findings in my HPI and MDM. Vital Signs Reviewed:  Patient Vitals for the past 24 hrs:   Temp Pulse Resp BP SpO2   03/24/23 2315 -- 94 -- 125/78 97 %   03/24/23 2212 98.6 °F (37 °C) (!) 102 18 120/62 95 %     Pulse Oximetry Analysis: 95% on RA with good pleth    Cardiac Monitor:   Rate: 88 bpm  The cardiac monitor revealed the following rhythm as interpreted by me: Normal Sinus Rhythm  Cardiac monitoring was ordered to monitor patient for signs of cardiac dysrhythmia, which they are at risk for based on their history and/or risk for cardiovascular disease and/or metabolic abnormalities. EKG interpretation:   Billable EKG reviewed by ED Physician in the absence of a cardiologist: Yes  Rhythm: sinus tachycardia; and regular . Rate (approx.): 102; Axis: normal; P wave: normal; QRS interval: normal ; ST/T wave: normal; Other findings: normal. This EKG was interpreted by Preston Degroot MD     Records Reviewed: Nursing Notes, Old Medical Records, Previous electrocardiograms, Previous Radiology Studies and Previous Laboratory Studies, EMS reports    DIFFERENTIAL DIAGNOSIS AND PLAN:  Patient presents with acute nausea. Pt is afebrile with stable vitals; abdominal exam is non-peritoneal. Pt is without headache, visual disturbances, numbness, focal weakness, or ataxia and has an otherwise non-focal neuro exam. Do not suspect intra-cranial pathology. Differential includes gastritis/GERD, electrolyte disturbance, ODETTE, pancreatitis, cholelithiasis, cholecystitis, hepatitis, renal pathology, ACS, gastroenteritis, infection such as UTI/PNA. EKG obtained without concerns for ischemia. Will treat symptomatically and reassess. Given 1 month duration of symptoms and reassuring exam, can safely defer labs at this time.     Pertinent Chronic Medical Conditions Affecting Care:  Past Medical History:   Diagnosis Date Abnormal Pap smear     \"years ago\"     Bipolar disorder ([facility] [address]     Bronchitis     Depression with anxiety 9/22/2010    Diabetes mellitus     type II diabetes mellitus since 2004    GERD (gastroesophageal reflux disease)     High cholesterol     HTN (hypertension) 9/22/2010    Ill-defined condition     bronchitis    Postpartum depression     hospitalized after last delivery    Rhinitis 6/8/2012    Sleep apnea     uses cpap    UTI (urinary tract infection)      HYPERTENSION COUNSELING  For 6 minutes, education was provided to the patient today regarding their hypertension. Patient is made aware of their elevated blood pressure and is instructed to follow up this week with their Primary Care for a recheck. Patient is counseled regarding consequences of chronic, uncontrolled hypertension including kidney disease, heart disease, stroke or even death. Patient states their understanding and agrees to follow up this week. Additionally, during their visit, I discussed sodium restriction, maintaining ideal body weight and regular exercise program as physiologic means to achieve blood pressure control. The patient will strive towards this. Review of Prior Records and External Documents:  Reviewed records from [address] [place] [address], August 26, 2022. Patient was seen by ENT for pharyngoesophageal dysphagia. Per records, she is supposed to have a modified barium swallow and EGD but before which were canceled because she had a cold. States that she has not been taking her omeprazole as prescribed.     Social Determinants of Health Affecting Dx or Tx:  None    Social History     Socioeconomic History    Marital status: SINGLE   Tobacco Use    Smoking status: Former     Packs/day: 0.50     Types: Cigarettes     Quit date: 6/19/2019     Years since quitting: 3.7    Smokeless tobacco: Former   Vaping Use    Vaping Use: Never used   Substance and Sexual Activity    Alcohol use: Not Currently    Drug use: Not Currently     Types: Marijuana Sexual activity: Yes     Partners: Female     Birth control/protection: Surgical     Comment: Tubal ligation   Social History Narrative    Lives in the [address] [place] [address] with her 7 yo daughter and her daughter's father. On disability for depression. ED Course: Progress Notes, Reevaluation, and Consults:  Initial assessment performed. I discussed presenting problems and concerns, and my formulated plan for today's visit with the patient and any available family members. I have encouraged them to ask questions as they arise throughout the visit. ED Physician Orders:   Orders Placed This Encounter    PO CHALLENGE    EKG 12 LEAD INITIAL    ondansetron (ZOFRAN ODT) tablet 8 mg    aluminum-magnesium hydroxide (MAALOX) oral suspension 30 mL    famotidine (PEPCID) tablet 20 mg    ondansetron hcl (Zofran) 4 mg tablet    famotidine (Pepcid) 20 mg tablet    aluminum-magnesium hydroxide (MAALOX) 200-200 mg/5 mL suspension     ED Medications Given:   Medications   ondansetron (ZOFRAN ODT) tablet 8 mg (8 mg Oral Given 3/24/23 2227)   aluminum-magnesium hydroxide (MAALOX) oral suspension 30 mL (30 mL Oral Given 3/24/23 2227)   famotidine (PEPCID) tablet 20 mg (20 mg Oral Given 3/24/23 2227)     Pt received IV/IM medications per above and placed on appropriate cardiac/respiratory monitoring due to drug toxicity. ED Physician Interpretation of Test Results: All results were independently reviewed and interpreted by myself, notably showing:     RADIOLOGY:  Non-plain film images such as CT, ultrasound and MRI are read by the radiologist. Plain radiographic images are visualized and preliminarily interpreted by the ED Provider with the below findings:     Imaging interpreted by me:     Interpretation per the Radiologist below, if available at the time of this note:  No results found.       Amount and/or Complexity of Data Reviewed  Clinical lab tests: ordered as appropriate & reviewed  Tests in the medicine section of CPT®: ordered as appropriate & reviewed  Obtain history from someone other than the patient: yes  Review and summarize past medical records: yes  Independent visualization of images, tracings, or specimens: yes    Risks  OTC drugs. Prescription drug management. Progress Note:  I have just re-evaluated the patient. Pt reports improvement of her symptoms after ED medicaitons. I have reviewed her vital signs and determined there is currently no worsening in their condition or physical exam. Results have been reviewed with them and their questions have been answered. I will continue to review further results as they come available. Shared Decision Making: I considered performing CT imaging, but did not after shared decision making with patient due to risk of radiation and reassuring abdominal exam.  Patient's presentation is consistent with likely GERD versus peptic ulcer disease. No focal findings. Reassuring exam.  Patient advised to follow-up with GI for outpatient EGD and further work-up. Pink Jf DISCHARGE  Pt reassessed and symptoms noted to have improved significantly after ED treatment. Dionicio Mir's labs and imaging have been reviewed with her and available family. She verbally conveys understanding and agreement of the signs, symptoms, diagnosis, treatment and prognosis and additionally agrees to follow up as recommended with Dr. Leobardo Onofre, Kamini Carroll MD and/or specialist as instructed. She agrees with the care plan we have created and conveys that all of her questions have been answered. Additionally, I have put together a packet of discharge instructions for her that include: 1) Educational information regarding their diagnosis, 2) How to care for their diagnosis at home, as well a 3) List of reasons why they would want to return to the ED prior to their follow-up appointment should their condition change or symptoms worsen. I have answered all questions to the patient's satisfaction.  Strict return precautions given. She and/or family conveyed understanding and agreement with care plan. Vital signs stable for discharge. PLAN  1. Return precautions as discussed with patient and available family/caregiver. 2.   Discharge Medication List as of 3/24/2023 11:09 PM        START taking these medications    Details   ondansetron hcl (Zofran) 4 mg tablet Take 1 Tablet by mouth every eight (8) hours as needed for Nausea or Vomiting., Print, Disp-20 Tablet, R-0      famotidine (Pepcid) 20 mg tablet Take 1 Tablet by mouth two (2) times a day for 10 days. , Print, Disp-20 Tablet, R-0      aluminum-magnesium hydroxide (MAALOX) 200-200 mg/5 mL suspension Take 15 mL by mouth every six (6) hours as needed for Indigestion. , Print, Disp-300 mL, R-0           CONTINUE these medications which have NOT CHANGED    Details   predniSONE (STERAPRED DS) 10 mg dose pack Per Dose Pack instructions, Normal, Disp-21 Tablet, R-0      triamcinolone acetonide (KENALOG) 0.1 % topical cream Apply  to affected area two (2) times a day. use thin layer, Normal, Disp-15 g, R-0      ondansetron (ZOFRAN ODT) 4 mg disintegrating tablet Take 1 Tablet by mouth every eight (8) hours as needed for Nausea or Vomiting., Normal, Disp-12 Tablet, R-0      albuterol (PROVENTIL HFA, VENTOLIN HFA, PROAIR HFA) 90 mcg/actuation inhaler Take 2 Puffs by inhalation every four (4) hours as needed for Wheezing or Cough., Normal, Disp-1 Each, R-1      butalbital-acetaminophen-caffeine (FIORICET, ESGIC) -40 mg per tablet Take 1 Tablet by mouth every four (4) hours as needed for Headache., Normal, Disp-20 Tablet, R-0      butalbital-acetaminophen-caff (Fioricet) -40 mg per capsule Take 1 Capsule by mouth every four (4) hours as needed for Headache.  Indications: tension headache, Normal, Disp-20 Capsule, R-0      ketorolac (TORADOL) 10 mg tablet Take 1 Tablet by mouth every six (6) hours as needed for Pain., Normal, Disp-15 Tablet, R-0 fluticasone propionate (FLONASE) 50 mcg/actuation nasal spray 2 Sprays by Both Nostrils route daily. , Historical Med      busPIRone (BUSPAR) 15 mg tablet TAKE 1 TABLET BY MOUTH TWICE A DAY AS DIRECTED, Historical Med      Latuda 20 mg tab tablet TAKE 1 TABLET BY MOUTH EVERY EVENING WITH AT LEAST 350 CALORIES AS DIRECTED, Historical Med, BUNNY      losartan (COZAAR) 50 mg tablet TAKE 1 TABLET BY MOUTH ONCE DAILY FOR 90 DAYS, Historical Med      omeprazole (PRILOSEC) 40 mg capsule Take 40 mg by mouth daily. , Historical Med      metoclopramide HCl (Reglan) 5 mg tablet Take 5 mg by mouth four (4) times daily. , Historical Med      aspirin 81 mg chewable tablet Take 81 mg by mouth daily. , Historical Med      metFORMIN (GLUCOPHAGE) 1,000 mg tablet Take 1,000 mg by mouth two (2) times daily (with meals). Indications: type 2 diabetes mellitus, Historical Med           3. Follow-up Information       Follow up With Specialties Details Why Contact Info    de Corina Zhou, [address] [facility] [id number] [phone]      [facility] EMERGENCY DEPT Emergency Medicine   [address] [address]  [place] [address] [facility]    [address] [place] [address] [address] [place] [address] to return to ED if worse    FINAL DIAGNOSIS   Clinical Impression:  1. Nausea without vomiting    2. Gastroesophageal reflux disease without esophagitis    3. Esophageal dysphagia      Attestation:  I am the attending of record for this patient. I personally performed the services described in this documentation on this date, 3/24/2023 for patient, Dhaval Dominguez. I have reviewed the chart and verified that the record is accurate and complete.       Tanya Ford MD (Electronic Signature)

## 2023-04-16 ENCOUNTER — HOSPITAL ENCOUNTER (EMERGENCY)
Age: 43
Discharge: HOME | End: 2023-04-16
Attending: EMERGENCY MEDICINE
Payer: MEDICARE

## 2023-04-16 VITALS
HEART RATE: 86 BPM | DIASTOLIC BLOOD PRESSURE: 87 MMHG | HEIGHT: 63 IN | RESPIRATION RATE: 18 BRPM | OXYGEN SATURATION: 99 % | SYSTOLIC BLOOD PRESSURE: 152 MMHG | WEIGHT: 228.5 LBS | TEMPERATURE: 99.2 F | BODY MASS INDEX: 40.49 KG/M2

## 2023-04-16 DIAGNOSIS — L02.91 ABSCESS: Primary | ICD-10-CM

## 2023-04-16 PROCEDURE — 96372 THER/PROPH/DIAG INJ SC/IM: CPT

## 2023-04-16 PROCEDURE — 87186 SC STD MICRODIL/AGAR DIL: CPT

## 2023-04-16 PROCEDURE — 74011000250 HC RX REV CODE- 250: Performed by: EMERGENCY MEDICINE

## 2023-04-16 PROCEDURE — 87077 CULTURE AEROBIC IDENTIFY: CPT

## 2023-04-16 PROCEDURE — 87205 SMEAR GRAM STAIN: CPT

## 2023-04-16 PROCEDURE — 75810000289 HC I&D ABSCESS SIMP/COMP/MULT

## 2023-04-16 PROCEDURE — 99283 EMERGENCY DEPT VISIT LOW MDM: CPT

## 2023-04-16 RX ORDER — DOXYCYCLINE HYCLATE 100 MG
100 TABLET ORAL 2 TIMES DAILY
Qty: 20 TABLET | Refills: 0 | Status: SHIPPED | OUTPATIENT
Start: 2023-04-16 | End: 2023-04-26

## 2023-04-16 RX ORDER — FLUCONAZOLE 150 MG/1
150 TABLET ORAL
Qty: 1 TABLET | Refills: 1 | Status: SHIPPED | OUTPATIENT
Start: 2023-04-16 | End: 2023-04-16

## 2023-04-16 RX ORDER — LIDOCAINE HCL/EPINEPHRINE/PF 2%-1:200K
10 VIAL (ML) INJECTION ONCE
Status: COMPLETED | OUTPATIENT
Start: 2023-04-16 | End: 2023-04-16

## 2023-04-16 RX ADMIN — LIDOCAINE HYDROCHLORIDE AND EPINEPHRINE 200 MG: 20; 5 INJECTION, SOLUTION EPIDURAL; INFILTRATION; INTRACAUDAL; PERINEURAL at 19:41

## 2023-04-17 NOTE — ED NOTES

## 2023-04-17 NOTE — ED NOTES
Discharge instructions were given to the patient by Karely Kraus RN. The patient left the Emergency Department ambulatory, alert and oriented and in no acute distress with 2 prescriptions. The patient was encouraged to call or return to the ED for worsening issues or problems and was encouraged to schedule a follow up appointment for continuing care. The patient verbalized understanding of discharge instructions and prescriptions, all questions were answered. The patient has no further concerns at this time.

## 2023-04-19 LAB
BACTERIA SPEC CULT: ABNORMAL
GRAM STN SPEC: ABNORMAL
GRAM STN SPEC: ABNORMAL
SERVICE CMNT-IMP: ABNORMAL

## 2023-04-23 ENCOUNTER — HOSPITAL ENCOUNTER (EMERGENCY)
Age: 43
Discharge: HOME | End: 2023-04-23
Attending: EMERGENCY MEDICINE
Payer: MEDICARE

## 2023-04-23 VITALS
HEART RATE: 102 BPM | RESPIRATION RATE: 18 BRPM | BODY MASS INDEX: 40.75 KG/M2 | SYSTOLIC BLOOD PRESSURE: 132 MMHG | OXYGEN SATURATION: 97 % | HEIGHT: 63 IN | DIASTOLIC BLOOD PRESSURE: 70 MMHG | WEIGHT: 230 LBS | TEMPERATURE: 99.1 F

## 2023-04-23 DIAGNOSIS — L03.317 ABSCESS AND CELLULITIS OF GLUTEAL REGION: ICD-10-CM

## 2023-04-23 DIAGNOSIS — Z48.01 ENCOUNTER FOR CHANGE OR REMOVAL OF SURGICAL WOUND DRESSING: Primary | ICD-10-CM

## 2023-04-23 DIAGNOSIS — L02.31 ABSCESS AND CELLULITIS OF GLUTEAL REGION: ICD-10-CM

## 2023-04-23 PROCEDURE — 74011000250 HC RX REV CODE- 250: Performed by: EMERGENCY MEDICINE

## 2023-04-23 PROCEDURE — 99283 EMERGENCY DEPT VISIT LOW MDM: CPT

## 2023-04-23 RX ORDER — OXYCODONE AND ACETAMINOPHEN 5; 325 MG/1; MG/1
1 TABLET ORAL
Status: DISCONTINUED | OUTPATIENT
Start: 2023-04-23 | End: 2023-04-23

## 2023-04-23 RX ORDER — MUPIROCIN 20 MG/G
OINTMENT TOPICAL 3 TIMES DAILY
Qty: 22 G | Refills: 0 | Status: SHIPPED | OUTPATIENT
Start: 2023-04-23

## 2023-04-23 RX ORDER — KETOROLAC TROMETHAMINE 30 MG/ML
30 INJECTION, SOLUTION INTRAMUSCULAR; INTRAVENOUS
Status: DISCONTINUED | OUTPATIENT
Start: 2023-04-23 | End: 2023-04-23 | Stop reason: HOSPADM

## 2023-04-23 RX ORDER — LIDOCAINE HYDROCHLORIDE AND EPINEPHRINE 10; 10 MG/ML; UG/ML
1.5 INJECTION, SOLUTION INFILTRATION; PERINEURAL
Status: COMPLETED | OUTPATIENT
Start: 2023-04-23 | End: 2023-04-23

## 2023-04-23 RX ORDER — NAPROXEN 500 MG/1
500 TABLET ORAL 2 TIMES DAILY WITH MEALS
Qty: 15 TABLET | Refills: 0 | Status: SHIPPED | OUTPATIENT
Start: 2023-04-23

## 2023-04-23 RX ADMIN — LIDOCAINE HYDROCHLORIDE,EPINEPHRINE BITARTRATE 15 MG: 10; .01 INJECTION, SOLUTION INFILTRATION; PERINEURAL at 01:45

## 2023-04-27 ENCOUNTER — HOSPITAL ENCOUNTER (EMERGENCY)
Age: 43
Discharge: HOME | End: 2023-04-27
Attending: EMERGENCY MEDICINE
Payer: MEDICARE

## 2023-04-27 VITALS
DIASTOLIC BLOOD PRESSURE: 67 MMHG | BODY MASS INDEX: 41.2 KG/M2 | TEMPERATURE: 97.1 F | RESPIRATION RATE: 17 BRPM | WEIGHT: 232.5 LBS | HEART RATE: 91 BPM | SYSTOLIC BLOOD PRESSURE: 139 MMHG | OXYGEN SATURATION: 100 % | HEIGHT: 63 IN

## 2023-04-27 DIAGNOSIS — Z48.00 ABSCESS PACKING REMOVAL: Primary | ICD-10-CM

## 2023-04-27 PROCEDURE — 74011000250 HC RX REV CODE- 250: Performed by: PHYSICIAN ASSISTANT

## 2023-04-27 PROCEDURE — 99282 EMERGENCY DEPT VISIT SF MDM: CPT

## 2023-04-27 RX ORDER — LIDOCAINE HYDROCHLORIDE AND EPINEPHRINE 10; 10 MG/ML; UG/ML
10 INJECTION, SOLUTION INFILTRATION; PERINEURAL
Status: COMPLETED | OUTPATIENT
Start: 2023-04-27 | End: 2023-04-27

## 2023-04-27 RX ADMIN — LIDOCAINE HYDROCHLORIDE,EPINEPHRINE BITARTRATE 100 MG: 10; .01 INJECTION, SOLUTION INFILTRATION; PERINEURAL at 18:24

## 2023-04-27 NOTE — ED NOTES
Pt arrived to ED via POV with c/o abscess to R upper buttocks. Per PT, I & D done by PCP with wound packing and ABT therapy. Wound packing completed every 2 days. On Monday, PCP had a family emergency and could not complete wound care was told packing would be fine until Thursday. PT to PCP today for wound care, and PCP was not there. Came to ER for packing. Pt reported completed doxycline therapy today. Reports 6/10 pain. Does not take prescribed pain meds because she does not like the way they make her feel. Denies further complaints at this time. Pt is in no acute distress. Will continue to monitor. See nursing assessment. Safety precautions in place; call light within reach. Emergency Department Nursing Plan of Care       The Nursing Plan of Care is developed from the Nursing assessment and Emergency Department Attending provider initial evaluation. The plan of care may be reviewed in the ED Provider note.     The Plan of Care was developed with the following considerations:   Patient / Family readiness to learn indicated by:verbalized understanding  Persons(s) to be included in education: patient  Barriers to Learning/Limitations:No    Signed     Hung June RN    4/27/2023   6:16 PM

## 2023-04-27 NOTE — ED TRIAGE NOTES
Pt present need wound to be unpacked. Was supposed to follow up with PCP but they had a family emergency. Was placed here on Monday. States it is healing well, mild redness is on antibiotics.

## 2023-04-27 NOTE — ED PROVIDER NOTES
[facility] EMERGENCY DEPT  EMERGENCY DEPARTMENT ENCOUNTER       Pt Name: Simran Mckeon  MRN: [id number]  [place] 1980  Date of evaluation: 2023  Provider: NATHANIEL Mahtis   PCP: Maria Elena Llamas MD  Note Started: 6:13 PM 23     ED attending involment: I have seen and evaluated the patient. My supervision physician was available for consultation. CHIEF COMPLAINT       Chief Complaint   Patient presents with    Wound Check        HISTORY OF PRESENT ILLNESS: 1 or more elements      History From: Patient  HPI Limitations : None     Simran Mckeon is a 37 y.o. female who presents for repacking of right buttock wound. Patient was seen here 11 days ago and had incision and drainage of abscess on her right buttock. Her PCP has been repacking it every 2 days since then. She was at their office today to have this done when her doctor had a family emergency and had to leave. She was advised to come to the emergency department to have it done. She reports it has been healing okay. Nursing Notes were all reviewed and agreed with or any disagreements were addressed in the HPI. REVIEW OF SYSTEMS      Review of Systems     Positives and Pertinent negatives as per HPI.     PAST HISTORY     Past Medical History:  Past Medical History:   Diagnosis Date    Abnormal Pap smear     \"years ago\"     Bipolar disorder ([facility] [address]     Bronchitis     Depression with anxiety 2010    Diabetes mellitus     type II diabetes mellitus since     GERD (gastroesophageal reflux disease)     High cholesterol     HTN (hypertension) 2010    Ill-defined condition     bronchitis    Postpartum depression     hospitalized after last delivery    Rhinitis 2012    Sleep apnea     uses cpap    UTI (urinary tract infection)        Past Surgical History:  Past Surgical History:   Procedure Laterality Date    HX  SECTION      X 3    HX TUBAL LIGATION         Family History:  Family History   Problem Relation Age of Onset    Diabetes Mother     Hypertension Mother     Stroke Mother     Heart Disease Mother     Hypertension Father     Diabetes Father     Kidney Disease Father     Hypertension Maternal Grandmother     Diabetes Maternal Grandmother     Hypertension Maternal Grandfather     Diabetes Maternal Grandfather     Hypertension Paternal Grandmother     Diabetes Paternal Grandmother     Hypertension Paternal Grandfather     Diabetes Paternal Grandfather     Hypertension Sister     Diabetes Sister     Diabetes Sister     Hypertension Sister     Diabetes Sister     Hypertension Sister     Thyroid Disease Neg Hx        Social History:  Social History     Tobacco Use    Smoking status: Former     Packs/day: 0.50     Types: Cigarettes     Quit date: 6/19/2019     Years since quitting: 3.8    Smokeless tobacco: Former   Vaping Use    Vaping Use: Never used   Substance Use Topics    Alcohol use: Not Currently    Drug use: Not Currently     Types: Marijuana       Allergies: Allergies   Allergen Reactions    Latex Hives     rash    Other Food Hives     Citrus Fruits    Flagyl [Metronidazole] Hives and Itching    Lisinopril-Hydrochlorothiazide Angioedema    Rosuvastatin Anaphylaxis    Clindamycin Rash and Itching    Ciprofloxacin Rash and Itching    Citrate Hives    Cottonseed Oil Rash    Cymbalta [Duloxetine] Hives and Itching    Flexeril [Cyclobenzaprine] Angioedema    Lemon Hives    Pcn [Penicillins] Hives    Shrimp Itching    Sulfa (Sulfonamide Antibiotics) Shortness of Breath    Tomato Hives       CURRENT MEDICATIONS      Discharge Medication List as of 4/27/2023  6:54 PM        CONTINUE these medications which have NOT CHANGED    Details   naproxen (NAPROSYN) 500 mg tablet Take 1 Tablet by mouth two (2) times daily (with meals). , Normal, Disp-15 Tablet, R-0      mupirocin (BACTROBAN) 2 % ointment Apply  to affected area three (3) times daily. , Normal, Disp-22 g, R-0      methylPREDNISolone (Medrol, Bridger,) 4 mg tablet Take as instructed, Print, Disp-1 Dose Pack, R-0      ondansetron hcl (Zofran) 4 mg tablet Take 1 Tablet by mouth every eight (8) hours as needed for Nausea or Vomiting., Print, Disp-20 Tablet, R-0      aluminum-magnesium hydroxide (MAALOX) 200-200 mg/5 mL suspension Take 15 mL by mouth every six (6) hours as needed for Indigestion. , Print, Disp-300 mL, R-0      predniSONE (STERAPRED DS) 10 mg dose pack Per Dose Pack instructions, Normal, Disp-21 Tablet, R-0      triamcinolone acetonide (KENALOG) 0.1 % topical cream Apply  to affected area two (2) times a day. use thin layer, Normal, Disp-15 g, R-0      ondansetron (ZOFRAN ODT) 4 mg disintegrating tablet Take 1 Tablet by mouth every eight (8) hours as needed for Nausea or Vomiting., Normal, Disp-12 Tablet, R-0      albuterol (PROVENTIL HFA, VENTOLIN HFA, PROAIR HFA) 90 mcg/actuation inhaler Take 2 Puffs by inhalation every four (4) hours as needed for Wheezing or Cough., Normal, Disp-1 Each, R-1      butalbital-acetaminophen-caffeine (FIORICET, ESGIC) -40 mg per tablet Take 1 Tablet by mouth every four (4) hours as needed for Headache., Normal, Disp-20 Tablet, R-0      butalbital-acetaminophen-caff (Fioricet) -40 mg per capsule Take 1 Capsule by mouth every four (4) hours as needed for Headache. Indications: tension headache, Normal, Disp-20 Capsule, R-0      ketorolac (TORADOL) 10 mg tablet Take 1 Tablet by mouth every six (6) hours as needed for Pain., Normal, Disp-15 Tablet, R-0      fluticasone propionate (FLONASE) 50 mcg/actuation nasal spray 2 Sprays by Both Nostrils route daily. , Historical Med      busPIRone (BUSPAR) 15 mg tablet TAKE 1 TABLET BY MOUTH TWICE A DAY AS DIRECTED, Historical Med      Latuda 20 mg tab tablet TAKE 1 TABLET BY MOUTH EVERY EVENING WITH AT LEAST 350 CALORIES AS DIRECTED, Historical Med, BUNNY      losartan (COZAAR) 50 mg tablet TAKE 1 TABLET BY MOUTH ONCE DAILY FOR 90 DAYS, Historical Med      omeprazole (PRILOSEC) 40 mg capsule Take 40 mg by mouth daily. , Historical Med      metoclopramide HCl (Reglan) 5 mg tablet Take 5 mg by mouth four (4) times daily. , Historical Med      aspirin 81 mg chewable tablet Take 81 mg by mouth daily. , Historical Med      metFORMIN (GLUCOPHAGE) 1,000 mg tablet Take 1,000 mg by mouth two (2) times daily (with meals). Indications: type 2 diabetes mellitus, Historical Med             PHYSICAL EXAM      ED Triage Vitals [04/27/23 1736]   ED Encounter Vitals Group      /67      Pulse (Heart Rate) 91      Resp Rate 17      Temp 97.1 °F (36.2 °C)      Temp src       O2 Sat (%) 100 %      Weight 232 lb 8 oz      Height 5' 3\"        Physical Exam  Vitals and nursing note reviewed. Constitutional:       General: She is not in acute distress. Skin:     Comments: There is a wound to the right buttock with packing in place. There is some surrounding skin breakdown from where bandage was applied. No significant cellulitis. Neurological:      Mental Status: She is alert. DIAGNOSTIC RESULTS   LABS:     No results found for this or any previous visit (from the past 12 hour(s)). RADIOLOGY:  Non-plain film images such as CT, Ultrasound and MRI are read by the radiologist. Plain radiographic images are visualized and preliminarily interpreted by myself, NATHANIEL Friedman, ED provider   with the below findings:          Interpretation per the Radiologist below, if available at the time of this note:     No results found.       PROCEDURES   Unless otherwise noted below, none  Procedures     EMERGENCY DEPARTMENT COURSE and DIFFERENTIAL DIAGNOSIS/MDM   Vitals:    Vitals:    04/27/23 1736 04/27/23 1819   BP: 139/67    Pulse: 91    Resp: 17    Temp: 97.1 °F (36.2 °C)    SpO2: 100% 100%   Weight: 105.5 kg (232 lb 8 oz)    Height: 5' 3\" (1.6 m)         Patient was given the following medications:  Medications   lidocaine-EPINEPHrine (XYLOCAINE) 1 %-1:100,000 injection 100 mg (100 mg IntraDERMal Given by Provider 4/27/23 5839)       CONSULTS: (Who and What was discussed)  None    Chronic Conditions: Diabetes, GERD, hypertension    Social Determinants affecting Dx or Tx: None    Records Reviewed (source and summary): Visits to the ED on 4/16 in which initial I&D was performed and 4/23 when she had packing removed and was repacked    MDM (CC/HPI Summary, DDx, ED Course, Reassessment, Disposition Considerations -Tests not done, Shared Decision Making, Pt Expectation of Test or Tx.): This is a 37year old female who presents for packing removal and wound recheck. On exam, there is some skin breakdown from where patient had overlying adhesive bandage over wound. There is no significant cellulitis. The wound was anesthestized with 8 ml of 1% lidocaine with epi with improvement in pain. Packing was removed. The wound is staying open well and appears to be healing well. I do not think it needs to be repacked at this time. Discussed wound care instructions and advised follow up with PCP for a recheck. FINAL IMPRESSION     1. Abscess packing removal          DISPOSITION/PLAN   Discharged      The pt is ready for discharge. The pt's signs, symptoms, diagnosis, and discharge instructions have been discussed and pt has conveyed their understanding. The pt is to follow up as recommended or return to ER should their symptoms worsen. Plan has been discussed and pt is in agreement.            PATIENT REFERRED TO:  Follow-up Information       Follow up With Specialties Details Why Contact Asif Lozada MD Family Medicine Go in 3 days For wound re-check [address]  [phone]                DISCHARGE MEDICATIONS:  Discharge Medication List as of 4/27/2023  6:54 PM            DISCONTINUED MEDICATIONS:  Discharge Medication List as of 4/27/2023  6:54 PM        STOP taking these medications       doxycycline (VIBRA-TABS) 100 mg tablet Comments:   Reason for Stopping: I am the Primary Clinician of Record. NATHANIEL Goncalves (electronically signed)    (Please note that parts of this dictation were completed with voice recognition software. Quite often unanticipated grammatical, syntax, homophones, and other interpretive errors are inadvertently transcribed by the computer software. Please disregards these errors.  Please excuse any errors that have escaped final proofreading.)

## 2023-04-27 NOTE — ED NOTES
Patient (s)  given copy of dc instructions and 0 script(s). Patient (s)  verbalized understanding of instructions and script (s). Patient given a current medication reconciliation form and verbalized understanding of their medications. Patient (s) verbalized understanding of the importance of discussing medications with  his or her physician or clinic they will be following up with. Patient alert and oriented and in no acute distress. Patient discharged home ambulatory with a steady gait unassisted.

## 2023-05-02 NOTE — ED PROVIDER NOTES
EMERGENCY DEPARTMENT HISTORY AND PHYSICAL EXAM      Date: 2020  Patient Name: Edy Tyler  Patient Age and Sex: 41 y.o. female    History of Presenting Illness     Chief Complaint   Patient presents with    Abdominal Pain       History Provided By: Patient    Ability to gather history was limited by:     HPI: Edy Tyler, 41 y.o. female with history of obesity, diabetes, GERD, complains of heartburn type symptoms. States she has had identical symptoms multiple times in the past, attributed to acid reflux, for which she typically comes in for GI cocktail. She specifically requests administration of GI cocktail, stating that she is sure this will alleviate her symptoms. She has been taking Nexium and Pepto-Bismol without relief. Pain is aching and burning in nature, epigastric, radiating up into the chest, as it typically does with heartburn symptoms. Location:    Quality:      Severity:    Duration:   Timing:      Context:    Modifying factors:   Associated symptoms:       The patient's medical, surgical, family, and social history on file were reviewed by me today.       Past Medical History:   Diagnosis Date    Abnormal Pap smear     \"years ago\"     Asthma     bronchitis    Bipolar disorder ([address]     Depression with anxiety 2010    Diabetes mellitus     type II diabetes mellitus since     GERD (gastroesophageal reflux disease)     HTN (hypertension) 2010    Ill-defined condition     High Cholesterol    Mood disorder ([address]     Other and unspecified hyperlipidemia 2010    Postpartum depression     hospitalized after last delivery    Rhinitis 2012    UTI (urinary tract infection)      Past Surgical History:   Procedure Laterality Date     DELIVERY ONLY      cesearean section 08    HX  SECTION      X 3    HX GYN      , tubal ligation       PCP: Lauar Jones NP    Past History     Past Medical History:  Past Medical History: PRE-ADMISSION TESTING INSTRUCTIONS FOR ADULTS    Take these medications the morning of surgery with a small sip of water:   Desvenlafaxine, Valium , Flexeril      Do not take any insulin or diabetes medications the morning of surgery.      No aspirin, advil, aleve, ibuprofen, naproxen, diet pills, decongestants, or herbal/vitamins for a week prior to surgery.       Tylenol/Acetaminophen is okay to take if needed.    General Instructions:    DO NOT EAT SOLID FOOD AFTER MIDNIGHT THE NIGHT BEFORE SURGERY. No gum, mints, or hard candy after midnight the night before surgery.  You may drink clear liquids the day of surgery up until 2 hours before your arrival time.  Clear liquids are liquids you can see through. Nothing RED in color.    Plain water    Sports drinks      Gelatin (Jell-O)  Fruit juices without pulp such as white grape juice and apple juice  Popsicles that contain no fruit or yogurt  Tea or coffee (no cream or milk added)    It is beneficial for you to have a clear drink that contains carbohydrates 2 hours before your arrival time.  We suggest a 20 ounce bottle of Gatorade or Powerade for non-diabetic patients or a 20 ounce bottle of Gatorade Zero or Powerade Zero for diabetic patients.     Patients who avoid smoking, chewing tobacco and alcohol for 4 weeks prior to surgery have a reduced risk of post-operative complications.  If at all possible, quit smoking as many days before surgery as you can.    Do not smoke, use chewing tobacco or drink alcohol the day of surgery    Bring your C-PAP/ BI-PAP machine if you use one.  Wear clean comfortable clothes.  Do not wear contact lenses, lotion, deodorant, or make-up.  Bring a case for your glasses if applicable. You may brush your teeth the morning of surgery.  You may wear dentures/partials, do not put adhesive/glue on them.  Leave all other jewelry and valuables at home.      Preventing a Surgical Site Infection:    Shower the night before and on the morning  Diagnosis Date    Abnormal Pap smear     \"years ago\"     Asthma     bronchitis    [facility]     Depression with anxiety 2010    Diabetes mellitus     type II diabetes mellitus since     GERD (gastroesophageal reflux disease)     HTN (hypertension) 2010    Ill-defined condition     High Cholesterol    Mood disorder ([facility]     Other and unspecified hyperlipidemia 2010    Postpartum depression     hospitalized after last delivery    Rhinitis 2012    UTI (urinary tract infection)        Past Surgical History:  Past Surgical History:   Procedure Laterality Date     DELIVERY ONLY      cesearean section 08    HX  SECTION      X 3    HX GYN      , tubal ligation       Family History:  Family History   Problem Relation Age of Onset    Diabetes Mother     Hypertension Mother     Stroke Mother     Heart Disease Mother     Hypertension Father     Stroke Father     Diabetes Father     Kidney Disease Father     Hypertension Maternal Grandmother     Diabetes Maternal Grandmother     Hypertension Maternal Grandfather     Diabetes Maternal Grandfather     Hypertension Paternal Grandmother     Diabetes Paternal Grandmother     Hypertension Paternal Grandfather     Diabetes Paternal Grandfather     Hypertension Sister     Diabetes Sister     Diabetes Sister     Hypertension Sister     Diabetes Sister     Hypertension Sister        Social History:  Social History     Tobacco Use    Smoking status: Former Smoker     Last attempt to quit: 10/16/2015     Years since quittin.7    Smokeless tobacco: Never Used    Tobacco comment: quit 17 days ago   Substance Use Topics    Alcohol use: Not Currently     Frequency: Never     Comment: Occasionally    Drug use: No       Allergies:   Allergies   Allergen Reactions    Latex Hives     rash    Other Food Hives     Citrus Fruits    Flagyl [Metronidazole] Hives and Itching    of surgery using the chlorhexidine soap you were given.  Use a clean washcloth with the soap.  Place clean sheets on your bed after showering the night before surgery. Do not use the CHG soap on your hair, face, or private areas. Wash your body gently for five (5) minutes. Do not scrub your skin.  Dry with a clean towel and dress in clean clothing.  Do not shave the surgical area for 10 days-2 weeks prior to surgery  because the razor can irritate skin and make it easier to develop an infection.  Make sure you, your family, and all healthcare providers clean their hands with soap and water or an alcohol based hand  before caring for you or your wound.      Day of surgery:    Your surgeon’s office will advise you of your arrival time for the day of surgery.    Upon arrival, a Pre-op nurse and Anesthesia provider will review your health history, obtain vital signs, and answer questions you may have. The anesthesia provider will also discuss the type of anesthesia that will be needed for your procedure, which may include general anesthesia. The only belongings needed at this time will be your home medications and if applicable your C-PAP/BI-PAP machine.  If you are staying overnight your family can leave the rest of your belongings in the car and bring them to your room later.  A Pre-op nurse will start an IV and you may receive medication in preparation for surgery, including something to help you relax.  Your family will be able to see you in the Pre-op area.  While you are in surgery your family should notify the waiting room  if they leave the waiting room area and provide a contact phone number.    IF you have any questions, you can call the Pre-Admission Department at [phone] or your surgeon's office.  Notify your surgeon if  you become sick, have a fever, productive cough, or cannot be here the day of surgery    Please be aware that surgery does come with discomfort.  We want to make  Lisinopril-Hydrochlorothiazide Angioedema    Clindamycin Rash and Itching    Ciprofloxacin Rash and Itching    Citrate Hives    Cottonseed Oil Rash    Cymbalta [Duloxetine] Hives and Itching    Flexeril [Cyclobenzaprine] Angioedema    Lemon Hives    Pcn [Penicillins] Hives    Sulfa (Sulfonamide Antibiotics) Shortness of Breath    Tomato Hives       Current Medications:  No current facility-administered medications on file prior to encounter. Current Outpatient Medications on File Prior to Encounter   Medication Sig Dispense Refill    methocarbamoL (ROBAXIN) 750 mg tablet Take 1 Tab by mouth four (4) times daily. 15 Tab 0    lidocaine 4 % patch Apply patch to left neck . Apply patch to the affected area for 12 hours a day and remove for 12 hours a day. 30 Patch 0    promethazine (PHENERGAN) 25 mg tablet Take 1 Tab by mouth every six (6) hours as needed for Nausea. 12 Tab 0    ibuprofen (MOTRIN) 800 mg tablet Take 1 Tab by mouth every eight (8) hours as needed for Pain. 30 Tab 0    fluticasone propionate (FLONASE) 50 mcg/actuation nasal spray 2 Sprays by Both Nostrils route daily. 1 Bottle 0    loratadine-pseudoephedrine (CLARITIN-D 12 HOUR) 5-120 mg per tablet Take 1 Tab by mouth two (2) times a day. 30 Tab 0    sucralfate (CARAFATE) 1 gram tablet Take 1 Tab by mouth four (4) times daily. 20 Tab 0    busPIRone (BUSPAR) 7.5 mg tablet Take 7.5 mg by mouth daily.  labetalol (NORMODYNE) 200 mg tablet Take 200 mg by mouth two (2) times a day.  aspirin 81 mg chewable tablet Take 81 mg by mouth daily.  albuterol (PROVENTIL HFA, VENTOLIN HFA, PROAIR HFA) 90 mcg/actuation inhaler Take 1-2 Puffs by inhalation every four (4) hours as needed for Wheezing. 1 Inhaler 1    esomeprazole (NEXIUM) 40 mg capsule Take 1 Cap by mouth daily. 20 Cap 0    metFORMIN (GLUCOPHAGE) 1,000 mg tablet Take 1,000 mg by mouth two (2) times daily (with meals).  Indications: type 2 diabetes mellitus      every effort to control your discomfort so please discuss any uncontrolled symptoms with your nurse.   Your doctor will most likely have prescribed pain medications.      If you are going home after surgery, you will receive individualized written care instructions before being discharged.  A responsible adult (over the age of 18) must drive you to and from the hospital on the day of your surgery and stay with you for 24 hours after anesthesia.    If you are staying overnight following surgery, you will be transported to your hospital room following the recovery period.  [facility] has all private rooms.    You may receive a survey regarding the care you received. Your feedback is very important and will be used to collect the necessary data to help us to continue to provide excellent care.     Deductibles and co-payments are collected on the day of service. Please be prepared to pay the required co-pay, deductible or deposit on the day of service as defined by your plan.    gabapentin (NEURONTIN) 400 mg capsule Take 400 mg by mouth three (3) times daily. Indications: NEUROPATHIC PAIN      losartan (COZAAR) 25 mg tablet Take 100 mg by mouth daily.  metoclopramide HCl (REGLAN) 5 mg tablet Take 5 mg by mouth Before breakfast, lunch, dinner and at bedtime. Review of Systems   Review of Systems   Constitutional: Negative for fever. Respiratory: Negative for shortness of breath. Gastrointestinal: Positive for abdominal pain. All other systems reviewed and are negative. Physical Exam   Vital Signs  Patient Vitals for the past 8 hrs:   Temp Pulse Resp BP SpO2   07/13/20 0117 98.8 °F (37.1 °C) 81 18 164/86 99 %          Physical Exam  Vitals signs and nursing note reviewed. Constitutional:       General: She is not in acute distress. Appearance: Normal appearance. She is well-developed. She is not ill-appearing. HENT:      Head: Normocephalic and atraumatic. Mouth/Throat:      Mouth: Mucous membranes are moist.   Eyes:      General:         Right eye: No discharge. Left eye: No discharge. Conjunctiva/sclera: Conjunctivae normal.   Neck:      Musculoskeletal: Normal range of motion and neck supple. Cardiovascular:      Rate and Rhythm: Normal rate and regular rhythm. Heart sounds: Normal heart sounds. No murmur. Pulmonary:      Effort: Pulmonary effort is normal. No respiratory distress. Breath sounds: Normal breath sounds. No wheezing. Abdominal:      General: There is no distension. Palpations: Abdomen is soft. Tenderness: There is no abdominal tenderness. Musculoskeletal: Normal range of motion. General: No deformity. Skin:     General: Skin is warm and dry. Findings: No rash. Neurological:      General: No focal deficit present. Mental Status: She is alert and oriented to person, place, and time.    Psychiatric:         Speech: Speech normal.         Behavior: Behavior normal. Cognition and Memory: Cognition normal.         Diagnostic Study Results   Labs  No results found for this or any previous visit (from the past 24 hour(s)). Radiologic Studies  No orders to display     CT Results  (Last 48 hours)    None        CXR Results  (Last 48 hours)    None          Procedures   Procedures    Medical Decision Making     I reviewed the patient's most recent Emergency Dept notes and diagnostic tests  in formulating my MDM on today's visit. Provider Notes (Medical Decision Making):   53-year-old female presenting for recurrent, longstanding symptoms of GERD. No apparent distress or discomfort. Nontender abdominal exam.  Normal cardiopulmonary exam.    Patient declined laboratories or EKG. She was quite clear that she simply wanted a dose of GI cocktail and Zofran prescription. Prescription was provided, stable for discharge home. Clinical concern for ACS or MI or pancreatitis is very low, and further testing can be safely deferred. Barrett Crane MD  2:23 AM        Social History     Tobacco Use    Smoking status: Former Smoker     Last attempt to quit: 10/16/2015     Years since quittin.7    Smokeless tobacco: Never Used    Tobacco comment: quit 17 days ago   Substance Use Topics    Alcohol use: Not Currently     Frequency: Never     Comment: Occasionally    Drug use: No     Patient Vitals for the past 4 hrs:   Temp Pulse Resp BP SpO2   20 0117 98.8 °F (37.1 °C) 81 18 164/86 99 %            Consults:      Medications Administered during ED course:  Medications   mylanta/viscous lidocaine (GI COCKTAIL) (has no administration in time range)   ondansetron (ZOFRAN ODT) tablet 4 mg (4 mg Oral Given 20 0238)          Current Discharge Medication List      START taking these medications    Details   ondansetron (ZOFRAN ODT) 4 mg disintegrating tablet Take 1 Tab by mouth every six (6) hours as needed for Nausea.   Qty: 25 Tab, Refills: 0                Diagnosis and Disposition     Disposition:  Discharged    Clinical Impression:   1. Gastroesophageal reflux disease, esophagitis presence not specified        Attestation:  I personally performed the services described in this documentation on this date 7/13/2020 for patient Huber Mckeon. Vj Arriaza MD        I was the first provider for this patient on this visit. To the best of my ability I reviewed relevant prior medical records, electrocardiograms, laboratories, and radiologic studies. The patient's presenting problems were discussed, and the patient was in agreement with the care plan formulated and outlined with them. Vj Arriaza MD    Please note that this dictation was completed with Dragon voice recognition software. Quite often unanticipated grammatical, syntax, homophones, and other interpretive errors are inadvertently transcribed by the computer software. Please disregard these errors and excuse any errors that have escaped final proofreading.

## 2023-05-29 ENCOUNTER — HOSPITAL ENCOUNTER (EMERGENCY)
Facility: HOSPITAL | Age: 43
Discharge: HOME | End: 2023-05-29
Attending: EMERGENCY MEDICINE
Payer: MEDICARE

## 2023-05-29 ENCOUNTER — APPOINTMENT (OUTPATIENT)
Facility: HOSPITAL | Age: 43
End: 2023-05-29
Payer: MEDICARE

## 2023-05-29 VITALS
RESPIRATION RATE: 18 BRPM | HEIGHT: 63 IN | DIASTOLIC BLOOD PRESSURE: 84 MMHG | SYSTOLIC BLOOD PRESSURE: 150 MMHG | HEART RATE: 99 BPM | OXYGEN SATURATION: 99 % | BODY MASS INDEX: 41.2 KG/M2 | WEIGHT: 232.5 LBS | TEMPERATURE: 97.9 F

## 2023-05-29 DIAGNOSIS — R10.9 CHRONIC ABDOMINAL PAIN: Primary | ICD-10-CM

## 2023-05-29 DIAGNOSIS — R10.13 DYSPEPSIA: ICD-10-CM

## 2023-05-29 DIAGNOSIS — K76.0 HEPATIC STEATOSIS: ICD-10-CM

## 2023-05-29 DIAGNOSIS — G89.29 CHRONIC ABDOMINAL PAIN: Primary | ICD-10-CM

## 2023-05-29 DIAGNOSIS — R11.0 NAUSEA: ICD-10-CM

## 2023-05-29 LAB
ALBUMIN SERPL-MCNC: 3.5 G/DL (ref 3.5–5)
ALBUMIN/GLOB SERPL: 0.9 (ref 1.1–2.2)
ALP SERPL-CCNC: 75 U/L (ref 45–117)
ALT SERPL-CCNC: 26 U/L (ref 12–78)
ANION GAP SERPL CALC-SCNC: 10 MMOL/L (ref 5–15)
APPEARANCE UR: CLEAR
AST SERPL-CCNC: 21 U/L (ref 15–37)
BACTERIA URNS QL MICRO: NEGATIVE /HPF
BASOPHILS # BLD: 0 K/UL (ref 0–0.1)
BASOPHILS NFR BLD: 1 % (ref 0–1)
BILIRUB SERPL-MCNC: 0.3 MG/DL (ref 0.2–1)
BILIRUB UR QL: NEGATIVE
BUN SERPL-MCNC: 7 MG/DL (ref 6–20)
BUN/CREAT SERPL: 8 (ref 12–20)
CALCIUM SERPL-MCNC: 8.6 MG/DL (ref 8.5–10.1)
CHLORIDE SERPL-SCNC: 99 MMOL/L (ref 97–108)
CO2 SERPL-SCNC: 26 MMOL/L (ref 21–32)
COLOR UR: ABNORMAL
CREAT SERPL-MCNC: 0.84 MG/DL (ref 0.55–1.02)
DIFFERENTIAL METHOD BLD: ABNORMAL
EOSINOPHIL # BLD: 0.1 K/UL (ref 0–0.4)
EOSINOPHIL NFR BLD: 1 % (ref 0–7)
EPITH CASTS URNS QL MICRO: ABNORMAL /LPF
ERYTHROCYTE [DISTWIDTH] IN BLOOD BY AUTOMATED COUNT: 16 % (ref 11.5–14.5)
GLOBULIN SER CALC-MCNC: 3.7 G/DL (ref 2–4)
GLUCOSE BLD STRIP.AUTO-MCNC: 234 MG/DL (ref 65–117)
GLUCOSE SERPL-MCNC: 228 MG/DL (ref 65–100)
GLUCOSE UR STRIP.AUTO-MCNC: >1000 MG/DL
HCT VFR BLD AUTO: 32.3 % (ref 35–47)
HGB BLD-MCNC: 10 G/DL (ref 11.5–16)
HGB UR QL STRIP: NEGATIVE
IMM GRANULOCYTES # BLD AUTO: 0.1 K/UL (ref 0–0.04)
IMM GRANULOCYTES NFR BLD AUTO: 1 % (ref 0–0.5)
KETONES UR QL STRIP.AUTO: 15 MG/DL
LEUKOCYTE ESTERASE UR QL STRIP.AUTO: NEGATIVE
LIPASE SERPL-CCNC: 82 U/L (ref 73–393)
LYMPHOCYTES # BLD: 1.8 K/UL (ref 0.8–3.5)
LYMPHOCYTES NFR BLD: 22 % (ref 12–49)
MCH RBC QN AUTO: 22.7 PG (ref 26–34)
MCHC RBC AUTO-ENTMCNC: 31 G/DL (ref 30–36.5)
MCV RBC AUTO: 73.2 FL (ref 80–99)
MONOCYTES # BLD: 0.6 K/UL (ref 0–1)
MONOCYTES NFR BLD: 7 % (ref 5–13)
NEUTS SEG # BLD: 5.4 K/UL (ref 1.8–8)
NEUTS SEG NFR BLD: 68 % (ref 32–75)
NITRITE UR QL STRIP.AUTO: NEGATIVE
NRBC # BLD: 0 K/UL (ref 0–0.01)
NRBC BLD-RTO: 0 PER 100 WBC
PH UR STRIP: 5 (ref 5–8)
PLATELET # BLD AUTO: 242 K/UL (ref 150–400)
PMV BLD AUTO: 10.7 FL (ref 8.9–12.9)
POTASSIUM SERPL-SCNC: 3.7 MMOL/L (ref 3.5–5.1)
PROT SERPL-MCNC: 7.2 G/DL (ref 6.4–8.2)
PROT UR STRIP-MCNC: ABNORMAL MG/DL
RBC # BLD AUTO: 4.41 M/UL (ref 3.8–5.2)
RBC #/AREA URNS HPF: ABNORMAL /HPF (ref 0–5)
SERVICE CMNT-IMP: ABNORMAL
SODIUM SERPL-SCNC: 135 MMOL/L (ref 136–145)
SP GR UR REFRACTOMETRY: 1.03 (ref 1–1.03)
URINE CULTURE IF INDICATED: ABNORMAL
UROBILINOGEN UR QL STRIP.AUTO: 0.2 EU/DL (ref 0.2–1)
WBC # BLD AUTO: 8 K/UL (ref 3.6–11)
WBC URNS QL MICRO: ABNORMAL /HPF (ref 0–4)

## 2023-05-29 PROCEDURE — 2580000003 HC RX 258: Performed by: PHYSICIAN ASSISTANT

## 2023-05-29 PROCEDURE — 83690 ASSAY OF LIPASE: CPT

## 2023-05-29 PROCEDURE — 6370000000 HC RX 637 (ALT 250 FOR IP): Performed by: PHYSICIAN ASSISTANT

## 2023-05-29 PROCEDURE — 6360000004 HC RX CONTRAST MEDICATION: Performed by: PHYSICIAN ASSISTANT

## 2023-05-29 PROCEDURE — 36415 COLL VENOUS BLD VENIPUNCTURE: CPT

## 2023-05-29 PROCEDURE — 74177 CT ABD & PELVIS W/CONTRAST: CPT

## 2023-05-29 PROCEDURE — 81001 URINALYSIS AUTO W/SCOPE: CPT

## 2023-05-29 PROCEDURE — 85025 COMPLETE CBC W/AUTO DIFF WBC: CPT

## 2023-05-29 PROCEDURE — 82962 GLUCOSE BLOOD TEST: CPT

## 2023-05-29 PROCEDURE — 96374 THER/PROPH/DIAG INJ IV PUSH: CPT

## 2023-05-29 PROCEDURE — 6360000002 HC RX W HCPCS: Performed by: PHYSICIAN ASSISTANT

## 2023-05-29 PROCEDURE — 80053 COMPREHEN METABOLIC PANEL: CPT

## 2023-05-29 PROCEDURE — 99285 EMERGENCY DEPT VISIT HI MDM: CPT

## 2023-05-29 RX ORDER — ONDANSETRON 4 MG/1
4 TABLET, ORALLY DISINTEGRATING ORAL EVERY 8 HOURS PRN
Qty: 15 TABLET | Refills: 0 | Status: SHIPPED | OUTPATIENT
Start: 2023-05-29

## 2023-05-29 RX ORDER — 0.9 % SODIUM CHLORIDE 0.9 %
1000 INTRAVENOUS SOLUTION INTRAVENOUS ONCE
Status: COMPLETED | OUTPATIENT
Start: 2023-05-29 | End: 2023-05-29

## 2023-05-29 RX ORDER — PROCHLORPERAZINE EDISYLATE 5 MG/ML
10 INJECTION INTRAMUSCULAR; INTRAVENOUS ONCE
Status: COMPLETED | OUTPATIENT
Start: 2023-05-29 | End: 2023-05-29

## 2023-05-29 RX ADMIN — IOPAMIDOL 100 ML: 755 INJECTION, SOLUTION INTRAVENOUS at 11:51

## 2023-05-29 RX ADMIN — ALUMINUM HYDROXIDE, MAGNESIUM HYDROXIDE, AND SIMETHICONE 40 ML: 200; 200; 20 SUSPENSION ORAL at 10:13

## 2023-05-29 RX ADMIN — SODIUM CHLORIDE 1000 ML: 9 INJECTION, SOLUTION INTRAVENOUS at 12:10

## 2023-05-29 RX ADMIN — PROCHLORPERAZINE EDISYLATE 10 MG: 5 INJECTION, SOLUTION INTRAMUSCULAR; INTRAVENOUS at 10:52

## 2023-05-29 ASSESSMENT — PAIN - FUNCTIONAL ASSESSMENT: PAIN_FUNCTIONAL_ASSESSMENT: 0-10

## 2023-05-29 ASSESSMENT — PAIN DESCRIPTION - ORIENTATION: ORIENTATION: MID

## 2023-05-29 ASSESSMENT — PAIN DESCRIPTION - LOCATION: LOCATION: ABDOMEN

## 2023-05-29 ASSESSMENT — PAIN SCALES - GENERAL: PAINLEVEL_OUTOF10: 9

## 2023-05-29 ASSESSMENT — LIFESTYLE VARIABLES
HOW MANY STANDARD DRINKS CONTAINING ALCOHOL DO YOU HAVE ON A TYPICAL DAY: PATIENT DOES NOT DRINK
HOW OFTEN DO YOU HAVE A DRINK CONTAINING ALCOHOL: NEVER

## 2023-05-29 ASSESSMENT — PAIN DESCRIPTION - DESCRIPTORS: DESCRIPTORS: ACHING;BURNING

## 2023-05-29 NOTE — ED NOTES
Patient (s) was given copy of dc instructions and 1 script(s). Patient (s) verbalized understanding of instructions and script (s). Patient given a current medication reconciliation form and verbalized understanding of their medications. Patient (s) verbalized understanding of the importance of discussing medications with his or her physician or clinic they will be following up with. Patient alert and oriented and in no acute distress. Patient discharged home ambulatory.         Asya Prince RN  05/29/23 3673

## 2023-05-29 NOTE — ED NOTES
Pt presents ambulatory to ED complaining of nausea, abdominal pain and heartburn onset 3 months ago. Patient went to pcp and was diagnosed with GERD and started on Pepcid bid as well as Zofran prn. Per patient she was on Reglan for 12 years and was recently taken off by new doctor. Symptoms started after she was taken off Reglan. Patient missed a scheduled EGD because she did not have transportation. Pt is alert and oriented x 4, RR even and unlabored, skin is warm and dry. Assesment completed and pt updated on plan of care. Emergency Department Nursing Plan of Care       The Nursing Plan of Care is developed from the Nursing assessment and Emergency Department Attending provider initial evaluation. The plan of care may be reviewed in the ED Provider note.     The Plan of Care was developed with the following considerations:   Patient / Family readiness to learn indicated by:verbalized understanding  Persons(s) to be included in education: patient  Barriers to Learning/Limitations:None    Signed     Mike Santana RN    5/29/2023   9:52 AM       Mike Santana RN  05/29/23 [id number]

## 2023-05-29 NOTE — ED TRIAGE NOTES
Pt reports nausea x 3 months and prescribed zofran and famotidine that has not helped. Pt states she is now having mid abd pain with a burning sensation in her throat. Pt states she was on reglan for 11 years and taken off of it in January. Pt concerned her s/s might be related to not taking it any longer.

## 2023-05-29 NOTE — ED PROVIDER NOTES
[facility] EMERGENCY DEPT  EMERGENCY DEPARTMENT ENCOUNTER       Pt Name: Ion Jeffers  MRN: [id number]  [place] 1980  Date of evaluation: 2023  Provider: Chance Longoria PA-C   PCP: Mindi Bazzi MD  Note Started: 12:34 PM 23     CHIEF COMPLAINT       Chief Complaint   Patient presents with    Abdominal Pain        HISTORY OF PRESENT ILLNESS: 1 or more elements      History From: Patient  HPI Limitations: None     Ion Jeffers is a 37 y.o. female who presents intermittent abdominal pain, nausea x3 months. Patient states she has been taking Zofran and Pepcid that has not been helping. She states she was due for an endoscopy in March but did not have a  for the procedure at the time. She also reports being on Reglan for about 12 years and was taken off of it in January due to [facility]" regulations. She states she is unsure if symptoms are related to her being on the Reglan or something else going on. Most of her pain is upper abdomen. She denies any vomiting, diarrhea, dysuria, urinary frequency. Nursing Notes were all reviewed and agreed with or any disagreements were addressed in the HPI. REVIEW OF SYSTEMS      Review of Systems     Positives and Pertinent negatives as per HPI.     PAST HISTORY     Past Medical History:  Past Medical History:   Diagnosis Date    Abnormal Pap smear     \"years ago\"     B[facility] disorder [facility]     Bronchitis     Depression with anxiety 2010    Diabetes mellitus ([facility]     type II diabetes mellitus since 2004    GERD (gastroesophageal reflux disease)     High cholesterol     HTN (hypertension) 2010    Ill-defined condition     bronchitis    Postpartum depression     hospitalized after last delivery    Rhinitis 2012    Sleep apnea     uses cpap    UTI (urinary tract infection)        Past Surgical History:  Past Surgical History:   Procedure Laterality Date     SECTION      X 3    TUBAL LIGATION         Family History:  Family

## 2023-05-29 NOTE — ED NOTES
Patient reports tubal ligation. Refuses pregnancy test prior to CT.       Tennille Cartwright RN  05/29/23 3695

## 2023-05-30 ENCOUNTER — HOSPITAL ENCOUNTER (EMERGENCY)
Facility: HOSPITAL | Age: 43
Discharge: HOME | End: 2023-05-30
Attending: EMERGENCY MEDICINE
Payer: MEDICARE

## 2023-05-30 VITALS
OXYGEN SATURATION: 98 % | WEIGHT: 230 LBS | TEMPERATURE: 97.8 F | HEART RATE: 99 BPM | SYSTOLIC BLOOD PRESSURE: 150 MMHG | BODY MASS INDEX: 40.75 KG/M2 | HEIGHT: 63 IN | RESPIRATION RATE: 18 BRPM | DIASTOLIC BLOOD PRESSURE: 95 MMHG

## 2023-05-30 DIAGNOSIS — R59.0 SUBMENTAL LYMPHADENOPATHY: ICD-10-CM

## 2023-05-30 DIAGNOSIS — L70.0 ACNE VULGARIS: Primary | ICD-10-CM

## 2023-05-30 PROCEDURE — 99283 EMERGENCY DEPT VISIT LOW MDM: CPT

## 2023-05-30 RX ORDER — DOXYCYCLINE HYCLATE 100 MG
100 TABLET ORAL 2 TIMES DAILY
Qty: 14 TABLET | Refills: 0 | Status: SHIPPED | OUTPATIENT
Start: 2023-05-30 | End: 2023-06-06

## 2023-05-30 ASSESSMENT — PAIN DESCRIPTION - ORIENTATION: ORIENTATION: INNER

## 2023-05-30 ASSESSMENT — PAIN DESCRIPTION - DESCRIPTORS: DESCRIPTORS: SORE

## 2023-05-30 ASSESSMENT — PAIN SCALES - GENERAL: PAINLEVEL_OUTOF10: 8

## 2023-05-30 ASSESSMENT — PAIN DESCRIPTION - LOCATION: LOCATION: THROAT

## 2023-05-30 ASSESSMENT — PAIN - FUNCTIONAL ASSESSMENT: PAIN_FUNCTIONAL_ASSESSMENT: 0-10

## 2023-05-30 NOTE — ED NOTES
Introduced self to patient at this time. Pt placed on the monitor with continuous pulse ox and BP cuff. Call light within reach. Pt complains of breaking out around her mouth for a while and having swelling underneath her mandible starting this morning after she woke up. Pt alert and oriented. No shortness of breath or chest pain reported. Emergency Department Nursing Plan of Care       The Nursing Plan of Care is developed from the Nursing assessment and Emergency Department Attending provider initial evaluation. The plan of care may be reviewed in the ED Provider note.       The Plan of Care was developed with the following considerations:  Patient / Family readiness to learn indicated by:verbalized understanding, successful return demonstration, and appropriate questions asked  Persons(s) to be included in education: patient  Barriers to Learning/Limitations:None      Signed     Sterling Sandifer, RN    5/30/2023   12:30 PM         Sterling Sandifer, RN  05/30/23 0537

## 2023-05-30 NOTE — ED TRIAGE NOTES
Pt feels like she is having an allergic reaction to something. Pt woke up recently with a sore throat and feeling if it is swollen. Pt also has a rash to the face. Pt has a hx of allergies to several medicines.

## 2023-05-30 NOTE — ED PROVIDER NOTES
[facility] EMERGENCY DEPT  EMERGENCY DEPARTMENT ENCOUNTER    Patient Name: Luly Waters  MRN: [id number]  YOB: 1980  Provider: Grace Oneill MD  PCP: Lisa Au MD   Time/Date of evaluation: 12:41 PM EDT on 23    History of Presenting Illness     Chief Complaint   Patient presents with    Facial Swelling       History Provided by: Patient   History is limited by: Nothing    HISTORY Mimi Guy): Luly Waters is a 37 y.o. female with a PMHX of bipolar disorder, bronchitis, depression, anxiety, diabetes, GERD, hypertension, and obstructive sleep apnea  who presents to the emergency department (room 2) by POV C/O sore throat and chin swelling that started this morning when she woke up. Patient believes she may be having allergic reaction or an infection of some kind. She tells me she was seen here yesterday for abdominal pain and had labs and a CT scan done that were relatively unremarkable with the exception of fatty liver disease. She is supposed to follow-up with outpatient GI on  for an endoscopy. She denies any new medications or any other new symptoms. Nursing Notes were all reviewed and agreed with or any disagreements were addressed in the HPI.     Past History     PAST MEDICAL HISTORY:  Past Medical History:   Diagnosis Date    Abnormal Pap smear     \"years ago\"     B[facility] disorder ([facility]     Bronchitis     Depression with anxiety 2010    Diabetes mellitus ([facility]     type II diabetes mellitus since     GERD (gastroesophageal reflux disease)     High cholesterol     HTN (hypertension) 2010    Ill-defined condition     bronchitis    Postpartum depression     hospitalized after last delivery    Rhinitis 2012    Sleep apnea     uses cpap    UTI (urinary tract infection)        PAST SURGICAL HISTORY:  Past Surgical History:   Procedure Laterality Date     SECTION      X 3    TUBAL LIGATION         FAMILY HISTORY:  Family History   Problem

## 2023-05-30 NOTE — ED NOTES
Discharge instructions provided. Pt was given copy of discharge instructions with 0 paper script(s) and 1 electronic script(s). Pt verbalized understanding of the medication instructions, and the importance of following up as recommended by EDP. Pt has no further questions at this time. Wheelchair offered from treatment area to hospital entrance, pt declined. Pt leaving ED ambulatory and in stable condition.         iNya Hartmann RN  05/30/23 [id number]

## 2023-07-18 ENCOUNTER — HOSPITAL ENCOUNTER (EMERGENCY)
Facility: HOSPITAL | Age: 43
Discharge: HOME | End: 2023-07-18
Payer: MEDICARE

## 2023-07-18 VITALS
WEIGHT: 238 LBS | HEIGHT: 63 IN | BODY MASS INDEX: 42.17 KG/M2 | OXYGEN SATURATION: 99 % | DIASTOLIC BLOOD PRESSURE: 69 MMHG | RESPIRATION RATE: 18 BRPM | HEART RATE: 87 BPM | SYSTOLIC BLOOD PRESSURE: 111 MMHG | TEMPERATURE: 98.8 F

## 2023-07-18 DIAGNOSIS — J02.9 ACUTE PHARYNGITIS, UNSPECIFIED ETIOLOGY: ICD-10-CM

## 2023-07-18 DIAGNOSIS — R59.0 SUBMENTAL ADENOPATHY: ICD-10-CM

## 2023-07-18 DIAGNOSIS — R07.9 CHEST PAIN, UNSPECIFIED TYPE: Primary | ICD-10-CM

## 2023-07-18 LAB
ALBUMIN SERPL-MCNC: 3.5 G/DL (ref 3.5–5)
ALBUMIN/GLOB SERPL: 0.9 (ref 1.1–2.2)
ALP SERPL-CCNC: 82 U/L (ref 45–117)
ALT SERPL-CCNC: 21 U/L (ref 12–78)
ANION GAP SERPL CALC-SCNC: 9 MMOL/L (ref 5–15)
AST SERPL-CCNC: 13 U/L (ref 15–37)
BASOPHILS # BLD: 0.1 K/UL (ref 0–0.1)
BASOPHILS NFR BLD: 1 % (ref 0–1)
BILIRUB SERPL-MCNC: 0.2 MG/DL (ref 0.2–1)
BUN SERPL-MCNC: 7 MG/DL (ref 6–20)
BUN/CREAT SERPL: 8 (ref 12–20)
CALCIUM SERPL-MCNC: 9.2 MG/DL (ref 8.5–10.1)
CHLORIDE SERPL-SCNC: 97 MMOL/L (ref 97–108)
CO2 SERPL-SCNC: 26 MMOL/L (ref 21–32)
CREAT SERPL-MCNC: 0.85 MG/DL (ref 0.55–1.02)
D DIMER PPP FEU-MCNC: 0.3 MG/L FEU (ref 0–0.65)
DIFFERENTIAL METHOD BLD: ABNORMAL
EOSINOPHIL # BLD: 0 K/UL (ref 0–0.4)
EOSINOPHIL NFR BLD: 0 % (ref 0–7)
ERYTHROCYTE [DISTWIDTH] IN BLOOD BY AUTOMATED COUNT: 16.4 % (ref 11.5–14.5)
GLOBULIN SER CALC-MCNC: 3.7 G/DL (ref 2–4)
GLUCOSE BLD STRIP.AUTO-MCNC: 185 MG/DL (ref 65–117)
GLUCOSE SERPL-MCNC: 150 MG/DL (ref 65–100)
HCT VFR BLD AUTO: 31.5 % (ref 35–47)
HGB BLD-MCNC: 9.6 G/DL (ref 11.5–16)
IMM GRANULOCYTES # BLD AUTO: 0.1 K/UL (ref 0–0.04)
IMM GRANULOCYTES NFR BLD AUTO: 1 % (ref 0–0.5)
LYMPHOCYTES # BLD: 1.7 K/UL (ref 0.8–3.5)
LYMPHOCYTES NFR BLD: 14 % (ref 12–49)
MCH RBC QN AUTO: 21.2 PG (ref 26–34)
MCHC RBC AUTO-ENTMCNC: 30.5 G/DL (ref 30–36.5)
MCV RBC AUTO: 69.7 FL (ref 80–99)
MONOCYTES # BLD: 0.9 K/UL (ref 0–1)
MONOCYTES NFR BLD: 7 % (ref 5–13)
NEUTS SEG # BLD: 9.4 K/UL (ref 1.8–8)
NEUTS SEG NFR BLD: 77 % (ref 32–75)
NRBC # BLD: 0 K/UL (ref 0–0.01)
NRBC BLD-RTO: 0 PER 100 WBC
PLATELET # BLD AUTO: 336 K/UL (ref 150–400)
PMV BLD AUTO: 10.1 FL (ref 8.9–12.9)
POTASSIUM SERPL-SCNC: 3.9 MMOL/L (ref 3.5–5.1)
PROT SERPL-MCNC: 7.2 G/DL (ref 6.4–8.2)
RBC # BLD AUTO: 4.52 M/UL (ref 3.8–5.2)
RBC MORPH BLD: ABNORMAL
SERVICE CMNT-IMP: ABNORMAL
SODIUM SERPL-SCNC: 132 MMOL/L (ref 136–145)
TROPONIN I SERPL HS-MCNC: 6 NG/L (ref 0–51)
WBC # BLD AUTO: 12.2 K/UL (ref 3.6–11)

## 2023-07-18 PROCEDURE — 6360000002 HC RX W HCPCS: Performed by: PHYSICIAN ASSISTANT

## 2023-07-18 PROCEDURE — 93005 ELECTROCARDIOGRAM TRACING: CPT | Performed by: STUDENT IN AN ORGANIZED HEALTH CARE EDUCATION/TRAINING PROGRAM

## 2023-07-18 PROCEDURE — 82962 GLUCOSE BLOOD TEST: CPT

## 2023-07-18 PROCEDURE — 36415 COLL VENOUS BLD VENIPUNCTURE: CPT

## 2023-07-18 PROCEDURE — 84484 ASSAY OF TROPONIN QUANT: CPT

## 2023-07-18 PROCEDURE — 85025 COMPLETE CBC W/AUTO DIFF WBC: CPT

## 2023-07-18 PROCEDURE — 96374 THER/PROPH/DIAG INJ IV PUSH: CPT

## 2023-07-18 PROCEDURE — 85379 FIBRIN DEGRADATION QUANT: CPT

## 2023-07-18 PROCEDURE — 80053 COMPREHEN METABOLIC PANEL: CPT

## 2023-07-18 PROCEDURE — 99284 EMERGENCY DEPT VISIT MOD MDM: CPT

## 2023-07-18 RX ORDER — ONDANSETRON 2 MG/ML
4 INJECTION INTRAMUSCULAR; INTRAVENOUS
Status: COMPLETED | OUTPATIENT
Start: 2023-07-18 | End: 2023-07-18

## 2023-07-18 RX ORDER — DOXYCYCLINE HYCLATE 100 MG
100 TABLET ORAL 2 TIMES DAILY
Qty: 14 TABLET | Refills: 0 | Status: SHIPPED | OUTPATIENT
Start: 2023-07-18 | End: 2023-07-25

## 2023-07-18 RX ORDER — PREDNISONE 5 MG/1
TABLET ORAL
Qty: 1 EACH | Refills: 0 | Status: SHIPPED | OUTPATIENT
Start: 2023-07-18

## 2023-07-18 RX ADMIN — ONDANSETRON 4 MG: 2 INJECTION INTRAMUSCULAR; INTRAVENOUS at 20:07

## 2023-07-18 ASSESSMENT — ENCOUNTER SYMPTOMS
NAUSEA: 1
SORE THROAT: 1
COUGH: 0

## 2023-07-18 ASSESSMENT — PAIN SCALES - GENERAL: PAINLEVEL_OUTOF10: 10

## 2023-07-18 ASSESSMENT — VISUAL ACUITY: OU: 1

## 2023-07-18 ASSESSMENT — PAIN DESCRIPTION - ORIENTATION: ORIENTATION: LEFT;RIGHT

## 2023-07-18 ASSESSMENT — PAIN DESCRIPTION - DESCRIPTORS: DESCRIPTORS: SORE

## 2023-07-18 ASSESSMENT — PAIN - FUNCTIONAL ASSESSMENT: PAIN_FUNCTIONAL_ASSESSMENT: 0-10

## 2023-07-18 ASSESSMENT — PAIN DESCRIPTION - LOCATION: LOCATION: THROAT

## 2023-07-18 NOTE — ED PROVIDER NOTES
TO:  [facility]  [address] [place] [address] Way  [phone]  Call   ENT: call tomorrow to schedule follow up       DISCHARGE MEDICATIONS:     Medication List        START taking these medications      doxycycline hyclate 100 MG tablet  Commonly known as: VIBRA-TABS  Take 1 tablet by mouth 2 times daily for 7 days            CHANGE how you take these medications      * predniSONE 10 MG (21) Tbpk  What changed: Another medication with the same name was added. Make sure you understand how and when to take each. * predniSONE 5 MG (21) Tbpk  Per Dose Pack instructions  What changed: You were already taking a medication with the same name, and this prescription was added. Make sure you understand how and when to take each. * This list has 2 medication(s) that are the same as other medications prescribed for you. Read the directions carefully, and ask your doctor or other care provider to review them with you.                 ASK your doctor about these medications      albuterol sulfate  (90 Base) MCG/ACT inhaler  Commonly known as: PROVENTIL;VENTOLIN;PROAIR     aspirin 81 MG chewable tablet     busPIRone 15 MG tablet  Commonly known as: BUSPAR     * butalbital-APAP-caffeine -40 MG Caps per capsule     * butalbital-acetaminophen-caffeine -40 MG per tablet  Commonly known as: FIORICET, ESGIC     fluticasone 50 MCG/ACT nasal spray  Commonly known as: FLONASE     ketorolac 10 MG tablet  Commonly known as: TORADOL     losartan 50 MG tablet  Commonly known as: COZAAR     lurasidone 20 MG Tabs tablet  Commonly known as: LATUDA     metFORMIN 1000 MG tablet  Commonly known as: GLUCOPHAGE     metoclopramide 5 MG tablet  Commonly known as: REGLAN     omeprazole 40 MG delayed release capsule  Commonly known as: PRILOSEC     ondansetron 4 MG disintegrating tablet  Commonly known as: ZOFRAN-ODT  Take 1 tablet by mouth every 8 hours as needed for Nausea or

## 2023-07-18 NOTE — ED TRIAGE NOTES
Pt reports difficulty swallowing as if feels like her throat is always swollen. Pt reports feeling dizzy x 2 days with nausea/anxiety. Pt reports chest and arm pain during dizzy episodes.  Pt has DM, but does not know how to work her glucose machine

## 2023-07-19 NOTE — ED NOTES
Pt says, \"This is taking too long for me. I got to go. \" Pt upset during whole encounter. This RN spoke to MD and removed patient's line.       Vnagie Odom RN  07/18/23 2059

## 2023-07-19 NOTE — ED NOTES
Pt left with out discharge paperwork and follow up information.       [address], RN  07/18/23 7601

## 2023-07-20 ENCOUNTER — HOSPITAL ENCOUNTER (EMERGENCY)
Facility: HOSPITAL | Age: 43
Discharge: HOME | End: 2023-07-20
Attending: EMERGENCY MEDICINE
Payer: MEDICARE

## 2023-07-20 VITALS
DIASTOLIC BLOOD PRESSURE: 65 MMHG | SYSTOLIC BLOOD PRESSURE: 135 MMHG | HEIGHT: 63 IN | RESPIRATION RATE: 18 BRPM | TEMPERATURE: 98.6 F | HEART RATE: 90 BPM | WEIGHT: 238 LBS | OXYGEN SATURATION: 99 % | BODY MASS INDEX: 42.17 KG/M2

## 2023-07-20 DIAGNOSIS — J02.9 PHARYNGITIS, UNSPECIFIED ETIOLOGY: Primary | ICD-10-CM

## 2023-07-20 LAB
DEPRECATED S PYO AG THROAT QL EIA: NEGATIVE
EKG ATRIAL RATE: 97 BPM
EKG DIAGNOSIS: NORMAL
EKG P AXIS: 62 DEGREES
EKG P-R INTERVAL: 140 MS
EKG Q-T INTERVAL: 346 MS
EKG QRS DURATION: 76 MS
EKG QTC CALCULATION (BAZETT): 439 MS
EKG R AXIS: 31 DEGREES
EKG T AXIS: 49 DEGREES
EKG VENTRICULAR RATE: 97 BPM

## 2023-07-20 PROCEDURE — 6360000002 HC RX W HCPCS: Performed by: EMERGENCY MEDICINE

## 2023-07-20 PROCEDURE — 99283 EMERGENCY DEPT VISIT LOW MDM: CPT

## 2023-07-20 PROCEDURE — 6370000000 HC RX 637 (ALT 250 FOR IP): Performed by: EMERGENCY MEDICINE

## 2023-07-20 PROCEDURE — 87070 CULTURE OTHR SPECIMN AEROBIC: CPT

## 2023-07-20 PROCEDURE — 93010 ELECTROCARDIOGRAM REPORT: CPT | Performed by: SPECIALIST

## 2023-07-20 PROCEDURE — 87880 STREP A ASSAY W/OPTIC: CPT

## 2023-07-20 RX ORDER — ONDANSETRON 4 MG/1
4 TABLET, ORALLY DISINTEGRATING ORAL 3 TIMES DAILY PRN
Qty: 21 TABLET | Refills: 0 | Status: SHIPPED | OUTPATIENT
Start: 2023-07-20

## 2023-07-20 RX ORDER — ONDANSETRON 4 MG/1
4 TABLET, ORALLY DISINTEGRATING ORAL EVERY 8 HOURS PRN
Status: DISCONTINUED | OUTPATIENT
Start: 2023-07-20 | End: 2023-07-20 | Stop reason: HOSPADM

## 2023-07-20 RX ORDER — DEXAMETHASONE SODIUM PHOSPHATE 10 MG/ML
10 INJECTION INTRAMUSCULAR; INTRAVENOUS
Status: COMPLETED | OUTPATIENT
Start: 2023-07-20 | End: 2023-07-20

## 2023-07-20 RX ADMIN — DEXAMETHASONE SODIUM PHOSPHATE 10 MG: 10 INJECTION INTRAMUSCULAR; INTRAVENOUS at 02:12

## 2023-07-20 RX ADMIN — ONDANSETRON 4 MG: 4 TABLET, ORALLY DISINTEGRATING ORAL at 02:12

## 2023-07-20 ASSESSMENT — PAIN DESCRIPTION - DESCRIPTORS: DESCRIPTORS: SORE

## 2023-07-20 ASSESSMENT — LIFESTYLE VARIABLES
HOW OFTEN DO YOU HAVE A DRINK CONTAINING ALCOHOL: NEVER
HOW MANY STANDARD DRINKS CONTAINING ALCOHOL DO YOU HAVE ON A TYPICAL DAY: PATIENT DOES NOT DRINK

## 2023-07-20 ASSESSMENT — PAIN SCALES - GENERAL: PAINLEVEL_OUTOF10: 10

## 2023-07-20 ASSESSMENT — PAIN - FUNCTIONAL ASSESSMENT: PAIN_FUNCTIONAL_ASSESSMENT: 0-10

## 2023-07-20 ASSESSMENT — PAIN DESCRIPTION - ORIENTATION: ORIENTATION: INNER

## 2023-07-20 ASSESSMENT — PAIN DESCRIPTION - LOCATION: LOCATION: THROAT

## 2023-07-20 NOTE — ED TRIAGE NOTES
Pt reports that her throat has felt like it is swelling for several months. Pt was seen here a few days ago but states they did not focus on her throat just her other issues she was having.

## 2023-07-20 NOTE — ED NOTES
Pt left before being registered and did not receive discharge papers     Dory Trinidad RN  07/20/23 4201

## 2023-07-22 LAB
BACTERIA SPEC CULT: NORMAL
SERVICE CMNT-IMP: NORMAL

## 2023-08-12 ENCOUNTER — HOSPITAL ENCOUNTER (EMERGENCY)
Facility: HOSPITAL | Age: 43
Discharge: HOME | End: 2023-08-12
Attending: EMERGENCY MEDICINE
Payer: MEDICARE

## 2023-08-12 VITALS
HEIGHT: 63 IN | HEART RATE: 84 BPM | SYSTOLIC BLOOD PRESSURE: 145 MMHG | RESPIRATION RATE: 20 BRPM | BODY MASS INDEX: 42.17 KG/M2 | WEIGHT: 238 LBS | TEMPERATURE: 98.9 F | OXYGEN SATURATION: 100 % | DIASTOLIC BLOOD PRESSURE: 69 MMHG

## 2023-08-12 DIAGNOSIS — J02.9 ACUTE PHARYNGITIS, UNSPECIFIED ETIOLOGY: Primary | ICD-10-CM

## 2023-08-12 DIAGNOSIS — K21.9 LPRD (LARYNGOPHARYNGEAL REFLUX DISEASE): ICD-10-CM

## 2023-08-12 DIAGNOSIS — R09.89 GLOBUS SENSATION: ICD-10-CM

## 2023-08-12 PROCEDURE — 6360000002 HC RX W HCPCS: Performed by: EMERGENCY MEDICINE

## 2023-08-12 PROCEDURE — 6370000000 HC RX 637 (ALT 250 FOR IP): Performed by: EMERGENCY MEDICINE

## 2023-08-12 PROCEDURE — 99283 EMERGENCY DEPT VISIT LOW MDM: CPT

## 2023-08-12 RX ORDER — LIDOCAINE HYDROCHLORIDE 20 MG/ML
15 SOLUTION OROPHARYNGEAL PRN
Qty: 300 ML | Refills: 0 | Status: SHIPPED | OUTPATIENT
Start: 2023-08-12

## 2023-08-12 RX ORDER — METHYLPREDNISOLONE 4 MG/1
TABLET ORAL
Qty: 21 TABLET | Refills: 0 | Status: SHIPPED | OUTPATIENT
Start: 2023-08-12 | End: 2023-08-18

## 2023-08-12 RX ORDER — DOXYCYCLINE HYCLATE 100 MG
100 TABLET ORAL ONCE
Status: COMPLETED | OUTPATIENT
Start: 2023-08-12 | End: 2023-08-12

## 2023-08-12 RX ORDER — LIDOCAINE HYDROCHLORIDE 20 MG/ML
15 SOLUTION OROPHARYNGEAL
Status: COMPLETED | OUTPATIENT
Start: 2023-08-12 | End: 2023-08-12

## 2023-08-12 RX ORDER — FLUCONAZOLE 150 MG/1
150 TABLET ORAL ONCE
Qty: 1 TABLET | Refills: 0 | Status: SHIPPED | OUTPATIENT
Start: 2023-08-12 | End: 2023-08-12

## 2023-08-12 RX ORDER — DEXAMETHASONE SODIUM PHOSPHATE 10 MG/ML
10 INJECTION INTRAMUSCULAR; INTRAVENOUS ONCE
Status: COMPLETED | OUTPATIENT
Start: 2023-08-12 | End: 2023-08-12

## 2023-08-12 RX ORDER — DOXYCYCLINE HYCLATE 100 MG
100 TABLET ORAL 2 TIMES DAILY
Qty: 14 TABLET | Refills: 0 | Status: SHIPPED | OUTPATIENT
Start: 2023-08-12 | End: 2023-08-19

## 2023-08-12 RX ADMIN — DEXAMETHASONE SODIUM PHOSPHATE 10 MG: 10 INJECTION INTRAMUSCULAR; INTRAVENOUS at 07:04

## 2023-08-12 RX ADMIN — DOXYCYCLINE HYCLATE 100 MG: 100 TABLET, COATED ORAL at 07:03

## 2023-08-12 RX ADMIN — LIDOCAINE HYDROCHLORIDE 15 ML: 20 SOLUTION ORAL; TOPICAL at 07:04

## 2023-08-12 ASSESSMENT — PAIN SCALES - GENERAL: PAINLEVEL_OUTOF10: 0

## 2023-08-12 ASSESSMENT — ENCOUNTER SYMPTOMS
ABDOMINAL PAIN: 0
VOMITING: 0
NAUSEA: 0
SORE THROAT: 1
COUGH: 0
SHORTNESS OF BREATH: 0
DIARRHEA: 0
EYE PAIN: 0
RHINORRHEA: 0

## 2023-08-12 ASSESSMENT — PAIN - FUNCTIONAL ASSESSMENT: PAIN_FUNCTIONAL_ASSESSMENT: 0-10

## 2023-08-12 NOTE — ED PROVIDER NOTES
EMERGENCY DEPARTMENT HISTORY AND PHYSICAL EXAM            Please note that this dictation was completed with the assistance of \"Dragon\", the computer voice recognition software. Quite often unanticipated grammatical, syntax, homophones, and other interpretive errors are inadvertently transcribed by the computer software. Please disregard these errors and any errors that have escaped final proofreading. Thank you. Date of Evaluation: 08/12/23  Patient: Manfred Courtney  Patient Age and Sex: 37 y.o. female   MRN: [id number]  CSN: [id number]  PCP: Vidal Jenkins MD    History of Present Illness     Chief Complaint   Patient presents with    Sore Throat     History Provided By: Patient/family/EMS (if available)    History is limited by: Nothing     HPI: Manfred Courtney, 37 y.o. female with past medical history as documented below presents to the ED with c/o of acute on chronic sore throat and globus sensation. States she has an ENT appointment coming up. States she was previously seen here and given steroids and doxy which seemed to help with sx's. Denies any vomiting, hematemesis, weight loss, No neck pain or swelling. . Pt denies any other exacerbating or ameliorating factors. There are no other complaints, changes or physical findings pertinent to the HPI at this time. Nursing Notes were all reviewed and agreed with or any disagreements were addressed in the HPI.     Past History   Past Medical History:  Past Medical History:   Diagnosis Date    Abnormal Pap smear     \"years ago\"     Bipolar disorder ([address] )     Bronchitis     Depression with anxiety 9/22/2010    Diabetes mellitus ([address] [facility])     type II diabetes mellitus since 2004    GERD (gastroesophageal reflux disease)     High cholesterol     HTN (hypertension) 9/22/2010    Ill-defined condition     bronchitis    Postpartum depression     hospitalized after last delivery    Rhinitis 6/8/2012    Sleep apnea     uses cpap    UTI (urinary tract infection) tablet  fluconazole 150 MG tablet  lidocaine viscous hcl 2 % Soln solution  methylPREDNISolone 4 MG tablet           3. PATIENT REFERRED TO:  [facility] EMERGENCY DEPT  [address]  [phone]    As needed, If symptoms worsen    Darrian Cooper MD  [address] [place] [address] [facility]  [address] [address] [address]  [phone]                                CLINICAL IMPRESSION     1. Acute pharyngitis, unspecified etiology    2. LPRD (laryngopharyngeal reflux disease)    3. Globus sensation      Attestation:  I am the attending of record for this patient. I personally performed the services described in this documentation on this date, 8/12/2023 for patient, Noa West. I have reviewed the chart and verified that the record is accurate and complete.       Smith Hart MD (Electronic Signature)         Smith Hart MD  08/12/23 0682

## 2023-08-12 NOTE — ED NOTES
Discharge instructions were given to the patient by Bard Vaughan. The patient left the Emergency Department ambulatory, alert and oriented and in no acute distress with 4 prescriptions. The patient was encouraged to call or return to the ED for worsening issues or problems and was encouraged to schedule a follow up appointment for continuing care. The patient verbalized understanding of discharge instructions and prescriptions, all questions were answered. The patient has no further concerns at this time.          Minerva Lopez RN  08/12/23 9645

## 2023-08-12 NOTE — ED TRIAGE NOTES
Pt reports foreign body sensation in her throat when swallowing d/t throat swelling that she has been following with ENT for. States she was taking steroids and doxy. Airway patent during triage.

## 2023-08-12 NOTE — DISCHARGE INSTRUCTIONS
Thank You! It was a pleasure taking care of you in our Emergency Department today. We know that when you come to [facility], you are entrusting us with your health, comfort, and safety. Our physicians and nurses honor that trust, and truly appreciate the opportunity to care for you and your loved ones. We also value your feedback. If you receive a survey about your Emergency Department experience today, please fill it out. We care about our patients' feedback, and we listen to what you have to say. Thank you. Dr. Claudia Patricia M.D.      ____________________________________________________________________  I have included a copy of your lab results and/or radiologic studies from today's visit so you can have them easily available at your follow-up visit. We hope you feel better and please do not hesitate to contact the ED if you have any questions at all! No results found for this or any previous visit (from the past 12 hour(s)). No orders to display     [unfilled]  The exam and treatment you received in the Emergency Department were for an urgent problem and are not intended as complete care. It is important that you follow up with a doctor, nurse practitioner, or physician assistant for ongoing care. If your symptoms become worse or you do not improve as expected and you are unable to reach your usual health care provider, you should return to the Emergency Department. We are available 24 hours a day. Please take your discharge instructions with you when you go to your follow-up appointment. If a prescription has been provided, please have it filled as soon as possible to prevent a delay in treatment. Read the entire medication instruction sheet provided to you by the pharmacy.  If you have any questions or reservations about taking the medication due to side effects or interactions with other medications, please call your primary care physician or contact the ER to speak with the charge nurse. Please make an appointment with your family doctor or the physician you were referred to for follow-up of this visit as instructed on your discharge paperwork. Return to the ER if you are unable to be seen or if you are unable to be seen in a timely manner. If you have any problem arranging the follow-up visit, contact the Emergency Department immediately.

## 2023-08-20 ENCOUNTER — HOSPITAL ENCOUNTER (EMERGENCY)
Facility: HOSPITAL | Age: 43
Discharge: HOME | End: 2023-08-20
Attending: EMERGENCY MEDICINE
Payer: MEDICARE

## 2023-08-20 VITALS
RESPIRATION RATE: 12 BRPM | HEART RATE: 88 BPM | TEMPERATURE: 98.6 F | WEIGHT: 230 LBS | SYSTOLIC BLOOD PRESSURE: 137 MMHG | HEIGHT: 63 IN | BODY MASS INDEX: 40.75 KG/M2 | OXYGEN SATURATION: 95 % | DIASTOLIC BLOOD PRESSURE: 103 MMHG

## 2023-08-20 DIAGNOSIS — R09.89 GLOBUS SENSATION: ICD-10-CM

## 2023-08-20 DIAGNOSIS — I10 ESSENTIAL HYPERTENSION: ICD-10-CM

## 2023-08-20 DIAGNOSIS — J02.9 ACUTE PHARYNGITIS, UNSPECIFIED ETIOLOGY: Primary | ICD-10-CM

## 2023-08-20 DIAGNOSIS — K21.9 LPRD (LARYNGOPHARYNGEAL REFLUX DISEASE): ICD-10-CM

## 2023-08-20 PROCEDURE — 6360000002 HC RX W HCPCS: Performed by: EMERGENCY MEDICINE

## 2023-08-20 PROCEDURE — 99283 EMERGENCY DEPT VISIT LOW MDM: CPT

## 2023-08-20 PROCEDURE — 6370000000 HC RX 637 (ALT 250 FOR IP): Performed by: EMERGENCY MEDICINE

## 2023-08-20 RX ORDER — OMEPRAZOLE 20 MG/1
20 CAPSULE, DELAYED RELEASE ORAL
Qty: 30 CAPSULE | Refills: 0 | Status: SHIPPED | OUTPATIENT
Start: 2023-08-20

## 2023-08-20 RX ORDER — DEXAMETHASONE SODIUM PHOSPHATE 10 MG/ML
10 INJECTION INTRAMUSCULAR; INTRAVENOUS ONCE
Status: COMPLETED | OUTPATIENT
Start: 2023-08-20 | End: 2023-08-20

## 2023-08-20 RX ADMIN — DEXAMETHASONE SODIUM PHOSPHATE 10 MG: 10 INJECTION INTRAMUSCULAR; INTRAVENOUS at 06:51

## 2023-08-20 RX ADMIN — ALUMINUM HYDROXIDE AND MAGNESIUM HYDROXIDE 20 ML: 200; 200 SUSPENSION ORAL at 06:51

## 2023-08-20 ASSESSMENT — PAIN SCALES - GENERAL: PAINLEVEL_OUTOF10: 9

## 2023-08-20 ASSESSMENT — ENCOUNTER SYMPTOMS
NAUSEA: 0
RHINORRHEA: 0
COUGH: 0
SHORTNESS OF BREATH: 0
SORE THROAT: 1
EYE PAIN: 0
VOMITING: 0
ABDOMINAL PAIN: 0
DIARRHEA: 0

## 2023-08-20 ASSESSMENT — PAIN DESCRIPTION - DESCRIPTORS: DESCRIPTORS: SORE

## 2023-08-20 ASSESSMENT — PAIN DESCRIPTION - LOCATION: LOCATION: THROAT

## 2023-08-20 ASSESSMENT — PAIN - FUNCTIONAL ASSESSMENT: PAIN_FUNCTIONAL_ASSESSMENT: 0-10

## 2023-08-20 ASSESSMENT — PAIN DESCRIPTION - ORIENTATION: ORIENTATION: MID

## 2023-08-20 NOTE — ED PROVIDER NOTES
delivery    Rhinitis 2012    Sleep apnea     uses cpap    UTI (urinary tract infection)        Past Surgical History:  Past Surgical History:   Procedure Laterality Date     SECTION      X 3    TUBAL LIGATION         Family History:   Family history reviewed and was non-contributory, unless specified below:  Family History   Problem Relation Age of Onset    Diabetes Maternal Grandmother     Hypertension Maternal Grandfather     Diabetes Mother     Hypertension Mother     Stroke Mother     Diabetes Maternal Grandfather     Hypertension Paternal Grandmother     Diabetes Paternal Grandmother     Hypertension Paternal Grandfather     Diabetes Paternal Grandfather     Hypertension Sister     Diabetes Sister     Diabetes Sister     Hypertension Sister     Diabetes Sister     Hypertension Sister     Thyroid Disease Neg Hx     Hypertension Maternal Grandmother     Kidney Disease Father     Diabetes Father     Hypertension Father     Heart Disease Mother        Social History:  Social History     Tobacco Use    Smoking status: Former     Packs/day: 0.50     Types: Cigarettes     Quit date: 2019     Years since quittin.1    Smokeless tobacco: Former   Substance Use Topics    Alcohol use: Not Currently    Drug use: Not Currently     Types: Marijuana Jess Sella)       Allergies: Allergies   Allergen Reactions    Latex Hives     rash    Lisinopril-Hydrochlorothiazide Angioedema    Metronidazole Hives and Itching    Rosuvastatin Anaphylaxis    Sulfa Antibiotics Shortness Of Breath    Clindamycin Itching and Rash    Citrate Hives    Cyclobenzaprine Angioedema    Duloxetine Hives and Itching    Lemon Oil Hives    Other Hives     Citrus Fruits    Penicillins Hives    Shrimp Extract Allergy Skin Test Itching    Tomato Hives    Ciprofloxacin Itching and Rash    Cottonseed Oil Rash       Current Medications:  No current facility-administered medications on file prior to encounter.      Current Outpatient Medications on

## 2023-08-20 NOTE — ED TRIAGE NOTES
Pt presents to the ED c/o \"throat swelling\", throat pain since March. Pt reports last night symptoms worsened. Pt was seen here on 8/12/23 and received Prednisone, Doxycycline, Lidocain, and Fluconazole. Pt brought medications with her but only 4 tablets of Day 1 had been taken out of methylPREDNISolone (MEDROL) dose pack. Pt educated that she must take medications as prescribed and follow directions for prescribed medications. Unknown how patient has been taking other medications.

## 2023-08-20 NOTE — ED NOTES
Discharge instructions were given to the patient by [facility]. The patient left the Emergency Department ambulatory, alert and oriented and in no acute distress with 3 prescriptions. The patient was encouraged to call or return to the ED for worsening issues or problems and was encouraged to schedule a follow up appointment for continuing care. The patient verbalized understanding of discharge instructions and prescriptions, all questions were answered. The patient has no further concerns at this time.         Lydia Contreras RN  08/20/23 0700

## 2023-08-20 NOTE — DISCHARGE INSTRUCTIONS
Thank You! It was a pleasure taking care of you in our Emergency Department today. We know that when you come to [facility], you are entrusting us with your health, comfort, and safety. Our physicians and nurses honor that trust, and truly appreciate the opportunity to care for you and your loved ones. We also value your feedback. If you receive a survey about your Emergency Department experience today, please fill it out. We care about our patients' feedback, and we listen to what you have to say. Thank you. Dr. Valerio Rivero M.D.      ____________________________________________________________________  I have included a copy of your lab results and/or radiologic studies from today's visit so you can have them easily available at your follow-up visit. We hope you feel better and please do not hesitate to contact the ED if you have any questions at all! No results found for this or any previous visit (from the past 12 hour(s)). No orders to display     [unfilled]  The exam and treatment you received in the Emergency Department were for an urgent problem and are not intended as complete care. It is important that you follow up with a doctor, nurse practitioner, or physician assistant for ongoing care. If your symptoms become worse or you do not improve as expected and you are unable to reach your usual health care provider, you should return to the Emergency Department. We are available 24 hours a day. Please take your discharge instructions with you when you go to your follow-up appointment. If a prescription has been provided, please have it filled as soon as possible to prevent a delay in treatment. Read the entire medication instruction sheet provided to you by the pharmacy.  If you have any questions or reservations about taking the medication due to side effects or interactions with other medications, please call your primary care physician or contact the ER to

## 2023-09-23 ENCOUNTER — HOSPITAL ENCOUNTER (EMERGENCY)
Facility: HOSPITAL | Age: 43
Discharge: HOME | End: 2023-09-23
Attending: STUDENT IN AN ORGANIZED HEALTH CARE EDUCATION/TRAINING PROGRAM
Payer: MEDICARE

## 2023-09-23 ENCOUNTER — HOSPITAL ENCOUNTER (EMERGENCY)
Facility: HOSPITAL | Age: 43
End: 2023-09-23
Payer: MEDICARE

## 2023-09-23 VITALS
HEART RATE: 101 BPM | TEMPERATURE: 99.1 F | DIASTOLIC BLOOD PRESSURE: 103 MMHG | BODY MASS INDEX: 41.99 KG/M2 | OXYGEN SATURATION: 100 % | SYSTOLIC BLOOD PRESSURE: 173 MMHG | RESPIRATION RATE: 18 BRPM | HEIGHT: 63 IN | WEIGHT: 237 LBS

## 2023-09-23 DIAGNOSIS — S80.02XA CONTUSION OF LEFT KNEE, INITIAL ENCOUNTER: Primary | ICD-10-CM

## 2023-09-23 DIAGNOSIS — S90.02XA CONTUSION OF LEFT ANKLE, INITIAL ENCOUNTER: ICD-10-CM
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
## 2023-09-23 PROCEDURE — 73562 X-RAY EXAM OF KNEE 3: CPT

## 2023-09-23 PROCEDURE — 6360000002 HC RX W HCPCS: Performed by: STUDENT IN AN ORGANIZED HEALTH CARE EDUCATION/TRAINING PROGRAM

## 2023-09-23 PROCEDURE — 73610 X-RAY EXAM OF ANKLE: CPT

## 2023-09-23 PROCEDURE — 73590 X-RAY EXAM OF LOWER LEG: CPT

## 2023-09-23 PROCEDURE — 99284 EMERGENCY DEPT VISIT MOD MDM: CPT

## 2023-09-23 PROCEDURE — 96372 THER/PROPH/DIAG INJ SC/IM: CPT

## 2023-09-23 PROCEDURE — 6370000000 HC RX 637 (ALT 250 FOR IP): Performed by: STUDENT IN AN ORGANIZED HEALTH CARE EDUCATION/TRAINING PROGRAM

## 2023-09-23 RX ORDER — KETOROLAC TROMETHAMINE 30 MG/ML
15 INJECTION, SOLUTION INTRAMUSCULAR; INTRAVENOUS ONCE
Status: COMPLETED | OUTPATIENT
Start: 2023-09-23 | End: 2023-09-23

## 2023-09-23 RX ORDER — ACETAMINOPHEN 500 MG
1000 TABLET ORAL
Status: DISCONTINUED | OUTPATIENT
Start: 2023-09-23 | End: 2023-09-23 | Stop reason: HOSPADM

## 2023-09-23 RX ORDER — NAPROXEN 500 MG/1
500 TABLET ORAL 2 TIMES DAILY
Qty: 60 TABLET | Refills: 0 | Status: SHIPPED | OUTPATIENT
Start: 2023-09-23

## 2023-09-23 RX ADMIN — KETOROLAC TROMETHAMINE 15 MG: 30 INJECTION, SOLUTION INTRAMUSCULAR; INTRAVENOUS at 18:52

## 2023-09-23 ASSESSMENT — PAIN - FUNCTIONAL ASSESSMENT: PAIN_FUNCTIONAL_ASSESSMENT: 0-10

## 2023-09-23 ASSESSMENT — PAIN DESCRIPTION - ORIENTATION: ORIENTATION: LOWER;LEFT

## 2023-09-23 ASSESSMENT — PAIN DESCRIPTION - LOCATION
LOCATION: KNEE;LEG
LOCATION: LEG;KNEE

## 2023-09-23 ASSESSMENT — PAIN SCALES - GENERAL
PAINLEVEL_OUTOF10: 10
PAINLEVEL_OUTOF10: 9
PAINLEVEL_OUTOF10: 10

## 2023-09-23 ASSESSMENT — PAIN DESCRIPTION - DESCRIPTORS: DESCRIPTORS: ACHING

## 2023-09-23 NOTE — ED NOTES
DISCHARGE INSTRUCTIONS  Pt discharged home. A total of 0 written and 1 electronic prescriptions were discussed with pt. Pt educated on follow up appointments, diagnosis print outs, and medication list.  Pt verbalized understanding. All questions answered. Pt stable to go home. Pt was offered wheelchair, pt refused wheelchair.        Davis Eng RN  09/23/23 4079

## 2023-10-02 ENCOUNTER — HOSPITAL ENCOUNTER (EMERGENCY)
Facility: HOSPITAL | Age: 43
Discharge: HOME | End: 2023-10-02
Attending: EMERGENCY MEDICINE
Payer: MEDICARE

## 2023-10-02 VITALS
HEIGHT: 61 IN | RESPIRATION RATE: 19 BRPM | TEMPERATURE: 98.3 F | SYSTOLIC BLOOD PRESSURE: 138 MMHG | OXYGEN SATURATION: 100 % | WEIGHT: 235.89 LBS | DIASTOLIC BLOOD PRESSURE: 84 MMHG | BODY MASS INDEX: 44.54 KG/M2 | HEART RATE: 89 BPM

## 2023-10-02 DIAGNOSIS — M79.602 LEFT ARM PAIN: Primary | ICD-10-CM

## 2023-10-02 DIAGNOSIS — E11.65 HYPERGLYCEMIA DUE TO DIABETES MELLITUS (HCC): ICD-10-CM

## 2023-10-02 LAB
ALBUMIN SERPL-MCNC: 3.7 G/DL (ref 3.5–5)
ALBUMIN/GLOB SERPL: 1 (ref 1.1–2.2)
ALP SERPL-CCNC: 88 U/L (ref 45–117)
ALT SERPL-CCNC: 27 U/L (ref 12–78)
ANION GAP SERPL CALC-SCNC: 13 MMOL/L (ref 5–15)
AST SERPL-CCNC: 9 U/L (ref 15–37)
BASOPHILS # BLD: 0.2 K/UL (ref 0–0.1)
BASOPHILS NFR BLD: 1 % (ref 0–1)
BILIRUB SERPL-MCNC: 0.2 MG/DL (ref 0.2–1)
BUN SERPL-MCNC: 11 MG/DL (ref 6–20)
BUN/CREAT SERPL: 11 (ref 12–20)
CALCIUM SERPL-MCNC: 9.1 MG/DL (ref 8.5–10.1)
CHLORIDE SERPL-SCNC: 93 MMOL/L (ref 97–108)
CK SERPL-CCNC: 108 U/L (ref 26–192)
CO2 SERPL-SCNC: 23 MMOL/L (ref 21–32)
CREAT SERPL-MCNC: 0.99 MG/DL (ref 0.55–1.02)
DIFFERENTIAL METHOD BLD: ABNORMAL
EOSINOPHIL # BLD: 0 K/UL (ref 0–0.4)
EOSINOPHIL NFR BLD: 0 % (ref 0–7)
ERYTHROCYTE [DISTWIDTH] IN BLOOD BY AUTOMATED COUNT: 22.7 % (ref 11.5–14.5)
GLOBULIN SER CALC-MCNC: 3.8 G/DL (ref 2–4)
GLUCOSE BLD STRIP.AUTO-MCNC: 350 MG/DL (ref 65–117)
GLUCOSE BLD STRIP.AUTO-MCNC: 432 MG/DL (ref 65–117)
GLUCOSE SERPL-MCNC: 436 MG/DL (ref 65–100)
HCT VFR BLD AUTO: 35.8 % (ref 35–47)
HGB BLD-MCNC: 11.4 G/DL (ref 11.5–16)
IMM GRANULOCYTES # BLD AUTO: 0.3 K/UL (ref 0–0.04)
IMM GRANULOCYTES NFR BLD AUTO: 2 % (ref 0–0.5)
LYMPHOCYTES # BLD: 1.8 K/UL (ref 0.8–3.5)
LYMPHOCYTES NFR BLD: 11 % (ref 12–49)
MCH RBC QN AUTO: 23.5 PG (ref 26–34)
MCHC RBC AUTO-ENTMCNC: 31.8 G/DL (ref 30–36.5)
MCV RBC AUTO: 73.7 FL (ref 80–99)
MONOCYTES # BLD: 1 K/UL (ref 0–1)
MONOCYTES NFR BLD: 6 % (ref 5–13)
NEUTS SEG # BLD: 13 K/UL (ref 1.8–8)
NEUTS SEG NFR BLD: 80 % (ref 32–75)
NRBC # BLD: 0 K/UL (ref 0–0.01)
NRBC BLD-RTO: 0 PER 100 WBC
PLATELET # BLD AUTO: 421 K/UL (ref 150–400)
PMV BLD AUTO: 11 FL (ref 8.9–12.9)
POTASSIUM SERPL-SCNC: 4.2 MMOL/L (ref 3.5–5.1)
PROT SERPL-MCNC: 7.5 G/DL (ref 6.4–8.2)
RBC # BLD AUTO: 4.86 M/UL (ref 3.8–5.2)
RBC MORPH BLD: ABNORMAL
SERVICE CMNT-IMP: ABNORMAL
SERVICE CMNT-IMP: ABNORMAL
SODIUM SERPL-SCNC: 129 MMOL/L (ref 136–145)
TROPONIN I SERPL HS-MCNC: 6 NG/L (ref 0–51)
WBC # BLD AUTO: 16.3 K/UL (ref 3.6–11)

## 2023-10-02 PROCEDURE — 36415 COLL VENOUS BLD VENIPUNCTURE: CPT

## 2023-10-02 PROCEDURE — 93005 ELECTROCARDIOGRAM TRACING: CPT | Performed by: EMERGENCY MEDICINE

## 2023-10-02 PROCEDURE — 96374 THER/PROPH/DIAG INJ IV PUSH: CPT

## 2023-10-02 PROCEDURE — 80053 COMPREHEN METABOLIC PANEL: CPT

## 2023-10-02 PROCEDURE — 99284 EMERGENCY DEPT VISIT MOD MDM: CPT

## 2023-10-02 PROCEDURE — 96375 TX/PRO/DX INJ NEW DRUG ADDON: CPT

## 2023-10-02 PROCEDURE — 2580000003 HC RX 258: Performed by: EMERGENCY MEDICINE

## 2023-10-02 PROCEDURE — 6360000002 HC RX W HCPCS: Performed by: EMERGENCY MEDICINE

## 2023-10-02 PROCEDURE — 82550 ASSAY OF CK (CPK): CPT

## 2023-10-02 PROCEDURE — 84484 ASSAY OF TROPONIN QUANT: CPT

## 2023-10-02 PROCEDURE — 6370000000 HC RX 637 (ALT 250 FOR IP): Performed by: EMERGENCY MEDICINE

## 2023-10-02 PROCEDURE — 82962 GLUCOSE BLOOD TEST: CPT

## 2023-10-02 PROCEDURE — 85025 COMPLETE CBC W/AUTO DIFF WBC: CPT

## 2023-10-02 RX ORDER — 0.9 % SODIUM CHLORIDE 0.9 %
1000 INTRAVENOUS SOLUTION INTRAVENOUS ONCE
Status: COMPLETED | OUTPATIENT
Start: 2023-10-02 | End: 2023-10-02

## 2023-10-02 RX ORDER — KETOROLAC TROMETHAMINE 10 MG/1
10 TABLET, FILM COATED ORAL EVERY 6 HOURS PRN
Qty: 20 TABLET | Refills: 0 | Status: SHIPPED | OUTPATIENT
Start: 2023-10-02

## 2023-10-02 RX ORDER — KETOROLAC TROMETHAMINE 30 MG/ML
15 INJECTION, SOLUTION INTRAMUSCULAR; INTRAVENOUS ONCE
Status: COMPLETED | OUTPATIENT
Start: 2023-10-02 | End: 2023-10-02

## 2023-10-02 RX ADMIN — INSULIN HUMAN 10 UNITS: 100 INJECTION, SOLUTION PARENTERAL at 21:32

## 2023-10-02 RX ADMIN — SODIUM CHLORIDE 1000 ML: 900 INJECTION, SOLUTION INTRAVENOUS at 21:33

## 2023-10-02 RX ADMIN — KETOROLAC TROMETHAMINE 15 MG: 30 INJECTION, SOLUTION INTRAMUSCULAR; INTRAVENOUS at 21:01

## 2023-10-02 ASSESSMENT — PAIN DESCRIPTION - PAIN TYPE: TYPE: ACUTE PAIN

## 2023-10-02 ASSESSMENT — PAIN DESCRIPTION - ORIENTATION: ORIENTATION: LEFT

## 2023-10-02 ASSESSMENT — PAIN SCALES - GENERAL: PAINLEVEL_OUTOF10: 9

## 2023-10-02 ASSESSMENT — PAIN DESCRIPTION - FREQUENCY: FREQUENCY: CONTINUOUS

## 2023-10-02 ASSESSMENT — PAIN DESCRIPTION - LOCATION: LOCATION: ARM;GENERALIZED

## 2023-10-02 ASSESSMENT — PAIN - FUNCTIONAL ASSESSMENT: PAIN_FUNCTIONAL_ASSESSMENT: 0-10

## 2023-10-02 ASSESSMENT — PAIN DESCRIPTION - DESCRIPTORS: DESCRIPTORS: ACHING

## 2023-10-03 LAB
EKG ATRIAL RATE: 105 BPM
EKG DIAGNOSIS: NORMAL
EKG P AXIS: 48 DEGREES
EKG P-R INTERVAL: 134 MS
EKG Q-T INTERVAL: 344 MS
EKG QRS DURATION: 78 MS
EKG QTC CALCULATION (BAZETT): 454 MS
EKG R AXIS: 43 DEGREES
EKG T AXIS: 44 DEGREES
EKG VENTRICULAR RATE: 105 BPM

## 2023-10-03 PROCEDURE — 93010 ELECTROCARDIOGRAM REPORT: CPT | Performed by: SPECIALIST

## 2023-10-03 NOTE — DISCHARGE INSTRUCTIONS
You can take 1000mg of tylenol every 8 hours for pain (if you have 325mg tabs you can take 3 of these, or 975mg)
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
## 2023-10-03 NOTE — ED PROVIDER NOTES
[facility] EMERGENCY DEPT  EMERGENCY DEPARTMENT ENCOUNTER       Pt Name: Julius Coronado  MRN: [id number]  [address] [facility] [place]  Date of evaluation: 10/2/2023  Provider: Karla Cherry MD   PCP: Marita Quijano MD  Note Started: 8:41 PM EDT 10/2/23     CHIEF COMPLAINT       Chief Complaint   Patient presents with    Arm Pain        HISTORY OF PRESENT ILLNESS: 1 or more elements      History From: patient, History limited by: none     Julius Coronado is a 37 y.o. female who presents with chief complaint of body wide pain as well as left arm pain. Please See MDM for Additional Details of the HPI/PMH  Nursing Notes were all reviewed and agreed with or any disagreements were addressed in the HPI. REVIEW OF SYSTEMS        Positives and Pertinent negatives as per HPI.     PAST HISTORY     Past Medical History:  Past Medical History:   Diagnosis Date    Abnormal Pap smear     \"years ago\"     Bipolar disorder ([address])     Bronchitis     Depression with anxiety 2010    Diabetes mellitus ([address])     type II diabetes mellitus since     GERD (gastroesophageal reflux disease)     High cholesterol     HTN (hypertension) 2010    Ill-defined condition     bronchitis    Postpartum depression     hospitalized after last delivery    Rhinitis 2012    Sleep apnea     uses cpap    UTI (urinary tract infection)        Past Surgical History:  Past Surgical History:   Procedure Laterality Date     SECTION      X 3    TUBAL LIGATION         Family History:  Family History   Problem Relation Age of Onset    Diabetes Maternal Grandmother     Hypertension Maternal Grandfather     Diabetes Mother     Hypertension Mother     Stroke Mother     Diabetes Maternal Grandfather     Hypertension Paternal Grandmother     Diabetes Paternal Grandmother     Hypertension Paternal Grandfather     Diabetes Paternal Grandfather     Hypertension Sister     Diabetes Sister     Diabetes Sister     Hypertension Sister     Diabetes

## 2023-10-03 NOTE — ED NOTES
Discharge instructions were given to the patient by Adia Adan RN    The patient left the Emergency Department ambulatory, alert and oriented and in no acute distress with 1 prescriptions. The patient was encouraged to call or return to the ED for worsening issues or problems and was encouraged to schedule a follow up appointment for continuing care. The patient verbalized understanding of discharge instructions and prescriptions, all questions were answered. The patient has no further concerns at this time.          Adelia Cook RN  10/02/23 9871

## 2023-10-07 ENCOUNTER — APPOINTMENT (OUTPATIENT)
Facility: HOSPITAL | Age: 43
End: 2023-10-07
Payer: MEDICARE

## 2023-10-07 ENCOUNTER — HOSPITAL ENCOUNTER (EMERGENCY)
Facility: HOSPITAL | Age: 43
Discharge: HOME | End: 2023-10-07
Attending: EMERGENCY MEDICINE
Payer: MEDICARE

## 2023-10-07 VITALS
SYSTOLIC BLOOD PRESSURE: 145 MMHG | HEIGHT: 63 IN | OXYGEN SATURATION: 97 % | HEART RATE: 85 BPM | RESPIRATION RATE: 17 BRPM | TEMPERATURE: 98.6 F | WEIGHT: 237 LBS | DIASTOLIC BLOOD PRESSURE: 62 MMHG | BODY MASS INDEX: 41.99 KG/M2

## 2023-10-07 DIAGNOSIS — I16.0 HYPERTENSIVE URGENCY: ICD-10-CM

## 2023-10-07 DIAGNOSIS — M54.12 ACUTE CERVICAL RADICULOPATHY: Primary | ICD-10-CM

## 2023-10-07 DIAGNOSIS — S16.1XXA ACUTE CERVICAL MYOFASCIAL STRAIN, INITIAL ENCOUNTER: ICD-10-CM

## 2023-10-07 DIAGNOSIS — M25.512 ACUTE PAIN OF LEFT SHOULDER: ICD-10-CM

## 2023-10-07 PROCEDURE — 96372 THER/PROPH/DIAG INJ SC/IM: CPT

## 2023-10-07 PROCEDURE — 6370000000 HC RX 637 (ALT 250 FOR IP): Performed by: EMERGENCY MEDICINE

## 2023-10-07 PROCEDURE — 72040 X-RAY EXAM NECK SPINE 2-3 VW: CPT

## 2023-10-07 PROCEDURE — 73030 X-RAY EXAM OF SHOULDER: CPT

## 2023-10-07 PROCEDURE — 6360000002 HC RX W HCPCS: Performed by: EMERGENCY MEDICINE

## 2023-10-07 PROCEDURE — 99284 EMERGENCY DEPT VISIT MOD MDM: CPT

## 2023-10-07 RX ORDER — LIDOCAINE 4 G/G
1 PATCH TOPICAL ONCE
Status: DISCONTINUED | OUTPATIENT
Start: 2023-10-07 | End: 2023-10-07 | Stop reason: HOSPADM

## 2023-10-07 RX ORDER — METHOCARBAMOL 750 MG/1
750 TABLET, FILM COATED ORAL 3 TIMES DAILY
Qty: 30 TABLET | Refills: 0 | Status: SHIPPED | OUTPATIENT
Start: 2023-10-07 | End: 2023-10-07 | Stop reason: SDUPTHER

## 2023-10-07 RX ORDER — LIDOCAINE 4 G/G
1 PATCH TOPICAL DAILY
Qty: 30 PATCH | Refills: 0 | Status: SHIPPED | OUTPATIENT
Start: 2023-10-07 | End: 2023-11-06

## 2023-10-07 RX ORDER — KETOROLAC TROMETHAMINE 30 MG/ML
30 INJECTION, SOLUTION INTRAMUSCULAR; INTRAVENOUS ONCE
Status: COMPLETED | OUTPATIENT
Start: 2023-10-07 | End: 2023-10-07

## 2023-10-07 RX ORDER — LIDOCAINE 4 G/G
1 PATCH TOPICAL DAILY
Qty: 30 PATCH | Refills: 0 | Status: SHIPPED | OUTPATIENT
Start: 2023-10-07 | End: 2023-10-07 | Stop reason: SDUPTHER

## 2023-10-07 RX ORDER — NAPROXEN 500 MG/1
500 TABLET ORAL 2 TIMES DAILY
Qty: 30 TABLET | Refills: 0 | Status: SHIPPED | OUTPATIENT
Start: 2023-10-07 | End: 2023-10-07 | Stop reason: SDUPTHER

## 2023-10-07 RX ORDER — GABAPENTIN 100 MG/1
100 CAPSULE ORAL 3 TIMES DAILY PRN
Qty: 15 CAPSULE | Refills: 0 | Status: SHIPPED | OUTPATIENT
Start: 2023-10-07 | End: 2023-10-12

## 2023-10-07 RX ORDER — METHOCARBAMOL 500 MG/1
500 TABLET, FILM COATED ORAL ONCE
Status: COMPLETED | OUTPATIENT
Start: 2023-10-07 | End: 2023-10-07

## 2023-10-07 RX ORDER — METHOCARBAMOL 750 MG/1
750 TABLET, FILM COATED ORAL 3 TIMES DAILY
Qty: 30 TABLET | Refills: 0 | Status: SHIPPED | OUTPATIENT
Start: 2023-10-07 | End: 2023-10-17

## 2023-10-07 RX ORDER — NAPROXEN 500 MG/1
500 TABLET ORAL 2 TIMES DAILY
Qty: 30 TABLET | Refills: 0 | Status: SHIPPED | OUTPATIENT
Start: 2023-10-07

## 2023-10-07 RX ADMIN — KETOROLAC TROMETHAMINE 30 MG: 30 INJECTION, SOLUTION INTRAMUSCULAR; INTRAVENOUS at 05:12

## 2023-10-07 RX ADMIN — METHOCARBAMOL 500 MG: 500 TABLET ORAL at 05:13

## 2023-10-07 ASSESSMENT — PAIN DESCRIPTION - LOCATION: LOCATION: ARM

## 2023-10-07 ASSESSMENT — ENCOUNTER SYMPTOMS
EYE PAIN: 0
SHORTNESS OF BREATH: 0
ABDOMINAL PAIN: 0
DIARRHEA: 0
RHINORRHEA: 0
COUGH: 0
NAUSEA: 0
SORE THROAT: 0
VOMITING: 0

## 2023-10-07 ASSESSMENT — PAIN SCALES - GENERAL
PAINLEVEL_OUTOF10: 8
PAINLEVEL_OUTOF10: 0

## 2023-10-07 ASSESSMENT — PAIN - FUNCTIONAL ASSESSMENT
PAIN_FUNCTIONAL_ASSESSMENT: NONE - DENIES PAIN
PAIN_FUNCTIONAL_ASSESSMENT: 0-10

## 2023-10-07 ASSESSMENT — PAIN DESCRIPTION - ORIENTATION: ORIENTATION: LEFT

## 2023-10-07 ASSESSMENT — PAIN DESCRIPTION - DESCRIPTORS: DESCRIPTORS: ACHING;TINGLING;NUMBNESS
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
## 2023-10-07 NOTE — DISCHARGE INSTRUCTIONS
Thank You! It was a pleasure taking care of you in our Emergency Department today. We know that when you come to [facility], you are entrusting us with your health, comfort, and safety. Our physicians and nurses honor that trust, and truly appreciate the opportunity to care for you and your loved ones. We also value your feedback. If you receive a survey about your Emergency Department experience today, please fill it out. We care about our patients' feedback, and we listen to what you have to say. Thank you. Dr. Dilan Perez M.D.      ____________________________________________________________________  I have included a copy of your lab results and/or radiologic studies from today's visit so you can have them easily available at your follow-up visit. We hope you feel better and please do not hesitate to contact the ED if you have any questions at all! No results found for this or any previous visit (from the past 12 hour(s)). XR CERVICAL SPINE (2-3 VIEWS)    (Results Pending)   XR SHOULDER LEFT (MIN 2 VIEWS)    (Results Pending)     [unfilled]  The exam and treatment you received in the Emergency Department were for an urgent problem and are not intended as complete care. It is important that you follow up with a doctor, nurse practitioner, or physician assistant for ongoing care. If your symptoms become worse or you do not improve as expected and you are unable to reach your usual health care provider, you should return to the Emergency Department. We are available 24 hours a day. Please take your discharge instructions with you when you go to your follow-up appointment. If a prescription has been provided, please have it filled as soon as possible to prevent a delay in treatment. Read the entire medication instruction sheet provided to you by the pharmacy.  If you have any questions or reservations about taking the medication due to side effects or interactions with other medications, please call your primary care physician or contact the ER to speak with the charge nurse. Please make an appointment with your family doctor or the physician you were referred to for follow-up of this visit as instructed on your discharge paperwork. Return to the ER if you are unable to be seen or if you are unable to be seen in a timely manner. If you have any problem arranging the follow-up visit, contact the Emergency Department immediately.

## 2023-10-07 NOTE — ED NOTES

## 2023-10-15 ENCOUNTER — APPOINTMENT (OUTPATIENT)
Facility: HOSPITAL | Age: 43
End: 2023-10-15
Payer: MEDICARE

## 2023-10-15 ENCOUNTER — HOSPITAL ENCOUNTER (EMERGENCY)
Facility: HOSPITAL | Age: 43
Discharge: HOME | End: 2023-10-16
Payer: MEDICARE

## 2023-10-15 VITALS
RESPIRATION RATE: 16 BRPM | HEART RATE: 92 BPM | BODY MASS INDEX: 40.75 KG/M2 | TEMPERATURE: 98.6 F | WEIGHT: 230 LBS | DIASTOLIC BLOOD PRESSURE: 85 MMHG | OXYGEN SATURATION: 95 % | SYSTOLIC BLOOD PRESSURE: 141 MMHG | HEIGHT: 63 IN

## 2023-10-15 DIAGNOSIS — J20.9 ACUTE BRONCHITIS, UNSPECIFIED ORGANISM: Primary | ICD-10-CM

## 2023-10-15 DIAGNOSIS — M94.0 COSTOCHONDRITIS: ICD-10-CM

## 2023-10-15 PROCEDURE — 99284 EMERGENCY DEPT VISIT MOD MDM: CPT

## 2023-10-15 PROCEDURE — 71045 X-RAY EXAM CHEST 1 VIEW: CPT

## 2023-10-15 PROCEDURE — 6370000000 HC RX 637 (ALT 250 FOR IP): Performed by: NURSE PRACTITIONER

## 2023-10-15 RX ORDER — ONDANSETRON 4 MG/1
4 TABLET, ORALLY DISINTEGRATING ORAL ONCE
Status: COMPLETED | OUTPATIENT
Start: 2023-10-15 | End: 2023-10-15

## 2023-10-15 RX ADMIN — ONDANSETRON 4 MG: 4 TABLET, ORALLY DISINTEGRATING ORAL at 23:06

## 2023-10-15 ASSESSMENT — PAIN - FUNCTIONAL ASSESSMENT: PAIN_FUNCTIONAL_ASSESSMENT: 0-10

## 2023-10-15 ASSESSMENT — PAIN DESCRIPTION - LOCATION: LOCATION: RIB CAGE

## 2023-10-15 ASSESSMENT — PAIN SCALES - GENERAL: PAINLEVEL_OUTOF10: 10

## 2023-10-15 ASSESSMENT — PAIN DESCRIPTION - ORIENTATION: ORIENTATION: RIGHT;LEFT

## 2023-10-15 ASSESSMENT — PAIN DESCRIPTION - DESCRIPTORS: DESCRIPTORS: SORE

## 2023-10-16 PROCEDURE — 96372 THER/PROPH/DIAG INJ SC/IM: CPT

## 2023-10-16 PROCEDURE — 6360000002 HC RX W HCPCS: Performed by: NURSE PRACTITIONER

## 2023-10-16 RX ORDER — ACETAMINOPHEN AND CHLORPHENIRAMINE MALEATE 325; 2 MG/1; MG/1
2 TABLET, FILM COATED ORAL EVERY 6 HOURS PRN
Qty: 20 TABLET | Refills: 0 | Status: SHIPPED | OUTPATIENT
Start: 2023-10-16 | End: 2023-10-23

## 2023-10-16 RX ORDER — ALBUTEROL SULFATE 90 UG/1
2 AEROSOL, METERED RESPIRATORY (INHALATION) EVERY 6 HOURS PRN
Qty: 18 G | Refills: 0 | Status: SHIPPED | OUTPATIENT
Start: 2023-10-16

## 2023-10-16 RX ORDER — KETOROLAC TROMETHAMINE 30 MG/ML
30 INJECTION, SOLUTION INTRAMUSCULAR; INTRAVENOUS
Status: COMPLETED | OUTPATIENT
Start: 2023-10-16 | End: 2023-10-16

## 2023-10-16 RX ORDER — PREDNISONE 10 MG/1
10 TABLET ORAL 2 TIMES DAILY
Qty: 10 TABLET | Refills: 0 | Status: SHIPPED | OUTPATIENT
Start: 2023-10-16 | End: 2023-10-21

## 2023-10-16 RX ORDER — DICLOFENAC SODIUM 75 MG/1
75 TABLET, DELAYED RELEASE ORAL 2 TIMES DAILY
Qty: 30 TABLET | Refills: 3 | Status: SHIPPED | OUTPATIENT
Start: 2023-10-16

## 2023-10-16 RX ADMIN — KETOROLAC TROMETHAMINE 30 MG: 30 INJECTION, SOLUTION INTRAMUSCULAR; INTRAVENOUS at 00:11

## 2023-10-16 ASSESSMENT — ENCOUNTER SYMPTOMS
COUGH: 1
BACK PAIN: 0
ABDOMINAL PAIN: 0
SHORTNESS OF BREATH: 0

## 2023-10-16 NOTE — ED TRIAGE NOTES
Cold symptoms x 2 days causing cough that pt believes is causing rib cage pain. No direct injury or trauma to area. Reports pain is amplified when she laughs or coughs.

## 2023-10-17 NOTE — ED PROVIDER NOTES
[facility] EMERGENCY DEPT  EMERGENCY DEPARTMENT ENCOUNTER       Pt Name: Barron Prader  MRN: [id number]  [address] [facility] 1980  Date of evaluation: 10/15/2023  Provider: KYLER Reed NP   PCP: Norm Hall MD  Note Started: 9:41 PM 10/16/23     CHIEF COMPLAINT       Chief Complaint   Patient presents with    Rib Pain (injury)     No injury    URI        HISTORY OF PRESENT ILLNESS: 1 or more elements      History Provided by: Patient   History is limited by: Nothing     Barron Prader is a 37 y.o. female who presents ambulatory to the ED reports bilateral rib pain. for two days. States she has pain under her breasts which worsen with cough. reports nasal congestion. Reports HX bronchitis. Reports intermittent headache. Denies chest painSOB injury. Nursing Notes were all reviewed and agreed with or any disagreements were addressed in the HPI. REVIEW OF SYSTEMS      Review of Systems   Constitutional:  Negative for fever. HENT:  Positive for congestion. Eyes:  Negative for visual disturbance. Respiratory:  Positive for cough. Negative for shortness of breath. Rib pain   Cardiovascular:  Negative for chest pain. Gastrointestinal:  Negative for abdominal pain. Genitourinary:  Negative for difficulty urinating. Musculoskeletal:  Negative for back pain and neck pain. Skin:  Negative for rash. Neurological:  Negative for dizziness, weakness and headaches. Psychiatric/Behavioral:  Negative for behavioral problems. All other systems reviewed and are negative. Positives and Pertinent negatives as per HPI.     PAST HISTORY     Past Medical History:  Past Medical History:   Diagnosis Date    Abnormal Pap smear     \"years ago\"     Bipolar disorder ([address] [facility])     Bronchitis     Depression with anxiety 9/22/2010    Diabetes mellitus ([facility])     type II diabetes mellitus since 2004    GERD (gastroesophageal reflux disease)     High cholesterol     HTN (hypertension) 9/22/2010

## 2023-11-07 ENCOUNTER — HOSPITAL ENCOUNTER (OUTPATIENT)
Facility: HOSPITAL | Age: 43
Setting detail: OUTPATIENT SURGERY
Discharge: HOME | End: 2023-11-07
Attending: INTERNAL MEDICINE | Admitting: INTERNAL MEDICINE
Payer: MEDICARE

## 2023-11-07 ENCOUNTER — ANESTHESIA EVENT (OUTPATIENT)
Facility: HOSPITAL | Age: 43
End: 2023-11-07
Payer: MEDICARE

## 2023-11-07 ENCOUNTER — ANESTHESIA (OUTPATIENT)
Facility: HOSPITAL | Age: 43
End: 2023-11-07
Payer: MEDICARE

## 2023-11-07 VITALS
WEIGHT: 231 LBS | SYSTOLIC BLOOD PRESSURE: 134 MMHG | HEIGHT: 63 IN | HEART RATE: 101 BPM | DIASTOLIC BLOOD PRESSURE: 96 MMHG | BODY MASS INDEX: 40.93 KG/M2 | TEMPERATURE: 96.4 F | RESPIRATION RATE: 15 BRPM | OXYGEN SATURATION: 96 %

## 2023-11-07 PROCEDURE — 7100000010 HC PHASE II RECOVERY - FIRST 15 MIN: Performed by: INTERNAL MEDICINE

## 2023-11-07 PROCEDURE — 2500000003 HC RX 250 WO HCPCS: Performed by: NURSE ANESTHETIST, CERTIFIED REGISTERED

## 2023-11-07 PROCEDURE — 2580000003 HC RX 258: Performed by: NURSE ANESTHETIST, CERTIFIED REGISTERED

## 2023-11-07 PROCEDURE — C1769 GUIDE WIRE: HCPCS | Performed by: INTERNAL MEDICINE

## 2023-11-07 PROCEDURE — 3600007502: Performed by: INTERNAL MEDICINE

## 2023-11-07 PROCEDURE — 7100000011 HC PHASE II RECOVERY - ADDTL 15 MIN: Performed by: INTERNAL MEDICINE

## 2023-11-07 PROCEDURE — 6360000002 HC RX W HCPCS: Performed by: NURSE ANESTHETIST, CERTIFIED REGISTERED

## 2023-11-07 PROCEDURE — 88305 TISSUE EXAM BY PATHOLOGIST: CPT

## 2023-11-07 PROCEDURE — 2720000010 HC SURG SUPPLY STERILE: Performed by: INTERNAL MEDICINE

## 2023-11-07 PROCEDURE — 3700000001 HC ADD 15 MINUTES (ANESTHESIA): Performed by: INTERNAL MEDICINE

## 2023-11-07 PROCEDURE — 3700000000 HC ANESTHESIA ATTENDED CARE: Performed by: INTERNAL MEDICINE

## 2023-11-07 PROCEDURE — 3600007512: Performed by: INTERNAL MEDICINE

## 2023-11-07 RX ORDER — SODIUM CHLORIDE 9 MG/ML
INJECTION, SOLUTION INTRAVENOUS CONTINUOUS
Status: DISCONTINUED | OUTPATIENT
Start: 2023-11-07 | End: 2023-11-07 | Stop reason: HOSPADM

## 2023-11-07 RX ORDER — SODIUM CHLORIDE 0.9 % (FLUSH) 0.9 %
5-40 SYRINGE (ML) INJECTION PRN
Status: DISCONTINUED | OUTPATIENT
Start: 2023-11-07 | End: 2023-11-07 | Stop reason: HOSPADM

## 2023-11-07 RX ORDER — SODIUM CHLORIDE 9 MG/ML
INJECTION, SOLUTION INTRAVENOUS CONTINUOUS PRN
Status: DISCONTINUED | OUTPATIENT
Start: 2023-11-07 | End: 2023-11-07 | Stop reason: SDUPTHER

## 2023-11-07 RX ORDER — SODIUM CHLORIDE 0.9 % (FLUSH) 0.9 %
5-40 SYRINGE (ML) INJECTION EVERY 12 HOURS SCHEDULED
Status: DISCONTINUED | OUTPATIENT
Start: 2023-11-07 | End: 2023-11-07 | Stop reason: HOSPADM

## 2023-11-07 RX ORDER — SODIUM CHLORIDE 9 MG/ML
25 INJECTION, SOLUTION INTRAVENOUS PRN
Status: DISCONTINUED | OUTPATIENT
Start: 2023-11-07 | End: 2023-11-07 | Stop reason: HOSPADM

## 2023-11-07 RX ORDER — LIDOCAINE HYDROCHLORIDE 20 MG/ML
INJECTION, SOLUTION EPIDURAL; INFILTRATION; INTRACAUDAL; PERINEURAL PRN
Status: DISCONTINUED | OUTPATIENT
Start: 2023-11-07 | End: 2023-11-07 | Stop reason: SDUPTHER

## 2023-11-07 RX ORDER — FAMOTIDINE 20 MG/1
20 TABLET, FILM COATED ORAL 2 TIMES DAILY
COMMUNITY

## 2023-11-07 RX ORDER — LABETALOL HYDROCHLORIDE 5 MG/ML
INJECTION, SOLUTION INTRAVENOUS PRN
Status: DISCONTINUED | OUTPATIENT
Start: 2023-11-07 | End: 2023-11-07 | Stop reason: SDUPTHER

## 2023-11-07 RX ADMIN — PROPOFOL 50 MG: 10 INJECTION, EMULSION INTRAVENOUS at 16:26

## 2023-11-07 RX ADMIN — PROPOFOL 50 MG: 10 INJECTION, EMULSION INTRAVENOUS at 16:22

## 2023-11-07 RX ADMIN — SODIUM CHLORIDE: 9 INJECTION, SOLUTION INTRAVENOUS at 16:08

## 2023-11-07 RX ADMIN — PROPOFOL 50 MG: 10 INJECTION, EMULSION INTRAVENOUS at 16:28

## 2023-11-07 RX ADMIN — PROPOFOL 50 MG: 10 INJECTION, EMULSION INTRAVENOUS at 16:23

## 2023-11-07 RX ADMIN — LABETALOL HYDROCHLORIDE 15 MG: 5 INJECTION INTRAVENOUS at 16:46

## 2023-11-07 RX ADMIN — PROPOFOL 100 MG: 10 INJECTION, EMULSION INTRAVENOUS at 16:21

## 2023-11-07 RX ADMIN — PROPOFOL 50 MG: 10 INJECTION, EMULSION INTRAVENOUS at 16:31

## 2023-11-07 RX ADMIN — LIDOCAINE HYDROCHLORIDE 100 MG: 20 INJECTION, SOLUTION EPIDURAL; INFILTRATION; INTRACAUDAL; PERINEURAL at 16:21

## 2023-11-07 RX ADMIN — PROPOFOL 50 MG: 10 INJECTION, EMULSION INTRAVENOUS at 16:38

## 2023-11-07 RX ADMIN — PROPOFOL 50 MG: 10 INJECTION, EMULSION INTRAVENOUS at 16:35

## 2023-11-07 ASSESSMENT — PAIN - FUNCTIONAL ASSESSMENT: PAIN_FUNCTIONAL_ASSESSMENT: NONE - DENIES PAIN

## 2023-11-07 NOTE — PROGRESS NOTES
Discharge instructions provided to patient. States understanding. Discharged via wheelchair to private vehicle in care of friend.

## 2023-11-07 NOTE — DISCHARGE INSTRUCTIONS
[address] [facility]  [address], [address] [facility]          Sergo Hernández  [id number]  1980    EGD DISCHARGE INSTRUCTIONS    DISCOMFORT:  Redness at IV site- apply warm compress to area; if redness or soreness persist- contact your physician    Gaseous discomfort- walking, belching will help relieve any discomfort    DIET:   GERD diet         ACTIVITY:  You may resume your normal daily activities it is recommended that you spend the remainder of the day resting -  avoid any strenuous activity. You may not operate a vehicle for 12 hours  You may not engage in an occupation involving machinery or appliances for rest of today  You may not drink alcoholic beverages for at least 12 hours  Avoid making any critical decisions for at least 24 hour    CALL M.D. ANY SIGN OF:   Increasing pain, nausea, vomiting  Abdominal distension (swelling)  New increased bleeding (oral or rectal)  Fever (chills)  Pain in chest area  Bloody discharge from nose or mouth  Shortness of breath     Follow-up Instructions:   Call Dr. Александр Rodriguez for any questions or problems. Telephone # [phone]  If a biopsy was taken, your results will be available in  5 to 7 days      Impression:      -Normal appearing esophagus. Biopsies obtained. Empiric dilation to 18 mm with savary resulted in tear in the proximal esophagus. Bravo pH capsule placed to assess extent of reflux.   -Small sliding hiatal hernia and mild stomach inflammation. Biopsies obtained. Recommendations:  -Resume normal medications  -Continue to all acid suppressing medication for the duration of the study. Okay to resume after. -GERD diet: avoid fried and fatty foods. peppermint, chocolate, alcohol, coffee, citrus fruits and juices, tomoato products; avoid lying down for 2 to 3 hours after eating.  -Await pathology and ph study results. -Work on [employer] loss  -Keep follow up in the office at next available appointment for further management.

## 2023-11-07 NOTE — ANESTHESIA PRE PROCEDURE
08:54 PM       POC Tests: No results for input(s): \"POCGLU\", \"POCNA\", \"POCK\", \"POCCL\", \"POCBUN\", \"POCHEMO\", \"POCHCT\" in the last 72 hours. Coags: No results found for: \"PROTIME\", \"INR\", \"APTT\"    HCG (If Applicable): No results found for: \"PREGTESTUR\", \"PREGSERUM\", \"HCG\", \"HCGQUANT\"     ABGs: No results found for: \"PHART\", \"PO2ART\", \"TMW2EQE\", \"OWW4IGI\", \"BEART\", \"D6CHXZTA\"     Type & Screen (If Applicable):  No results found for: \"LABABO\", \"LABRH\"    Drug/Infectious Status (If Applicable):  No results found for: \"HIV\", \"HEPCAB\"    COVID-19 Screening (If Applicable):   Lab Results   Component Value Date/Time    COVID19 Not detected 01/09/2022 12:42 AM           Anesthesia Evaluation  Patient summary reviewed and Nursing notes reviewed  Airway: Mallampati: II  TM distance: >3 FB   Neck ROM: full  Mouth opening: > = 3 FB   Dental: normal exam         Pulmonary:normal exam    (+) sleep apnea: on CPAP,                             Cardiovascular:  Exercise tolerance: good (>4 METS),   (+) hypertension:,                   Neuro/Psych:   (+) psychiatric history:            GI/Hepatic/Renal:   (+) GERD:, morbid obesity          Endo/Other:    (+) DiabetesType II DM, , .                 Abdominal: normal exam            Vascular: negative vascular ROS. Other Findings:           Anesthesia Plan      MAC     ASA 3       Induction: intravenous. Anesthetic plan and risks discussed with patient. Plan discussed with CRNA.     Attending anesthesiologist reviewed and agrees with Preprocedure content                Tova Brian MD   11/7/2023

## 2023-11-07 NOTE — OP NOTE
[address]  [address]  [phone]           Endoscopic Gastroduodenoscopy Procedure Note    Edgar Lindseyrot  1980  [id number]    Indication: heartburn, dysphagia (worsening since being off famotidine)    : Keaton Masterson MD    Staff: Circulator: Tushar Vasquez RN  Endoscopy Technician: Raza Pleitez     Referring Provider:  Deirdre Stark MD    Anesthesia/Sedation:  MAC anesthesia      Procedure Details     After infomed consent was obtained for the procedure, with all risks and benefits of procedure explained the patient was taken to the endoscopy suite and placed in the left lateral decubitus position. Following sequential administration of sedation as per above, the endoscope was inserted into the mouth and advanced under direct vision to second portion of the duodenum. A careful inspection was made as the gastroscope was withdrawn, including a retroflexed view of the proximal stomach; findings and interventions are described below. Findings:   Esophagus:normal without inflammation, narrowing or growth. Empiric dilation to 18 mm resulted in linear mucosal tear just distal to upper esophageal sphincter. Biopsies obtained. Bravo capsule placed 6 cm proximal to GE junction. Stomach: Small, 2 cm, sliding hiatal hernia with Hill grade 3 flap valve. GE junction and z-line noted at 38 cm and diaphragmatic hiatus at 40 cm from incisors. Mild erythema in the antrum. Biopsies obtained. Duodenum/jejunum: normal    Therapies:  esophageal dilation with 18 mm savary dilator  biopsy of esophagus  biopsy of stomach fundus, body, antrum  Bravo pH capsule placed at 32 cm from incisors - 6 cm proximal to GE junction.      Specimens:   ID Type Source Tests Collected by Time Destination   1 : Gastric Biopsy Tissue Gastric SURGICAL PATHOLOGY Keaton Masterson MD 11/7/2023 3832    2 : Distal Esophagus Biopsy Tissue Esophagus SURGICAL PATHOLOGY

## 2023-11-07 NOTE — H&P
No hepatosplenomegaly  Extremities: No cyanosis or edema  Neurologic:  CN 2-12 grossly intact, Alert and oriented X 3. No acute neurological distress   Psych:   Good insight. Not anxious nor agitated.      Assessment:   GERD, dysphagia    Plan:   Endoscopic procedure: EGD with bravo and possible dilation  Anesthesia plan: [address], MD  11/7/20234:21 PM

## 2023-11-12 ENCOUNTER — APPOINTMENT (OUTPATIENT)
Facility: HOSPITAL | Age: 43
End: 2023-11-12
Attending: STUDENT IN AN ORGANIZED HEALTH CARE EDUCATION/TRAINING PROGRAM
Payer: MEDICARE

## 2023-11-12 ENCOUNTER — HOSPITAL ENCOUNTER (EMERGENCY)
Facility: HOSPITAL | Age: 43
Discharge: HOME | End: 2023-11-12
Attending: STUDENT IN AN ORGANIZED HEALTH CARE EDUCATION/TRAINING PROGRAM
Payer: MEDICARE

## 2023-11-12 VITALS
OXYGEN SATURATION: 99 % | BODY MASS INDEX: 40.93 KG/M2 | HEIGHT: 63 IN | WEIGHT: 231 LBS | RESPIRATION RATE: 14 BRPM | DIASTOLIC BLOOD PRESSURE: 79 MMHG | SYSTOLIC BLOOD PRESSURE: 121 MMHG | TEMPERATURE: 98.3 F | HEART RATE: 108 BPM

## 2023-11-12 DIAGNOSIS — R09.A2 GLOBUS SENSATION: Primary | ICD-10-CM

## 2023-11-12 DIAGNOSIS — K22.9 ESOPHAGEAL ABNORMALITY: ICD-10-CM

## 2023-11-12 PROCEDURE — 71045 X-RAY EXAM CHEST 1 VIEW: CPT

## 2023-11-12 PROCEDURE — 99283 EMERGENCY DEPT VISIT LOW MDM: CPT

## 2023-11-12 PROCEDURE — 6370000000 HC RX 637 (ALT 250 FOR IP): Performed by: STUDENT IN AN ORGANIZED HEALTH CARE EDUCATION/TRAINING PROGRAM

## 2023-11-12 RX ORDER — MAGNESIUM HYDROXIDE/ALUMINUM HYDROXICE/SIMETHICONE 120; 1200; 1200 MG/30ML; MG/30ML; MG/30ML
30 SUSPENSION ORAL
Status: COMPLETED | OUTPATIENT
Start: 2023-11-12 | End: 2023-11-12

## 2023-11-12 RX ORDER — ONDANSETRON 4 MG/1
4 TABLET, ORALLY DISINTEGRATING ORAL ONCE
Status: COMPLETED | OUTPATIENT
Start: 2023-11-12 | End: 2023-11-12

## 2023-11-12 RX ORDER — LIDOCAINE HYDROCHLORIDE 20 MG/ML
15 SOLUTION OROPHARYNGEAL
Status: COMPLETED | OUTPATIENT
Start: 2023-11-12 | End: 2023-11-12

## 2023-11-12 RX ADMIN — ALUMINUM HYDROXIDE, MAGNESIUM HYDROXIDE, AND SIMETHICONE 30 ML: 200; 200; 20 SUSPENSION ORAL at 03:01

## 2023-11-12 RX ADMIN — LIDOCAINE HYDROCHLORIDE 15 ML: 20 SOLUTION ORAL at 03:01

## 2023-11-12 RX ADMIN — DIPHENHYDRAMINE HYDROCHLORIDE 12.5 MG: 25 SOLUTION ORAL at 03:01

## 2023-11-12 RX ADMIN — ONDANSETRON 4 MG: 4 TABLET, ORALLY DISINTEGRATING ORAL at 03:01

## 2023-11-12 ASSESSMENT — PAIN - FUNCTIONAL ASSESSMENT: PAIN_FUNCTIONAL_ASSESSMENT: 0-10

## 2023-11-12 ASSESSMENT — PAIN SCALES - GENERAL: PAINLEVEL_OUTOF10: 10

## 2023-11-12 ASSESSMENT — PAIN DESCRIPTION - ORIENTATION: ORIENTATION: MID

## 2023-11-12 ASSESSMENT — PAIN DESCRIPTION - DESCRIPTORS: DESCRIPTORS: ACHING

## 2023-11-12 ASSESSMENT — PAIN DESCRIPTION - LOCATION: LOCATION: NECK

## 2023-11-12 NOTE — DISCHARGE INSTRUCTIONS
Thank you! Thank you for allowing me to care for you in the emergency department. It is my goal to provide you with excellent care. If you have not received excellent quality care, please ask to speak to the nurse manager. Please fill out the survey that will come to you by mail or email since we listen to your feedback! Below you will find a list of your tests from today's visit. Should you have any questions, please do not hesitate to call the emergency department. Labs  No results found for this or any previous visit (from the past 12 hour(s)). Radiologic Studies  XR CHEST PORTABLE   Final Result   No acute process on portable chest.           ------------------------------------------------------------------------------------------------------------  The exam and treatment you received in the Emergency Department were for an urgent problem and are not intended as complete care. It is important that you follow-up with a doctor, nurse practitioner, or physician assistant to:  (1) confirm your diagnosis,  (2) re-evaluation of changes in your illness and treatment, and  (3) for ongoing care. Please take your discharge instructions with you when you go to your follow-up appointment. If you have any problem arranging a follow-up appointment, contact the Emergency Department. If your symptoms become worse or you do not improve as expected and you are unable to reach your health care provider, please return to the Emergency Department. We are available 24 hours a day. If a prescription has been provided, please have it filled as soon as possible to prevent a delay in treatment. If you have any questions or reservations about taking the medication due to side effects or interactions with other medications, please call your primary care provider or contact the ER.

## 2023-11-12 NOTE — ED PROVIDER NOTES
upon. The patient is to follow up as recommended or return to ER should their symptoms worsen. PATIENT REFERRED TO:  No follow-up provider specified. DISCHARGE MEDICATIONS:     Medication List        ASK your doctor about these medications      albuterol sulfate  (90 Base) MCG/ACT inhaler  Commonly known as: PROVENTIL;VENTOLIN;PROAIR  Inhale 2 puffs into the lungs every 6 hours as needed for Wheezing     aspirin 81 MG chewable tablet     azithromycin 100 MG/5ML suspension  Commonly known as: Zithromax  25ml (500mg) by mouth day one, then 12.5mg (250mg) by mouth days 2-5. Benzocaine-Menthol 6-10 MG Lozg lozenge  Commonly known as: Sore Throat Lozenges  Take 1 lozenge by mouth every 2 hours as needed for Sore Throat     busPIRone 15 MG tablet  Commonly known as: BUSPAR     * butalbital-APAP-caffeine -40 MG Caps per capsule     * butalbital-acetaminophen-caffeine -40 MG per tablet  Commonly known as: FIORICET, ESGIC     diclofenac 75 MG EC tablet  Commonly known as: VOLTAREN  Take 1 tablet by mouth 2 times daily     famotidine 20 MG tablet  Commonly known as: PEPCID     fluticasone 50 MCG/ACT nasal spray  Commonly known as: FLONASE     gabapentin 100 MG capsule  Commonly known as: NEURONTIN  Take 1 capsule by mouth 3 times daily as needed (neuropathy) for up to 5 days.  Intended supply: 90 days Max Daily Amount: 300 mg     * ketorolac 10 MG tablet  Commonly known as: TORADOL     * ketorolac 10 MG tablet  Commonly known as: TORADOL  Take 1 tablet by mouth every 6 hours as needed for Pain     lidocaine viscous hcl 2 % Soln solution  Commonly known as: XYLOCAINE  Take 15 mLs by mouth as needed for Irritation     losartan 50 MG tablet  Commonly known as: COZAAR     lurasidone 20 MG Tabs tablet  Commonly known as: LATUDA     metFORMIN 1000 MG tablet  Commonly known as: GLUCOPHAGE     metoclopramide 5 MG tablet  Commonly known as: REGLAN     * naproxen 500 MG tablet  Commonly known as:

## 2023-11-12 NOTE — ED TRIAGE NOTES
Pt presents to the ED by wu with EMS with c/o getting an endoscopy at [facility] on Tuesday. Reports feeling like her throat is swelling and having difficulty eating and swallowing due to the pain and swelling. Reports they cut her throat and placed a chip to monitor her GERD.

## 2023-11-17 ENCOUNTER — APPOINTMENT (OUTPATIENT)
Facility: HOSPITAL | Age: 43
End: 2023-11-17
Payer: MEDICARE

## 2023-11-17 ENCOUNTER — HOSPITAL ENCOUNTER (EMERGENCY)
Facility: HOSPITAL | Age: 43
Discharge: HOME | End: 2023-11-17
Attending: STUDENT IN AN ORGANIZED HEALTH CARE EDUCATION/TRAINING PROGRAM
Payer: MEDICARE

## 2023-11-17 VITALS
DIASTOLIC BLOOD PRESSURE: 79 MMHG | OXYGEN SATURATION: 99 % | HEIGHT: 63 IN | TEMPERATURE: 98.1 F | SYSTOLIC BLOOD PRESSURE: 147 MMHG | WEIGHT: 233 LBS | HEART RATE: 108 BPM | BODY MASS INDEX: 41.29 KG/M2 | RESPIRATION RATE: 22 BRPM

## 2023-11-17 DIAGNOSIS — R13.10 DYSPHAGIA, UNSPECIFIED TYPE: Primary | ICD-10-CM

## 2023-11-17 PROCEDURE — 6370000000 HC RX 637 (ALT 250 FOR IP): Performed by: STUDENT IN AN ORGANIZED HEALTH CARE EDUCATION/TRAINING PROGRAM

## 2023-11-17 PROCEDURE — 70360 X-RAY EXAM OF NECK: CPT

## 2023-11-17 PROCEDURE — 99283 EMERGENCY DEPT VISIT LOW MDM: CPT

## 2023-11-17 RX ORDER — PREDNISONE 20 MG/1
40 TABLET ORAL ONCE
Status: DISCONTINUED | OUTPATIENT
Start: 2023-11-17 | End: 2023-11-17

## 2023-11-17 RX ORDER — LIDOCAINE HYDROCHLORIDE 20 MG/ML
15 SOLUTION OROPHARYNGEAL PRN
Qty: 100 ML | Refills: 0 | Status: SHIPPED | OUTPATIENT
Start: 2023-11-17

## 2023-11-17 RX ORDER — DIPHENHYDRAMINE HCL 25 MG
25 CAPSULE ORAL
Status: DISCONTINUED | OUTPATIENT
Start: 2023-11-17 | End: 2023-11-17

## 2023-11-17 RX ORDER — PREDNISOLONE SODIUM PHOSPHATE 15 MG/5ML
30 SOLUTION ORAL ONCE
Status: COMPLETED | OUTPATIENT
Start: 2023-11-17 | End: 2023-11-17

## 2023-11-17 RX ADMIN — ALUMINUM HYDROXIDE, MAGNESIUM HYDROXIDE, AND SIMETHICONE 40 ML: 200; 200; 20 SUSPENSION ORAL at 14:34

## 2023-11-17 RX ADMIN — Medication 30 MG: at 16:00

## 2023-11-17 ASSESSMENT — PAIN DESCRIPTION - DESCRIPTORS: DESCRIPTORS: ACHING

## 2023-11-17 ASSESSMENT — PAIN SCALES - GENERAL: PAINLEVEL_OUTOF10: 10

## 2023-11-17 ASSESSMENT — PAIN DESCRIPTION - LOCATION: LOCATION: THROAT

## 2023-11-17 ASSESSMENT — PAIN - FUNCTIONAL ASSESSMENT: PAIN_FUNCTIONAL_ASSESSMENT: 0-10

## 2023-11-17 NOTE — ED TRIAGE NOTES
Patient presents to ED with c/o \" I was eating cabbage with neck bones in it and I think I swallowed a bone. \"

## 2023-11-17 NOTE — ED NOTES
Discharge instructions were given to the patient by [facility], RN. The patient left the Emergency Department ambulatory, alert and oriented and in no acute distress with 1 prescriptions. The patient was encouraged to call or return to the ED for worsening issues or problems and was encouraged to schedule a follow up appointment for continuing care. The patient verbalized understanding of discharge instructions and prescriptions, all questions were answered. The patient has no further concerns at this time.         Jesus Grant RN  11/17/23 1600

## 2023-11-17 NOTE — ED PROVIDER NOTES
metoclopramide 5 MG tablet  Commonly known as: REGLAN     * naproxen 500 MG tablet  Commonly known as: Naprosyn  Take 1 tablet by mouth 2 times daily     * naproxen 500 MG tablet  Commonly known as: Naprosyn  Take 1 tablet by mouth 2 times daily     NONFORMULARY     * omeprazole 40 MG delayed release capsule  Commonly known as: PRILOSEC     * omeprazole 20 MG delayed release capsule  Commonly known as: PRILOSEC  Take 1 capsule by mouth every morning (before breakfast)     * ondansetron 4 MG disintegrating tablet  Commonly known as: ZOFRAN-ODT  Take 1 tablet by mouth every 8 hours as needed for Nausea or Vomiting     * ondansetron 4 MG disintegrating tablet  Commonly known as: ZOFRAN-ODT  Take 1 tablet by mouth 3 times daily as needed for Nausea or Vomiting     prednisoLONE 5 MG (21) Tbpk  Take as prescribed     triamcinolone 0.1 % cream  Commonly known as: KENALOG           * This list has 10 medication(s) that are the same as other medications prescribed for you. Read the directions carefully, and ask your doctor or other care provider to review them with you.                    Where to Get Your Medications        These medications were sent to [facility], [address] [facility] [phone]  [address] [facility], [address] [facility]      Phone: [phone]   lidocaine viscous hcl 2 % Soln solution       [facility] [facility] [address] [place] [address] [id number] [phone]    Schedule an appointment as soon as possible for a visit in 3 days      Your GI doctor    Schedule an appointment as soon as possible for a visit in 3 days      Return to ED if worse     Diagnosis     Clinical Impression: Acute on chronic dysphagia               Jayesh Ortega MD  11/17/23 1622

## 2023-12-07 NOTE — ED NOTES
Emergency Department Nursing Plan of Care       The Nursing Plan of Care is developed from the Nursing assessment and Emergency Department Attending provider initial evaluation. The plan of care may be reviewed in the ED Provider note. The Plan of Care was developed with the following considerations:   Patient / Family readiness to learn indicated by:verbalized understanding  Persons(s) to be included in education: patient  Barriers to Learning/Limitations:No    Signed     Bernice Contreras    7/9/2018   5:58 PM    See nursing assessment    Patient is alert and oriented x 4 and in no acute distress at this time. Respirations are at a regular rate, depth, and pattern. Patient updated on plan of care and has no questions or concerns at this time. done

## 2024-02-27 ENCOUNTER — HOSPITAL ENCOUNTER (EMERGENCY)
Facility: HOSPITAL | Age: 44
Discharge: HOME | End: 2024-02-28
Attending: STUDENT IN AN ORGANIZED HEALTH CARE EDUCATION/TRAINING PROGRAM
Payer: MEDICAID

## 2024-02-27 DIAGNOSIS — J18.9 PNEUMONIA OF BOTH LOWER LOBES DUE TO INFECTIOUS ORGANISM: Primary | ICD-10-CM

## 2024-02-27 DIAGNOSIS — I10 HYPERTENSION, UNSPECIFIED TYPE: ICD-10-CM

## 2024-02-27 PROCEDURE — 93005 ELECTROCARDIOGRAM TRACING: CPT | Performed by: STUDENT IN AN ORGANIZED HEALTH CARE EDUCATION/TRAINING PROGRAM

## 2024-02-27 PROCEDURE — 96374 THER/PROPH/DIAG INJ IV PUSH: CPT

## 2024-02-27 PROCEDURE — 96361 HYDRATE IV INFUSION ADD-ON: CPT

## 2024-02-27 PROCEDURE — 99285 EMERGENCY DEPT VISIT HI MDM: CPT

## 2024-02-27 RX ORDER — IPRATROPIUM BROMIDE AND ALBUTEROL SULFATE 2.5; .5 MG/3ML; MG/3ML
1 SOLUTION RESPIRATORY (INHALATION)
Status: COMPLETED | OUTPATIENT
Start: 2024-02-27 | End: 2024-02-28

## 2024-02-27 ASSESSMENT — PAIN DESCRIPTION - ORIENTATION
ORIENTATION_2: RIGHT
ORIENTATION: RIGHT;LEFT

## 2024-02-27 ASSESSMENT — PAIN DESCRIPTION - LOCATION
LOCATION_2: EAR
LOCATION: RIB CAGE

## 2024-02-27 ASSESSMENT — PAIN DESCRIPTION - DESCRIPTORS
DESCRIPTORS: ACHING
DESCRIPTORS_2: ACHING

## 2024-02-27 ASSESSMENT — PAIN SCALES - GENERAL: PAINLEVEL_OUTOF10: 8

## 2024-02-27 ASSESSMENT — PAIN - FUNCTIONAL ASSESSMENT: PAIN_FUNCTIONAL_ASSESSMENT: 0-10

## 2024-02-27 ASSESSMENT — PAIN DESCRIPTION - INTENSITY: RATING_2: 9

## 2024-02-28 ENCOUNTER — APPOINTMENT (OUTPATIENT)
Facility: HOSPITAL | Age: 44
End: 2024-02-28
Payer: MEDICAID

## 2024-02-28 VITALS
OXYGEN SATURATION: 100 % | SYSTOLIC BLOOD PRESSURE: 150 MMHG | DIASTOLIC BLOOD PRESSURE: 79 MMHG | HEART RATE: 102 BPM | WEIGHT: 239 LBS | BODY MASS INDEX: 40.8 KG/M2 | RESPIRATION RATE: 18 BRPM | TEMPERATURE: 98.7 F | HEIGHT: 64 IN

## 2024-02-28 LAB
ALBUMIN SERPL-MCNC: 3.6 G/DL (ref 3.5–5)
ALBUMIN/GLOB SERPL: 0.9 (ref 1.1–2.2)
ALP SERPL-CCNC: 96 U/L (ref 45–117)
ALT SERPL-CCNC: 24 U/L (ref 12–78)
ANION GAP SERPL CALC-SCNC: 13 MMOL/L (ref 5–15)
AST SERPL-CCNC: 17 U/L (ref 15–37)
BASOPHILS # BLD: 0.1 K/UL (ref 0–0.1)
BASOPHILS NFR BLD: 1 % (ref 0–1)
BILIRUB SERPL-MCNC: 0.2 MG/DL (ref 0.2–1)
BUN SERPL-MCNC: 7 MG/DL (ref 6–20)
BUN/CREAT SERPL: 8 (ref 12–20)
CALCIUM SERPL-MCNC: 9.3 MG/DL (ref 8.5–10.1)
CHLORIDE SERPL-SCNC: 100 MMOL/L (ref 97–108)
CO2 SERPL-SCNC: 24 MMOL/L (ref 21–32)
CREAT SERPL-MCNC: 0.91 MG/DL (ref 0.55–1.02)
DIFFERENTIAL METHOD BLD: ABNORMAL
EOSINOPHIL # BLD: 0.1 K/UL (ref 0–0.4)
EOSINOPHIL NFR BLD: 1 % (ref 0–7)
ERYTHROCYTE [DISTWIDTH] IN BLOOD BY AUTOMATED COUNT: 19.4 % (ref 11.5–14.5)
FLUAV RNA SPEC QL NAA+PROBE: NOT DETECTED
FLUBV RNA SPEC QL NAA+PROBE: NOT DETECTED
GLOBULIN SER CALC-MCNC: 4.1 G/DL (ref 2–4)
GLUCOSE SERPL-MCNC: 124 MG/DL (ref 65–100)
HCG UR QL: NEGATIVE
HCT VFR BLD AUTO: 32.1 % (ref 35–47)
HGB BLD-MCNC: 9.7 G/DL (ref 11.5–16)
IMM GRANULOCYTES # BLD AUTO: 0.1 K/UL (ref 0–0.04)
IMM GRANULOCYTES NFR BLD AUTO: 1 % (ref 0–0.5)
LYMPHOCYTES # BLD: 2.6 K/UL (ref 0.8–3.5)
LYMPHOCYTES NFR BLD: 23 % (ref 12–49)
MCH RBC QN AUTO: 20.7 PG (ref 26–34)
MCHC RBC AUTO-ENTMCNC: 30.2 G/DL (ref 30–36.5)
MCV RBC AUTO: 68.4 FL (ref 80–99)
MONOCYTES # BLD: 0.8 K/UL (ref 0–1)
MONOCYTES NFR BLD: 7 % (ref 5–13)
NEUTS SEG # BLD: 7.4 K/UL (ref 1.8–8)
NEUTS SEG NFR BLD: 67 % (ref 32–75)
NRBC # BLD: 0 K/UL (ref 0–0.01)
NRBC BLD-RTO: 0 PER 100 WBC
PLATELET # BLD AUTO: 313 K/UL (ref 150–400)
PMV BLD AUTO: 10.5 FL (ref 8.9–12.9)
POTASSIUM SERPL-SCNC: 3.5 MMOL/L (ref 3.5–5.1)
PROT SERPL-MCNC: 7.7 G/DL (ref 6.4–8.2)
RBC # BLD AUTO: 4.69 M/UL (ref 3.8–5.2)
RBC MORPH BLD: ABNORMAL
SARS-COV-2 RNA RESP QL NAA+PROBE: NOT DETECTED
SODIUM SERPL-SCNC: 137 MMOL/L (ref 136–145)
TROPONIN I SERPL HS-MCNC: 7 NG/L (ref 0–51)
WBC # BLD AUTO: 11.1 K/UL (ref 3.6–11)

## 2024-02-28 PROCEDURE — 87636 SARSCOV2 & INF A&B AMP PRB: CPT

## 2024-02-28 PROCEDURE — 6370000000 HC RX 637 (ALT 250 FOR IP): Performed by: STUDENT IN AN ORGANIZED HEALTH CARE EDUCATION/TRAINING PROGRAM

## 2024-02-28 PROCEDURE — 81025 URINE PREGNANCY TEST: CPT

## 2024-02-28 PROCEDURE — 80053 COMPREHEN METABOLIC PANEL: CPT

## 2024-02-28 PROCEDURE — 96361 HYDRATE IV INFUSION ADD-ON: CPT

## 2024-02-28 PROCEDURE — 6360000002 HC RX W HCPCS: Performed by: STUDENT IN AN ORGANIZED HEALTH CARE EDUCATION/TRAINING PROGRAM

## 2024-02-28 PROCEDURE — 96374 THER/PROPH/DIAG INJ IV PUSH: CPT

## 2024-02-28 PROCEDURE — 71045 X-RAY EXAM CHEST 1 VIEW: CPT

## 2024-02-28 PROCEDURE — 85025 COMPLETE CBC W/AUTO DIFF WBC: CPT

## 2024-02-28 PROCEDURE — 84484 ASSAY OF TROPONIN QUANT: CPT

## 2024-02-28 PROCEDURE — 2580000003 HC RX 258: Performed by: STUDENT IN AN ORGANIZED HEALTH CARE EDUCATION/TRAINING PROGRAM

## 2024-02-28 PROCEDURE — 94640 AIRWAY INHALATION TREATMENT: CPT

## 2024-02-28 RX ORDER — ONDANSETRON 4 MG/1
4 TABLET, ORALLY DISINTEGRATING ORAL 3 TIMES DAILY PRN
Qty: 21 TABLET | Refills: 0 | Status: SHIPPED | OUTPATIENT
Start: 2024-02-28

## 2024-02-28 RX ORDER — FLUCONAZOLE 150 MG/1
150 TABLET ORAL ONCE
Qty: 1 TABLET | Refills: 0 | Status: SHIPPED | OUTPATIENT
Start: 2024-02-28 | End: 2024-02-28

## 2024-02-28 RX ORDER — KETOROLAC TROMETHAMINE 30 MG/ML
15 INJECTION, SOLUTION INTRAMUSCULAR; INTRAVENOUS
Status: COMPLETED | OUTPATIENT
Start: 2024-02-28 | End: 2024-02-28

## 2024-02-28 RX ORDER — LOSARTAN POTASSIUM 50 MG/1
50 TABLET ORAL DAILY
Qty: 7 TABLET | Refills: 0 | Status: SHIPPED | OUTPATIENT
Start: 2024-02-28 | End: 2024-03-06

## 2024-02-28 RX ORDER — DOXYCYCLINE HYCLATE 100 MG
100 TABLET ORAL ONCE
Status: COMPLETED | OUTPATIENT
Start: 2024-02-28 | End: 2024-02-28

## 2024-02-28 RX ORDER — ONDANSETRON 4 MG/1
4 TABLET, ORALLY DISINTEGRATING ORAL ONCE
Status: COMPLETED | OUTPATIENT
Start: 2024-02-28 | End: 2024-02-28

## 2024-02-28 RX ORDER — 0.9 % SODIUM CHLORIDE 0.9 %
1000 INTRAVENOUS SOLUTION INTRAVENOUS ONCE
Status: COMPLETED | OUTPATIENT
Start: 2024-02-28 | End: 2024-02-28

## 2024-02-28 RX ORDER — DOXYCYCLINE HYCLATE 100 MG
100 TABLET ORAL 2 TIMES DAILY
Qty: 14 TABLET | Refills: 0 | Status: SHIPPED | OUTPATIENT
Start: 2024-02-28 | End: 2024-03-06

## 2024-02-28 RX ADMIN — DOXYCYCLINE HYCLATE 100 MG: 100 TABLET, COATED ORAL at 02:55

## 2024-02-28 RX ADMIN — ONDANSETRON 4 MG: 4 TABLET, ORALLY DISINTEGRATING ORAL at 02:21

## 2024-02-28 RX ADMIN — ALUMINUM HYDROXIDE, MAGNESIUM HYDROXIDE, SIMETHICONE 40 ML: 200; 200; 20 LIQUID ORAL at 00:41

## 2024-02-28 RX ADMIN — IPRATROPIUM BROMIDE AND ALBUTEROL SULFATE 1 DOSE: .5; 3 SOLUTION RESPIRATORY (INHALATION) at 00:22

## 2024-02-28 RX ADMIN — KETOROLAC TROMETHAMINE 15 MG: 30 INJECTION, SOLUTION INTRAMUSCULAR; INTRAVENOUS at 00:41

## 2024-02-28 RX ADMIN — SODIUM CHLORIDE 1000 ML: 9 INJECTION, SOLUTION INTRAVENOUS at 00:41

## 2024-02-28 ASSESSMENT — PAIN DESCRIPTION - LOCATION: LOCATION: GENERALIZED

## 2024-02-28 ASSESSMENT — PAIN DESCRIPTION - DESCRIPTORS: DESCRIPTORS: ACHING

## 2024-02-28 ASSESSMENT — PAIN DESCRIPTION - ORIENTATION: ORIENTATION: OUTER

## 2024-02-28 ASSESSMENT — PAIN SCALES - GENERAL: PAINLEVEL_OUTOF10: 9

## 2024-02-28 NOTE — ED NOTES
Pt presents ambulatory to ED complaining of epigastric pain ,hx GERD; right ear pain and congestion.   Pt requesting GI cocktail.  Pt is alert and oriented x 4, RR even and unlabored, skin is warm and dry. Assesment completed and pt updated on plan of care.       Emergency Department Nursing Plan of Care       The Nursing Plan of Care is developed from the Nursing assessment and Emergency Department Attending provider initial evaluation.  The plan of care may be reviewed in the “ED Provider note”.    The Plan of Care was developed with the following considerations:   Patient / Family readiness to learn indicated by:verbalized understanding  Persons(s) to be included in education: patient  Barriers to Learning/Limitations:No    Signed     Yajaira Flanagan RN    2/28/2024   12:09 AM

## 2024-02-28 NOTE — ED NOTES
Patient  given copy of dc instructions and 0 paper script(s) and 4 electronic scripts.  Patient  verbalized understanding of instructions and script (s).  Patient given a current medication reconciliation form and verbalized understanding of their medications.   Patient  verbalized understanding of the importance of discussing medications with  his or her physician or clinic they will be following up with.  Patient alert and oriented and in no acute distress.

## 2024-02-28 NOTE — ED TRIAGE NOTES
Pt presents to ED via EMS with multiple complaints. Pt c/o nasal congestion radiating to right ear, N/V, SOB, rib cage pain, chest congestion ongoing for 3 days. Pt reports hx of HTN & DM t.2.

## 2024-02-28 NOTE — ED PROVIDER NOTES
[facility] EMERGENCY DEPT  EMERGENCY DEPARTMENT ENCOUNTER       Pt Name: Lindsey Wheat  MRN: [id number]  Birthdate 1980  Date of evaluation: 2024  Provider: Cristian Sharma MD   PCP: Jessica Dang MD  Note Started: 11:47 PM EST 24     CHIEF COMPLAINT       Chief Complaint   Patient presents with    Nasal Congestion    Shortness of Breath    Emesis     HISTORY OF PRESENT ILLNESS: 1 or more elements      History From: patient, History limited by: none     Lindsey Wheat is a 44 y.o. female w HTN, DM, obesity, BALDEMAR who presents to the ED with multiple complaints.  She has a constellation of symptoms including right ear fullness, nasal congestion, cough, chest pain and congestion, pain of her bilateral ribs.  She has been having a nonproductive cough.  No fevers that she endorses.  She is worried about the possibility of an ear infection or possibly pneumonia.  She has an inhaler at home and feels that this helps, but has never been diagnosed with COPD or asthma.    Please See [facility] for Additional Details of the HPI/PMH  Nursing Notes were all reviewed and agreed with or any disagreements were addressed in the HPI.     REVIEW OF SYSTEMS        Positives and Pertinent negatives as per HPI.    PAST HISTORY     Past Medical History:  Past Medical History:   Diagnosis Date    Abnormal Pap smear     \"years ago\"     Bipolar disorder (HCC)     Bronchitis     Depression with anxiety 2010    Diabetes mellitus (HCC)     type II diabetes mellitus since     GERD (gastroesophageal reflux disease)     High cholesterol     HTN (hypertension) 2010    Ill-defined condition     bronchitis    Postpartum depression     hospitalized after last delivery    Rhinitis 2012    Sleep apnea     uses cpap    UTI (urinary tract infection)        Past Surgical History:  Past Surgical History:   Procedure Laterality Date     SECTION      X 3    TUBAL LIGATION      UPPER GASTROINTESTINAL ENDOSCOPY N/A

## 2024-02-28 NOTE — ED NOTES
This RN cleaning pt's arm to assess for IV access. Pt states I don't know why you keep going back to that spot. Pt stating she does not want to be stuck 3 or 4 times and asking for the \"tech\" to come in  This RN did not attempt IV access.  Charge RN notified

## 2024-02-29 LAB
EKG ATRIAL RATE: 95 BPM
EKG DIAGNOSIS: NORMAL
EKG P AXIS: 38 DEGREES
EKG P-R INTERVAL: 152 MS
EKG Q-T INTERVAL: 372 MS
EKG QRS DURATION: 74 MS
EKG QTC CALCULATION (BAZETT): 467 MS
EKG R AXIS: 27 DEGREES
EKG T AXIS: 37 DEGREES
EKG VENTRICULAR RATE: 95 BPM

## 2024-02-29 PROCEDURE — 93010 ELECTROCARDIOGRAM REPORT: CPT | Performed by: SPECIALIST

## 2024-03-03 NOTE — ED NOTES
Pt discharged. Discharge instructions reviewed. Pt verbalized understanding.       Antonia Gallagher RN  11/12/23 7766 [facility]

## 2024-03-11 ENCOUNTER — HOSPITAL ENCOUNTER (EMERGENCY)
Facility: HOSPITAL | Age: 44
Discharge: HOME | End: 2024-03-11
Attending: STUDENT IN AN ORGANIZED HEALTH CARE EDUCATION/TRAINING PROGRAM
Payer: MEDICARE

## 2024-03-11 ENCOUNTER — NURSE TRIAGE (OUTPATIENT)
Dept: OTHER | Facility: CLINIC | Age: 44
End: 2024-03-11

## 2024-03-11 ENCOUNTER — APPOINTMENT (OUTPATIENT)
Facility: HOSPITAL | Age: 44
End: 2024-03-11
Payer: MEDICARE

## 2024-03-11 VITALS
TEMPERATURE: 97.7 F | WEIGHT: 244.2 LBS | DIASTOLIC BLOOD PRESSURE: 82 MMHG | OXYGEN SATURATION: 100 % | BODY MASS INDEX: 41.69 KG/M2 | SYSTOLIC BLOOD PRESSURE: 144 MMHG | HEIGHT: 64 IN | RESPIRATION RATE: 15 BRPM | HEART RATE: 88 BPM

## 2024-03-11 DIAGNOSIS — R05.2 SUBACUTE COUGH: Primary | ICD-10-CM

## 2024-03-11 PROCEDURE — 6370000000 HC RX 637 (ALT 250 FOR IP): Performed by: STUDENT IN AN ORGANIZED HEALTH CARE EDUCATION/TRAINING PROGRAM

## 2024-03-11 PROCEDURE — 6360000002 HC RX W HCPCS: Performed by: STUDENT IN AN ORGANIZED HEALTH CARE EDUCATION/TRAINING PROGRAM

## 2024-03-11 PROCEDURE — 99283 EMERGENCY DEPT VISIT LOW MDM: CPT

## 2024-03-11 PROCEDURE — 71046 X-RAY EXAM CHEST 2 VIEWS: CPT

## 2024-03-11 RX ORDER — IBUPROFEN 600 MG/1
600 TABLET ORAL EVERY 6 HOURS PRN
Qty: 50 TABLET | Refills: 1 | Status: SHIPPED | OUTPATIENT
Start: 2024-03-11

## 2024-03-11 RX ORDER — BENZONATATE 100 MG/1
100 CAPSULE ORAL 3 TIMES DAILY PRN
Qty: 20 CAPSULE | Refills: 0 | Status: SHIPPED | OUTPATIENT
Start: 2024-03-11 | End: 2024-03-18

## 2024-03-11 RX ORDER — PREDNISONE 20 MG/1
40 TABLET ORAL DAILY
Qty: 10 TABLET | Refills: 0 | Status: SHIPPED | OUTPATIENT
Start: 2024-03-11 | End: 2024-03-16

## 2024-03-11 RX ORDER — DEXAMETHASONE 4 MG/1
4 TABLET ORAL ONCE
Status: COMPLETED | OUTPATIENT
Start: 2024-03-11 | End: 2024-03-11

## 2024-03-11 RX ORDER — ALBUTEROL SULFATE 90 UG/1
2 AEROSOL, METERED RESPIRATORY (INHALATION) EVERY 4 HOURS PRN
Qty: 8 G | Refills: 0 | Status: SHIPPED | OUTPATIENT
Start: 2024-03-11 | End: 2024-03-11

## 2024-03-11 RX ORDER — HYDROCODONE BITARTRATE AND ACETAMINOPHEN 5; 325 MG/1; MG/1
1 TABLET ORAL
Status: COMPLETED | OUTPATIENT
Start: 2024-03-11 | End: 2024-03-11

## 2024-03-11 RX ORDER — LIDOCAINE 4 G/G
1 PATCH TOPICAL DAILY
Qty: 30 PATCH | Refills: 0 | Status: SHIPPED | OUTPATIENT
Start: 2024-03-11 | End: 2024-04-10

## 2024-03-11 RX ORDER — IBUPROFEN 400 MG/1
400 TABLET ORAL
Status: COMPLETED | OUTPATIENT
Start: 2024-03-11 | End: 2024-03-11

## 2024-03-11 RX ORDER — IPRATROPIUM BROMIDE AND ALBUTEROL SULFATE 2.5; .5 MG/3ML; MG/3ML
1 SOLUTION RESPIRATORY (INHALATION) EVERY 6 HOURS PRN
Qty: 30 EACH | Refills: 0 | Status: SHIPPED | OUTPATIENT
Start: 2024-03-11

## 2024-03-11 RX ADMIN — DEXAMETHASONE 4 MG: 4 TABLET ORAL at 19:54

## 2024-03-11 RX ADMIN — HYDROCODONE BITARTRATE AND ACETAMINOPHEN 1 TABLET: 5; 325 TABLET ORAL at 19:54

## 2024-03-11 RX ADMIN — IBUPROFEN 400 MG: 400 TABLET, FILM COATED ORAL at 19:54

## 2024-03-11 ASSESSMENT — PAIN SCALES - GENERAL
PAINLEVEL_OUTOF10: 8
PAINLEVEL_OUTOF10: 8

## 2024-03-11 ASSESSMENT — PAIN DESCRIPTION - LOCATION: LOCATION: RIB CAGE

## 2024-03-11 ASSESSMENT — PAIN - FUNCTIONAL ASSESSMENT: PAIN_FUNCTIONAL_ASSESSMENT: 0-10

## 2024-03-11 ASSESSMENT — PAIN DESCRIPTION - ORIENTATION: ORIENTATION: RIGHT;LEFT

## 2024-03-11 NOTE — ED NOTES
Pt presents ambulatory to ED complaining of bilateral rib pain and generalized chest discomfort. Pt diagnosed and treated with Pneumonia x13 days ago at [facility]. Pt reports finishing full course of antibiotics but states today she felt as though some of her previous discomfort has come back that was similar to when she was first diagnosed. Pt also endorses reduction of appetite. Pt is alert and oriented x 4, RR even and unlabored, skin is warm and dry. Assesment completed and pt updated on plan of care.       Emergency Department Nursing Plan of Care       The Nursing Plan of Care is developed from the Nursing assessment and Emergency Department Attending provider initial evaluation.  The plan of care may be reviewed in the “ED Provider note”.    The Plan of Care was developed with the following considerations:   Patient / Family readiness to learn indicated by:verbalized understanding  Persons(s) to be included in education: patient  Barriers to Learning/Limitations:None    Signed

## 2024-03-11 NOTE — ED NOTES
Report rcvd. Pt resting on stretcher at this time; nad noted. Pt on monitor x3 and vs documented. MD completed re eval

## 2024-03-11 NOTE — ED TRIAGE NOTES
Pt arrives via EMS complaining of rib/lung pain after being diagnosed with and treated for pneumonia 1-2 weeks ago. Pt states she finished the antibiotics that she was prescribed but states the productive cough persists and she now has constant rib pain. Pt reports she takes aspirin daily.

## 2024-03-11 NOTE — TELEPHONE ENCOUNTER
[place]    Received call from [place] at [facility]; Patient with Red Flag Complaint requesting to establish care with [facility].    Subjective: Caller states \"Cough\"     Current Symptoms:   Productive cough of yellow/white sputum  Rib pain from coughing  \"My lungs hurt.\"  Patient was seen in the ER on 02/27/2024 and diagnosed with PNA.  Patient has completed a round of antibiotics.  Denies SOB    Onset: 2 weeks ago; improving    Pain Severity: Moderate; Intermittent    Temperature: Denies    What has been tried: Albuterol inhaler, heating pad    LMP:  tubal ligation  Pregnant: No    Recommended disposition: See PCP within 3 Days  Advised UCC if no available appts.    Care advice provided, patient verbalizes understanding; denies any other questions or concerns; instructed to call back for any new or worsening symptoms.    Patient/Caller agrees with recommended disposition; writer provided warm transfer to Silv[facility] at [facility] for appointment scheduling    Attention Provider:  Thank you for allowing me to participate in the care of your patient.  The patient was connected to triage in response to information provided to the [facility].  Please do not respond through this encounter as the response is not directed to a shared pool.    Reason for Disposition   [1] Completed antibiotic treatment AND [2] cough not improved    Protocols used: Pneumonia Follow-up Call-ADULT-

## 2024-03-12 NOTE — ED PROVIDER NOTES
[facility] EMERGENCY DEPT  EMERGENCY DEPARTMENT ENCOUNTER       Pt Name: Lindsey Wheat  MRN: [id number]  Birthdate 1980  Date of evaluation: 3/11/2024  Provider: Hugh Madsen MD   PCP: Jessica Dang MD  Note Started: 8:39 PM EDT 3/11/24     CHIEF COMPLAINT       Chief Complaint   Patient presents with    Rib Pain (injury)    Chest Pain        HISTORY OF PRESENT ILLNESS: 1 or more elements      History From: Patient  HPI Limitations: None     Lindsey Wheat is a 44 y.o. female who presents for persistent cough, pain along her upper ribs.  Patient states she was diagnosed with pneumonia, finished her antibiotics a few days ago.  She states she is been sick for about 10 days.  She reports she is feeling better but continues to have a dry cough.  Denies new symptoms.  She reports pain when she is coughing over her ribs.  Denies fever, chills, shortness of breath.  Has taken no medications prior to arrival.  Has a history of mood disorder, hypertension, diabetes, high cholesterol.  No history of heart disease or pulmonary embolism.  Denies leg swelling, rash.  States she finished all of her antibiotics.  She presents today for medications for cough.     Nursing Notes were all reviewed and agreed with or any disagreements were addressed in the HPI.     REVIEW OF SYSTEMS      Review of Systems     Positives and Pertinent negatives as per HPI.    PAST HISTORY     Past Medical History:  Past Medical History:   Diagnosis Date    Abnormal Pap smear     \"years ago\"     Bipolar disorder (HCC)     Bronchitis     Depression with anxiety 9/22/2010    Diabetes mellitus (HCC)     type II diabetes mellitus since 2004    GERD (gastroesophageal reflux disease)     High cholesterol     HTN (hypertension) 9/22/2010    Ill-defined condition     bronchitis    Postpartum depression     hospitalized after last delivery    Rhinitis 6/8/2012    Sleep apnea     uses cpap    UTI (urinary tract infection)          Past Surgical  MEDICATIONS:  Current Discharge Medication List          I am the Primary Clinician of Record.   Hugh Madsen MD (electronically signed)      (Please note that parts of this dictation were completed with voice recognition software. Quite often unanticipated grammatical, syntax, homophones, and other interpretive errors are inadvertently transcribed by the computer software. Please disregards these errors. Please excuse any errors that have escaped final proofreading.)         Hugh Madsen MD  03/16/24 1208

## 2024-03-12 NOTE — ED NOTES
Pt d/c with instructions and ambulatory. Rx x4 sent to pharm. No additional questions at this time

## 2024-04-04 NOTE — TELEPHONE ENCOUNTER
Action    Action Taken 4/4/24  WT from [facility], spoke w/ pt:     -Pt advised recent diagnosed. Pt advised diagnosed @ [facility]/ 4/3/24.  -Pt advised they have a history of Breast Cancer. Pt advised they had Radiation, and their Oncologist are at [facility].   -Pt advised no Genetic Testing conducted.  -Pt advised no hx of Bone Marrow Biopsies, advised only Biopsies/Surgical procedures were for Breast Ca, all done @ [facility].  -Pt advised all imaging @ [facility]. Pt advised upcoming Mammogram @ the end of April.  10:01 AM       We saw pt in September but I don't see where we ever ordered IV iron. Please review and order if needed. Then we could see day of tx.

## 2024-06-01 ENCOUNTER — HOSPITAL ENCOUNTER (EMERGENCY)
Facility: HOSPITAL | Age: 44
Discharge: HOME | End: 2024-06-01
Payer: MEDICARE

## 2024-06-01 ENCOUNTER — APPOINTMENT (OUTPATIENT)
Facility: HOSPITAL | Age: 44
End: 2024-06-01
Payer: MEDICARE

## 2024-06-01 VITALS
DIASTOLIC BLOOD PRESSURE: 89 MMHG | SYSTOLIC BLOOD PRESSURE: 150 MMHG | RESPIRATION RATE: 14 BRPM | BODY MASS INDEX: 38.84 KG/M2 | OXYGEN SATURATION: 99 % | WEIGHT: 227.5 LBS | TEMPERATURE: 98.4 F | HEART RATE: 90 BPM | HEIGHT: 64 IN

## 2024-06-01 DIAGNOSIS — J06.9 ACUTE UPPER RESPIRATORY INFECTION: Primary | ICD-10-CM

## 2024-06-01 DIAGNOSIS — R06.02 SHORTNESS OF BREATH: ICD-10-CM

## 2024-06-01 LAB
ALBUMIN SERPL-MCNC: 3.5 G/DL (ref 3.5–5)
ALBUMIN/GLOB SERPL: 0.9 (ref 1.1–2.2)
ALP SERPL-CCNC: 101 U/L (ref 45–117)
ALT SERPL-CCNC: 22 U/L (ref 12–78)
ANION GAP SERPL CALC-SCNC: 9 MMOL/L (ref 5–15)
AST SERPL-CCNC: 17 U/L (ref 15–37)
BASOPHILS # BLD: 0.1 K/UL (ref 0–0.1)
BASOPHILS NFR BLD: 1 % (ref 0–1)
BILIRUB SERPL-MCNC: 0.3 MG/DL (ref 0.2–1)
BUN SERPL-MCNC: 4 MG/DL (ref 6–20)
BUN/CREAT SERPL: 5 (ref 12–20)
CALCIUM SERPL-MCNC: 8.9 MG/DL (ref 8.5–10.1)
CHLORIDE SERPL-SCNC: 98 MMOL/L (ref 97–108)
CO2 SERPL-SCNC: 28 MMOL/L (ref 21–32)
CREAT SERPL-MCNC: 0.77 MG/DL (ref 0.55–1.02)
D DIMER PPP FEU-MCNC: 0.35 MG/L FEU (ref 0–0.65)
DIFFERENTIAL METHOD BLD: ABNORMAL
EKG ATRIAL RATE: 94 BPM
EKG DIAGNOSIS: NORMAL
EKG P AXIS: 47 DEGREES
EKG P-R INTERVAL: 146 MS
EKG Q-T INTERVAL: 368 MS
EKG QRS DURATION: 74 MS
EKG QTC CALCULATION (BAZETT): 460 MS
EKG R AXIS: 35 DEGREES
EKG T AXIS: 50 DEGREES
EKG VENTRICULAR RATE: 94 BPM
EOSINOPHIL # BLD: 0.2 K/UL (ref 0–0.4)
EOSINOPHIL NFR BLD: 2 % (ref 0–7)
ERYTHROCYTE [DISTWIDTH] IN BLOOD BY AUTOMATED COUNT: 19.5 % (ref 11.5–14.5)
FLUAV RNA SPEC QL NAA+PROBE: NOT DETECTED
FLUBV RNA SPEC QL NAA+PROBE: NOT DETECTED
GLOBULIN SER CALC-MCNC: 4.1 G/DL (ref 2–4)
GLUCOSE SERPL-MCNC: 249 MG/DL (ref 65–100)
HCT VFR BLD AUTO: 33.3 % (ref 35–47)
HGB BLD-MCNC: 9.5 G/DL (ref 11.5–16)
IMM GRANULOCYTES # BLD AUTO: 0.1 K/UL (ref 0–0.04)
IMM GRANULOCYTES NFR BLD AUTO: 1 % (ref 0–0.5)
LYMPHOCYTES # BLD: 1.7 K/UL (ref 0.8–3.5)
LYMPHOCYTES NFR BLD: 18 % (ref 12–49)
MCH RBC QN AUTO: 19.4 PG (ref 26–34)
MCHC RBC AUTO-ENTMCNC: 28.5 G/DL (ref 30–36.5)
MCV RBC AUTO: 68 FL (ref 80–99)
MONOCYTES # BLD: 0.8 K/UL (ref 0–1)
MONOCYTES NFR BLD: 8 % (ref 5–13)
NEUTS SEG # BLD: 6.8 K/UL (ref 1.8–8)
NEUTS SEG NFR BLD: 70 % (ref 32–75)
NRBC # BLD: 0 K/UL (ref 0–0.01)
NRBC BLD-RTO: 0 PER 100 WBC
NT PRO BNP: 71 PG/ML (ref 0–125)
PLATELET # BLD AUTO: 422 K/UL (ref 150–400)
PMV BLD AUTO: 10.2 FL (ref 8.9–12.9)
POTASSIUM SERPL-SCNC: 4 MMOL/L (ref 3.5–5.1)
PROT SERPL-MCNC: 7.6 G/DL (ref 6.4–8.2)
RBC # BLD AUTO: 4.9 M/UL (ref 3.8–5.2)
RBC MORPH BLD: ABNORMAL
RBC MORPH BLD: ABNORMAL
SARS-COV-2 RNA RESP QL NAA+PROBE: NOT DETECTED
SODIUM SERPL-SCNC: 135 MMOL/L (ref 136–145)
TROPONIN I SERPL HS-MCNC: 10 NG/L (ref 0–51)
WBC # BLD AUTO: 9.7 K/UL (ref 3.6–11)

## 2024-06-01 PROCEDURE — 85379 FIBRIN DEGRADATION QUANT: CPT

## 2024-06-01 PROCEDURE — 84484 ASSAY OF TROPONIN QUANT: CPT

## 2024-06-01 PROCEDURE — 80053 COMPREHEN METABOLIC PANEL: CPT

## 2024-06-01 PROCEDURE — 93005 ELECTROCARDIOGRAM TRACING: CPT | Performed by: EMERGENCY MEDICINE

## 2024-06-01 PROCEDURE — 85025 COMPLETE CBC W/AUTO DIFF WBC: CPT

## 2024-06-01 PROCEDURE — 6370000000 HC RX 637 (ALT 250 FOR IP): Performed by: PHYSICIAN ASSISTANT

## 2024-06-01 PROCEDURE — 87636 SARSCOV2 & INF A&B AMP PRB: CPT

## 2024-06-01 PROCEDURE — 6360000002 HC RX W HCPCS: Performed by: PHYSICIAN ASSISTANT

## 2024-06-01 PROCEDURE — 99285 EMERGENCY DEPT VISIT HI MDM: CPT

## 2024-06-01 PROCEDURE — 96374 THER/PROPH/DIAG INJ IV PUSH: CPT

## 2024-06-01 PROCEDURE — 36415 COLL VENOUS BLD VENIPUNCTURE: CPT

## 2024-06-01 PROCEDURE — 71045 X-RAY EXAM CHEST 1 VIEW: CPT

## 2024-06-01 PROCEDURE — 83880 ASSAY OF NATRIURETIC PEPTIDE: CPT

## 2024-06-01 RX ORDER — ALBUTEROL SULFATE 90 UG/1
2 AEROSOL, METERED RESPIRATORY (INHALATION) 4 TIMES DAILY PRN
Qty: 18 G | Refills: 0 | Status: SHIPPED | OUTPATIENT
Start: 2024-06-01

## 2024-06-01 RX ORDER — FLUTICASONE PROPIONATE 50 MCG
2 SPRAY, SUSPENSION (ML) NASAL DAILY
Qty: 16 G | Refills: 0 | Status: SHIPPED | OUTPATIENT
Start: 2024-06-01

## 2024-06-01 RX ORDER — CETIRIZINE HYDROCHLORIDE 10 MG/1
10 TABLET ORAL DAILY
Qty: 30 TABLET | Refills: 0 | Status: SHIPPED | OUTPATIENT
Start: 2024-06-01 | End: 2024-07-01

## 2024-06-01 RX ORDER — BENZONATATE 200 MG/1
200 CAPSULE ORAL 3 TIMES DAILY PRN
Qty: 15 CAPSULE | Refills: 0 | Status: SHIPPED | OUTPATIENT
Start: 2024-06-01 | End: 2024-06-08

## 2024-06-01 RX ORDER — FLUCONAZOLE 150 MG/1
150 TABLET ORAL
Status: COMPLETED | OUTPATIENT
Start: 2024-06-01 | End: 2024-06-01

## 2024-06-01 RX ORDER — METHYLPREDNISOLONE 4 MG/1
TABLET ORAL
Qty: 21 TABLET | Refills: 0 | Status: SHIPPED | OUTPATIENT
Start: 2024-06-01 | End: 2024-06-07

## 2024-06-01 RX ORDER — KETOROLAC TROMETHAMINE 30 MG/ML
30 INJECTION, SOLUTION INTRAMUSCULAR; INTRAVENOUS ONCE
Status: COMPLETED | OUTPATIENT
Start: 2024-06-01 | End: 2024-06-01

## 2024-06-01 RX ADMIN — KETOROLAC TROMETHAMINE 30 MG: 30 INJECTION, SOLUTION INTRAMUSCULAR; INTRAVENOUS at 16:26

## 2024-06-01 RX ADMIN — FLUCONAZOLE 150 MG: 150 TABLET ORAL at 17:46

## 2024-06-01 ASSESSMENT — PAIN DESCRIPTION - LOCATION: LOCATION: CHEST

## 2024-06-01 ASSESSMENT — PAIN SCALES - GENERAL: PAINLEVEL_OUTOF10: 8

## 2024-06-01 ASSESSMENT — PAIN DESCRIPTION - DESCRIPTORS: DESCRIPTORS: SORE

## 2024-06-01 ASSESSMENT — PAIN DESCRIPTION - PAIN TYPE: TYPE: CHRONIC PAIN

## 2024-06-01 ASSESSMENT — PAIN - FUNCTIONAL ASSESSMENT: PAIN_FUNCTIONAL_ASSESSMENT: 0-10

## 2024-06-01 NOTE — ED TRIAGE NOTES
Patient reports that, \"I had pneumonia in March and I have been coughing ever since.\"  She states she was supposed to \"follow up with cardiology but I haven't been able to.\"  She reports chest/rib/lung pain for two days.  She also reports, \"my throat has been  swollen for three days.\"

## 2024-06-01 NOTE — ED NOTES
Noted that pt was yelling at charge RN, provider made aware. Another RN in to attempt to obtain labs from pt.

## 2024-06-01 NOTE — ED NOTES
Discharge instructions were given to the patient by SUYAPA GRANT RN.     The patient left the Emergency Department alert and oriented and in no acute distress with electronic prescriptions. The patient was encouraged to call or return to the ED for worsening issues or problems and was encouraged to schedule a follow up appointment for continuing care.     Ambulation assessment completed before discharge.  Pt left Emergency Department ambulating at baseline with no ortho devices  Ortho device education: none    The patient verbalized understanding of discharge instructions and prescriptions, all questions were answered. The patient has no further concerns at this time.

## 2024-06-01 NOTE — ED NOTES
Pt presents to ED complaining of ongoing, intermittently productive cough, and difficulty swallowing. Pt reports these are all ongoing issues. Pt reports being seen and treated for pneumonia recently and reports she never got better. Pt also noted to be anxious about life stressors and upcoming move. Pt is alert and oriented x 4, RR even and unlabored, skin is warm and dry. Pt appears in NAD at this time. Assessment completed and pt updated on plan of care.  Call bell in reach.     The Nursing Plan of Care is developed from the Nursing assessment and Emergency Department Attending provider initial evaluation.  The plan of care may be reviewed in the “ED Provider note”.    The Plan of Care was developed with the following considerations:  Patient / Family readiness to learn indicated by:verbalized understanding  Persons(s) to be included in education: patient  Barriers to Learning/Limitations:None    Signed    SUYAPA GRANT RN    6/1/2024   2:49 PM

## 2024-06-01 NOTE — ED PROVIDER NOTES
[facility] EMERGENCY DEPT  EMERGENCY DEPARTMENT ENCOUNTER       Pt Name: Lindsey Wheat  MRN: [id number]  Birthdate 1980  Date of evaluation: 2024  Provider: Joseline Anderson PA-C   PCP: Jessica Dang MD  Note Started: 3:01 PM EDT 24     CHIEF COMPLAINT       Chief Complaint   Patient presents with    Cough    Chest Pain        HISTORY OF PRESENT ILLNESS: 1 or more elements      History From: Patient  HPI Limitations: None     Lindsey Wheat is a 44 y.o. female who presents with intermittent cough, shortness of breath and difficulty swallowing x 3 days.  She states since she had pneumonia in March every time she gets a cough it seems to linger on.  She rates discomfort 8 out of 10.     Nursing Notes were all reviewed and agreed with or any disagreements were addressed in the HPI.   Please see MDM for additional details of HPI and ROS  REVIEW OF SYSTEMS      Review of Systems     Positives and Pertinent negatives as per HPI.    PAST HISTORY     Past Medical History:  Past Medical History:   Diagnosis Date    Abnormal Pap smear     \"years ago\"     Bipolar disorder (HCC)     Bronchitis     Depression with anxiety 2010    Diabetes mellitus (HCC)     type II diabetes mellitus since     GERD (gastroesophageal reflux disease)     High cholesterol     HTN (hypertension) 2010    Ill-defined condition     bronchitis    Postpartum depression     hospitalized after last delivery    Rhinitis 2012    Sleep apnea     uses cpap    UTI (urinary tract infection)        Past Surgical History:  Past Surgical History:   Procedure Laterality Date     SECTION      X 3    TUBAL LIGATION      UPPER GASTROINTESTINAL ENDOSCOPY N/A 2023    EGD WITH BRAVO, dilation, biopsy performed by Brandy Khoury MD at [facility] ENDOSCOPY    UPPER GASTROINTESTINAL ENDOSCOPY N/A 2023    GERD TEST W/ ELECTRODE performed by Brandy Khoury MD at [facility] ENDOSCOPY       Family History:  Family History   Problem Relation  mellitus (HCC), GERD (gastroesophageal reflux disease), High cholesterol, HTN (hypertension), Ill-defined condition, Postpartum depression, Rhinitis, Sleep apnea, and UTI (urinary tract infection).      Social Determinants affecting Dx or Tx: None    Records Reviewed (source and summary of external records): Nursing Notes and Old Medical Records-patient seen on 4/12/2024 for swelling of the neck with a negative CT at that time.    MDM (CC/HPI Summary, DDx, ED Course, and Reassessment, Disposition Considerations (Tests not done, Shared Decision Making, Pt Expectation of Test or Tx.)::   Patient is a 44-year-old female who presents to the ED complaining of cough, shortness of breath, \"difficulty with digestion\" and general malaise.  Patient reports having pneumonia in March and states since then she has not felt \"great.\"  She complains of generalized bodyaches, swelling to the submandibular region and rash that broke out all over her face over the last week.    On exam lung sounds are clear bilaterally, O2 sats 100% on room air.  She is slightly tachycardic at 103 and hypertensive but all other vital signs stable.  She has no extremity swelling but has hyperpigmented macular lesions scattered around the entire face from previous rash that has left dark spots.  While she does have some swelling to the submental area it is not new or different.  She was seen in April at [facility] for the same thing where she had a CT of her neck which was unremarkable.  She denies any recent long trips but reports possible sick contacts after riding in a friend's car with her kids.  DDx: URI, bronchitis, PNA, COVID, flu, low suspicion for PE or CHF but will plan to get basic COVID/flu swab, labs, EKG, chest x-ray and include troponin and D-dimer.    ED Course as of 06/01/24 1739   Sat Jun 01, 2024   1446 ED EKG interpretation:  Rhythm: normal sinus rhythm; and regular . Rate (approx.): 94; Axis: normal; P wave: normal; QRS interval: normal ;

## 2024-06-01 NOTE — ED NOTES
This RN stuck pt 1 time, but the vein blew. Pt requesting another ED staff member to stick her, charge nurse made aware.

## 2024-06-03 LAB
EKG ATRIAL RATE: 94 BPM
EKG DIAGNOSIS: NORMAL
EKG P AXIS: 47 DEGREES
EKG P-R INTERVAL: 146 MS
EKG Q-T INTERVAL: 368 MS
EKG QRS DURATION: 74 MS
EKG QTC CALCULATION (BAZETT): 460 MS
EKG R AXIS: 35 DEGREES
EKG T AXIS: 50 DEGREES
EKG VENTRICULAR RATE: 94 BPM

## 2024-06-24 ENCOUNTER — HOSPITAL ENCOUNTER (EMERGENCY)
Facility: HOSPITAL | Age: 44
Discharge: HOME | End: 2024-06-24
Payer: MEDICARE

## 2024-06-24 VITALS
DIASTOLIC BLOOD PRESSURE: 73 MMHG | SYSTOLIC BLOOD PRESSURE: 117 MMHG | BODY MASS INDEX: 40.93 KG/M2 | RESPIRATION RATE: 18 BRPM | HEART RATE: 100 BPM | TEMPERATURE: 97.7 F | HEIGHT: 63 IN | WEIGHT: 231 LBS | OXYGEN SATURATION: 98 %

## 2024-06-24 DIAGNOSIS — R09.A2 GLOBUS SENSATION: Primary | ICD-10-CM

## 2024-06-24 PROCEDURE — 99283 EMERGENCY DEPT VISIT LOW MDM: CPT

## 2024-06-24 RX ORDER — PREDNISONE 20 MG/1
20 TABLET ORAL DAILY
Qty: 3 TABLET | Refills: 0 | Status: SHIPPED | OUTPATIENT
Start: 2024-06-24 | End: 2024-06-24

## 2024-06-24 RX ORDER — PREDNISONE 20 MG/1
20 TABLET ORAL DAILY
Qty: 3 TABLET | Refills: 0 | Status: SHIPPED | OUTPATIENT
Start: 2024-06-24 | End: 2024-06-27

## 2024-06-24 ASSESSMENT — PAIN DESCRIPTION - ORIENTATION: ORIENTATION: LEFT;RIGHT

## 2024-06-24 ASSESSMENT — PAIN - FUNCTIONAL ASSESSMENT: PAIN_FUNCTIONAL_ASSESSMENT: 0-10

## 2024-06-24 ASSESSMENT — PAIN DESCRIPTION - LOCATION: LOCATION: THROAT

## 2024-06-24 ASSESSMENT — PAIN SCALES - GENERAL: PAINLEVEL_OUTOF10: 10

## 2024-06-24 ASSESSMENT — PAIN DESCRIPTION - DESCRIPTORS: DESCRIPTORS: ACHING;SORE

## 2024-06-24 NOTE — ED TRIAGE NOTES
Pt reports acute on chronic throat swelling that leads to food boluses get stuck there. Pt rpeorts symptoms have been present for years

## 2024-06-26 NOTE — ED PROVIDER NOTES
mouth as needed for Irritation     losartan 50 MG tablet  Commonly known as: COZAAR  Take 1 tablet by mouth daily for 7 days     lurasidone 20 MG Tabs tablet  Commonly known as: LATUDA     metFORMIN 1000 MG tablet  Commonly known as: GLUCOPHAGE     metoclopramide 5 MG tablet  Commonly known as: REGLAN     * naproxen 500 MG tablet  Commonly known as: Naprosyn  Take 1 tablet by mouth 2 times daily     * naproxen 500 MG tablet  Commonly known as: Naprosyn  Take 1 tablet by mouth 2 times daily     NONFORMULARY     * omeprazole 40 MG delayed release capsule  Commonly known as: PRILOSEC     * omeprazole 20 MG delayed release capsule  Commonly known as: PRILOSEC  Take 1 capsule by mouth every morning (before breakfast)     ondansetron 4 MG disintegrating tablet  Commonly known as: ZOFRAN-ODT  Take 1 tablet by mouth every 8 hours as needed for Nausea or Vomiting     triamcinolone 0.1 % cream  Commonly known as: KENALOG           * This list has 10 medication(s) that are the same as other medications prescribed for you. Read the directions carefully, and ask your doctor or other care provider to review them with you.                   Where to Get Your Medications        Information about where to get these medications is not yet available    Ask your nurse or doctor about these medications  predniSONE 20 MG tablet           DISCONTINUED MEDICATIONS:  Discharge Medication List as of 6/24/2024  3:34 PM        I have seen and evaluated the patient autonomously. My supervision physician was on site and available for consultation if needed.     I am the Primary Clinician of Record.   Herminia Masterson PA-C (electronically signed)    (Please note that parts of this dictation were completed with voice recognition software. Quite often unanticipated grammatical, syntax, homophones, and other interpretive errors are inadvertently transcribed by the computer software. Please disregards these errors. Please excuse any errors that have

## 2024-07-09 ENCOUNTER — TELEPHONE (OUTPATIENT)
Age: 44
End: 2024-07-09

## 2024-07-09 NOTE — TELEPHONE ENCOUNTER
----- Message from Johnson Gallegos sent at 7/9/2024 12:25 PM EDT -----  Regarding: ECC Appointment Request  ECC Appointment Request    Patient needs appointment for ECC Appointment Type: New to Provider.    Patient Requested Dates(s): Earliest available  Patient Requested Time: Mid day  Provider Name: Any available doctor    Reason for Appointment Request: New Patient - Requested Provider unavailable  --------------------------------------------------------------------------------------------------------------------------    Relationship to Patient: Self     Call Back Information: OK to leave message on voicemail  Preferred Call Back Number: Phone [phone]

## 2024-07-24 ENCOUNTER — OFFICE VISIT (OUTPATIENT)
Age: 44
End: 2024-07-24
Payer: MEDICARE

## 2024-07-24 VITALS
SYSTOLIC BLOOD PRESSURE: 127 MMHG | TEMPERATURE: 97.5 F | OXYGEN SATURATION: 98 % | RESPIRATION RATE: 16 BRPM | BODY MASS INDEX: 39.27 KG/M2 | HEART RATE: 92 BPM | HEIGHT: 64 IN | DIASTOLIC BLOOD PRESSURE: 82 MMHG | WEIGHT: 230 LBS

## 2024-07-24 DIAGNOSIS — I10 PRIMARY HYPERTENSION: ICD-10-CM

## 2024-07-24 DIAGNOSIS — K21.00 GASTROESOPHAGEAL REFLUX DISEASE WITH ESOPHAGITIS WITHOUT HEMORRHAGE: ICD-10-CM

## 2024-07-24 DIAGNOSIS — E11.8 DIABETES MELLITUS TYPE 2 WITH COMPLICATIONS (HCC): ICD-10-CM

## 2024-07-24 DIAGNOSIS — E04.1 THYROID NODULE: ICD-10-CM

## 2024-07-24 DIAGNOSIS — I10 PRIMARY HYPERTENSION: Primary | ICD-10-CM

## 2024-07-24 DIAGNOSIS — E11.42 DIABETIC POLYNEUROPATHY ASSOCIATED WITH TYPE 2 DIABETES MELLITUS (HCC): ICD-10-CM

## 2024-07-24 DIAGNOSIS — Z12.11 SCREEN FOR COLON CANCER: ICD-10-CM

## 2024-07-24 DIAGNOSIS — D50.8 OTHER IRON DEFICIENCY ANEMIA: ICD-10-CM

## 2024-07-24 DIAGNOSIS — E78.00 HYPERCHOLESTEREMIA: ICD-10-CM

## 2024-07-24 DIAGNOSIS — Z76.89 ENCOUNTER TO ESTABLISH CARE: ICD-10-CM

## 2024-07-24 DIAGNOSIS — Z12.31 ENCOUNTER FOR SCREENING MAMMOGRAM FOR MALIGNANT NEOPLASM OF BREAST: ICD-10-CM

## 2024-07-24 DIAGNOSIS — F41.8 DEPRESSION WITH ANXIETY: ICD-10-CM

## 2024-07-24 PROBLEM — Z86.59 HX OF MAJOR DEPRESSION: Status: RESOLVED | Noted: 2024-07-24 | Resolved: 2024-07-24

## 2024-07-24 PROBLEM — R13.12 OROPHARYNGEAL DYSPHAGIA: Status: ACTIVE | Noted: 2024-07-24

## 2024-07-24 PROBLEM — Z86.59 HX OF MAJOR DEPRESSION: Status: ACTIVE | Noted: 2024-07-24

## 2024-07-24 PROBLEM — K76.0 FATTY LIVER: Status: ACTIVE | Noted: 2024-07-24

## 2024-07-24 PROBLEM — F33.1 MODERATE EPISODE OF RECURRENT MAJOR DEPRESSIVE DISORDER (HCC): Status: ACTIVE | Noted: 2024-07-24

## 2024-07-24 PROBLEM — M19.90 OSTEOARTHROSIS: Status: ACTIVE | Noted: 2024-07-24

## 2024-07-24 PROBLEM — E11.43 TYPE 2 DIABETES MELLITUS WITH DIABETIC AUTONOMIC (POLY)NEUROPATHY (HCC): Status: ACTIVE | Noted: 2024-07-24

## 2024-07-24 PROBLEM — N92.6 IRREGULAR MENSTRUAL CYCLE: Status: ACTIVE | Noted: 2024-07-24

## 2024-07-24 PROBLEM — N92.1 MENORRHAGIA WITH IRREGULAR CYCLE: Status: ACTIVE | Noted: 2024-07-24

## 2024-07-24 PROBLEM — F41.1 GENERALIZED ANXIETY DISORDER: Status: ACTIVE | Noted: 2024-07-24

## 2024-07-24 PROBLEM — K31.84 GASTROPARESIS: Status: ACTIVE | Noted: 2024-07-24

## 2024-07-24 PROBLEM — G47.33 OBSTRUCTIVE SLEEP APNEA: Status: ACTIVE | Noted: 2019-06-12

## 2024-07-24 PROBLEM — F43.10 PTSD (POST-TRAUMATIC STRESS DISORDER): Status: ACTIVE | Noted: 2024-07-24

## 2024-07-24 PROBLEM — M54.42 LUMBAGO WITH SCIATICA, LEFT SIDE: Status: ACTIVE | Noted: 2024-07-24

## 2024-07-24 LAB
CREAT UR-MCNC: 122 MG/DL
MICROALBUMIN UR-MCNC: 0.88 MG/DL
MICROALBUMIN/CREAT UR-RTO: 7 MG/G (ref 0–30)
SPECIMEN HOLD: NORMAL

## 2024-07-24 PROCEDURE — 99204 OFFICE O/P NEW MOD 45 MIN: CPT | Performed by: NURSE PRACTITIONER

## 2024-07-24 PROCEDURE — 3074F SYST BP LT 130 MM HG: CPT | Performed by: NURSE PRACTITIONER

## 2024-07-24 PROCEDURE — 2022F DILAT RTA XM EVC RTNOPTHY: CPT | Performed by: NURSE PRACTITIONER

## 2024-07-24 PROCEDURE — 3079F DIAST BP 80-89 MM HG: CPT | Performed by: NURSE PRACTITIONER

## 2024-07-24 PROCEDURE — G8427 DOCREV CUR MEDS BY ELIG CLIN: HCPCS | Performed by: NURSE PRACTITIONER

## 2024-07-24 PROCEDURE — G8417 CALC BMI ABV UP PARAM F/U: HCPCS | Performed by: NURSE PRACTITIONER

## 2024-07-24 PROCEDURE — 3046F HEMOGLOBIN A1C LEVEL >9.0%: CPT | Performed by: NURSE PRACTITIONER

## 2024-07-24 PROCEDURE — 1036F TOBACCO NON-USER: CPT | Performed by: NURSE PRACTITIONER

## 2024-07-24 RX ORDER — CLONAZEPAM 0.5 MG/1
0.5 TABLET ORAL 2 TIMES DAILY PRN
COMMUNITY
Start: 2024-06-09

## 2024-07-24 ASSESSMENT — ENCOUNTER SYMPTOMS
CONSTIPATION: 0
DIARRHEA: 0
TROUBLE SWALLOWING: 1
SHORTNESS OF BREATH: 0

## 2024-07-24 NOTE — PROGRESS NOTES
Lindsey Wheat is a 44 y.o. female  Chief Complaint   Patient presents with    Establish Care       Vitals:    07/24/24 1034   BP: 127/82   Pulse: 92   Resp: 16   Temp: 97.5 °F (36.4 °C)   SpO2: 98%      Wt Readings from Last 3 Encounters:   07/24/24 104.3 kg (230 lb)   06/24/24 104.8 kg (231 lb)   06/01/24 103.2 kg (227 lb 8 oz)     BMI Readings from Last 3 Encounters:   07/24/24 39.48 kg/m²   06/24/24 40.92 kg/m²   06/01/24 39.05 kg/m²     Health Maintenance Due   Topic Date Due    Hepatitis B vaccine (1 of 3 - 3-dose series) Never done    COVID-19 Vaccine (1) Never done    Pneumococcal 0-64 years Vaccine (1 of 2 - PCV) Never done    Diabetic foot exam  Never done    A1C test (Diabetic or Prediabetic)  Never done    Lipids  Never done    Depression Monitoring  Never done    Diabetic Alb to Cr ratio (uACR) test  Never done    Diabetic retinal exam  Never done    Hepatitis C screen  Never done    DTaP/Tdap/Td vaccine (1 - Tdap) Never done     HPI  Patient is here to establish care. Here with daughter.  Previous PCP -  Rona Tinoco    Extensive PMH    Diabetes  diagnosed 2004.    Metformin.  Only on metformin.       Neuropathy- was on gabapentin. Pain/ cramps.   Does not take/ does not like.  Interferes with other psych medication    Iron deficiency- was always prescribed.  CBC 6/2024   Lab Results   Component Value Date    WBC 9.7 06/01/2024    HGB 9.5 (L) 06/01/2024    HCT 33.3 (L) 06/01/2024    MCV 68.0 (L) 06/01/2024     (H) 06/01/2024         Depression/ Anxiety/ PTSD / Bi polar   Clonazepam/ Buspar/ Latuda    Has a  Counselor/ Therapist / psychiatrist.  Sees every month. Stable    GERD- omeprazole -PPI. Stopped/ On Pepcid Has had EGD Globus sensation. Many tests done.    Hypertension-  Cozaar 50mg . Does not monitor BP at home    BALDEMAR- CPAP    Obesity/ Fatty liver   dx on CT 5/2023  Reports weight loss. \"Used to be 250\"       Care Gaps-  OB-GYN- none     told early menopause.   Amenorrhea.   PAP- ?

## 2024-07-24 NOTE — PATIENT INSTRUCTIONS
Services  Local Department of  contacts: https://www.[facility]/localagency/index.cgi    [place], [place]ordability Programs  https://[place]va.gov/public-utilities/billing  Call:  741.609.7299   Email:  claus@[facility].gov  Programs available:  Equal Monthly Payment Plan, MetroCare Heat Program, MetroCare Water Program, MetroCare Water Conservation Program, Senior Assistance, Other Fuel Assistance Programs, PromisePay Payment Plans, Low-Income Household Water Assistance Program (LIHWAP)    Financial Opportunity Centers:  [employer]   What they offer: free coaching services in the areas of Employment, Financial, and Benefits Access   Phone Number: [phone]   Address: [address] [place] [address]   Website: https://www.[employer].org/economic-resource-center/htxahmjwk-bjftetcqgtk-oddsqs-[place]/          [facility]   What they offer: free coaching services in the areas of Employment, Financial, and Benefits Access   Phone Number: 508.178.7114   Email: info@[employer].org   Address: [address] [place] [address]   Website: https://[facility] Centera[facility].org/[facility]-works-a-galo-pvgwnrxwh-jmvauldjlsu-center/       [facility] [employer]  [employer] [facility]   What they offer: free coaching services in the areas of Employment, Financial, and Benefits Access   Phone Number: 676.843.6199   Email: community@[employer]   Address: [address], [facility] [place] [address]

## 2024-07-24 NOTE — PROGRESS NOTES
I have reviewed all needed documentation in preparation for visit. Verified patient by name and date of birth  Chief Complaint   Patient presents with    Establish Care       There were no vitals filed for this visit.    Health Maintenance Due   Topic Date Due    Hepatitis B vaccine (1 of 3 - 3-dose series) Never done    COVID-19 Vaccine (1) Never done    Pneumococcal 0-64 years Vaccine (1 of 2 - PCV) Never done    Diabetic foot exam  Never done    A1C test (Diabetic or Prediabetic)  Never done    Lipids  Never done    Depression Monitoring  Never done    Diabetic Alb to Cr ratio (uACR) test  Never done    Diabetic retinal exam  Never done    Hepatitis C screen  Never done    DTaP/Tdap/Td vaccine (1 - Tdap) Never done     \"Have you been to the ER, urgent care clinic since your last visit?  Hospitalized since your last visit?\"    [facility] 2 to 3 wks ago , [place]     “Have you seen or consulted any other health care providers outside of [facility] since your last visit?”    NO            Click Here for Release of Records Request         Angelika Chapa [facility]

## 2024-07-25 DIAGNOSIS — D50.9 IRON DEFICIENCY ANEMIA, UNSPECIFIED IRON DEFICIENCY ANEMIA TYPE: Primary | ICD-10-CM

## 2024-07-25 LAB
ALBUMIN SERPL-MCNC: 4 G/DL (ref 3.5–5)
ALBUMIN/GLOB SERPL: 1.2 (ref 1.1–2.2)
ALP SERPL-CCNC: 84 U/L (ref 45–117)
ALT SERPL-CCNC: 23 U/L (ref 12–78)
ANION GAP SERPL CALC-SCNC: 10 MMOL/L (ref 5–15)
AST SERPL-CCNC: 16 U/L (ref 15–37)
BASOPHILS # BLD: 0.1 K/UL (ref 0–0.1)
BASOPHILS NFR BLD: 1 % (ref 0–1)
BILIRUB SERPL-MCNC: 0.4 MG/DL (ref 0.2–1)
BUN SERPL-MCNC: 5 MG/DL (ref 6–20)
BUN/CREAT SERPL: 7 (ref 12–20)
CALCIUM SERPL-MCNC: 9.5 MG/DL (ref 8.5–10.1)
CHLORIDE SERPL-SCNC: 100 MMOL/L (ref 97–108)
CHOLEST SERPL-MCNC: 242 MG/DL
CO2 SERPL-SCNC: 24 MMOL/L (ref 21–32)
CREAT SERPL-MCNC: 0.75 MG/DL (ref 0.55–1.02)
DIFFERENTIAL METHOD BLD: ABNORMAL
EOSINOPHIL # BLD: 0.1 K/UL (ref 0–0.4)
EOSINOPHIL NFR BLD: 1 % (ref 0–7)
ERYTHROCYTE [DISTWIDTH] IN BLOOD BY AUTOMATED COUNT: 20 % (ref 11.5–14.5)
EST. AVERAGE GLUCOSE BLD GHB EST-MCNC: 206 MG/DL
GLOBULIN SER CALC-MCNC: 3.4 G/DL (ref 2–4)
GLUCOSE SERPL-MCNC: 158 MG/DL (ref 65–100)
HBA1C MFR BLD: 8.8 % (ref 4–5.6)
HCT VFR BLD AUTO: 29.6 % (ref 35–47)
HDLC SERPL-MCNC: 57 MG/DL
HDLC SERPL: 4.2 (ref 0–5)
HGB BLD-MCNC: 8.7 G/DL (ref 11.5–16)
IMM GRANULOCYTES # BLD AUTO: 0.1 K/UL (ref 0–0.04)
IMM GRANULOCYTES NFR BLD AUTO: 1 % (ref 0–0.5)
IRON SATN MFR SERPL: 5 % (ref 20–50)
IRON SERPL-MCNC: 22 UG/DL (ref 35–150)
LDLC SERPL CALC-MCNC: 147 MG/DL (ref 0–100)
LYMPHOCYTES # BLD: 1.7 K/UL (ref 0.8–3.5)
LYMPHOCYTES NFR BLD: 20 % (ref 12–49)
MCH RBC QN AUTO: 19.6 PG (ref 26–34)
MCHC RBC AUTO-ENTMCNC: 29.4 G/DL (ref 30–36.5)
MCV RBC AUTO: 66.8 FL (ref 80–99)
MONOCYTES # BLD: 0.6 K/UL (ref 0–1)
MONOCYTES NFR BLD: 7 % (ref 5–13)
NEUTS SEG # BLD: 5.8 K/UL (ref 1.8–8)
NEUTS SEG NFR BLD: 70 % (ref 32–75)
NRBC # BLD: 0 K/UL (ref 0–0.01)
NRBC BLD-RTO: 0 PER 100 WBC
PLATELET # BLD AUTO: 337 K/UL (ref 150–400)
PLATELET COMMENT: ABNORMAL
POTASSIUM SERPL-SCNC: 3.9 MMOL/L (ref 3.5–5.1)
PROT SERPL-MCNC: 7.4 G/DL (ref 6.4–8.2)
RBC # BLD AUTO: 4.43 M/UL (ref 3.8–5.2)
RBC MORPH BLD: ABNORMAL
SODIUM SERPL-SCNC: 134 MMOL/L (ref 136–145)
TIBC SERPL-MCNC: 482 UG/DL (ref 250–450)
TRIGL SERPL-MCNC: 190 MG/DL
TSH SERPL DL<=0.05 MIU/L-ACNC: 0.61 UIU/ML (ref 0.36–3.74)
VLDLC SERPL CALC-MCNC: 38 MG/DL
WBC # BLD AUTO: 8.4 K/UL (ref 3.6–11)

## 2024-07-25 RX ORDER — FERROUS SULFATE 220 (44)/5
220 ELIXIR ORAL DAILY
Qty: 450 ML | Refills: 2 | Status: SHIPPED | OUTPATIENT
Start: 2024-07-25 | End: 2025-07-25

## 2024-07-31 ENCOUNTER — TELEPHONE (OUTPATIENT)
Age: 44
End: 2024-07-31

## 2024-07-31 NOTE — TELEPHONE ENCOUNTER
Called and spoke with pt.  Verified identity x2.    Relayed Swati's message regarding recent labs  \"Your anemia is worse. I am sending an iron supplement to pharmacy. Its a solution. Easier to digest. If the anemia is because of menstrual bleeding, you need to see a gynecologist for options to control. Call soon please. NO referral needed.  If not, you need to see a gastroenterologist to find out why.  Cholesterol panel is high.  The 10-year ASCVD risk score (Eda DK, et al., 2019) is: 3.1%. This and having diabetes, you should be on a statin medication.  I see you have an allergy so your option is only to add Omega 3 FA fish oil supplement to diet.  A1c is 8.0, you are on maximum dose of metformin.  I need to add another agent to help lower. We can discuss at next visit.   And recheck.  I sent iron, diabetes drug to pharmacy.  I will see you soon.\"  Pt verbalized understanding    Pt states recent Rx were sent to incorrect pharmacy  Pt will p/u meds from [facility] if they're ready, but is asking for all future Rx to be sent to [facility] on [address] (in chart)    [facility] on [address] [facility] and on [address] were removed upon pt request    Cassie Garcia LPN

## 2024-07-31 NOTE — TELEPHONE ENCOUNTER
----- Message from Radha Lacy Tonyarinkuas sent at 7/30/2024 12:51 PM EDT -----  Regarding: ECC Results Request  ECC Results Request    Which lab or imaging result is the patient calling about: test result of labwork    Which provider ordered the test?Swati Diggs,    Was this a Non-[facility] Provider: No    Date the test was preformed (AMADOU/RENY/YYYY): 07/24/2024  --------------------------------------------------------------------------------------------------------------------------    Relationship to Patient: Self     Call Back Info: OK to leave message on voicemail  Preferred Call Back Number: Phone [phone]

## 2024-07-31 NOTE — TELEPHONE ENCOUNTER
Pt called to report pharmacy says iron is not covered by insurance  Pt also is asking for an Rx for fish oil    Should go to [facility] on [address]    Pt is aware that Swati is out of the office until 8/6    Cassie Garcia LPN

## 2024-08-04 DIAGNOSIS — E78.00 HYPERCHOLESTEREMIA: ICD-10-CM

## 2024-08-04 DIAGNOSIS — D50.8 OTHER IRON DEFICIENCY ANEMIA: Primary | ICD-10-CM

## 2024-08-04 RX ORDER — FERROUS SULFATE 325(65) MG
325 TABLET ORAL 2 TIMES DAILY
Qty: 60 TABLET | Refills: 5 | Status: SHIPPED | OUTPATIENT
Start: 2024-08-04

## 2024-08-04 RX ORDER — OMEGA-3 FATTY ACIDS/FISH OIL 300-1000MG
1 CAPSULE ORAL DAILY
Qty: 90 CAPSULE | Refills: 1 | Status: SHIPPED | OUTPATIENT
Start: 2024-08-04

## 2024-08-07 NOTE — TELEPHONE ENCOUNTER
Refill request received from [facility]    for   Requested Prescriptions     Pending Prescriptions Disp Refills    aspirin 81 MG chewable tablet 30 tablet      Sig: Take 1 tablet by mouth daily     Last office visit: 7/24/2024   Next office visit: 10/24/2024     Routed to KATIUSKA Johns for review.     Cassie Garcia LPN

## 2024-08-08 RX ORDER — ASPIRIN 81 MG/1
81 TABLET, CHEWABLE ORAL DAILY
Qty: 90 TABLET | Refills: 1 | Status: SHIPPED | OUTPATIENT
Start: 2024-08-08

## 2024-08-21 ENCOUNTER — HOSPITAL ENCOUNTER (EMERGENCY)
Facility: HOSPITAL | Age: 44
Discharge: HOME | End: 2024-08-21
Payer: MEDICARE

## 2024-08-21 ENCOUNTER — APPOINTMENT (OUTPATIENT)
Facility: HOSPITAL | Age: 44
End: 2024-08-21
Payer: MEDICARE

## 2024-08-21 VITALS
OXYGEN SATURATION: 97 % | BODY MASS INDEX: 39.14 KG/M2 | HEART RATE: 60 BPM | HEIGHT: 64 IN | RESPIRATION RATE: 20 BRPM | TEMPERATURE: 98.5 F | DIASTOLIC BLOOD PRESSURE: 79 MMHG | SYSTOLIC BLOOD PRESSURE: 134 MMHG | WEIGHT: 229.28 LBS

## 2024-08-21 DIAGNOSIS — D64.9 CHRONIC ANEMIA: ICD-10-CM

## 2024-08-21 DIAGNOSIS — K21.9 GASTROESOPHAGEAL REFLUX DISEASE, UNSPECIFIED WHETHER ESOPHAGITIS PRESENT: ICD-10-CM

## 2024-08-21 DIAGNOSIS — R09.A2 GLOBUS SENSATION: Primary | ICD-10-CM

## 2024-08-21 LAB
ALBUMIN SERPL-MCNC: 3.8 G/DL (ref 3.5–5)
ALBUMIN/GLOB SERPL: 1 (ref 1.1–2.2)
ALP SERPL-CCNC: 88 U/L (ref 45–117)
ALT SERPL-CCNC: 21 U/L (ref 12–78)
ANION GAP SERPL CALC-SCNC: 11 MMOL/L (ref 5–15)
AST SERPL-CCNC: 23 U/L (ref 15–37)
BASOPHILS # BLD: 0.1 K/UL (ref 0–0.1)
BASOPHILS NFR BLD: 1 % (ref 0–1)
BILIRUB SERPL-MCNC: 0.3 MG/DL (ref 0.2–1)
BUN SERPL-MCNC: 4 MG/DL (ref 6–20)
BUN/CREAT SERPL: 4 (ref 12–20)
CALCIUM SERPL-MCNC: 9.3 MG/DL (ref 8.5–10.1)
CHLORIDE SERPL-SCNC: 97 MMOL/L (ref 97–108)
CO2 SERPL-SCNC: 28 MMOL/L (ref 21–32)
CREAT SERPL-MCNC: 0.96 MG/DL (ref 0.55–1.02)
DIFFERENTIAL METHOD BLD: ABNORMAL
EOSINOPHIL # BLD: 0.1 K/UL (ref 0–0.4)
EOSINOPHIL NFR BLD: 2 % (ref 0–7)
ERYTHROCYTE [DISTWIDTH] IN BLOOD BY AUTOMATED COUNT: 19.8 % (ref 11.5–14.5)
GLOBULIN SER CALC-MCNC: 4 G/DL (ref 2–4)
GLUCOSE SERPL-MCNC: 167 MG/DL (ref 65–100)
HCT VFR BLD AUTO: 28.8 % (ref 35–47)
HGB BLD-MCNC: 8.5 G/DL (ref 11.5–16)
IMM GRANULOCYTES # BLD AUTO: 0.1 K/UL (ref 0–0.04)
IMM GRANULOCYTES NFR BLD AUTO: 1 % (ref 0–0.5)
LIPASE SERPL-CCNC: 42 U/L (ref 13–75)
LYMPHOCYTES # BLD: 1.6 K/UL (ref 0.8–3.5)
LYMPHOCYTES NFR BLD: 19 % (ref 12–49)
MCH RBC QN AUTO: 19.1 PG (ref 26–34)
MCHC RBC AUTO-ENTMCNC: 29.5 G/DL (ref 30–36.5)
MCV RBC AUTO: 64.7 FL (ref 80–99)
MONOCYTES # BLD: 0.6 K/UL (ref 0–1)
MONOCYTES NFR BLD: 7 % (ref 5–13)
NEUTS SEG # BLD: 6.1 K/UL (ref 1.8–8)
NEUTS SEG NFR BLD: 71 % (ref 32–75)
NRBC # BLD: 0 K/UL (ref 0–0.01)
NRBC BLD-RTO: 0 PER 100 WBC
PLATELET # BLD AUTO: 371 K/UL (ref 150–400)
PMV BLD AUTO: 10.2 FL (ref 8.9–12.9)
POTASSIUM SERPL-SCNC: 4.2 MMOL/L (ref 3.5–5.1)
PROT SERPL-MCNC: 7.8 G/DL (ref 6.4–8.2)
RBC # BLD AUTO: 4.45 M/UL (ref 3.8–5.2)
SODIUM SERPL-SCNC: 136 MMOL/L (ref 136–145)
TROPONIN I SERPL HS-MCNC: 6 NG/L (ref 0–51)
WBC # BLD AUTO: 8.5 K/UL (ref 3.6–11)

## 2024-08-21 PROCEDURE — 96374 THER/PROPH/DIAG INJ IV PUSH: CPT

## 2024-08-21 PROCEDURE — 83690 ASSAY OF LIPASE: CPT

## 2024-08-21 PROCEDURE — 96375 TX/PRO/DX INJ NEW DRUG ADDON: CPT

## 2024-08-21 PROCEDURE — 85025 COMPLETE CBC W/AUTO DIFF WBC: CPT

## 2024-08-21 PROCEDURE — 80053 COMPREHEN METABOLIC PANEL: CPT

## 2024-08-21 PROCEDURE — 71045 X-RAY EXAM CHEST 1 VIEW: CPT

## 2024-08-21 PROCEDURE — 84484 ASSAY OF TROPONIN QUANT: CPT

## 2024-08-21 PROCEDURE — 2580000003 HC RX 258: Performed by: PHYSICIAN ASSISTANT

## 2024-08-21 PROCEDURE — 6360000002 HC RX W HCPCS: Performed by: PHYSICIAN ASSISTANT

## 2024-08-21 PROCEDURE — 93005 ELECTROCARDIOGRAM TRACING: CPT | Performed by: PHYSICIAN ASSISTANT

## 2024-08-21 PROCEDURE — 36415 COLL VENOUS BLD VENIPUNCTURE: CPT

## 2024-08-21 PROCEDURE — 6370000000 HC RX 637 (ALT 250 FOR IP): Performed by: PHYSICIAN ASSISTANT

## 2024-08-21 PROCEDURE — 99285 EMERGENCY DEPT VISIT HI MDM: CPT

## 2024-08-21 RX ORDER — ONDANSETRON 2 MG/ML
4 INJECTION INTRAMUSCULAR; INTRAVENOUS ONCE
Status: COMPLETED | OUTPATIENT
Start: 2024-08-21 | End: 2024-08-21

## 2024-08-21 RX ORDER — FLUCONAZOLE 150 MG/1
150 TABLET ORAL ONCE
Qty: 1 TABLET | Refills: 0 | Status: SHIPPED | OUTPATIENT
Start: 2024-08-21 | End: 2024-08-21

## 2024-08-21 RX ORDER — KETOROLAC TROMETHAMINE 30 MG/ML
15 INJECTION, SOLUTION INTRAMUSCULAR; INTRAVENOUS ONCE
Status: COMPLETED | OUTPATIENT
Start: 2024-08-21 | End: 2024-08-21

## 2024-08-21 RX ORDER — KETOROLAC TROMETHAMINE 30 MG/ML
15 INJECTION, SOLUTION INTRAMUSCULAR; INTRAVENOUS ONCE
Status: DISCONTINUED | OUTPATIENT
Start: 2024-08-21 | End: 2024-08-21

## 2024-08-21 RX ORDER — HYDROCODONE BITARTRATE AND ACETAMINOPHEN 5; 325 MG/1; MG/1
1 TABLET ORAL
Status: DISCONTINUED | OUTPATIENT
Start: 2024-08-21 | End: 2024-08-21 | Stop reason: HOSPADM

## 2024-08-21 RX ORDER — DOXYCYCLINE HYCLATE 100 MG
100 TABLET ORAL 2 TIMES DAILY
Qty: 14 TABLET | Refills: 0 | Status: SHIPPED | OUTPATIENT
Start: 2024-08-21 | End: 2024-08-28

## 2024-08-21 RX ORDER — 0.9 % SODIUM CHLORIDE 0.9 %
1000 INTRAVENOUS SOLUTION INTRAVENOUS ONCE
Status: COMPLETED | OUTPATIENT
Start: 2024-08-21 | End: 2024-08-21

## 2024-08-21 RX ADMIN — SODIUM CHLORIDE 1000 ML: 9 INJECTION, SOLUTION INTRAVENOUS at 09:37

## 2024-08-21 RX ADMIN — LIDOCAINE HYDROCHLORIDE 40 ML: 20 SOLUTION ORAL at 09:23

## 2024-08-21 RX ADMIN — KETOROLAC TROMETHAMINE 15 MG: 30 INJECTION, SOLUTION INTRAMUSCULAR at 10:21

## 2024-08-21 RX ADMIN — ONDANSETRON 4 MG: 2 INJECTION INTRAMUSCULAR; INTRAVENOUS at 09:36

## 2024-08-21 ASSESSMENT — PAIN DESCRIPTION - DESCRIPTORS: DESCRIPTORS: BURNING;SORE

## 2024-08-21 ASSESSMENT — PAIN DESCRIPTION - ONSET: ONSET: ON-GOING

## 2024-08-21 ASSESSMENT — PAIN SCALES - GENERAL: PAINLEVEL_OUTOF10: 9

## 2024-08-21 ASSESSMENT — PAIN DESCRIPTION - PAIN TYPE: TYPE: ACUTE PAIN

## 2024-08-21 ASSESSMENT — PAIN DESCRIPTION - FREQUENCY: FREQUENCY: CONTINUOUS

## 2024-08-21 ASSESSMENT — PAIN DESCRIPTION - ORIENTATION: ORIENTATION: MID

## 2024-08-21 ASSESSMENT — PAIN - FUNCTIONAL ASSESSMENT: PAIN_FUNCTIONAL_ASSESSMENT: PREVENTS OR INTERFERES SOME ACTIVE ACTIVITIES AND ADLS

## 2024-08-21 NOTE — DISCHARGE INSTRUCTIONS
Antibiotics as prescribed  Follow-up with ENT tomorrow as scheduled        Thank You!    It was a pleasure taking care of you in our Emergency Department today. We know that when you come to [facility], you are entrusting us with your health, comfort, and safety. Our clinicians honor that trust, and truly appreciate the opportunity to care for you and your loved ones.    If you receive a survey about your Emergency Department experience today, please fill it out.  We value your feedback. Thank you.      Ivette Swann PA-C    ___________________________________  I have included a copy of your lab results and/or radiologic studies from today's visit so you can have them easily available at your follow-up visit.   Recent Results (from the past 12 hour(s))   EKG 12 Lead    Collection Time: 08/21/24  9:24 AM   Result Value Ref Range    Ventricular Rate 94 BPM    Atrial Rate 94 BPM    P-R Interval 144 ms    QRS Duration 74 ms    Q-T Interval 376 ms    QTc Calculation (Bazett) 470 ms    P Axis 56 degrees    R Axis 35 degrees    T Axis 25 degrees    Diagnosis       Normal sinus rhythm  Normal ECG  When compared with ECG of 01-JUN-2024 14:38,  No significant change was found     CBC with Auto Differential    Collection Time: 08/21/24  9:28 AM   Result Value Ref Range    WBC 8.5 3.6 - 11.0 K/uL    RBC 4.45 3.80 - 5.20 M/uL    Hemoglobin 8.5 (L) 11.5 - 16.0 g/dL    Hematocrit 28.8 (L) 35.0 - 47.0 %    MCV 64.7 (L) 80.0 - 99.0 FL    MCH 19.1 (L) 26.0 - 34.0 PG    MCHC 29.5 (L) 30.0 - 36.5 g/dL    RDW 19.8 (H) 11.5 - 14.5 %    Platelets 371 150 - 400 K/uL    MPV 10.2 8.9 - 12.9 FL    Nucleated RBCs 0.0 0  WBC    nRBC 0.00 0.00 - 0.01 K/uL    Neutrophils % 71 32 - 75 %    Lymphocytes % 19 12 - 49 %    Monocytes % 7 5 - 13 %    Eosinophils % 2 0 - 7 %    Basophils % 1 0 - 1 %    Immature Granulocytes % 1 (H) 0.0 - 0.5 %    Neutrophils Absolute 6.1 1.8 - 8.0 K/UL    Lymphocytes Absolute

## 2024-08-21 NOTE — ED TRIAGE NOTES
Pt c/o worsening throat swelling and pain over last 4 years. Also c/o gastric reflux and heart burn. Pt requesting GI cocktail. Appt with ENT tomorrow

## 2024-08-21 NOTE — ED NOTES
Pt refused oral pain medication she reports \"my throat is swollen, I can't swallow.\" Observed pts airway and it is clear and patent. PA made aware

## 2024-08-21 NOTE — ED NOTES
Pt presents ambulatory to ED complaining of throat swelling and pain x4 days, pt airway is patent. She reports she has acid reflux and takes medication but reports her heartburn has been increasingly worse and having nausea. Pt denies chest pain or SOB. Pt has an appointment with an ENT tomorrow. Pt is alert and oriented x 4, RR even and unlabored, skin is warm and dry. Assessment completed and pt updated on plan of care.        Emergency Department Nursing Plan of Care        The Nursing Plan of Care is developed from the Nursing assessment and Emergency Department Attending provider initial evaluation.  The plan of care may be reviewed in the “ED Provider note”.

## 2024-08-21 NOTE — ED NOTES
Discharge instructions were given to the patient by Louise Macias RN  .     The patient left the Emergency Department ambulatory, alert and oriented and in no acute distress with 2 prescriptions. The patient was encouraged to call or return to the ED for worsening issues or problems and was encouraged to schedule a follow up appointment for continuing care. Patient discharged home ambulatory with a steady gait.    The patient verbalized understanding of discharge instructions and prescriptions, all questions were answered. The patient has no further concerns at this time.

## 2024-08-22 NOTE — ED PROVIDER NOTES
appropriate to obtain screening labs a set of cardiac enzymes and EKG.  Patient is requesting a GI cocktail and doxycycline.  Workup reassuring.  Patient endorses symptom improvement    See ED course note below for additional MDM    ED Course as of 08/22/24 0821   Wed Aug 21, 2024   0935 EKG interpreted by me.  Shows Normal Sinus rhythm with a HR of 94.  No ST elevations or depressions concerning for acute ischemia.   MELVINA LIU MD   [DB]   1115 Reviewed prior records including outpatient [facility] visit on 4/12/2024 for similar symptoms. [TL]   1117 Patient states he has a long list of allergies but in the past when she has had the symptoms she has been placed on doxycycline and this has helped.  Airway widely patent.  Clear and coherent speech.  Will cover doxycycline this is reasonable given she has multiple areas on her face that appear as though they have been probed/excoriated by the patient suspect the submental adenopathy could be secondary to this.  No evidence of Ra's angina, obstructive etiology, or other emergent conditions requiring further evaluation or management acutely here at this time. [TL]      ED Course User Index  [DB] Melvina Liu MD  [TL] Ivette Swann PA       No suggestion of epiglottitis, Ra's angina, abscess, obstructive etiology, or other emergent conditions requiring further evaluation or management acutely here at this time.           FINAL IMPRESSION     1. Globus sensation    2. Chronic anemia    3. Gastroesophageal reflux disease, unspecified whether esophagitis present          DISPOSITION/PLAN   DISPOSITION Decision To Discharge 08/21/2024 11:17:13 AM  Condition at Disposition: Stable      Discharge Note: The patient is stable for discharge home. The signs, symptoms, diagnosis, and discharge instructions have been discussed, understanding conveyed, and agreed upon. The patient is to follow up as recommended or return to ER should their symptoms worsen.

## 2024-08-23 LAB
EKG ATRIAL RATE: 94 BPM
EKG DIAGNOSIS: NORMAL
EKG P AXIS: 56 DEGREES
EKG P-R INTERVAL: 144 MS
EKG Q-T INTERVAL: 376 MS
EKG QRS DURATION: 74 MS
EKG QTC CALCULATION (BAZETT): 470 MS
EKG R AXIS: 35 DEGREES
EKG T AXIS: 25 DEGREES
EKG VENTRICULAR RATE: 94 BPM

## 2024-08-23 PROCEDURE — 93010 ELECTROCARDIOGRAM REPORT: CPT | Performed by: SPECIALIST

## 2024-09-02 ENCOUNTER — APPOINTMENT (OUTPATIENT)
Facility: HOSPITAL | Age: 44
End: 2024-09-02
Payer: MEDICARE

## 2024-09-02 ENCOUNTER — HOSPITAL ENCOUNTER (EMERGENCY)
Facility: HOSPITAL | Age: 44
Discharge: HOME | End: 2024-09-02
Payer: MEDICARE

## 2024-09-02 VITALS
SYSTOLIC BLOOD PRESSURE: 129 MMHG | RESPIRATION RATE: 16 BRPM | HEART RATE: 99 BPM | OXYGEN SATURATION: 99 % | BODY MASS INDEX: 39.35 KG/M2 | TEMPERATURE: 99.1 F | DIASTOLIC BLOOD PRESSURE: 67 MMHG | HEIGHT: 64 IN | WEIGHT: 230.5 LBS

## 2024-09-02 DIAGNOSIS — J02.9 ACUTE PHARYNGITIS, UNSPECIFIED ETIOLOGY: ICD-10-CM

## 2024-09-02 DIAGNOSIS — R09.A2 GLOBUS SENSATION: Primary | ICD-10-CM

## 2024-09-02 LAB
DEPRECATED S PYO AG THROAT QL EIA: NEGATIVE
FLUAV RNA SPEC QL NAA+PROBE: NOT DETECTED
FLUBV RNA SPEC QL NAA+PROBE: NOT DETECTED
SARS-COV-2 RNA RESP QL NAA+PROBE: NOT DETECTED
SOURCE: NORMAL

## 2024-09-02 PROCEDURE — 87636 SARSCOV2 & INF A&B AMP PRB: CPT

## 2024-09-02 PROCEDURE — 87070 CULTURE OTHR SPECIMN AEROBIC: CPT

## 2024-09-02 PROCEDURE — 6360000002 HC RX W HCPCS: Performed by: PHYSICIAN ASSISTANT

## 2024-09-02 PROCEDURE — 99284 EMERGENCY DEPT VISIT MOD MDM: CPT

## 2024-09-02 PROCEDURE — 87880 STREP A ASSAY W/OPTIC: CPT

## 2024-09-02 PROCEDURE — 6370000000 HC RX 637 (ALT 250 FOR IP): Performed by: PHYSICIAN ASSISTANT

## 2024-09-02 PROCEDURE — 70360 X-RAY EXAM OF NECK: CPT

## 2024-09-02 RX ORDER — DEXAMETHASONE SODIUM PHOSPHATE 10 MG/ML
10 INJECTION, SOLUTION INTRAMUSCULAR; INTRAVENOUS
Status: COMPLETED | OUTPATIENT
Start: 2024-09-02 | End: 2024-09-02

## 2024-09-02 RX ORDER — ACETAMINOPHEN 500 MG
1000 TABLET ORAL
Status: COMPLETED | OUTPATIENT
Start: 2024-09-02 | End: 2024-09-02

## 2024-09-02 RX ADMIN — LIDOCAINE HYDROCHLORIDE 40 ML: 20 SOLUTION ORAL at 10:17

## 2024-09-02 RX ADMIN — ACETAMINOPHEN 1000 MG: 500 TABLET ORAL at 10:18

## 2024-09-02 RX ADMIN — DEXAMETHASONE SODIUM PHOSPHATE 10 MG: 10 INJECTION, SOLUTION INTRAMUSCULAR; INTRAVENOUS at 10:18

## 2024-09-02 ASSESSMENT — ENCOUNTER SYMPTOMS
WHEEZING: 0
EYE PAIN: 0
SINUS PAIN: 0
VOMITING: 0
SINUS PRESSURE: 0
SHORTNESS OF BREATH: 0
COUGH: 0
BACK PAIN: 0
CHEST TIGHTNESS: 0
FACIAL SWELLING: 0
APNEA: 0
DIARRHEA: 0
VOICE CHANGE: 0
CHOKING: 0
SORE THROAT: 1
RHINORRHEA: 0
STRIDOR: 0
TROUBLE SWALLOWING: 1
NAUSEA: 0
ABDOMINAL PAIN: 0

## 2024-09-02 ASSESSMENT — PAIN DESCRIPTION - LOCATION: LOCATION: THROAT

## 2024-09-02 ASSESSMENT — PAIN SCALES - GENERAL: PAINLEVEL_OUTOF10: 9

## 2024-09-02 ASSESSMENT — PAIN DESCRIPTION - PAIN TYPE: TYPE: ACUTE PAIN

## 2024-09-02 ASSESSMENT — PAIN - FUNCTIONAL ASSESSMENT: PAIN_FUNCTIONAL_ASSESSMENT: 0-10

## 2024-09-02 ASSESSMENT — PAIN DESCRIPTION - DESCRIPTORS: DESCRIPTORS: SORE

## 2024-09-02 NOTE — ED PROVIDER NOTES
known as: Ventolin HFA  Inhale 2 puffs into the lungs 4 times daily as needed for Wheezing     aspirin 81 MG chewable tablet  Take 1 tablet by mouth daily     busPIRone 15 MG tablet  Commonly known as: BUSPAR     clonazePAM 0.5 MG tablet  Commonly known as: KLONOPIN     empagliflozin 10 MG tablet  Commonly known as: Jardiance  Take 1 tablet by mouth daily     famotidine 20 MG tablet  Commonly known as: PEPCID     ferrous sulfate 325 (65 Fe) MG tablet  Commonly known as: IRON 325  Take 1 tablet by mouth 2 times daily     fluticasone 50 MCG/ACT nasal spray  Commonly known as: FLONASE  2 sprays by Nasal route daily     gabapentin 100 MG capsule  Commonly known as: NEURONTIN  Take 1 capsule by mouth 3 times daily as needed (neuropathy) for up to 5 days. Intended supply: 90 days Max Daily Amount: 300 mg     ipratropium 0.5 mg-albuterol 2.5 mg 0.5-2.5 (3) MG/3ML Soln nebulizer solution  Commonly known as: DUONEB  Inhale 3 mLs into the lungs every 6 hours as needed for Shortness of Breath     losartan 50 MG tablet  Commonly known as: COZAAR  Take 1 tablet by mouth daily for 7 days     lurasidone 20 MG Tabs tablet  Commonly known as: LATUDA     metFORMIN 1000 MG tablet  Commonly known as: GLUCOPHAGE     NONFORMULARY     Omega 3 1000 MG Caps  Take 1 capsule by mouth daily                DISCONTINUED MEDICATIONS:  Current Discharge Medication List          I have seen and evaluated the patient autonomously. My supervision physician was on site and available for consultation if needed.     I am the Primary Clinician of Record.   Rona Vivar PA-C (electronically signed)    (Please note that parts of this dictation were completed with voice recognition software. Quite often unanticipated grammatical, syntax, homophones, and other interpretive errors are inadvertently transcribed by the computer software. Please disregards these errors. Please excuse any errors that have escaped final proofreading.)         Rona Vivar,

## 2024-09-02 NOTE — ED NOTES
Pt presents to ED complaining of chronic sore throat x 3 years with exacerbation x 1 week. Pt states that she has seen ENT for the pain, and has taken NyQuil and Benadryl with no relief. Pt endorses difficulty swallowing. Pt is alert and oriented x 4, RR even and unlabored, skin is warm and dry. Pt appears in NAD at this time. Assessment completed and pt updated on plan of care.  Call bell in reach.   Emergency Department Nursing Plan of Care  The Nursing Plan of Care is developed from the Nursing assessment and Emergency Department Attending provider initial evaluation.  The plan of care may be reviewed in the “ED Provider note”.  The Plan of Care was developed with the following considerations:  Patient / Family readiness to learn indicated by:Refer to Medical chart in [facility]  Persons(s) to be included in education: Refer to Medical chart in [facility]  Barriers to Learning/Limitations:Normal

## 2024-09-02 NOTE — ED NOTES
Discharge instructions were given to the patient by Yaron Remy RN  .  The patient left the Emergency Department alert and oriented and in no acute distress with 0 prescription(s). The patient was encouraged to call or return to the ED for worsening issues or problems and was encouraged to schedule a follow up appointment for continuing care.  The patient verbalized understanding of discharge instructions and prescriptions; all questions were answered. The patient has no further concerns at this time.

## 2024-09-04 LAB
BACTERIA SPEC CULT: NORMAL
SERVICE CMNT-IMP: NORMAL

## 2024-09-05 ENCOUNTER — TELEPHONE (OUTPATIENT)
Age: 44
End: 2024-09-05

## 2024-09-05 NOTE — TELEPHONE ENCOUNTER
Called pt to schedule and got disconnected in the middle of scheduling LVM for pt to call back and schedule ED follow up from [facility]

## 2024-09-05 NOTE — TELEPHONE ENCOUNTER
----- Message from Jame EARLY sent at 9/5/2024  9:08 AM EDT -----  Regarding: [facility] Appointment Request  [facility] Appointment Request    Patient needs appointment for [facility] Appointment Type: ED Follow-Up.    Patient Requested Dates(s): as soon as possible, preferably next week  Patient Requested Time: afternoon preferably  Provider Name: Swati ROMO, KATIUSKA    Reason for Appointment Request: Established Patient - Available appointments did not meet patient need - Patient has plans on 09/06 and would like to reschedule as soon as possible.  --------------------------------------------------------------------------------------------------------------------------    Relationship to Patient: Self     Call Back Information: OK to leave message on voicemail  Preferred Call Back Number: Phone [phone]

## 2024-09-11 ENCOUNTER — HOSPITAL ENCOUNTER (EMERGENCY)
Facility: HOSPITAL | Age: 44
Discharge: HOME | End: 2024-09-11
Payer: MEDICARE

## 2024-09-11 VITALS
HEART RATE: 100 BPM | RESPIRATION RATE: 19 BRPM | BODY MASS INDEX: 39.69 KG/M2 | DIASTOLIC BLOOD PRESSURE: 79 MMHG | HEIGHT: 64 IN | WEIGHT: 232.5 LBS | SYSTOLIC BLOOD PRESSURE: 129 MMHG | OXYGEN SATURATION: 98 % | TEMPERATURE: 98.5 F

## 2024-09-11 DIAGNOSIS — J02.9 SORE THROAT: ICD-10-CM

## 2024-09-11 DIAGNOSIS — R09.A2 GLOBUS SENSATION: Primary | ICD-10-CM

## 2024-09-11 PROCEDURE — 99283 EMERGENCY DEPT VISIT LOW MDM: CPT

## 2024-09-11 RX ORDER — ACETAMINOPHEN 500 MG
500 TABLET ORAL EVERY 6 HOURS PRN
Qty: 28 TABLET | Refills: 0 | Status: SHIPPED | OUTPATIENT
Start: 2024-09-11 | End: 2024-09-18

## 2024-09-11 RX ORDER — IBUPROFEN 600 MG/1
600 TABLET, FILM COATED ORAL 3 TIMES DAILY PRN
Qty: 30 TABLET | Refills: 0 | Status: SHIPPED | OUTPATIENT
Start: 2024-09-11

## 2024-09-11 RX ORDER — ACETAMINOPHEN 500 MG
1000 TABLET ORAL
Status: DISCONTINUED | OUTPATIENT
Start: 2024-09-11 | End: 2024-09-11 | Stop reason: HOSPADM

## 2024-09-11 ASSESSMENT — PAIN - FUNCTIONAL ASSESSMENT: PAIN_FUNCTIONAL_ASSESSMENT: 0-10

## 2024-09-11 ASSESSMENT — PAIN SCALES - GENERAL: PAINLEVEL_OUTOF10: 9

## 2024-09-11 ASSESSMENT — PAIN DESCRIPTION - ORIENTATION: ORIENTATION: INNER

## 2024-09-11 ASSESSMENT — PAIN DESCRIPTION - DESCRIPTORS: DESCRIPTORS: SORE

## 2024-09-11 ASSESSMENT — PAIN DESCRIPTION - LOCATION: LOCATION: THROAT

## 2024-09-20 ENCOUNTER — HOSPITAL ENCOUNTER (OUTPATIENT)
Facility: HOSPITAL | Age: 44
Discharge: HOME | End: 2024-09-23
Payer: MEDICARE

## 2024-09-20 DIAGNOSIS — R13.10 DYSPHAGIA, UNSPECIFIED TYPE: ICD-10-CM

## 2024-09-20 DIAGNOSIS — Z78.9 NON-SMOKER: ICD-10-CM

## 2024-09-20 DIAGNOSIS — R22.1 NECK SWELLING: ICD-10-CM

## 2024-09-20 DIAGNOSIS — E11.9 DIABETES MELLITUS WITHOUT COMPLICATION (HCC): ICD-10-CM

## 2024-09-20 PROCEDURE — 70491 CT SOFT TISSUE NECK W/DYE: CPT

## 2024-09-20 PROCEDURE — 6360000004 HC RX CONTRAST MEDICATION: Performed by: RADIOLOGY

## 2024-09-20 RX ORDER — IOPAMIDOL 612 MG/ML
100 INJECTION, SOLUTION INTRAVASCULAR
Status: COMPLETED | OUTPATIENT
Start: 2024-09-20 | End: 2024-09-20

## 2024-09-20 RX ADMIN — IOPAMIDOL 100 ML: 612 INJECTION, SOLUTION INTRAVENOUS at 11:32

## 2024-10-18 ENCOUNTER — TELEPHONE (OUTPATIENT)
Age: 44
End: 2024-10-18

## 2024-10-18 NOTE — TELEPHONE ENCOUNTER
Refill request received from [facility] for   Requested Prescriptions     Pending Prescriptions Disp Refills    metFORMIN (GLUCOPHAGE) 1000 MG tablet 60 tablet 3     Sig: Take 1 tablet by mouth 2 times daily (with meals)     Last office visit: 7/24/2024   Next office visit: 10/24/2024     Routed to KATIUSKA Johns for review.         Angelika Chapa Cma

## 2025-02-21 ENCOUNTER — TELEPHONE (OUTPATIENT)
Age: 45
End: 2025-02-21

## 2025-02-21 RX ORDER — LOSARTAN POTASSIUM 50 MG/1
50 TABLET ORAL DAILY
Qty: 30 TABLET | Refills: 1 | Status: SHIPPED | OUTPATIENT
Start: 2025-02-21

## 2025-02-21 RX ORDER — ALBUTEROL SULFATE 90 UG/1
2 INHALANT RESPIRATORY (INHALATION) 4 TIMES DAILY PRN
Qty: 18 G | Refills: 0 | Status: SHIPPED | OUTPATIENT
Start: 2025-02-21

## 2025-02-21 NOTE — TELEPHONE ENCOUNTER
Patient requesting refills of losartan (COZAAR) 50 MG tablet, aspirin 81 MG chewable tablet, and albuterol sulfate HFA (VENTOLIN HFA) 108 (90 Base) MCG/ACT inhaler to be sent to the [facility] on file. I let her know that I could not guarantee medication refills without an appointment as she has not been seen in last 6 months and KATIUSKA Johns is not the original prescriber of albuterol sulfate HFA (VENTOLIN HFA) 108 (90 Base) MCG/ACT inhaler or losartan (COZAAR) 50 MG tablet. Patient scheduled for a 6 month follow up on 03/03/25.

## 2025-03-18 ENCOUNTER — OFFICE VISIT (OUTPATIENT)
Age: 45
End: 2025-03-18
Payer: MEDICARE

## 2025-03-18 VITALS
HEART RATE: 98 BPM | OXYGEN SATURATION: 98 % | WEIGHT: 230.4 LBS | HEIGHT: 64 IN | DIASTOLIC BLOOD PRESSURE: 91 MMHG | SYSTOLIC BLOOD PRESSURE: 165 MMHG | RESPIRATION RATE: 20 BRPM | TEMPERATURE: 97.7 F | BODY MASS INDEX: 39.34 KG/M2

## 2025-03-18 DIAGNOSIS — B37.31 VAGINAL YEAST INFECTION: ICD-10-CM

## 2025-03-18 DIAGNOSIS — K21.00 GASTROESOPHAGEAL REFLUX DISEASE WITH ESOPHAGITIS WITHOUT HEMORRHAGE: ICD-10-CM

## 2025-03-18 DIAGNOSIS — E11.42 DIABETIC POLYNEUROPATHY ASSOCIATED WITH TYPE 2 DIABETES MELLITUS: ICD-10-CM

## 2025-03-18 DIAGNOSIS — R29.898 LEG WEAKNESS, BILATERAL: ICD-10-CM

## 2025-03-18 DIAGNOSIS — I10 PRIMARY HYPERTENSION: ICD-10-CM

## 2025-03-18 DIAGNOSIS — D50.9 IRON DEFICIENCY ANEMIA, UNSPECIFIED IRON DEFICIENCY ANEMIA TYPE: ICD-10-CM

## 2025-03-18 DIAGNOSIS — F41.8 DEPRESSION WITH ANXIETY: ICD-10-CM

## 2025-03-18 DIAGNOSIS — E78.00 HYPERCHOLESTEREMIA: ICD-10-CM

## 2025-03-18 DIAGNOSIS — E11.65 UNCONTROLLED TYPE 2 DIABETES MELLITUS WITH HYPERGLYCEMIA (HCC): Primary | ICD-10-CM

## 2025-03-18 DIAGNOSIS — J30.1 SEASONAL ALLERGIC RHINITIS DUE TO POLLEN: ICD-10-CM

## 2025-03-18 PROCEDURE — 99215 OFFICE O/P EST HI 40 MIN: CPT | Performed by: NURSE PRACTITIONER

## 2025-03-18 PROCEDURE — G2211 COMPLEX E/M VISIT ADD ON: HCPCS | Performed by: NURSE PRACTITIONER

## 2025-03-18 PROCEDURE — 3077F SYST BP >= 140 MM HG: CPT | Performed by: NURSE PRACTITIONER

## 2025-03-18 PROCEDURE — 3080F DIAST BP >= 90 MM HG: CPT | Performed by: NURSE PRACTITIONER

## 2025-03-18 RX ORDER — LOSARTAN POTASSIUM 50 MG/1
50 TABLET ORAL DAILY
Qty: 90 TABLET | Refills: 3 | Status: SHIPPED | OUTPATIENT
Start: 2025-03-18

## 2025-03-18 RX ORDER — FLUCONAZOLE 150 MG/1
150 TABLET ORAL ONCE
Qty: 1 TABLET | Refills: 0 | Status: SHIPPED | OUTPATIENT
Start: 2025-03-18 | End: 2025-03-18

## 2025-03-18 RX ORDER — FAMOTIDINE 20 MG/1
20 TABLET, FILM COATED ORAL 2 TIMES DAILY
Qty: 60 TABLET | Refills: 3 | Status: SHIPPED | OUTPATIENT
Start: 2025-03-18

## 2025-03-18 RX ORDER — LORATADINE 10 MG/1
10 TABLET ORAL DAILY
Qty: 30 TABLET | Refills: 3 | Status: SHIPPED | OUTPATIENT
Start: 2025-03-18

## 2025-03-18 RX ORDER — BUSPIRONE HYDROCHLORIDE 15 MG/1
15 TABLET ORAL 2 TIMES DAILY
Qty: 180 TABLET | Refills: 0 | Status: SHIPPED | OUTPATIENT
Start: 2025-03-18

## 2025-03-18 SDOH — ECONOMIC STABILITY: FOOD INSECURITY: WITHIN THE PAST 12 MONTHS, YOU WORRIED THAT YOUR FOOD WOULD RUN OUT BEFORE YOU GOT MONEY TO BUY MORE.: SOMETIMES TRUE

## 2025-03-18 SDOH — ECONOMIC STABILITY: FOOD INSECURITY: WITHIN THE PAST 12 MONTHS, THE FOOD YOU BOUGHT JUST DIDN'T LAST AND YOU DIDN'T HAVE MONEY TO GET MORE.: SOMETIMES TRUE

## 2025-03-18 ASSESSMENT — PATIENT HEALTH QUESTIONNAIRE - PHQ9
3. TROUBLE FALLING OR STAYING ASLEEP: NEARLY EVERY DAY
SUM OF ALL RESPONSES TO PHQ QUESTIONS 1-9: 10
SUM OF ALL RESPONSES TO PHQ QUESTIONS 1-9: 10
5. POOR APPETITE OR OVEREATING: NEARLY EVERY DAY
10. IF YOU CHECKED OFF ANY PROBLEMS, HOW DIFFICULT HAVE THESE PROBLEMS MADE IT FOR YOU TO DO YOUR WORK, TAKE CARE OF THINGS AT HOME, OR GET ALONG WITH OTHER PEOPLE: NOT DIFFICULT AT ALL
6. FEELING BAD ABOUT YOURSELF - OR THAT YOU ARE A FAILURE OR HAVE LET YOURSELF OR YOUR FAMILY DOWN: NOT AT ALL
1. LITTLE INTEREST OR PLEASURE IN DOING THINGS: NOT AT ALL
8. MOVING OR SPEAKING SO SLOWLY THAT OTHER PEOPLE COULD HAVE NOTICED. OR THE OPPOSITE, BEING SO FIGETY OR RESTLESS THAT YOU HAVE BEEN MOVING AROUND A LOT MORE THAN USUAL: NOT AT ALL
4. FEELING TIRED OR HAVING LITTLE ENERGY: NEARLY EVERY DAY
SUM OF ALL RESPONSES TO PHQ QUESTIONS 1-9: 10
2. FEELING DOWN, DEPRESSED OR HOPELESS: SEVERAL DAYS
7. TROUBLE CONCENTRATING ON THINGS, SUCH AS READING THE NEWSPAPER OR WATCHING TELEVISION: NOT AT ALL
SUM OF ALL RESPONSES TO PHQ QUESTIONS 1-9: 10
9. THOUGHTS THAT YOU WOULD BE BETTER OFF DEAD, OR OF HURTING YOURSELF: NOT AT ALL

## 2025-03-18 ASSESSMENT — ENCOUNTER SYMPTOMS
VOMITING: 0
RHINORRHEA: 1
COUGH: 0
DIARRHEA: 0
BACK PAIN: 1
CONSTIPATION: 0

## 2025-03-18 NOTE — PATIENT INSTRUCTIONS
Begin taking medication as directed  Call for an appointment with psychiatry  Lab work done and return to office in 1 week.  Call gynecology for women's health exam  X-ray of lumbar spine/will call with results  Referral to orthopedics.
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
## 2025-03-18 NOTE — PROGRESS NOTES
I have reviewed all needed documentation in preparation for visit. Verified patient by name and date of birth  Chief Complaint   Patient presents with    Follow-up    Leg Pain     Pain radiating through legs; been happening every since she fell in September     Allergies     Seasonal complain of itchy throat because of pollen     Medication Refill       Vitals:    03/18/25 1423   BP: (!) 165/91  Comment: patient did not take medication today   BP Site: Left Upper Arm   Patient Position: Sitting   BP Cuff Size: Large Adult   Pulse: 98   Resp: 20   Temp: 97.7 °F (36.5 °C)   TempSrc: Temporal   SpO2: 98%   Weight: 104.5 kg (230 lb 6.4 oz)   Height: 1.626 m (5' 4\")       Health Maintenance Due   Topic Date Due    Diabetic foot exam  Never done    Diabetic retinal exam  Never done    Hepatitis C screen  Never done    Hepatitis B vaccine (1 of 3 - 19+ 3-dose series) Never done    DTaP/Tdap/Td vaccine (1 - Tdap) Never done    Pneumococcal 0-49 years Vaccine (1 of 2 - PCV) Never done    Flu vaccine (1) Never done    COVID-19 Vaccine (1 - 2024-25 season) Never done    Breast cancer screen  11/23/2024    Annual Wellness Visit (Medicare Advantage)  Never done    Colorectal Cancer Screen  03/03/2025     \"Have you been to the ER, urgent care clinic since your last visit?  Hospitalized since your last visit?\"    NO    “Have you seen or consulted any other health care providers outside of [facility] since your last visit?”    NO    Have you had a mammogram?”   NO    Date of last Mammogram: 11/23/2022         “Have you had a colorectal cancer screening such as a colonoscopy/FIT/Cologuard?    NO    Date of last Colonoscopy: 3/3/2015  No cologuard on file  Date of last FIT: 9/10/2020   No flexible sigmoidoscopy on file         Click Here for Release of Records Request         NEFTALY Regan

## 2025-03-19 DIAGNOSIS — K21.00 GASTROESOPHAGEAL REFLUX DISEASE WITH ESOPHAGITIS WITHOUT HEMORRHAGE: ICD-10-CM

## 2025-03-19 DIAGNOSIS — E11.65 UNCONTROLLED TYPE 2 DIABETES MELLITUS WITH HYPERGLYCEMIA (HCC): ICD-10-CM

## 2025-03-19 RX ORDER — FAMOTIDINE 20 MG/1
20 TABLET, FILM COATED ORAL 2 TIMES DAILY
Qty: 180 TABLET | Refills: 1 | OUTPATIENT
Start: 2025-03-19

## 2025-03-20 ENCOUNTER — RESULTS FOLLOW-UP (OUTPATIENT)
Age: 45
End: 2025-03-20

## 2025-03-20 RX ORDER — ALBUTEROL SULFATE 90 UG/1
2 INHALANT RESPIRATORY (INHALATION) 4 TIMES DAILY PRN
Qty: 18 EACH | Refills: 3 | Status: SHIPPED | OUTPATIENT
Start: 2025-03-20

## 2025-03-25 ENCOUNTER — PATIENT MESSAGE (OUTPATIENT)
Age: 45
End: 2025-03-25

## 2025-03-25 ENCOUNTER — TELEPHONE (OUTPATIENT)
Age: 45
End: 2025-03-25

## 2025-03-25 NOTE — TELEPHONE ENCOUNTER
Pt is calling about her lab results I was able to read off the note that was left from provider but pt doesn't feel like it gives any details to why she is having pain in her legs and lower back. Pt would like to know if she could be prescribed something for her pain but was told that she had to wait for her bloodwork to come back. Pt would like a call back from her provider in regards to this pain and if she needs to make another appt or not. Please advise.

## 2025-03-31 ENCOUNTER — TELEPHONE (OUTPATIENT)
Age: 45
End: 2025-03-31

## 2025-03-31 RX ORDER — FERROUS SULFATE 325(65) MG
325 TABLET ORAL
Qty: 90 TABLET | Refills: 1 | Status: SHIPPED | OUTPATIENT
Start: 2025-03-31

## 2025-03-31 NOTE — TELEPHONE ENCOUNTER
Pt would like a call back from her provider or clinical staff pertaining to her Iron infusions/pills.

## 2025-03-31 NOTE — TELEPHONE ENCOUNTER
Called and spoke with pt.  Verified identity x2.    Pt states she  needs to go for iron infusion, or get iron pills.  Where does she go ?  Or will she get iron pills  Read Swati's lab note to pt  Pt wants to know what Swati thinks is best    Pt reports she has not been taking iron pills    States she cancelled today's appt du to she is too week to come in     States she will schedule a colonoscopy in the future, wants to concentrate on getting iron levels straight  This nurse reiterated that the colonoscopy could find a reason for her anemia    Cassie Garcia LPN

## 2025-04-01 ENCOUNTER — TELEPHONE (OUTPATIENT)
Age: 45
End: 2025-04-01

## 2025-04-01 NOTE — TELEPHONE ENCOUNTER
Patient called and stated that she needs to make a NP appt to be see. Requested a call back.     # [phone]

## 2025-04-01 NOTE — TELEPHONE ENCOUNTER
Called and spoke with pt.  Verified identity x2.    Relayed Swati's information to pt  Informed pt that iron supplement Rx was sent to pharmacy  Advised pt that infusion order will come through hematology  Provided phone number for [facility]  Advised pt to call and set up appointment at a convenient location  Advised pt to call her insurance to discuss any transportation benefits  Advised pt she can find resources on [employer] website  Pt verbalized understanding.    Cassie Garcia LPN

## 2025-04-01 NOTE — TELEPHONE ENCOUNTER
Called and spoke with pt.  Verified identity x2.     Pt lives in [place], needs referral to someplace closer to where she lives.  Pt asked again about iron infusions.  Said she had it before    Says she needs an Rx for the Iron supplement due to being on a fixed income she cannot get things OTC    Cassie Garcia LPN

## 2025-04-14 ENCOUNTER — APPOINTMENT (OUTPATIENT)
Facility: HOSPITAL | Age: 45
End: 2025-04-14
Payer: MEDICARE

## 2025-04-14 ENCOUNTER — OFFICE VISIT (OUTPATIENT)
Age: 45
End: 2025-04-14
Payer: MEDICARE

## 2025-04-14 ENCOUNTER — HOSPITAL ENCOUNTER (EMERGENCY)
Facility: HOSPITAL | Age: 45
Discharge: HOME | End: 2025-04-14
Payer: MEDICARE

## 2025-04-14 VITALS
OXYGEN SATURATION: 96 % | RESPIRATION RATE: 18 BRPM | HEART RATE: 100 BPM | BODY MASS INDEX: 38.89 KG/M2 | WEIGHT: 227.8 LBS | SYSTOLIC BLOOD PRESSURE: 142 MMHG | HEIGHT: 64 IN | TEMPERATURE: 98.1 F | DIASTOLIC BLOOD PRESSURE: 89 MMHG

## 2025-04-14 VITALS
HEIGHT: 64 IN | WEIGHT: 228.4 LBS | RESPIRATION RATE: 20 BRPM | DIASTOLIC BLOOD PRESSURE: 78 MMHG | TEMPERATURE: 99.1 F | SYSTOLIC BLOOD PRESSURE: 158 MMHG | OXYGEN SATURATION: 97 % | HEART RATE: 92 BPM | BODY MASS INDEX: 38.99 KG/M2

## 2025-04-14 DIAGNOSIS — H92.09 OTALGIA, UNSPECIFIED LATERALITY: ICD-10-CM

## 2025-04-14 DIAGNOSIS — D50.0 IRON DEFICIENCY ANEMIA DUE TO CHRONIC BLOOD LOSS: Primary | ICD-10-CM

## 2025-04-14 DIAGNOSIS — J02.9 ACUTE PHARYNGITIS, UNSPECIFIED ETIOLOGY: Primary | ICD-10-CM

## 2025-04-14 DIAGNOSIS — R03.0 ELEVATED BLOOD PRESSURE READING: ICD-10-CM

## 2025-04-14 LAB
FLUAV RNA SPEC QL NAA+PROBE: NOT DETECTED
FLUBV RNA SPEC QL NAA+PROBE: NOT DETECTED
SARS-COV-2 RNA RESP QL NAA+PROBE: NOT DETECTED
SOURCE: NORMAL

## 2025-04-14 PROCEDURE — 3079F DIAST BP 80-89 MM HG: CPT | Performed by: STUDENT IN AN ORGANIZED HEALTH CARE EDUCATION/TRAINING PROGRAM

## 2025-04-14 PROCEDURE — 3077F SYST BP >= 140 MM HG: CPT | Performed by: STUDENT IN AN ORGANIZED HEALTH CARE EDUCATION/TRAINING PROGRAM

## 2025-04-14 PROCEDURE — 99204 OFFICE O/P NEW MOD 45 MIN: CPT | Performed by: STUDENT IN AN ORGANIZED HEALTH CARE EDUCATION/TRAINING PROGRAM

## 2025-04-14 PROCEDURE — 87636 SARSCOV2 & INF A&B AMP PRB: CPT

## 2025-04-14 PROCEDURE — 99283 EMERGENCY DEPT VISIT LOW MDM: CPT

## 2025-04-14 RX ORDER — FLUCONAZOLE 150 MG/1
150 TABLET ORAL ONCE
Qty: 1 TABLET | Refills: 0 | Status: SHIPPED | OUTPATIENT
Start: 2025-04-14 | End: 2025-04-14

## 2025-04-14 RX ORDER — SODIUM CHLORIDE 9 MG/ML
5-250 INJECTION, SOLUTION INTRAVENOUS PRN
OUTPATIENT
Start: 2025-04-17

## 2025-04-14 RX ORDER — ALBUTEROL SULFATE 90 UG/1
4 INHALANT RESPIRATORY (INHALATION) PRN
OUTPATIENT
Start: 2025-04-17

## 2025-04-14 RX ORDER — HEPARIN SODIUM (PORCINE) LOCK FLUSH IV SOLN 100 UNIT/ML 100 UNIT/ML
500 SOLUTION INTRAVENOUS PRN
OUTPATIENT
Start: 2025-04-17

## 2025-04-14 RX ORDER — ACETAMINOPHEN 325 MG/1
650 TABLET ORAL
OUTPATIENT
Start: 2025-04-17

## 2025-04-14 RX ORDER — SODIUM CHLORIDE 9 MG/ML
5-250 INJECTION, SOLUTION INTRAVENOUS PRN
Status: CANCELLED | OUTPATIENT
Start: 2025-04-17

## 2025-04-14 RX ORDER — FAMOTIDINE 10 MG/ML
20 INJECTION, SOLUTION INTRAVENOUS
OUTPATIENT
Start: 2025-04-17

## 2025-04-14 RX ORDER — SODIUM CHLORIDE 9 MG/ML
INJECTION, SOLUTION INTRAVENOUS CONTINUOUS
OUTPATIENT
Start: 2025-04-17

## 2025-04-14 RX ORDER — EPINEPHRINE 1 MG/ML
0.3 INJECTION, SOLUTION, CONCENTRATE INTRAVENOUS PRN
OUTPATIENT
Start: 2025-04-17

## 2025-04-14 RX ORDER — SODIUM CHLORIDE 0.9 % (FLUSH) 0.9 %
5-40 SYRINGE (ML) INJECTION PRN
OUTPATIENT
Start: 2025-04-17

## 2025-04-14 RX ORDER — AZITHROMYCIN 500 MG/1
500 TABLET, FILM COATED ORAL DAILY
Qty: 5 TABLET | Refills: 0 | Status: SHIPPED | OUTPATIENT
Start: 2025-04-14 | End: 2025-04-19

## 2025-04-14 RX ORDER — DIPHENHYDRAMINE HYDROCHLORIDE 50 MG/ML
50 INJECTION, SOLUTION INTRAMUSCULAR; INTRAVENOUS
OUTPATIENT
Start: 2025-04-17

## 2025-04-14 RX ORDER — HYDROCORTISONE SODIUM SUCCINATE 100 MG/2ML
100 INJECTION INTRAMUSCULAR; INTRAVENOUS
OUTPATIENT
Start: 2025-04-17

## 2025-04-14 RX ORDER — ONDANSETRON 2 MG/ML
8 INJECTION INTRAMUSCULAR; INTRAVENOUS
OUTPATIENT
Start: 2025-04-17

## 2025-04-14 ASSESSMENT — PAIN SCALES - GENERAL: PAINLEVEL_OUTOF10: 9

## 2025-04-14 ASSESSMENT — PAIN - FUNCTIONAL ASSESSMENT: PAIN_FUNCTIONAL_ASSESSMENT: 0-10

## 2025-04-14 NOTE — PROGRESS NOTES
Lindsey Wheat is a 45 y.o. female new patient referred by Dr. Lundy to provider for anemia. Pot report feeling extremely fatigue. Pt report monthly menstrual cycles but the dates are different each month; pt report menstrual sometimes heavy w/ clots and sometimes she have the cramping an pain but no menstrual cycle at times.  No recent endoscopy or colonoscopy.    Chief Complaint   Patient presents with    New Patient     Anemia         .    
brain parenchyma is normal in appearance.  Intraorbital  contents are unremarkable.  Paranasal sinuses and mastoid air cells are clear.  ORAL CAVITY: Multiple chronically missing bilateral mandibular and maxillary  teeth. Oral cavity and sublingual/submandibular spaces appear grossly normal.  Bilateral submandibular glands appear grossly normal.  DEEP NECK SPACES:  Bilateral parotid glands appear grossly normal. Pharyngeal  mucosal space is unremarkable with grossly normal and bilaterally symmetric  tonsils/glossotonsillar fossae. Bilateral parapharyngeal and the retropharyngeal  spaces are grossly within normal limits.  The hypopharynx and larynx including the laryngeal apparatus  appear grossly  normal and symmetric bilaterally.  LYMPH NODES: No pathologically enlarged lymphadenopathy.  THYROID: Left thyroid lobe nodule measuring up to 1.0 cm.  OTHERS: Visualized neck vessels are patent bilaterally. Visualized proximal  esophagus appear grossly within normal limits. Visualized lung apices are  unremarkable. No acute fracture or aggressive osseous lesion.    Impression  There is no obvious suspicious mass lesion in the neck.  No acute abnormality or pathologically enlarged cervical lymphadenopathy.      Electronically signed by PEYTON RODRIGUEZ        Assessment/MDM:     Assessment & Plan  1. Iron Deficiency Anemia.  Iron deficiency is likely due to heavy menstrual cycles, causing significant blood loss. Hemoglobin is 7.6 g/dL with an MCV of 66, iron saturation is 3%, and total iron binding capacity is 512. Difficulty tolerating oral iron supplements due to nausea and upset stomach, and acid reflux medication (famotidine) may impair iron absorption. Intravenous iron therapy with Venofer once a week for 5 weeks is recommended. Continue oral iron supplements if tolerated to reduce the frequency of IV iron infusions. Blood work will be done a few days before the follow-up visit in 3 months to assess progress.    The

## 2025-04-14 NOTE — ED NOTES
Discharge instructions were given to the patient by Ebony STRICKLAND. The patient left the Emergency Department ambulatory, alert and oriented and in no acute distress with 2 prescriptions. The patient was encouraged to call or return to the ED for worsening issues or problems and was encouraged to schedule a follow up appointment for continuing care. The patient verbalized understanding of discharge instructions and prescriptions, all questions were answered. The patient has no further concerns at this time.

## 2025-04-14 NOTE — ED NOTES
Pt presents to ED ambulatory. See triage note. Pt is alert and oriented x 4, RR even and unlabored, skin is warm and dry. Assessment completed and pt updated on plan of care.  Call bell in reach.        Emergency Department Nursing Plan of Care       The Nursing Plan of Care is developed from the Nursing assessment and Emergency Department Attending provider initial evaluation.  The plan of care may be reviewed in the “ED Provider note”.    The Plan of Care was developed with the following considerations:   Patient / Family readiness to learn indicated by:verbalized understanding  Persons(s) to be included in education: patient  Barriers to Learning/Limitations:None    Signed

## 2025-04-14 NOTE — ED PROVIDER NOTES
[facility] EMERGENCY DEPARTMENT  EMERGENCY DEPARTMENT ENCOUNTER       Pt Name: Lindsey Wheat  MRN: [id number]  Birthdate 1980  Date of evaluation: 4/14/2025  Provider: FER Kaufman   PCP: Swati Diggs FNP  Note Started: 10:14 AM EDT 4/14/25     CHIEF COMPLAINT       Chief Complaint   Patient presents with    Pharyngitis    Cough    Fatigue        HISTORY OF PRESENT ILLNESS: 1 or more elements      History From: Patient  None     Lindsey Wheat is a 45 y.o. female with a history of bipolar disorder, hypertension, GERD, diabetes, and additional history as noted below, who presents to the ED for evaluation of mild, remittent cough, sore throat, and malaise for the past 2 weeks.  States cough is been ongoing 2 weeks with a sore throat is somewhat of an acute on chronic issue for her.  She has been seen multiple times for this in the past by ENT and other specialists as well.  States the cough has been ongoing for the past week, but denies any chest pain, shortness of breath, wheezing.  Denies fevers.  States throat pain is worse with swallowing, but denies difficulty swallowing, tolerating solids liquids well.  Denies any changes in speech, neck stiffness, difficulty speaking, abdominal pain, nausea, vomiting, diarrhea.  Denies any known sick contacts     Nursing Notes were all reviewed and agreed with or any disagreements were addressed in the HPI.     REVIEW OF SYSTEMS      Review of Systems   All other systems reviewed and are negative.       Positives and Pertinent negatives as per HPI.    PAST HISTORY     Past Medical History:  Past Medical History:   Diagnosis Date    Abnormal Pap smear     \"years ago\"     Allergic rhinitis     Bipolar disorder     Bronchitis     Depression with anxiety 9/22/2010    Diabetes mellitus (HCC)     type II diabetes mellitus since 2004    GERD (gastroesophageal reflux disease)     High cholesterol     HTN (hypertension) 9/22/2010    Ill-defined condition

## 2025-04-14 NOTE — ED TRIAGE NOTES
Pt ambulatory to triage. C/o sore throat, cough, and malaise for 2 weeks. Taking tylenol but not this AM. Aox4. Respirations regular, even, and unlabored on RA.

## 2025-04-14 NOTE — DISCHARGE INSTRUCTIONS
Thank You!    It was a pleasure taking care of you in our Emergency Department today. We know that when you come to [facility], you are entrusting us with your health, comfort, and safety. Our clinicians honor that trust, and truly appreciate the opportunity to care for you and your loved ones.    If you receive a survey about your Emergency Department experience today, please fill it out.  We value your feedback. Thank you.      Ivette Swann PA-C    ___________________________________  I have included a copy of your lab results and/or radiologic studies from today's visit so you can have them easily available at your follow-up visit.   No results found for this or any previous visit (from the past 12 hours).    No orders to display     [unfilled]

## 2025-04-15 ENCOUNTER — TELEPHONE (OUTPATIENT)
Age: 45
End: 2025-04-15

## 2025-04-15 NOTE — TELEPHONE ENCOUNTER
----- Message -----  From: Cathy Escalona formerly [facility]  Sent: 4/14/2025   9:33 AM EDT  To: Nubia Hansen; Candie MARTIN RN  Subject: pls schedule                                     [facility] pt- for venofer      Patient is scheduled and aware.

## 2025-04-22 ENCOUNTER — HOSPITAL ENCOUNTER (OUTPATIENT)
Facility: HOSPITAL | Age: 45
Setting detail: INFUSION SERIES
Discharge: HOME | End: 2025-04-22
Payer: MEDICARE

## 2025-04-22 VITALS
TEMPERATURE: 97.8 F | HEART RATE: 81 BPM | RESPIRATION RATE: 16 BRPM | SYSTOLIC BLOOD PRESSURE: 130 MMHG | OXYGEN SATURATION: 100 % | DIASTOLIC BLOOD PRESSURE: 68 MMHG

## 2025-04-22 DIAGNOSIS — D50.9 IRON DEFICIENCY ANEMIA, UNSPECIFIED IRON DEFICIENCY ANEMIA TYPE: Primary | ICD-10-CM

## 2025-04-22 PROCEDURE — 96374 THER/PROPH/DIAG INJ IV PUSH: CPT

## 2025-04-22 PROCEDURE — 6360000002 HC RX W HCPCS: Performed by: STUDENT IN AN ORGANIZED HEALTH CARE EDUCATION/TRAINING PROGRAM

## 2025-04-22 RX ORDER — HEPARIN 100 UNIT/ML
500 SYRINGE INTRAVENOUS PRN
OUTPATIENT
Start: 2025-04-29

## 2025-04-22 RX ORDER — ONDANSETRON 2 MG/ML
8 INJECTION INTRAMUSCULAR; INTRAVENOUS
OUTPATIENT
Start: 2025-04-29

## 2025-04-22 RX ORDER — SODIUM CHLORIDE 9 MG/ML
5-250 INJECTION, SOLUTION INTRAVENOUS PRN
Status: CANCELLED | OUTPATIENT
Start: 2025-04-29

## 2025-04-22 RX ORDER — HYDROCORTISONE SODIUM SUCCINATE 100 MG/2ML
100 INJECTION INTRAMUSCULAR; INTRAVENOUS
OUTPATIENT
Start: 2025-04-29

## 2025-04-22 RX ORDER — SODIUM CHLORIDE 9 MG/ML
5-250 INJECTION, SOLUTION INTRAVENOUS PRN
Status: DISCONTINUED | OUTPATIENT
Start: 2025-04-22 | End: 2025-04-23 | Stop reason: HOSPADM

## 2025-04-22 RX ORDER — SODIUM CHLORIDE 9 MG/ML
INJECTION, SOLUTION INTRAVENOUS CONTINUOUS
OUTPATIENT
Start: 2025-04-29

## 2025-04-22 RX ORDER — ACETAMINOPHEN 325 MG/1
650 TABLET ORAL
OUTPATIENT
Start: 2025-04-29

## 2025-04-22 RX ORDER — SODIUM CHLORIDE 9 MG/ML
5-250 INJECTION, SOLUTION INTRAVENOUS PRN
OUTPATIENT
Start: 2025-04-29

## 2025-04-22 RX ORDER — SODIUM CHLORIDE 0.9 % (FLUSH) 0.9 %
5-40 SYRINGE (ML) INJECTION PRN
Status: CANCELLED | OUTPATIENT
Start: 2025-04-29

## 2025-04-22 RX ORDER — DIPHENHYDRAMINE HYDROCHLORIDE 50 MG/ML
50 INJECTION, SOLUTION INTRAMUSCULAR; INTRAVENOUS
OUTPATIENT
Start: 2025-04-29

## 2025-04-22 RX ORDER — ALBUTEROL SULFATE 90 UG/1
4 INHALANT RESPIRATORY (INHALATION) PRN
OUTPATIENT
Start: 2025-04-29

## 2025-04-22 RX ORDER — EPINEPHRINE 1 MG/ML
0.3 INJECTION, SOLUTION INTRAMUSCULAR; SUBCUTANEOUS PRN
OUTPATIENT
Start: 2025-04-29

## 2025-04-22 RX ADMIN — IRON SUCROSE 200 MG: 20 INJECTION, SOLUTION INTRAVENOUS at 10:16

## 2025-04-22 NOTE — PROGRESS NOTES
[facility] Progress Note    Pt arrived in stable condition for Venofer 1/5    Assessment completed.     24G PIV established in right A/C with positive blood return noted.     Patient Vitals for the past 12 hrs:   Temp Pulse Resp BP SpO2   04/22/25 1054 -- 81 16 130/68 --   04/22/25 1000 97.8 °F (36.6 °C) 91 18 129/62 100 %      The following medications administered:  Medications Administered         iron sucrose (VENOFER) injection 200 mg Admin Date  04/22/2025 Action  Given Dose  200 mg Route  IntraVENous Documented By  Marcelina Porter, RN          Pt monitored x 30 mins. Pt tolerated treatment well. IV flushed per policy and removed, 2x2 and coban placed.     Pt provided with education on possible side effects of medication along with discharge instructions. Pt verbalized understanding.      Pt discharged in no acute distress. Next appointment:    Future Appointments   Date Time Provider Department Center   4/29/2025 10:30 AM [facility] CHAIR 5 [facility]   5/6/2025  1:00 PM [facility] CHAIR 5 [facility]   5/13/2025  1:00 PM [facility] CHAIR 1 [facility]   5/20/2025  1:00 PM [facility] CHAIR 1 [facility]   7/22/2025 10:30 AM Tuan De La O MD ONC BS [facility]

## 2025-04-29 ENCOUNTER — HOSPITAL ENCOUNTER (OUTPATIENT)
Facility: HOSPITAL | Age: 45
Setting detail: INFUSION SERIES
Discharge: HOME | End: 2025-04-29
Payer: MEDICARE

## 2025-04-29 VITALS
RESPIRATION RATE: 16 BRPM | SYSTOLIC BLOOD PRESSURE: 155 MMHG | OXYGEN SATURATION: 100 % | HEART RATE: 84 BPM | TEMPERATURE: 97.2 F | DIASTOLIC BLOOD PRESSURE: 89 MMHG

## 2025-04-29 DIAGNOSIS — D50.9 IRON DEFICIENCY ANEMIA, UNSPECIFIED IRON DEFICIENCY ANEMIA TYPE: Primary | ICD-10-CM

## 2025-04-29 PROCEDURE — 2500000003 HC RX 250 WO HCPCS: Performed by: STUDENT IN AN ORGANIZED HEALTH CARE EDUCATION/TRAINING PROGRAM

## 2025-04-29 PROCEDURE — 6360000002 HC RX W HCPCS: Performed by: STUDENT IN AN ORGANIZED HEALTH CARE EDUCATION/TRAINING PROGRAM

## 2025-04-29 PROCEDURE — 96374 THER/PROPH/DIAG INJ IV PUSH: CPT

## 2025-04-29 RX ORDER — SODIUM CHLORIDE 0.9 % (FLUSH) 0.9 %
5-40 SYRINGE (ML) INJECTION PRN
Status: DISCONTINUED | OUTPATIENT
Start: 2025-04-29 | End: 2025-04-30 | Stop reason: HOSPADM

## 2025-04-29 RX ORDER — DIPHENHYDRAMINE HYDROCHLORIDE 50 MG/ML
50 INJECTION, SOLUTION INTRAMUSCULAR; INTRAVENOUS
OUTPATIENT
Start: 2025-05-06

## 2025-04-29 RX ORDER — SODIUM CHLORIDE 9 MG/ML
INJECTION, SOLUTION INTRAVENOUS CONTINUOUS
OUTPATIENT
Start: 2025-05-06

## 2025-04-29 RX ORDER — ONDANSETRON 2 MG/ML
8 INJECTION INTRAMUSCULAR; INTRAVENOUS
OUTPATIENT
Start: 2025-05-06

## 2025-04-29 RX ORDER — ALBUTEROL SULFATE 90 UG/1
4 INHALANT RESPIRATORY (INHALATION) PRN
OUTPATIENT
Start: 2025-05-06

## 2025-04-29 RX ORDER — HYDROCORTISONE SODIUM SUCCINATE 100 MG/2ML
100 INJECTION INTRAMUSCULAR; INTRAVENOUS
OUTPATIENT
Start: 2025-05-06

## 2025-04-29 RX ORDER — ACETAMINOPHEN 325 MG/1
650 TABLET ORAL
OUTPATIENT
Start: 2025-05-06

## 2025-04-29 RX ORDER — EPINEPHRINE 1 MG/ML
0.3 INJECTION, SOLUTION INTRAMUSCULAR; SUBCUTANEOUS PRN
OUTPATIENT
Start: 2025-05-06

## 2025-04-29 RX ORDER — SODIUM CHLORIDE 0.9 % (FLUSH) 0.9 %
5-40 SYRINGE (ML) INJECTION PRN
OUTPATIENT
Start: 2025-05-06

## 2025-04-29 RX ORDER — SODIUM CHLORIDE 9 MG/ML
5-250 INJECTION, SOLUTION INTRAVENOUS PRN
Status: DISCONTINUED | OUTPATIENT
Start: 2025-04-29 | End: 2025-04-30 | Stop reason: HOSPADM

## 2025-04-29 RX ORDER — SODIUM CHLORIDE 9 MG/ML
5-250 INJECTION, SOLUTION INTRAVENOUS PRN
OUTPATIENT
Start: 2025-05-06

## 2025-04-29 RX ORDER — HEPARIN 100 UNIT/ML
500 SYRINGE INTRAVENOUS PRN
OUTPATIENT
Start: 2025-05-06

## 2025-04-29 RX ADMIN — SODIUM CHLORIDE, PRESERVATIVE FREE 10 ML: 5 INJECTION INTRAVENOUS at 10:37

## 2025-04-29 RX ADMIN — IRON SUCROSE 200 MG: 20 INJECTION, SOLUTION INTRAVENOUS at 10:38

## 2025-04-29 NOTE — PROGRESS NOTES
Name: Lindsey Wheat    MRN: [id number]         : 1980    Ms. Wheat Arrived ambulatory and in no distress for Venofer dose 2 of 5.  Assessment was completed, no acute issues at this time, no new complaints voiced. A 24 G PIV was established to right AC without difficulty.     Medications Administered         iron sucrose (VENOFER) injection 200 mg Admin Date  2025 Action  Given Dose  200 mg Route  IntraVENous Documented By  Shaylee Rossi RN        sodium chloride flush 0.9 % injection 5-40 mL Admin Date  2025 Action  Given Dose  10 mL Route  IntraVENous Documented By  Shaylee Rossi, MARCO A               Patient Vitals for the past 24 hrs:   BP Temp Temp src Pulse Resp SpO2   25 1112 (!) 155/89 -- -- 84 16 --   25 1029 (!) 172/85 97.2 °F (36.2 °C) Temporal 82 16 100 %        Ms. Wheat remained in clinic for 30 minutes following treatment for observation. Patient tolerated treatment well; no s/s of adverse reaction noted. PIV was flushed and removed per protocol. [facility] and in stable condition. She is to return on 2025 for her next appointment.    SHAYLEE ROSSI RN  2025

## 2025-05-06 ENCOUNTER — HOSPITAL ENCOUNTER (OUTPATIENT)
Facility: HOSPITAL | Age: 45
Setting detail: INFUSION SERIES
Discharge: HOME | End: 2025-05-06
Payer: MEDICARE

## 2025-05-06 VITALS
RESPIRATION RATE: 16 BRPM | OXYGEN SATURATION: 100 % | HEART RATE: 82 BPM | SYSTOLIC BLOOD PRESSURE: 144 MMHG | DIASTOLIC BLOOD PRESSURE: 65 MMHG | TEMPERATURE: 98.7 F

## 2025-05-06 DIAGNOSIS — D50.9 IRON DEFICIENCY ANEMIA, UNSPECIFIED IRON DEFICIENCY ANEMIA TYPE: Primary | ICD-10-CM

## 2025-05-06 PROCEDURE — 6360000002 HC RX W HCPCS: Performed by: STUDENT IN AN ORGANIZED HEALTH CARE EDUCATION/TRAINING PROGRAM

## 2025-05-06 PROCEDURE — 2500000003 HC RX 250 WO HCPCS: Performed by: STUDENT IN AN ORGANIZED HEALTH CARE EDUCATION/TRAINING PROGRAM

## 2025-05-06 PROCEDURE — 96374 THER/PROPH/DIAG INJ IV PUSH: CPT

## 2025-05-06 RX ORDER — EPINEPHRINE 1 MG/ML
0.3 INJECTION, SOLUTION INTRAMUSCULAR; SUBCUTANEOUS PRN
OUTPATIENT
Start: 2025-05-13

## 2025-05-06 RX ORDER — SODIUM CHLORIDE 9 MG/ML
5-250 INJECTION, SOLUTION INTRAVENOUS PRN
OUTPATIENT
Start: 2025-05-13

## 2025-05-06 RX ORDER — SODIUM CHLORIDE 0.9 % (FLUSH) 0.9 %
5-40 SYRINGE (ML) INJECTION PRN
Status: CANCELLED | OUTPATIENT
Start: 2025-05-13

## 2025-05-06 RX ORDER — ONDANSETRON 2 MG/ML
8 INJECTION INTRAMUSCULAR; INTRAVENOUS
OUTPATIENT
Start: 2025-05-13

## 2025-05-06 RX ORDER — HYDROCORTISONE SODIUM SUCCINATE 100 MG/2ML
100 INJECTION INTRAMUSCULAR; INTRAVENOUS
OUTPATIENT
Start: 2025-05-13

## 2025-05-06 RX ORDER — SODIUM CHLORIDE 0.9 % (FLUSH) 0.9 %
5-40 SYRINGE (ML) INJECTION PRN
Status: DISCONTINUED | OUTPATIENT
Start: 2025-05-06 | End: 2025-05-07 | Stop reason: HOSPADM

## 2025-05-06 RX ORDER — ACETAMINOPHEN 325 MG/1
650 TABLET ORAL
OUTPATIENT
Start: 2025-05-13

## 2025-05-06 RX ORDER — ALBUTEROL SULFATE 90 UG/1
4 INHALANT RESPIRATORY (INHALATION) PRN
OUTPATIENT
Start: 2025-05-13

## 2025-05-06 RX ORDER — SODIUM CHLORIDE 9 MG/ML
INJECTION, SOLUTION INTRAVENOUS CONTINUOUS
OUTPATIENT
Start: 2025-05-13

## 2025-05-06 RX ORDER — DIPHENHYDRAMINE HYDROCHLORIDE 50 MG/ML
50 INJECTION, SOLUTION INTRAMUSCULAR; INTRAVENOUS
OUTPATIENT
Start: 2025-05-13

## 2025-05-06 RX ORDER — HEPARIN 100 UNIT/ML
500 SYRINGE INTRAVENOUS PRN
OUTPATIENT
Start: 2025-05-13

## 2025-05-06 RX ADMIN — SODIUM CHLORIDE 10 ML: 9 INJECTION, SOLUTION INTRAMUSCULAR; INTRAVENOUS; SUBCUTANEOUS at 10:33

## 2025-05-06 RX ADMIN — SODIUM CHLORIDE 10 ML: 9 INJECTION, SOLUTION INTRAMUSCULAR; INTRAVENOUS; SUBCUTANEOUS at 10:39

## 2025-05-06 RX ADMIN — IRON SUCROSE 200 MG: 20 INJECTION, SOLUTION INTRAVENOUS at 10:35

## 2025-05-06 ASSESSMENT — PAIN DESCRIPTION - LOCATION: LOCATION: BACK

## 2025-05-06 ASSESSMENT — PAIN DESCRIPTION - ORIENTATION: ORIENTATION: LOWER

## 2025-05-06 ASSESSMENT — PAIN SCALES - GENERAL: PAINLEVEL_OUTOF10: 9

## 2025-05-06 ASSESSMENT — PAIN - FUNCTIONAL ASSESSMENT: PAIN_FUNCTIONAL_ASSESSMENT: ACTIVITIES ARE NOT PREVENTED

## 2025-05-06 ASSESSMENT — PAIN DESCRIPTION - DESCRIPTORS: DESCRIPTORS: ACHING;SORE

## 2025-05-06 NOTE — PROGRESS NOTES
[facility] Short Note                       Date: May 6, 2025    Name: Lindsey Whaet    MRN: [id number]         : 1980    Treatment: Venofer # 3    [facility] COVID-19 SCREENING      The patient was asked the following questions and answered as documented below:    Do you have any symptoms of COVID-19?  SOB, coughing, fever, or generally not feeling well? NO  Have you been exposed to COVID-19 recently? NO  Have you had any recent contact with family/friend that has a pending COVID test? NO      Follow Up: Proceed with treatment    Ms. Wheat was assessed and education was provided. VSS. IV placed to the RAC.     Lines:   Peripheral IV 25 Right Antecubital (Active)   Site Assessment Clean, dry & intact 25 1033   Line Status Brisk blood return 25 1033   Phlebitis Assessment No symptoms 25 1033   Infiltration Assessment 0 25 1033   Dressing Status New dressing applied 25 1033   Dressing Type Transparent 25 1033   Dressing Intervention New 25 1033        Ms. Wheat's vitals were reviewed prior to and after treatment.   Patient Vitals for the past 12 hrs:   Temp Pulse Resp BP SpO2   25 1103 -- 82 -- (!) 144/65 --   25 1028 98.7 °F (37.1 °C) 79 16 (!) 154/93 100 %       Medications given:  Medications Administered         iron sucrose (VENOFER) injection 200 mg Admin Date  2025 Action  Given Dose  200 mg Route  IntraVENous Documented By  Ashok Costello RN        sodium chloride flush 0.9 % injection 5-40 mL Admin Date  2025 Action  Given Dose  10 mL Route  IntraVENous Documented By  Ashok Costello RN        sodium chloride flush 0.9 % injection 5-40 mL Admin Date  2025 Action  Given Dose  10 mL Route  IntraVENous Documented By  Ashok Costello RN            Ms. Wheat tolerated the treatment without complaints. Observation complete. VSS. IV flushed and removed.     [facility] condition at

## 2025-05-13 ENCOUNTER — HOSPITAL ENCOUNTER (OUTPATIENT)
Facility: HOSPITAL | Age: 45
Setting detail: INFUSION SERIES
Discharge: HOME | End: 2025-05-13
Payer: MEDICARE

## 2025-05-13 VITALS
DIASTOLIC BLOOD PRESSURE: 88 MMHG | TEMPERATURE: 98.5 F | SYSTOLIC BLOOD PRESSURE: 142 MMHG | HEART RATE: 92 BPM | RESPIRATION RATE: 18 BRPM | OXYGEN SATURATION: 100 %

## 2025-05-13 DIAGNOSIS — D50.9 IRON DEFICIENCY ANEMIA, UNSPECIFIED IRON DEFICIENCY ANEMIA TYPE: Primary | ICD-10-CM

## 2025-05-13 PROCEDURE — 2500000003 HC RX 250 WO HCPCS: Performed by: STUDENT IN AN ORGANIZED HEALTH CARE EDUCATION/TRAINING PROGRAM

## 2025-05-13 PROCEDURE — 96374 THER/PROPH/DIAG INJ IV PUSH: CPT

## 2025-05-13 PROCEDURE — 6360000002 HC RX W HCPCS: Performed by: STUDENT IN AN ORGANIZED HEALTH CARE EDUCATION/TRAINING PROGRAM

## 2025-05-13 RX ORDER — SODIUM CHLORIDE 9 MG/ML
INJECTION, SOLUTION INTRAVENOUS CONTINUOUS
OUTPATIENT
Start: 2025-05-20

## 2025-05-13 RX ORDER — SODIUM CHLORIDE 0.9 % (FLUSH) 0.9 %
5-40 SYRINGE (ML) INJECTION PRN
OUTPATIENT
Start: 2025-05-20

## 2025-05-13 RX ORDER — ACETAMINOPHEN 325 MG/1
650 TABLET ORAL
OUTPATIENT
Start: 2025-05-20

## 2025-05-13 RX ORDER — SODIUM CHLORIDE 0.9 % (FLUSH) 0.9 %
5-40 SYRINGE (ML) INJECTION PRN
Status: DISCONTINUED | OUTPATIENT
Start: 2025-05-13 | End: 2025-05-14 | Stop reason: HOSPADM

## 2025-05-13 RX ORDER — HYDROCORTISONE SODIUM SUCCINATE 100 MG/2ML
100 INJECTION INTRAMUSCULAR; INTRAVENOUS
OUTPATIENT
Start: 2025-05-20

## 2025-05-13 RX ORDER — ONDANSETRON 2 MG/ML
8 INJECTION INTRAMUSCULAR; INTRAVENOUS
OUTPATIENT
Start: 2025-05-20

## 2025-05-13 RX ORDER — SODIUM CHLORIDE 9 MG/ML
5-250 INJECTION, SOLUTION INTRAVENOUS PRN
Status: CANCELLED | OUTPATIENT
Start: 2025-05-20

## 2025-05-13 RX ORDER — EPINEPHRINE 1 MG/ML
0.3 INJECTION, SOLUTION INTRAMUSCULAR; SUBCUTANEOUS PRN
OUTPATIENT
Start: 2025-05-20

## 2025-05-13 RX ORDER — ALBUTEROL SULFATE 90 UG/1
4 INHALANT RESPIRATORY (INHALATION) PRN
OUTPATIENT
Start: 2025-05-20

## 2025-05-13 RX ORDER — HEPARIN 100 UNIT/ML
500 SYRINGE INTRAVENOUS PRN
OUTPATIENT
Start: 2025-05-20

## 2025-05-13 RX ORDER — SODIUM CHLORIDE 9 MG/ML
5-250 INJECTION, SOLUTION INTRAVENOUS PRN
OUTPATIENT
Start: 2025-05-20

## 2025-05-13 RX ORDER — DIPHENHYDRAMINE HYDROCHLORIDE 50 MG/ML
50 INJECTION, SOLUTION INTRAMUSCULAR; INTRAVENOUS
OUTPATIENT
Start: 2025-05-20

## 2025-05-13 RX ADMIN — IRON SUCROSE 200 MG: 20 INJECTION, SOLUTION INTRAVENOUS at 10:34

## 2025-05-13 RX ADMIN — SODIUM CHLORIDE 10 ML: 9 INJECTION, SOLUTION INTRAMUSCULAR; INTRAVENOUS; SUBCUTANEOUS at 10:33

## 2025-05-13 RX ADMIN — SODIUM CHLORIDE 10 ML: 9 INJECTION, SOLUTION INTRAMUSCULAR; INTRAVENOUS; SUBCUTANEOUS at 10:42

## 2025-05-13 ASSESSMENT — PAIN DESCRIPTION - ORIENTATION: ORIENTATION: LOWER

## 2025-05-13 ASSESSMENT — PAIN SCALES - GENERAL: PAINLEVEL_OUTOF10: 4

## 2025-05-13 ASSESSMENT — PAIN DESCRIPTION - LOCATION: LOCATION: BACK

## 2025-05-13 NOTE — PROGRESS NOTES
[facility] Short Note                       Date: May 13, 2025    Name: Lindsey Wheat    MRN: [id number]         : 1980    Treatment: Venofer #4    [facility] COVID-19 SCREENING      The patient was asked the following questions and answered as documented below:    Do you have any symptoms of COVID-19?  SOB, coughing, fever, or generally not feeling well? NO  Have you been exposed to COVID-19 recently? NO  Have you had any recent contact with family/friend that has a pending COVID test? NO      Follow Up: Proceed with treatment    Ms. Wheat was assessed and education was provided.  VSS. IV placed to the RAC.     Lines:   Peripheral IV 25 Right Antecubital (Active)   Site Assessment Clean, dry & intact 25 1033   Line Status Brisk blood return 25 1033   Phlebitis Assessment No symptoms 25 1033   Infiltration Assessment 0 25 1033   Dressing Status New dressing applied 25 1033   Dressing Type Transparent 25 1033   Dressing Intervention New 25 1033        Ms. Chaus vitals were reviewed prior to and after treatment.   Patient Vitals for the past 12 hrs:   Temp Pulse Resp BP SpO2   25 1123 -- 92 -- (!) 142/88 --   25 1024 98.5 °F (36.9 °C) 87 18 (!) 151/91 100 %         Medications given:  Medications Administered         iron sucrose (VENOFER) injection 200 mg Admin Date  2025 Action  Given Dose  200 mg Route  IntraVENous Documented By  Ashok Costello RN        sodium chloride flush 0.9 % injection 5-40 mL Admin Date  2025 Action  Given Dose  10 mL Route  IntraVENous Documented By  Ashok Costello RN        sodium chloride flush 0.9 % injection 5-40 mL Admin Date  2025 Action  Given Dose  10 mL Route  IntraVENous Documented By  Ashok Costello RN            Ms. Wheat tolerated the treatment without complaints. Observation complete, VSS, IV flushed and removed.     [facility] condition at

## 2025-05-18 ENCOUNTER — TRANSCRIBE ORDERS (OUTPATIENT)
Facility: HOSPITAL | Age: 45
End: 2025-05-18

## 2025-05-18 DIAGNOSIS — R13.10 DYSPHAGIA, UNSPECIFIED TYPE: ICD-10-CM

## 2025-05-18 DIAGNOSIS — E11.9 TYPE 2 DIABETES MELLITUS WITHOUT COMPLICATION, WITHOUT LONG-TERM CURRENT USE OF INSULIN (HCC): ICD-10-CM

## 2025-05-18 DIAGNOSIS — Z87.891 FORMER SMOKER: ICD-10-CM

## 2025-05-18 DIAGNOSIS — Z00.8 HEALTH EXAMINATION IN POPULATION SURVEY: ICD-10-CM

## 2025-05-18 DIAGNOSIS — R22.1 NECK SWELLING: Primary | ICD-10-CM

## 2025-05-18 DIAGNOSIS — G47.33 OBSTRUCTIVE SLEEP APNEA ON CPAP: ICD-10-CM

## 2025-05-20 ENCOUNTER — HOSPITAL ENCOUNTER (OUTPATIENT)
Facility: HOSPITAL | Age: 45
Setting detail: INFUSION SERIES
Discharge: HOME | End: 2025-05-20
Payer: MEDICARE

## 2025-05-20 VITALS
RESPIRATION RATE: 16 BRPM | SYSTOLIC BLOOD PRESSURE: 146 MMHG | OXYGEN SATURATION: 96 % | HEART RATE: 92 BPM | DIASTOLIC BLOOD PRESSURE: 82 MMHG | TEMPERATURE: 97 F

## 2025-05-20 DIAGNOSIS — D50.9 IRON DEFICIENCY ANEMIA, UNSPECIFIED IRON DEFICIENCY ANEMIA TYPE: Primary | ICD-10-CM

## 2025-05-20 PROCEDURE — 6360000002 HC RX W HCPCS: Performed by: STUDENT IN AN ORGANIZED HEALTH CARE EDUCATION/TRAINING PROGRAM

## 2025-05-20 PROCEDURE — 96374 THER/PROPH/DIAG INJ IV PUSH: CPT

## 2025-05-20 RX ORDER — HEPARIN 100 UNIT/ML
500 SYRINGE INTRAVENOUS PRN
OUTPATIENT
Start: 2025-05-20

## 2025-05-20 RX ORDER — SODIUM CHLORIDE 9 MG/ML
5-250 INJECTION, SOLUTION INTRAVENOUS PRN
OUTPATIENT
Start: 2025-05-20

## 2025-05-20 RX ORDER — SODIUM CHLORIDE 0.9 % (FLUSH) 0.9 %
5-40 SYRINGE (ML) INJECTION PRN
OUTPATIENT
Start: 2025-05-20

## 2025-05-20 RX ORDER — ONDANSETRON 2 MG/ML
8 INJECTION INTRAMUSCULAR; INTRAVENOUS
OUTPATIENT
Start: 2025-05-20

## 2025-05-20 RX ORDER — SODIUM CHLORIDE 9 MG/ML
5-250 INJECTION, SOLUTION INTRAVENOUS PRN
Status: DISCONTINUED | OUTPATIENT
Start: 2025-05-20 | End: 2025-05-21 | Stop reason: HOSPADM

## 2025-05-20 RX ORDER — DIPHENHYDRAMINE HYDROCHLORIDE 50 MG/ML
50 INJECTION, SOLUTION INTRAMUSCULAR; INTRAVENOUS
OUTPATIENT
Start: 2025-05-20

## 2025-05-20 RX ORDER — ACETAMINOPHEN 325 MG/1
650 TABLET ORAL
OUTPATIENT
Start: 2025-05-20

## 2025-05-20 RX ORDER — ALBUTEROL SULFATE 90 UG/1
4 INHALANT RESPIRATORY (INHALATION) PRN
OUTPATIENT
Start: 2025-05-20

## 2025-05-20 RX ORDER — HYDROCORTISONE SODIUM SUCCINATE 100 MG/2ML
100 INJECTION INTRAMUSCULAR; INTRAVENOUS
OUTPATIENT
Start: 2025-05-20

## 2025-05-20 RX ORDER — SODIUM CHLORIDE 9 MG/ML
INJECTION, SOLUTION INTRAVENOUS CONTINUOUS
OUTPATIENT
Start: 2025-05-20

## 2025-05-20 RX ORDER — EPINEPHRINE 1 MG/ML
0.3 INJECTION, SOLUTION INTRAMUSCULAR; SUBCUTANEOUS PRN
OUTPATIENT
Start: 2025-05-20

## 2025-05-20 RX ADMIN — IRON SUCROSE 200 MG: 20 INJECTION, SOLUTION INTRAVENOUS at 10:47

## 2025-05-20 NOTE — PROGRESS NOTES
[facility] Progress Note    Pt arrived in stable condition for Venofer dose 5    Assessment completed.     24G PIV established in right A/C with positive blood return noted.     Patient Vitals for the past 12 hrs:   Temp Pulse Resp BP SpO2   05/20/25 1108 -- 92 16 (!) 146/82 --   05/20/25 1030 97 °F (36.1 °C) 86 18 129/76 96 %      The following medications administered:  Medications Administered         iron sucrose (VENOFER) injection 200 mg Admin Date  05/20/2025 Action  Given Dose  200 mg Route  IntraVENous Documented By  Marcelina Porter, RN          Pt tolerated treatment well. IV flushed per policy and removed, 2x2 and coban placed.     Pt provided with education on possible side effects of medication along with discharge instructions. Pt verbalized understanding.      Pt discharged in no acute distress. Next appointment:    Future Appointments   Date Time Provider Department Center   5/20/2025  1:00 PM [facility] CHAIR 1 [facility]   6/4/2025  1:30 PM Romie Blackburn APRN - NP [facility] DEP   7/22/2025 10:30 AM Tuan De La O MD [facility] BS AMB

## 2025-06-02 ENCOUNTER — PATIENT MESSAGE (OUTPATIENT)
Facility: CLINIC | Age: 45
End: 2025-06-02

## 2025-06-02 SDOH — HEALTH STABILITY: PHYSICAL HEALTH: ON AVERAGE, HOW MANY DAYS PER WEEK DO YOU ENGAGE IN MODERATE TO STRENUOUS EXERCISE (LIKE A BRISK WALK)?: 0 DAYS

## 2025-06-02 SDOH — HEALTH STABILITY: PHYSICAL HEALTH: ON AVERAGE, HOW MANY MINUTES DO YOU ENGAGE IN EXERCISE AT THIS LEVEL?: 0 MIN

## 2025-06-02 NOTE — PROGRESS NOTES
[facility]  Office Note  Please note that this dictation was completed with [employer], the computer voice recognition software.  Quite often unanticipated grammatical, syntax, homophones, and other interpretive errors are inadvertently transcribed by the computer software.  Please disregard these errors.  Please excuse any errors that have escaped final proofreading.     This is a late entry note (same day as appointment) secondary to connectivity difficulties.    Expand All Collapse All                                                                Lidnsey Wheat  45 y.o. female  1980  MRN:[id number]   [facility]  Progress Note      Encounter Date: 2025     Established medical group patient, last saw [facility] internal medicine 2025.  This is her first visit with myself.  Past medical history type 2 diabetes, behavioral health: Bipolar, anxiety, environmental allergies, hypertension, GERD.  She reports she is here for preop hysterectomy on  secondary to menorrhagia/iron def anemia.      Type 2 diabetes   Metformin, never started Jardiance  Has preference not to take many medications, has changed many lifestyle components  Cut out junk food, soda's, more active  A1c 8.2% 2025  States recently rechecked [facility]'s [place] and below 7%    Hypertension  Losartan    Environmental allergies  Claritin, Flonase    Behavioral health  Referred back to her psychiatrist  Stable, no acute concerns    Chronic pain/weakness  Referred to Ortho  Complains of low back pain    Iron deficiency anemia  Managed by hematology, iron transfusions     Preoperative Evaluation     Date of Exam: 2025     Lindsey Wheat is a 45 y.o. female (:1980) who presents for preoperative evaluation.     Procedure/Surgery: Hysterectomy          Chief Complaint   Patient presents with    Pre-op Exam       Laparoscopic

## 2025-06-04 ENCOUNTER — OFFICE VISIT (OUTPATIENT)
Facility: CLINIC | Age: 45
End: 2025-06-04
Payer: MEDICARE

## 2025-06-04 VITALS
OXYGEN SATURATION: 98 % | HEIGHT: 64 IN | RESPIRATION RATE: 16 BRPM | WEIGHT: 228.3 LBS | TEMPERATURE: 97.8 F | DIASTOLIC BLOOD PRESSURE: 78 MMHG | BODY MASS INDEX: 38.98 KG/M2 | SYSTOLIC BLOOD PRESSURE: 136 MMHG | HEART RATE: 88 BPM

## 2025-06-04 DIAGNOSIS — I10 PRIMARY HYPERTENSION: ICD-10-CM

## 2025-06-04 DIAGNOSIS — D50.9 IRON DEFICIENCY ANEMIA, UNSPECIFIED IRON DEFICIENCY ANEMIA TYPE: Primary | ICD-10-CM

## 2025-06-04 DIAGNOSIS — F99 CO-OCCURRENCE OF MULTIPLE PSYCHIATRIC DISORDERS: ICD-10-CM

## 2025-06-04 DIAGNOSIS — K21.00 GASTROESOPHAGEAL REFLUX DISEASE WITH ESOPHAGITIS WITHOUT HEMORRHAGE: ICD-10-CM

## 2025-06-04 DIAGNOSIS — E11.43 TYPE 2 DIABETES MELLITUS WITH DIABETIC AUTONOMIC NEUROPATHY, WITHOUT LONG-TERM CURRENT USE OF INSULIN (HCC): ICD-10-CM

## 2025-06-04 PROCEDURE — 3075F SYST BP GE 130 - 139MM HG: CPT | Performed by: NURSE PRACTITIONER

## 2025-06-04 PROCEDURE — 99214 OFFICE O/P EST MOD 30 MIN: CPT | Performed by: NURSE PRACTITIONER

## 2025-06-04 PROCEDURE — 3052F HG A1C>EQUAL 8.0%<EQUAL 9.0%: CPT | Performed by: NURSE PRACTITIONER

## 2025-06-04 PROCEDURE — 3078F DIAST BP <80 MM HG: CPT | Performed by: NURSE PRACTITIONER

## 2025-06-04 RX ORDER — FAMOTIDINE 20 MG/1
20 TABLET, FILM COATED ORAL 2 TIMES DAILY
Qty: 180 TABLET | Refills: 0 | Status: SHIPPED | OUTPATIENT
Start: 2025-06-04 | End: 2025-09-02

## 2025-06-04 RX ORDER — LOSARTAN POTASSIUM 50 MG/1
50 TABLET ORAL DAILY
Qty: 90 TABLET | Refills: 1 | Status: SHIPPED | OUTPATIENT
Start: 2025-06-04 | End: 2025-12-01

## 2025-06-04 NOTE — PROGRESS NOTES
Lindsey Wheat is a 45 y.o. female     Chief Complaint   Patient presents with    Pre-op Exam     Laparoscopic Hysterectomy- B Umjxsyfzdhack-Qmtopabprl-5/25/25-Dr. Miya Feldman       /78 (BP Site: Left Upper Arm, Patient Position: Sitting, BP Cuff Size: Large Adult)   Pulse 88   Temp 97.8 °F (36.6 °C) (Oral)   Resp 16   Ht 1.626 m (5' 4\")   Wt 103.6 kg (228 lb 4.8 oz)   LMP 05/20/2025   SpO2 98%   BMI 39.19 kg/m²     Health Maintenance Due   Topic Date Due    Diabetic foot exam  Never done    Diabetic retinal exam  Never done    Hepatitis C screen  Never done    Hepatitis B vaccine (1 of 3 - 19+ 3-dose series) Never done    DTaP/Tdap/Td vaccine (1 - Tdap) Never done    Pneumococcal 0-49 years Vaccine (1 of 2 - PCV) Never done    COVID-19 Vaccine (1 - 2024-25 season) Never done    Breast cancer screen  11/23/2024    Colorectal Cancer Screen  03/03/2025    Annual Wellness Visit (Medicare)  Never done         \"Have you been to the ER, urgent care clinic since your last visit?  Hospitalized since your last visit?\"    YES - When: approximately 1 months ago.  Where and Why: [facility]    “Have you seen or consulted any other health care providers outside of [facility] since your last visit?”    NO    “Have you had a colorectal cancer screening such as a colonoscopy/FIT/Cologuard?    NO    Date of last Colonoscopy: 3/3/2015  No cologuard on file  Date of last FIT: 9/10/2020   No flexible sigmoidoscopy on file        Have you had a mammogram?”   YES - Where: [facility] Nurse/CMA to request most recent records if not in the chart    Date of last Mammogram: 11/23/2022

## 2025-06-14 DIAGNOSIS — F41.8 DEPRESSION WITH ANXIETY: ICD-10-CM

## 2025-06-16 RX ORDER — BUSPIRONE HYDROCHLORIDE 15 MG/1
15 TABLET ORAL 2 TIMES DAILY
Qty: 180 TABLET | Refills: 0 | Status: SHIPPED | OUTPATIENT
Start: 2025-06-16

## 2025-06-24 ENCOUNTER — HOSPITAL ENCOUNTER (EMERGENCY)
Facility: HOSPITAL | Age: 45
Discharge: HOME | End: 2025-06-24
Attending: EMERGENCY MEDICINE
Payer: MEDICARE

## 2025-06-24 VITALS
BODY MASS INDEX: 39.14 KG/M2 | SYSTOLIC BLOOD PRESSURE: 176 MMHG | WEIGHT: 228 LBS | TEMPERATURE: 97.7 F | DIASTOLIC BLOOD PRESSURE: 69 MMHG | HEART RATE: 88 BPM | RESPIRATION RATE: 20 BRPM | OXYGEN SATURATION: 99 %

## 2025-06-24 DIAGNOSIS — N39.0 ACUTE LOWER UTI: Primary | ICD-10-CM

## 2025-06-24 LAB
APPEARANCE UR: CLEAR
BACTERIA URNS QL MICRO: ABNORMAL /HPF
BILIRUB UR QL: NEGATIVE
COLOR UR: ABNORMAL
EPITH CASTS URNS QL MICRO: ABNORMAL /LPF
GLUCOSE UR STRIP.AUTO-MCNC: NEGATIVE MG/DL
HGB UR QL STRIP: NEGATIVE
KETONES UR QL STRIP.AUTO: ABNORMAL MG/DL
LEUKOCYTE ESTERASE UR QL STRIP.AUTO: ABNORMAL
NITRITE UR QL STRIP.AUTO: NEGATIVE
PH UR STRIP: 5 (ref 5–8)
PROT UR STRIP-MCNC: NEGATIVE MG/DL
RBC #/AREA URNS HPF: ABNORMAL /HPF (ref 0–5)
SP GR UR REFRACTOMETRY: 1.02
URINE CULTURE IF INDICATED: ABNORMAL
UROBILINOGEN UR QL STRIP.AUTO: 0.2 EU/DL (ref 0.2–1)
WBC URNS QL MICRO: ABNORMAL /HPF (ref 0–4)

## 2025-06-24 PROCEDURE — 6370000000 HC RX 637 (ALT 250 FOR IP): Performed by: EMERGENCY MEDICINE

## 2025-06-24 PROCEDURE — 96372 THER/PROPH/DIAG INJ SC/IM: CPT

## 2025-06-24 PROCEDURE — 81001 URINALYSIS AUTO W/SCOPE: CPT

## 2025-06-24 PROCEDURE — 99284 EMERGENCY DEPT VISIT MOD MDM: CPT

## 2025-06-24 PROCEDURE — 6360000002 HC RX W HCPCS: Performed by: EMERGENCY MEDICINE

## 2025-06-24 RX ORDER — METHOCARBAMOL 500 MG/1
750 TABLET, FILM COATED ORAL ONCE
Status: DISCONTINUED | OUTPATIENT
Start: 2025-06-24 | End: 2025-06-24

## 2025-06-24 RX ORDER — IBUPROFEN 800 MG/1
800 TABLET, FILM COATED ORAL EVERY 8 HOURS PRN
Qty: 20 TABLET | Refills: 0 | Status: SHIPPED | OUTPATIENT
Start: 2025-06-24

## 2025-06-24 RX ORDER — KETOROLAC TROMETHAMINE 30 MG/ML
30 INJECTION, SOLUTION INTRAMUSCULAR; INTRAVENOUS
Status: COMPLETED | OUTPATIENT
Start: 2025-06-24 | End: 2025-06-24

## 2025-06-24 RX ORDER — NITROFURANTOIN 25; 75 MG/1; MG/1
100 CAPSULE ORAL 2 TIMES DAILY
Qty: 20 CAPSULE | Refills: 0 | Status: SHIPPED | OUTPATIENT
Start: 2025-06-24 | End: 2025-06-26 | Stop reason: SINTOL

## 2025-06-24 RX ORDER — NITROFURANTOIN 25; 75 MG/1; MG/1
100 CAPSULE ORAL
Status: COMPLETED | OUTPATIENT
Start: 2025-06-24 | End: 2025-06-24

## 2025-06-24 RX ORDER — FLUCONAZOLE 150 MG/1
150 TABLET ORAL
Qty: 3 TABLET | Refills: 0 | Status: SHIPPED | OUTPATIENT
Start: 2025-06-24

## 2025-06-24 RX ADMIN — NITROFURANTOIN MONOHYDRATE/MACROCRYSTALLINE 100 MG: 25; 75 CAPSULE ORAL at 02:13

## 2025-06-24 RX ADMIN — KETOROLAC TROMETHAMINE 30 MG: 30 INJECTION, SOLUTION INTRAMUSCULAR at 01:40

## 2025-06-24 ASSESSMENT — PAIN - FUNCTIONAL ASSESSMENT: PAIN_FUNCTIONAL_ASSESSMENT: 0-10

## 2025-06-24 ASSESSMENT — PAIN SCALES - GENERAL
PAINLEVEL_OUTOF10: 9
PAINLEVEL_OUTOF10: 10

## 2025-06-24 ASSESSMENT — PAIN DESCRIPTION - ORIENTATION: ORIENTATION: LOWER

## 2025-06-24 ASSESSMENT — PAIN DESCRIPTION - LOCATION
LOCATION: ABDOMEN
LOCATION: ABDOMEN;BACK

## 2025-06-24 ASSESSMENT — ENCOUNTER SYMPTOMS: ABDOMINAL PAIN: 1

## 2025-06-24 NOTE — ED NOTES
Pt presents to ED complaining of constant lower abdominal / suprapubic / vaginal / lower back throbbing pain for 3-4 days.  Patient expressing concerns for a urinary tract infection. Pt endorses dysuria and nausea. Pt denies vomiting, injuries, bowel problem, vaginal discharge, cardiac or respiratory distress. Pt reports taking tylenol and ibuprofen with no symptom relief. Pt is alert and oriented x 4, RR even and unlabored, skin is warm and dry. Pt appears in NAD at this time. Assessment completed and pt updated on plan of care.  Call bell in reach.      Emergency Department Nursing Plan of Care  The Nursing Plan of Care is developed from the Nursing assessment and Emergency Department Attending provider initial evaluation.  The plan of care may be reviewed in the “ED Provider note”.  The Plan of Care was developed with the following considerations:  Patient / Family readiness to learn indicated by:Refer to Medical chart in [facility]  Persons(s) to be included in education: Refer to Medical chart in [facility]  Barriers to Learning/Limitations:Normal      Signed    Yue Byrd, RN    6/24/2025   1:21 AM

## 2025-06-24 NOTE — ED PROVIDER NOTES
EMERGENCY DEPARTMENT HISTORY AND PHYSICAL EXAM    Date: 2025  Patient Name: Lindsey Wheat  Patient Age and Sex: 45 y.o. female  MRN:  [id number]  CSN:  [id number]    History of Present Illness     Chief Complaint   Patient presents with    Abdominal Pain       History Provided By: Patient    Ability to gather history was limited by:     HPI: Lindsey Wheat, 45 y.o. female   With history of bipolar disorder, diabetes, obesity, complains of suprapubic pelvic pain for the last few days, moderate severity.  Mild dysuria, no vaginal discharge or bleeding.  No nausea or vomiting or fevers.  Took Tylenol without relief.  She reports that she was supposed to undergo a hysterectomy this week or last week but had to cancel it.      Tobacco Use      Smoking status: Former        Packs/day: 0.00        Types: Cigarettes        Quit date: 2019        Years since quittin.0      Smokeless tobacco: Former     Past History   The patient's medical, surgical, and social history were reviewed by me today.    Current Medications:  No current facility-administered medications on file prior to encounter.     Current Outpatient Medications on File Prior to Encounter   Medication Sig Dispense Refill    busPIRone (BUSPAR) 15 MG tablet TAKE 1 TABLET BY MOUTH IN THE MORNING AND AT BEDTIME 180 tablet 0    acetaminophen (TYLENOL) 500 MG tablet Take 1 tablet by mouth every 6 hours as needed for Pain      metFORMIN (GLUCOPHAGE) 1000 MG tablet Take 1 tablet by mouth 2 times daily (with meals) 180 tablet 0    famotidine (PEPCID) 20 MG tablet Take 1 tablet by mouth 2 times daily 180 tablet 0    losartan (COZAAR) 50 MG tablet Take 1 tablet by mouth daily 90 tablet 1    ferrous sulfate (IRON 325) 325 (65 Fe) MG tablet Take 1 tablet by mouth daily (with breakfast) 90 tablet 1    albuterol sulfate HFA (PROVENTIL;VENTOLIN;PROAIR) 108 (90 Base) MCG/ACT inhaler INHALE 2 PUFFS INTO THE LUNGS 4 TIMES DAILY AS NEEDED FOR WHEEZING. 18 each 3

## 2025-06-24 NOTE — ED TRIAGE NOTES
Patient arrives from home for lower abdominal pain for 3-4 days that she describes as aching and constant. Patient expressing concerns for a urinary tract infection. Denies any dysuria.

## 2025-06-24 NOTE — ED NOTES
Discharge instructions were given to the patient by Yue Byrd RN .  The patient left the Emergency Department alert and oriented and in no acute distress with 3 prescription(s). The patient was encouraged to call or return to the ED for worsening issues or problems and was encouraged to schedule a follow up appointment for continuing care.      Ambulation assessment completed before discharge.  Pt left Emergency Department ambulating at baseline with no ortho devices  Ortho device education: none      The patient verbalized understanding of discharge instructions and prescriptions; all questions were answered. The patient has no further concerns at this time.

## 2025-06-25 ENCOUNTER — TRANSCRIBE ORDERS (OUTPATIENT)
Facility: HOSPITAL | Age: 45
End: 2025-06-25

## 2025-06-25 DIAGNOSIS — K21.9 GASTROESOPHAGEAL REFLUX DISEASE, UNSPECIFIED WHETHER ESOPHAGITIS PRESENT: Primary | ICD-10-CM

## 2025-06-25 DIAGNOSIS — K59.04 CHRONIC IDIOPATHIC CONSTIPATION: ICD-10-CM

## 2025-06-25 DIAGNOSIS — R11.0 NAUSEA: ICD-10-CM

## 2025-06-25 DIAGNOSIS — D64.9 ANEMIA, UNSPECIFIED TYPE: ICD-10-CM

## 2025-06-26 ENCOUNTER — HOSPITAL ENCOUNTER (EMERGENCY)
Facility: HOSPITAL | Age: 45
Discharge: HOME | End: 2025-06-26
Attending: EMERGENCY MEDICINE
Payer: MEDICARE

## 2025-06-26 VITALS
TEMPERATURE: 98.1 F | HEIGHT: 64 IN | HEART RATE: 94 BPM | OXYGEN SATURATION: 99 % | DIASTOLIC BLOOD PRESSURE: 74 MMHG | RESPIRATION RATE: 14 BRPM | BODY MASS INDEX: 38.93 KG/M2 | SYSTOLIC BLOOD PRESSURE: 144 MMHG | WEIGHT: 228 LBS

## 2025-06-26 DIAGNOSIS — N39.0 ACUTE UTI: ICD-10-CM

## 2025-06-26 DIAGNOSIS — T50.905A MEDICATION SIDE EFFECT, INITIAL ENCOUNTER: ICD-10-CM

## 2025-06-26 DIAGNOSIS — L24.9 IRRITANT DERMATITIS: ICD-10-CM

## 2025-06-26 DIAGNOSIS — T78.40XA ALLERGIC REACTION, INITIAL ENCOUNTER: Primary | ICD-10-CM

## 2025-06-26 PROCEDURE — 99283 EMERGENCY DEPT VISIT LOW MDM: CPT

## 2025-06-26 PROCEDURE — 6370000000 HC RX 637 (ALT 250 FOR IP): Performed by: EMERGENCY MEDICINE

## 2025-06-26 RX ORDER — CETIRIZINE HYDROCHLORIDE 10 MG/1
10 TABLET ORAL ONCE
Status: COMPLETED | OUTPATIENT
Start: 2025-06-26 | End: 2025-06-26

## 2025-06-26 RX ORDER — HYDROCORTISONE 25 MG/G
CREAM TOPICAL
Qty: 28 G | Refills: 0 | Status: SHIPPED | OUTPATIENT
Start: 2025-06-26

## 2025-06-26 RX ORDER — HYDROXYZINE HYDROCHLORIDE 25 MG/1
25 TABLET, FILM COATED ORAL EVERY 8 HOURS PRN
Qty: 30 TABLET | Refills: 0 | Status: SHIPPED | OUTPATIENT
Start: 2025-06-26 | End: 2025-07-06

## 2025-06-26 RX ORDER — CETIRIZINE HYDROCHLORIDE 10 MG/1
10 TABLET ORAL DAILY
Qty: 30 TABLET | Refills: 0 | Status: SHIPPED | OUTPATIENT
Start: 2025-06-26

## 2025-06-26 RX ORDER — DOXYCYCLINE HYCLATE 100 MG
100 TABLET ORAL 2 TIMES DAILY
Qty: 10 TABLET | Refills: 0 | Status: SHIPPED | OUTPATIENT
Start: 2025-06-26 | End: 2025-07-01

## 2025-06-26 RX ORDER — HYDROXYZINE HYDROCHLORIDE 25 MG/1
25 TABLET, FILM COATED ORAL ONCE
Status: COMPLETED | OUTPATIENT
Start: 2025-06-26 | End: 2025-06-26

## 2025-06-26 RX ORDER — PREDNISONE 20 MG/1
60 TABLET ORAL
Status: COMPLETED | OUTPATIENT
Start: 2025-06-26 | End: 2025-06-26

## 2025-06-26 RX ORDER — DIPHENHYDRAMINE HCL 25 MG
50 CAPSULE ORAL
Status: DISCONTINUED | OUTPATIENT
Start: 2025-06-26 | End: 2025-06-26 | Stop reason: HOSPADM

## 2025-06-26 RX ADMIN — CETIRIZINE HYDROCHLORIDE 10 MG: 10 TABLET, FILM COATED ORAL at 01:33

## 2025-06-26 RX ADMIN — PREDNISONE 60 MG: 20 TABLET ORAL at 01:33

## 2025-06-26 RX ADMIN — HYDROXYZINE HYDROCHLORIDE 25 MG: 25 TABLET, FILM COATED ORAL at 01:33

## 2025-06-26 ASSESSMENT — ENCOUNTER SYMPTOMS
EYE PAIN: 0
ABDOMINAL PAIN: 0
NAUSEA: 0
RHINORRHEA: 0
SORE THROAT: 0
DIARRHEA: 0
VOMITING: 0
SHORTNESS OF BREATH: 0
COUGH: 0

## 2025-06-26 ASSESSMENT — PAIN - FUNCTIONAL ASSESSMENT: PAIN_FUNCTIONAL_ASSESSMENT: NONE - DENIES PAIN

## 2025-06-26 NOTE — ED NOTES
Discharge instructions were given to the patient by Yue Byrd RN .  The patient left the Emergency Department alert and oriented and in no acute distress with 4 prescription(s). The patient was encouraged to call or return to the ED for worsening issues or problems and was encouraged to schedule a follow up appointment for continuing care.      Ambulation assessment completed before discharge.  Pt left Emergency Department ambulating at baseline with no ortho devices  Ortho device education: none      The patient verbalized understanding of discharge instructions and prescriptions; all questions were answered. The patient has no further concerns at this time.

## 2025-06-26 NOTE — DISCHARGE INSTRUCTIONS
Thank You!    It was a pleasure taking care of you in our Emergency Department today. We know that when you come to [facility], you are entrusting us with your health, comfort, and safety. Our physicians and nurses honor that trust, and truly appreciate the opportunity to care for you and your loved ones.      We also value your feedback. If you receive a survey about your Emergency Department experience today, please fill it out.  We care about our patients' feedback, and we listen to what you have to say.     Thank you again!    Dr. Maik Buitrago M.D.      ____________________________________________________________________  I have included a copy of your lab results and/or radiologic studies from today's visit so you can have them easily available at your follow-up visit. We hope you feel better and please do not hesitate to contact the ED if you have any questions at all!    No results found for this or any previous visit (from the past 12 hours).    No orders to display     The exam and treatment you received in the Emergency Department were for an urgent problem and are not intended as complete care. It is important that you follow up with a doctor, nurse practitioner, or physician assistant for ongoing care. If your symptoms become worse or you do not improve as expected and you are unable to reach your usual health care provider, you should return to the Emergency Department. We are available 24 hours a day.    Please take your discharge instructions with you when you go to your follow-up appointment.     If a prescription has been provided, please have it filled as soon as possible to prevent a delay in treatment. Read the entire medication instruction sheet provided to you by the pharmacy. If you have any questions or reservations about taking the medication due to side effects or interactions with other medications, please call your primary care physician or contact the ER to

## 2025-06-26 NOTE — ED TRIAGE NOTES
Pt presents to the ED with c/o allergic reaction to Macrobid that she started 2 days ago for a UTI. Reports skin is red and itching.pt took benadryl earlier today.

## 2025-06-26 NOTE — ED PROVIDER NOTES
EMERGENCY DEPARTMENT HISTORY AND PHYSICAL EXAM            Please note that this dictation was completed with the assistance of \"[employer]\", the computer voice recognition software. Quite often unanticipated grammatical, syntax, homophones, and other interpretive errors are inadvertently transcribed by the computer software. Please disregard these errors and any errors that have escaped final proofreading. Thank you.    Date of Evaluation: 06/26/25  Patient: Lindsey Wheat  Patient Age and Sex: 45 y.o. female   MRN: [id number]  CSN: [id number]  PCP: Romie Blackburn, KYLER - NP    History of Present Illness     Chief Complaint   Patient presents with    Allergic Reaction     History Provided By: Patient/family/EMS (if available)    History is limited by: Nothing     HPI: Lindsey Wheat, 45 y.o. female with past medical history as documented below presents to the ED with c/o of sudden onset of itchy pruritic rash noted to the extremities mostly the upper arms for the past day or so.  Patient notes being prescribed recently Macrobid for UTI.  She did take some Benadryl with some relief, denies any difficulty swallowing or voice changes.  Denies any other new soaps, detergents or lotions.  Patient states that she has multiple antibiotic allergies and doxycycline has worked for her in the past.. Pt denies any other exacerbating or ameliorating factors. There are no other complaints, changes or physical findings pertinent to the HPI at this time.    Nursing notes were all reviewed and agreed with or any disagreements were addressed in the HPI.    Past History   Past Medical History:  Past Medical History:   Diagnosis Date    Abnormal Pap smear     \"years ago\"     Allergic rhinitis     Bipolar disorder (HCC)     Bronchitis     Depression with anxiety 9/22/2010    Diabetes mellitus (HCC)     type II diabetes mellitus since 2004    GERD (gastroesophageal reflux disease)     High cholesterol     History of blood transfusion 2011

## 2025-06-26 NOTE — ED NOTES
Pt presents to ED complaining of allergic reaction to Macrobid that she started 2 days ago for a UTI. Pt reports generalized skin redness and itching sensation. Pt reports she took a benadryl earlier today with minimal symptom relief. Pt denies cardiac or respiratory distress. Pt is alert and oriented x 4, RR even and unlabored, skin is warm and dry. Pt appears in NAD at this time. Assessment completed and pt updated on plan of care.  Call bell in reach.      Emergency Department Nursing Plan of Care  The Nursing Plan of Care is developed from the Nursing assessment and Emergency Department Attending provider initial evaluation.  The plan of care may be reviewed in the “ED Provider note”.  The Plan of Care was developed with the following considerations:  Patient / Family readiness to learn indicated by:Refer to Medical chart in [facility]  Persons(s) to be included in education: Refer to Medical chart in [facility]  Barriers to Learning/Limitations:Normal      Signed    Yue Byrd, RN    6/26/2025   1:45 AM

## 2025-07-16 PROCEDURE — 99285 EMERGENCY DEPT VISIT HI MDM: CPT

## 2025-07-16 PROCEDURE — 96374 THER/PROPH/DIAG INJ IV PUSH: CPT

## 2025-07-17 ENCOUNTER — HOSPITAL ENCOUNTER (EMERGENCY)
Facility: HOSPITAL | Age: 45
Discharge: HOME | End: 2025-07-17
Attending: EMERGENCY MEDICINE
Payer: MEDICARE

## 2025-07-17 ENCOUNTER — APPOINTMENT (OUTPATIENT)
Facility: HOSPITAL | Age: 45
End: 2025-07-17
Payer: MEDICARE

## 2025-07-17 VITALS
TEMPERATURE: 98.2 F | HEIGHT: 64 IN | SYSTOLIC BLOOD PRESSURE: 138 MMHG | HEART RATE: 86 BPM | WEIGHT: 239 LBS | DIASTOLIC BLOOD PRESSURE: 94 MMHG | OXYGEN SATURATION: 98 % | RESPIRATION RATE: 18 BRPM | BODY MASS INDEX: 40.8 KG/M2

## 2025-07-17 DIAGNOSIS — R07.89 ATYPICAL CHEST PAIN: Primary | ICD-10-CM

## 2025-07-17 LAB
ALBUMIN SERPL-MCNC: 3.7 G/DL (ref 3.5–5)
ALBUMIN/GLOB SERPL: 1 (ref 1.1–2.2)
ALP SERPL-CCNC: 89 U/L (ref 45–117)
ALT SERPL-CCNC: 26 U/L (ref 12–78)
ANION GAP SERPL CALC-SCNC: 11 MMOL/L (ref 2–12)
APPEARANCE UR: CLEAR
AST SERPL-CCNC: 11 U/L (ref 15–37)
BACTERIA URNS QL MICRO: ABNORMAL /HPF
BASOPHILS # BLD: 0.07 K/UL (ref 0–0.1)
BASOPHILS NFR BLD: 0.9 % (ref 0–1)
BILIRUB SERPL-MCNC: 0.2 MG/DL (ref 0.2–1)
BILIRUB UR QL: NEGATIVE
BUN SERPL-MCNC: 11 MG/DL (ref 6–20)
BUN/CREAT SERPL: 11 (ref 12–20)
CALCIUM SERPL-MCNC: 8.9 MG/DL (ref 8.5–10.1)
CHLORIDE SERPL-SCNC: 101 MMOL/L (ref 97–108)
CO2 SERPL-SCNC: 26 MMOL/L (ref 21–32)
COLOR UR: ABNORMAL
CREAT SERPL-MCNC: 1.01 MG/DL (ref 0.55–1.02)
DIFFERENTIAL METHOD BLD: ABNORMAL
EKG ATRIAL RATE: 90 BPM
EKG DIAGNOSIS: NORMAL
EKG P AXIS: 48 DEGREES
EKG P-R INTERVAL: 154 MS
EKG Q-T INTERVAL: 372 MS
EKG QRS DURATION: 76 MS
EKG QTC CALCULATION (BAZETT): 455 MS
EKG R AXIS: 23 DEGREES
EKG T AXIS: 38 DEGREES
EKG VENTRICULAR RATE: 90 BPM
EOSINOPHIL # BLD: 0.28 K/UL (ref 0–0.4)
EOSINOPHIL NFR BLD: 3.6 % (ref 0–7)
EPITH CASTS URNS QL MICRO: ABNORMAL /LPF
ERYTHROCYTE [DISTWIDTH] IN BLOOD BY AUTOMATED COUNT: 17.8 % (ref 11.5–14.5)
GLOBULIN SER CALC-MCNC: 3.7 G/DL (ref 2–4)
GLUCOSE SERPL-MCNC: 227 MG/DL (ref 65–100)
GLUCOSE UR STRIP.AUTO-MCNC: >1000 MG/DL
HCT VFR BLD AUTO: 39.1 % (ref 35–47)
HGB BLD-MCNC: 13 G/DL (ref 11.5–16)
HGB UR QL STRIP: NEGATIVE
IMM GRANULOCYTES # BLD AUTO: 0.04 K/UL (ref 0–0.04)
IMM GRANULOCYTES NFR BLD AUTO: 0.5 % (ref 0–0.5)
KETONES UR QL STRIP.AUTO: 15 MG/DL
LEUKOCYTE ESTERASE UR QL STRIP.AUTO: NEGATIVE
LIPASE SERPL-CCNC: 39 U/L (ref 13–75)
LYMPHOCYTES # BLD: 2.44 K/UL (ref 0.8–3.5)
LYMPHOCYTES NFR BLD: 31.6 % (ref 12–49)
MCH RBC QN AUTO: 26.5 PG (ref 26–34)
MCHC RBC AUTO-ENTMCNC: 33.2 G/DL (ref 30–36.5)
MCV RBC AUTO: 79.6 FL (ref 80–99)
MONOCYTES # BLD: 0.56 K/UL (ref 0–1)
MONOCYTES NFR BLD: 7.3 % (ref 5–13)
NEUTS SEG # BLD: 4.32 K/UL (ref 1.8–8)
NEUTS SEG NFR BLD: 56.1 % (ref 32–75)
NITRITE UR QL STRIP.AUTO: NEGATIVE
NRBC # BLD: 0 K/UL (ref 0–0.01)
NRBC BLD-RTO: 0 PER 100 WBC
PH UR STRIP: 5.5 (ref 5–8)
PLATELET # BLD AUTO: 197 K/UL (ref 150–400)
POTASSIUM SERPL-SCNC: 4 MMOL/L (ref 3.5–5.1)
PROT SERPL-MCNC: 7.4 G/DL (ref 6.4–8.2)
PROT UR STRIP-MCNC: NEGATIVE MG/DL
RBC # BLD AUTO: 4.91 M/UL (ref 3.8–5.2)
RBC #/AREA URNS HPF: ABNORMAL /HPF (ref 0–5)
SODIUM SERPL-SCNC: 138 MMOL/L (ref 136–145)
SP GR UR REFRACTOMETRY: 1.02 (ref 1–1.03)
TROPONIN I SERPL HS-MCNC: 9 NG/L (ref 0–51)
URINE CULTURE IF INDICATED: ABNORMAL
UROBILINOGEN UR QL STRIP.AUTO: 1 EU/DL (ref 0.2–1)
WBC # BLD AUTO: 7.7 K/UL (ref 3.6–11)
WBC URNS QL MICRO: ABNORMAL /HPF (ref 0–4)

## 2025-07-17 PROCEDURE — 81001 URINALYSIS AUTO W/SCOPE: CPT

## 2025-07-17 PROCEDURE — 80053 COMPREHEN METABOLIC PANEL: CPT

## 2025-07-17 PROCEDURE — 6360000002 HC RX W HCPCS: Performed by: EMERGENCY MEDICINE

## 2025-07-17 PROCEDURE — 6370000000 HC RX 637 (ALT 250 FOR IP): Performed by: EMERGENCY MEDICINE

## 2025-07-17 PROCEDURE — 84484 ASSAY OF TROPONIN QUANT: CPT

## 2025-07-17 PROCEDURE — 85025 COMPLETE CBC W/AUTO DIFF WBC: CPT

## 2025-07-17 PROCEDURE — 71045 X-RAY EXAM CHEST 1 VIEW: CPT

## 2025-07-17 PROCEDURE — 93005 ELECTROCARDIOGRAM TRACING: CPT | Performed by: EMERGENCY MEDICINE

## 2025-07-17 PROCEDURE — 36415 COLL VENOUS BLD VENIPUNCTURE: CPT

## 2025-07-17 PROCEDURE — 83690 ASSAY OF LIPASE: CPT

## 2025-07-17 PROCEDURE — 96374 THER/PROPH/DIAG INJ IV PUSH: CPT

## 2025-07-17 RX ORDER — KETOROLAC TROMETHAMINE 30 MG/ML
15 INJECTION, SOLUTION INTRAMUSCULAR; INTRAVENOUS ONCE
Status: COMPLETED | OUTPATIENT
Start: 2025-07-17 | End: 2025-07-17

## 2025-07-17 RX ADMIN — LIDOCAINE HYDROCHLORIDE 40 ML: 20 SOLUTION ORAL at 02:18

## 2025-07-17 RX ADMIN — KETOROLAC TROMETHAMINE 15 MG: 30 INJECTION, SOLUTION INTRAMUSCULAR at 03:14

## 2025-07-17 ASSESSMENT — HEART SCORE: ECG: NORMAL

## 2025-07-17 ASSESSMENT — PAIN DESCRIPTION - LOCATION: LOCATION: OTHER (COMMENT)

## 2025-07-17 ASSESSMENT — PAIN - FUNCTIONAL ASSESSMENT: PAIN_FUNCTIONAL_ASSESSMENT: 0-10

## 2025-07-17 ASSESSMENT — PAIN DESCRIPTION - ORIENTATION: ORIENTATION: RIGHT

## 2025-07-17 ASSESSMENT — PAIN SCALES - GENERAL: PAINLEVEL_OUTOF10: 9

## 2025-07-17 NOTE — ED PROVIDER NOTES
[facility] EMERGENCY DEPARTMENT  EMERGENCY DEPARTMENT ENCOUNTER       Pt Name: Lindsey Wheat  MRN: [id number]  Birthdate 1980  Date of evaluation: 2025  Provider: Yolanda Manning MD   PCP: Romie Blackburn APRN - NP  Note Started: 2:07 AM EDT 25     CHIEF COMPLAINT       Chief Complaint   Patient presents with    Side Pain        HISTORY OF PRESENT ILLNESS: 1 or more elements      History From: patient, History limited by: none     Lindsey Wheat is a 45 y.o. female who presents with a chief complaint of right-sided chest pain as well as \"GERD.\"       Please See MDM for Additional Details of the HPI/PMH  Nursing Notes were all reviewed and agreed with or any disagreements were addressed in the HPI.     REVIEW OF SYSTEMS        Positives and Pertinent negatives as per HPI.    PAST HISTORY     Past Medical History:  Past Medical History:   Diagnosis Date    Abnormal Pap smear     \"years ago\"     Allergic rhinitis     Bipolar disorder (HCC)     Bronchitis     Depression with anxiety 2010    Diabetes mellitus (HCC)     type II diabetes mellitus since     GERD (gastroesophageal reflux disease)     High cholesterol     History of blood transfusion     HTN (hypertension) 2010    Obesity     Postpartum depression     hospitalized after last delivery    Rhinitis 2012    Sleep apnea     uses cpap    UTI (urinary tract infection)        Past Surgical History:  Past Surgical History:   Procedure Laterality Date     SECTION      X 3    COLONOSCOPY      TUBAL LIGATION      UPPER GASTROINTESTINAL ENDOSCOPY N/A 2023    EGD WITH BRAVO, dilation, biopsy performed by Brandy Khoury MD at [facility] ENDOSCOPY    UPPER GASTROINTESTINAL ENDOSCOPY N/A 2023    GERD TEST W/ ELECTRODE performed by Brandy Khoury MD at [facility] ENDOSCOPY    WISDOM TOOTH EXTRACTION         Family History:  Family History   Problem Relation Age of Onset    Diabetes Maternal Grandmother     Hypertension

## 2025-07-17 NOTE — ED NOTES
Pt presents ambulatory to ED complaining of right side pain under the right beast and upper GI pain. Pt reports a history of GERD and has not taken medications prior to arrival.     Pt reporting concern for pneumonia as the pain feels similar to her last visit. Pt requesting a x ray.    Pt is alert and oriented x 4, RR even and unlabored, skin is warm and dry. Assesment completed and pt updated on plan of care.       Emergency Department Nursing Plan of Care       The Nursing Plan of Care is developed from the Nursing assessment and Emergency Department Attending provider initial evaluation.  The plan of care may be reviewed in the “ED Provider note”.    The Plan of Care was developed with the following considerations:   Patient / Family readiness to learn indicated by:verbalized understanding  Persons(s) to be included in education: patient  Barriers to Learning/Limitations:None    Signed     Patti Pandya RN    7/17/2025   1:32 AM

## 2025-07-17 NOTE — ED NOTES
Discharge instructions were given to the patient by Patti Pandya RN.     The patient left the Emergency Department alert and oriented and in no acute distress with 0 prescriptions. The patient was encouraged to call or return to the ED for worsening issues or problems and was encouraged to schedule a follow up appointment for continuing care.     Ambulation assessment completed before discharge.  Pt left Emergency Department ambulating at baseline with no ortho devices  Ortho device education: none    The patient verbalized understanding of discharge instructions and prescriptions, all questions were answered. The patient has no further concerns at this time.

## 2025-07-17 NOTE — ED TRIAGE NOTES
Pt presents to ED with c/o right sided pain underneath right breast that started today. Pt reports hx of acid reflux. Pt denies meds PTA.

## 2025-07-21 ENCOUNTER — HOSPITAL ENCOUNTER (EMERGENCY)
Facility: HOSPITAL | Age: 45
Discharge: HOME | End: 2025-07-21
Payer: MEDICARE

## 2025-07-21 VITALS
HEART RATE: 90 BPM | HEIGHT: 64 IN | BODY MASS INDEX: 40.63 KG/M2 | OXYGEN SATURATION: 96 % | WEIGHT: 238 LBS | TEMPERATURE: 98.2 F | SYSTOLIC BLOOD PRESSURE: 144 MMHG | DIASTOLIC BLOOD PRESSURE: 80 MMHG | RESPIRATION RATE: 18 BRPM

## 2025-07-21 DIAGNOSIS — B96.89 BV (BACTERIAL VAGINOSIS): ICD-10-CM

## 2025-07-21 DIAGNOSIS — R10.2 PELVIC PAIN: Primary | ICD-10-CM

## 2025-07-21 DIAGNOSIS — N76.0 BV (BACTERIAL VAGINOSIS): ICD-10-CM

## 2025-07-21 LAB
CLUE CELLS VAG QL WET PREP: ABNORMAL
T VAGINALIS VAG QL WET PREP: ABNORMAL
YEAST WET PREP: ABNORMAL

## 2025-07-21 PROCEDURE — 87591 N.GONORRHOEAE DNA AMP PROB: CPT

## 2025-07-21 PROCEDURE — 87210 SMEAR WET MOUNT SALINE/INK: CPT

## 2025-07-21 PROCEDURE — 87491 CHLMYD TRACH DNA AMP PROBE: CPT

## 2025-07-21 PROCEDURE — 99283 EMERGENCY DEPT VISIT LOW MDM: CPT

## 2025-07-21 RX ORDER — TINIDAZOLE 500 MG/1
1 TABLET ORAL
Qty: 10 TABLET | Refills: 0 | Status: SHIPPED | OUTPATIENT
Start: 2025-07-21 | End: 2025-07-26

## 2025-07-21 RX ORDER — KETOROLAC TROMETHAMINE 30 MG/ML
30 INJECTION, SOLUTION INTRAMUSCULAR; INTRAVENOUS ONCE
Status: DISCONTINUED | OUTPATIENT
Start: 2025-07-21 | End: 2025-07-21 | Stop reason: HOSPADM

## 2025-07-21 RX ORDER — FLUCONAZOLE 150 MG/1
150 TABLET ORAL ONCE
Qty: 1 TABLET | Refills: 0 | Status: SHIPPED | OUTPATIENT
Start: 2025-07-21 | End: 2025-07-21

## 2025-07-21 RX ORDER — NAPROXEN 500 MG/1
500 TABLET ORAL 2 TIMES DAILY PRN
Qty: 30 TABLET | Refills: 0 | Status: SHIPPED | OUTPATIENT
Start: 2025-07-21

## 2025-07-21 ASSESSMENT — PAIN DESCRIPTION - LOCATION: LOCATION: ABDOMEN

## 2025-07-21 ASSESSMENT — PAIN - FUNCTIONAL ASSESSMENT: PAIN_FUNCTIONAL_ASSESSMENT: 0-10

## 2025-07-21 ASSESSMENT — PAIN DESCRIPTION - ORIENTATION: ORIENTATION: RIGHT;LEFT

## 2025-07-21 ASSESSMENT — PAIN SCALES - GENERAL: PAINLEVEL_OUTOF10: 9

## 2025-07-21 NOTE — ED TRIAGE NOTES
Pt reports abdominal pain and pelvic pain, reports she was seen recently but these symptoms were not addressed at that time

## 2025-07-21 NOTE — ED NOTES
Discharge instructions were given to the patient by MARCO A Barrera.     The patient left the Emergency Department alert and oriented and in no acute distress with 2 prescriptions. The patient was encouraged to call or return to the ED for worsening issues or problems and was encouraged to schedule a follow up appointment for continuing care.     Ambulation assessment completed before discharge.  Pt left Emergency Department ambulating at baseline with no ortho devices  Ortho device education: none    The patient verbalized understanding of discharge instructions and prescriptions, all questions were answered. The patient has no further concerns at this time.

## 2025-07-21 NOTE — DISCHARGE INSTRUCTIONS
You have been prescribed tinidazole for your infection, while it is similar to metronidazole it is not the same medication.  I recommend taking 25 mg of Benadryl 1 hour prior to taking the medication to prevent any reactions

## 2025-07-21 NOTE — ED PROVIDER NOTES
[facility] EMERGENCY DEPARTMENT  EMERGENCY DEPARTMENT ENCOUNTER         Pt Name: Lindsey Wheat  MRN: [id number]  Birthdate 1980  Date of evaluation: 7/21/2025  Provider: Joseline Anderson PA-C   PCP: Romie Blackburn APRN - NP  Note Started: 3:49 PM EDT 7/21/25     CHIEF COMPLAINT       Chief Complaint   Patient presents with    Abdominal Pain    Pelvic Pain        HISTORY OF PRESENT ILLNESS: 1 or more elements      History From: Patient  HPI Limitations: None  Arrival Mode: private vehicle    Chief Complaint  Abdominal pain for 3-4 weeks, worsening since last visit, \"uncomfortable and painful\"    History of Present Illness  INA Wheat, a 45-year-old female with a history of diabetes and anemia, presents to the Emergency Department with worsening abdominal pain. The patient reports a recent ED visit 3 weeks to a month ago for abdominal pain, at which time she was diagnosed with a UTI and given antibiotics. She returned to the ED a couple of days ago with persistent symptoms, where blood work was done and she was told she did not have a UTI.    The patient describes her current pain as located in the lower abdomen, specifically \"right here, down. Inside.\"  The pain has become worse since her last ED visit. She denies any specific pain with urination or discharge. The patient expresses concern about possible sexually transmitted infections, noting unprotected sexual activity over a year and a half ago, though she has not been sexually active since then.    The patient's medical history is significant for fibroids and ovarian cysts. She mentions that she was supposed to have a hysterectomy but did not proceed with it. She reports having an ultrasound earlier this year, though the results are not available in the current medical record system. The patient canceled a follow-up appointment with her OB-GYN scheduled for the next day due to the severity of her pain.        Nursing Notes were all reviewed

## 2025-07-21 NOTE — ED NOTES
See triage note. Pt is alert and oriented x 4, RR even and unlabored, skin is warm and dry. Assesment completed and pt updated on plan of care.       Emergency Department Nursing Plan of Care       The Nursing Plan of Care is developed from the Nursing assessment and Emergency Department Attending provider initial evaluation.  The plan of care may be reviewed in the “ED Provider note”.    The Plan of Care was developed with the following considerations:   Patient / Family readiness to learn indicated by:verbalized understanding  Persons(s) to be included in education: patient  Barriers to Learning/Limitations:None    Signed

## 2025-07-23 LAB
C TRACH DNA SPEC QL NAA+PROBE: NEGATIVE
N GONORRHOEA DNA SPEC QL NAA+PROBE: NEGATIVE
SAMPLE TYPE: NORMAL
SERVICE CMNT-IMP: NORMAL
SPECIMEN SOURCE: NORMAL

## 2025-08-20 ENCOUNTER — OFFICE VISIT (OUTPATIENT)
Age: 45
End: 2025-08-20
Payer: MEDICARE

## 2025-08-20 VITALS
OXYGEN SATURATION: 97 % | BODY MASS INDEX: 40.97 KG/M2 | DIASTOLIC BLOOD PRESSURE: 81 MMHG | WEIGHT: 240 LBS | HEART RATE: 91 BPM | RESPIRATION RATE: 16 BRPM | TEMPERATURE: 97.5 F | SYSTOLIC BLOOD PRESSURE: 142 MMHG | HEIGHT: 64 IN

## 2025-08-20 DIAGNOSIS — D50.0 IRON DEFICIENCY ANEMIA DUE TO CHRONIC BLOOD LOSS: Primary | ICD-10-CM

## 2025-08-20 DIAGNOSIS — K90.89 POOR IRON ABSORPTION: ICD-10-CM

## 2025-08-20 PROCEDURE — 3077F SYST BP >= 140 MM HG: CPT | Performed by: STUDENT IN AN ORGANIZED HEALTH CARE EDUCATION/TRAINING PROGRAM

## 2025-08-20 PROCEDURE — 3079F DIAST BP 80-89 MM HG: CPT | Performed by: STUDENT IN AN ORGANIZED HEALTH CARE EDUCATION/TRAINING PROGRAM

## 2025-08-20 PROCEDURE — 99213 OFFICE O/P EST LOW 20 MIN: CPT | Performed by: STUDENT IN AN ORGANIZED HEALTH CARE EDUCATION/TRAINING PROGRAM

## 2025-09-04 ENCOUNTER — OFFICE VISIT (OUTPATIENT)
Facility: CLINIC | Age: 45
End: 2025-09-04
Payer: MEDICARE

## 2025-09-04 VITALS
WEIGHT: 239.7 LBS | HEIGHT: 64 IN | DIASTOLIC BLOOD PRESSURE: 78 MMHG | RESPIRATION RATE: 16 BRPM | BODY MASS INDEX: 40.92 KG/M2 | TEMPERATURE: 98.7 F | HEART RATE: 96 BPM | OXYGEN SATURATION: 99 % | SYSTOLIC BLOOD PRESSURE: 136 MMHG

## 2025-09-04 DIAGNOSIS — R13.10 DYSPHAGIA, UNSPECIFIED TYPE: ICD-10-CM

## 2025-09-04 DIAGNOSIS — K21.00 GASTROESOPHAGEAL REFLUX DISEASE WITH ESOPHAGITIS WITHOUT HEMORRHAGE: ICD-10-CM

## 2025-09-04 DIAGNOSIS — Z12.11 SCREENING FOR MALIGNANT NEOPLASM OF COLON: ICD-10-CM

## 2025-09-04 DIAGNOSIS — I10 PRIMARY HYPERTENSION: ICD-10-CM

## 2025-09-04 DIAGNOSIS — E11.9 TYPE 2 DIABETES MELLITUS WITHOUT COMPLICATION, WITHOUT LONG-TERM CURRENT USE OF INSULIN (HCC): ICD-10-CM

## 2025-09-04 DIAGNOSIS — Z00.00 MEDICARE ANNUAL WELLNESS VISIT, SUBSEQUENT: Primary | ICD-10-CM

## 2025-09-04 DIAGNOSIS — Z79.899 ENCOUNTER FOR LONG-TERM (CURRENT) USE OF MEDICATIONS: ICD-10-CM

## 2025-09-04 PROCEDURE — 3052F HG A1C>EQUAL 8.0%<EQUAL 9.0%: CPT | Performed by: NURSE PRACTITIONER

## 2025-09-04 PROCEDURE — G0439 PPPS, SUBSEQ VISIT: HCPCS | Performed by: NURSE PRACTITIONER

## 2025-09-04 PROCEDURE — 3075F SYST BP GE 130 - 139MM HG: CPT | Performed by: NURSE PRACTITIONER

## 2025-09-04 PROCEDURE — 99214 OFFICE O/P EST MOD 30 MIN: CPT | Performed by: NURSE PRACTITIONER

## 2025-09-04 PROCEDURE — 3078F DIAST BP <80 MM HG: CPT | Performed by: NURSE PRACTITIONER

## 2025-09-04 RX ORDER — PANTOPRAZOLE SODIUM 40 MG/1
40 TABLET, DELAYED RELEASE ORAL
Qty: 90 TABLET | Refills: 1 | Status: SHIPPED | OUTPATIENT
Start: 2025-09-04

## 2025-09-04 ASSESSMENT — PATIENT HEALTH QUESTIONNAIRE - PHQ9
SUM OF ALL RESPONSES TO PHQ QUESTIONS 1-9: 0
9. THOUGHTS THAT YOU WOULD BE BETTER OFF DEAD, OR OF HURTING YOURSELF: NOT AT ALL
SUM OF ALL RESPONSES TO PHQ QUESTIONS 1-9: 0
8. MOVING OR SPEAKING SO SLOWLY THAT OTHER PEOPLE COULD HAVE NOTICED. OR THE OPPOSITE, BEING SO FIGETY OR RESTLESS THAT YOU HAVE BEEN MOVING AROUND A LOT MORE THAN USUAL: NOT AT ALL
1. LITTLE INTEREST OR PLEASURE IN DOING THINGS: NOT AT ALL
SUM OF ALL RESPONSES TO PHQ QUESTIONS 1-9: 0
3. TROUBLE FALLING OR STAYING ASLEEP: NOT AT ALL
6. FEELING BAD ABOUT YOURSELF - OR THAT YOU ARE A FAILURE OR HAVE LET YOURSELF OR YOUR FAMILY DOWN: NOT AT ALL
2. FEELING DOWN, DEPRESSED OR HOPELESS: NOT AT ALL
SUM OF ALL RESPONSES TO PHQ QUESTIONS 1-9: 0
10. IF YOU CHECKED OFF ANY PROBLEMS, HOW DIFFICULT HAVE THESE PROBLEMS MADE IT FOR YOU TO DO YOUR WORK, TAKE CARE OF THINGS AT HOME, OR GET ALONG WITH OTHER PEOPLE: NOT DIFFICULT AT ALL
5. POOR APPETITE OR OVEREATING: NOT AT ALL
7. TROUBLE CONCENTRATING ON THINGS, SUCH AS READING THE NEWSPAPER OR WATCHING TELEVISION: NOT AT ALL
4. FEELING TIRED OR HAVING LITTLE ENERGY: NOT AT ALL

## 2025-09-05 LAB
ALBUMIN SERPL-MCNC: 3.6 G/DL (ref 3.5–5.2)
ALBUMIN/GLOB SERPL: 1.2 (ref 1.1–2.2)
ALP SERPL-CCNC: 70 U/L (ref 35–104)
ALT SERPL-CCNC: 15 U/L (ref 10–35)
ANION GAP SERPL CALC-SCNC: 11 MMOL/L (ref 2–14)
APPEARANCE UR: ABNORMAL
AST SERPL-CCNC: 18 U/L (ref 10–35)
BACTERIA URNS QL MICRO: NEGATIVE /HPF
BILIRUB SERPL-MCNC: <0.2 MG/DL (ref 0–1.2)
BILIRUB UR QL: NEGATIVE
BUN SERPL-MCNC: 7 MG/DL (ref 6–20)
BUN/CREAT SERPL: 11 (ref 12–20)
CALCIUM SERPL-MCNC: 9.9 MG/DL (ref 8.6–10)
CHLORIDE SERPL-SCNC: 98 MMOL/L (ref 98–107)
CO2 SERPL-SCNC: 26 MMOL/L (ref 20–29)
COLOR UR: ABNORMAL
COMMENT:: NORMAL
CREAT SERPL-MCNC: 0.66 MG/DL (ref 0.6–1)
CREAT UR-MCNC: 156 MG/DL (ref 28–217)
EPITH CASTS URNS QL MICRO: ABNORMAL /LPF
EST. AVERAGE GLUCOSE BLD GHB EST-MCNC: 202 MG/DL
GLOBULIN SER CALC-MCNC: 3.1 G/DL (ref 2–4)
GLUCOSE SERPL-MCNC: 146 MG/DL (ref 65–100)
GLUCOSE UR STRIP.AUTO-MCNC: >1000 MG/DL
HBA1C MFR BLD: 8.7 % (ref 4–5.6)
HGB UR QL STRIP: ABNORMAL
KETONES UR QL STRIP.AUTO: ABNORMAL MG/DL
LEUKOCYTE ESTERASE UR QL STRIP.AUTO: NEGATIVE
MICROALBUMIN UR-MCNC: 2.66 MG/DL
MICROALBUMIN/CREAT UR-RTO: 17 MG/G
NITRITE UR QL STRIP.AUTO: NEGATIVE
PH UR STRIP: 5.5 (ref 5–8)
POTASSIUM SERPL-SCNC: 4.3 MMOL/L (ref 3.5–5.1)
PROT SERPL-MCNC: 6.7 G/DL (ref 6.4–8.3)
PROT UR STRIP-MCNC: NEGATIVE MG/DL
RBC #/AREA URNS HPF: ABNORMAL /HPF (ref 0–5)
SODIUM SERPL-SCNC: 135 MMOL/L (ref 136–145)
SP GR UR REFRACTOMETRY: 1.02 (ref 1–1.03)
SPECIMEN HOLD: NORMAL
TSH, 3RD GENERATION: 0.89 UIU/ML (ref 0.27–4.2)
URINE CULTURE IF INDICATED: ABNORMAL
UROBILINOGEN UR QL STRIP.AUTO: 0.2 EU/DL (ref 0.2–1)
WBC URNS QL MICRO: ABNORMAL /HPF (ref 0–4)

## (undated) DEVICE — FORCEPS BX L240CM JAW DIA2.8MM L CAP W/ NDL MIC MESH TOOTH

## (undated) DEVICE — TUBING HYDR IRR --

## (undated) DEVICE — Device

## (undated) DEVICE — BRAVO CF CAPSULE  DELIVERY DEV, 5-PK: Brand: BRAVO

## (undated) DEVICE — CLEANGUIDE DISPOSABLE MARKED SPRING TIP GUIDEWIRE, 1.86 MM X 210 CM: Brand: CLEANGUIDE